# Patient Record
Sex: FEMALE | Race: WHITE | Employment: OTHER | URBAN - METROPOLITAN AREA
[De-identification: names, ages, dates, MRNs, and addresses within clinical notes are randomized per-mention and may not be internally consistent; named-entity substitution may affect disease eponyms.]

---

## 2017-07-18 RX ORDER — ATORVASTATIN CALCIUM 40 MG/1
TABLET, FILM COATED ORAL
Qty: 90 TABLET | Refills: 2 | Status: SHIPPED | OUTPATIENT
Start: 2017-07-18 | End: 2017-09-15 | Stop reason: SDUPTHER

## 2017-09-15 ENCOUNTER — OFFICE VISIT (OUTPATIENT)
Dept: FAMILY MEDICINE CLINIC | Age: 72
End: 2017-09-15

## 2017-09-15 VITALS
BODY MASS INDEX: 22.45 KG/M2 | WEIGHT: 122 LBS | SYSTOLIC BLOOD PRESSURE: 128 MMHG | HEART RATE: 72 BPM | TEMPERATURE: 97.7 F | RESPIRATION RATE: 16 BRPM | DIASTOLIC BLOOD PRESSURE: 64 MMHG | HEIGHT: 62 IN

## 2017-09-15 DIAGNOSIS — E03.9 HYPOTHYROIDISM, UNSPECIFIED TYPE: ICD-10-CM

## 2017-09-15 DIAGNOSIS — I10 ESSENTIAL HYPERTENSION: Primary | ICD-10-CM

## 2017-09-15 DIAGNOSIS — E78.2 MIXED HYPERLIPIDEMIA: ICD-10-CM

## 2017-09-15 DIAGNOSIS — Z87.891 HISTORY OF TOBACCO ABUSE: ICD-10-CM

## 2017-09-15 DIAGNOSIS — I65.23 BILATERAL CAROTID ARTERY STENOSIS: ICD-10-CM

## 2017-09-15 LAB
T4 FREE: 1.69 NG/DL (ref 0.93–1.7)
TSH SERPL DL<=0.05 MIU/L-ACNC: 0.19 UIU/ML (ref 0.27–4.2)

## 2017-09-15 PROCEDURE — 99203 OFFICE O/P NEW LOW 30 MIN: CPT | Performed by: FAMILY MEDICINE

## 2017-09-15 RX ORDER — GABAPENTIN 800 MG/1
800 TABLET ORAL DAILY
COMMUNITY
End: 2017-09-15 | Stop reason: SDUPTHER

## 2017-09-15 RX ORDER — FOSINOPRIL SODIUM 10 MG/1
10 TABLET ORAL DAILY
Qty: 30 TABLET | Refills: 5 | Status: ON HOLD | OUTPATIENT
Start: 2017-09-15 | End: 2022-07-11 | Stop reason: HOSPADM

## 2017-09-15 RX ORDER — LEVOTHYROXINE SODIUM 88 UG/1
TABLET ORAL
Qty: 30 TABLET | Refills: 5 | Status: SHIPPED | OUTPATIENT
Start: 2017-09-15 | End: 2018-02-14 | Stop reason: SDUPTHER

## 2017-09-15 RX ORDER — GABAPENTIN 800 MG/1
800 TABLET ORAL DAILY
Qty: 30 TABLET | Refills: 5 | Status: SHIPPED | OUTPATIENT
Start: 2017-09-15 | End: 2018-06-20 | Stop reason: SDUPTHER

## 2017-09-15 RX ORDER — ATORVASTATIN CALCIUM 40 MG/1
TABLET, FILM COATED ORAL
Qty: 30 TABLET | Refills: 5 | Status: SHIPPED | OUTPATIENT
Start: 2017-09-15 | End: 2018-12-30 | Stop reason: SDUPTHER

## 2017-09-15 RX ORDER — AMLODIPINE BESYLATE 5 MG/1
5 TABLET ORAL DAILY
Qty: 30 TABLET | Refills: 5 | Status: SHIPPED | OUTPATIENT
Start: 2017-09-15 | End: 2018-03-16 | Stop reason: SDUPTHER

## 2017-09-15 RX ORDER — ISOSORBIDE MONONITRATE 30 MG/1
30 TABLET, EXTENDED RELEASE ORAL DAILY
Qty: 30 TABLET | Refills: 5 | Status: SHIPPED | OUTPATIENT
Start: 2017-09-15 | End: 2018-03-07 | Stop reason: SDUPTHER

## 2017-09-15 RX ORDER — LEVOTHYROXINE SODIUM 88 UG/1
TABLET ORAL
Refills: 3 | COMMUNITY
Start: 2017-06-15 | End: 2017-09-15 | Stop reason: SDUPTHER

## 2018-02-05 DIAGNOSIS — I10 ESSENTIAL HYPERTENSION: ICD-10-CM

## 2018-02-14 DIAGNOSIS — E03.9 HYPOTHYROIDISM, UNSPECIFIED TYPE: ICD-10-CM

## 2018-02-14 RX ORDER — LEVOTHYROXINE SODIUM 88 UG/1
TABLET ORAL
Qty: 30 TABLET | Refills: 5 | Status: SHIPPED | OUTPATIENT
Start: 2018-02-14 | End: 2018-08-31 | Stop reason: SDUPTHER

## 2018-03-07 RX ORDER — ISOSORBIDE MONONITRATE 30 MG/1
30 TABLET, EXTENDED RELEASE ORAL DAILY
Qty: 30 TABLET | Refills: 5 | Status: SHIPPED | OUTPATIENT
Start: 2018-03-07 | End: 2018-03-16 | Stop reason: SDUPTHER

## 2018-03-15 RX ORDER — PREDNISONE 20 MG/1
TABLET ORAL
Refills: 0 | Status: ON HOLD | COMMUNITY
Start: 2018-01-11 | End: 2019-05-27

## 2018-03-15 RX ORDER — FLUCONAZOLE 150 MG/1
TABLET ORAL
Refills: 0 | Status: ON HOLD | COMMUNITY
Start: 2018-01-11 | End: 2019-05-28 | Stop reason: HOSPADM

## 2018-03-15 RX ORDER — BENZONATATE 100 MG/1
CAPSULE ORAL
Refills: 0 | Status: ON HOLD | COMMUNITY
Start: 2018-01-11 | End: 2021-09-10

## 2018-03-15 RX ORDER — DOXYCYCLINE HYCLATE 100 MG
TABLET ORAL
Refills: 0 | Status: ON HOLD | COMMUNITY
Start: 2018-01-11 | End: 2019-05-27

## 2018-03-16 ENCOUNTER — OFFICE VISIT (OUTPATIENT)
Dept: FAMILY MEDICINE CLINIC | Age: 73
End: 2018-03-16
Payer: MEDICARE

## 2018-03-16 VITALS
TEMPERATURE: 98.4 F | RESPIRATION RATE: 14 BRPM | HEIGHT: 62 IN | WEIGHT: 118 LBS | BODY MASS INDEX: 21.71 KG/M2 | DIASTOLIC BLOOD PRESSURE: 90 MMHG | HEART RATE: 70 BPM | SYSTOLIC BLOOD PRESSURE: 180 MMHG | OXYGEN SATURATION: 96 %

## 2018-03-16 DIAGNOSIS — E78.2 MIXED HYPERLIPIDEMIA: ICD-10-CM

## 2018-03-16 DIAGNOSIS — I10 ESSENTIAL HYPERTENSION: ICD-10-CM

## 2018-03-16 DIAGNOSIS — E03.9 HYPOTHYROIDISM, UNSPECIFIED TYPE: ICD-10-CM

## 2018-03-16 DIAGNOSIS — R73.9 HYPERGLYCEMIA: ICD-10-CM

## 2018-03-16 DIAGNOSIS — E03.9 HYPOTHYROIDISM, UNSPECIFIED TYPE: Primary | ICD-10-CM

## 2018-03-16 DIAGNOSIS — Z12.11 COLON CANCER SCREENING: ICD-10-CM

## 2018-03-16 LAB
ALBUMIN SERPL-MCNC: 4.4 G/DL (ref 3.9–4.9)
ALP BLD-CCNC: 91 U/L (ref 40–130)
ALT SERPL-CCNC: 17 U/L (ref 0–33)
ANION GAP SERPL CALCULATED.3IONS-SCNC: 13 MEQ/L (ref 7–13)
AST SERPL-CCNC: 30 U/L (ref 0–35)
BILIRUB SERPL-MCNC: 0.5 MG/DL (ref 0–1.2)
BUN BLDV-MCNC: 25 MG/DL (ref 8–23)
CALCIUM SERPL-MCNC: 9.1 MG/DL (ref 8.6–10.2)
CHLORIDE BLD-SCNC: 99 MEQ/L (ref 98–107)
CHOLESTEROL, TOTAL: 140 MG/DL (ref 0–199)
CO2: 26 MEQ/L (ref 22–29)
CREAT SERPL-MCNC: 1.55 MG/DL (ref 0.5–0.9)
GFR AFRICAN AMERICAN: 39.9
GFR NON-AFRICAN AMERICAN: 33
GLOBULIN: 3.2 G/DL (ref 2.3–3.5)
GLUCOSE BLD-MCNC: 88 MG/DL (ref 74–109)
HBA1C MFR BLD: 5.2 % (ref 4.8–5.9)
HCT VFR BLD CALC: 34.5 % (ref 37–47)
HDLC SERPL-MCNC: 52 MG/DL (ref 40–59)
HEMOGLOBIN: 11.6 G/DL (ref 12–16)
LDL CHOLESTEROL CALCULATED: 57 MG/DL (ref 0–129)
MCH RBC QN AUTO: 34.6 PG (ref 27–31.3)
MCHC RBC AUTO-ENTMCNC: 33.8 % (ref 33–37)
MCV RBC AUTO: 102.3 FL (ref 82–100)
PDW BLD-RTO: 17.1 % (ref 11.5–14.5)
PLATELET # BLD: 177 K/UL (ref 130–400)
POTASSIUM SERPL-SCNC: 5.5 MEQ/L (ref 3.5–5.1)
RBC # BLD: 3.37 M/UL (ref 4.2–5.4)
SODIUM BLD-SCNC: 138 MEQ/L (ref 132–144)
T4 FREE: 1.81 NG/DL (ref 0.93–1.7)
TOTAL PROTEIN: 7.6 G/DL (ref 6.4–8.1)
TRIGL SERPL-MCNC: 153 MG/DL (ref 0–200)
TSH SERPL DL<=0.05 MIU/L-ACNC: 0.18 UIU/ML (ref 0.27–4.2)
WBC # BLD: 6.7 K/UL (ref 4.8–10.8)

## 2018-03-16 PROCEDURE — 99214 OFFICE O/P EST MOD 30 MIN: CPT | Performed by: FAMILY MEDICINE

## 2018-03-16 RX ORDER — ISOSORBIDE MONONITRATE 30 MG/1
30 TABLET, EXTENDED RELEASE ORAL DAILY
Qty: 90 TABLET | Refills: 3 | Status: SHIPPED | OUTPATIENT
Start: 2018-03-16

## 2018-03-16 RX ORDER — AMLODIPINE BESYLATE 10 MG/1
10 TABLET ORAL DAILY
Qty: 90 TABLET | Refills: 3 | Status: ON HOLD | OUTPATIENT
Start: 2018-03-16 | End: 2019-05-27

## 2018-03-16 ASSESSMENT — PATIENT HEALTH QUESTIONNAIRE - PHQ9
1. LITTLE INTEREST OR PLEASURE IN DOING THINGS: 0
SUM OF ALL RESPONSES TO PHQ9 QUESTIONS 1 & 2: 0
2. FEELING DOWN, DEPRESSED OR HOPELESS: 0
SUM OF ALL RESPONSES TO PHQ QUESTIONS 1-9: 0

## 2018-03-16 NOTE — PROGRESS NOTES
Subjective  Adra Erick, 72 y.o. female presents today with:  Chief Complaint   Patient presents with    Hyperthyroidism    Hypertension    Hyperlipidemia    Other     pt agreed to fit test pt states she would like flonase again           Past Medical History:   Diagnosis Date    Bilateral carotid artery disease ([address]     CAD (coronary artery disease)     Hyperlipidemia     Hypertension     Hypothyroidism     Osteoarthritis      Past Surgical History:   Procedure Laterality Date    CARDIAC CATHETERIZATION  1990s    SPINE SURGERY  8/11/2014    LUMBAR    THYROIDECTOMY, PARTIAL  1962     Social History     Social History    Marital status:      Spouse name: N/A    Number of children: N/A    Years of education: N/A     Occupational History    Not on file. Social History Main Topics    Smoking status: Former Smoker     Types: Cigarettes     Quit date: 11/24/1996    Smokeless tobacco: Never Used    Alcohol use Not on file    Drug use: Unknown    Sexual activity: Not on file     Other Topics Concern    Not on file     Social History Narrative    No narrative on file     No family history on file.   Allergies   Allergen Reactions    Pcn [Penicillins] Hives and Itching     Current Outpatient Prescriptions   Medication Sig Dispense Refill    benzonatate (TESSALON) 100 MG capsule TAKE 1-2 CAPSULES BY KATHLEEN 3 TIMES A DAY AS NEEDED FOR COUGH  0    doxycycline hyclate (VIBRA-TABS) 100 MG tablet TAKE 1 TABLET BY MOUTH 2 TIMES A DAY  0    fluconazole (DIFLUCAN) 150 MG tablet TAKE 1 TABLET BY MOUTH ONCE FOR ONE DOSE-TAKE ON DAY AFTER FINISHING ANTIBIOTICS  0    predniSONE (DELTASONE) 20 MG tablet TAKE 2 TABLETS BY MOUTH ONCE DAILY FOR 5 DAYS  0    isosorbide mononitrate (IMDUR) 30 MG extended release tablet TAKE 1 TABLET BY MOUTH DAILY 30 tablet 5    levothyroxine (SYNTHROID) 88 MCG tablet TAKE 1 TABLET BY MOUTH EVERY DAY 30 tablet 5    metoprolol tartrate (LOPRESSOR) 25 MG tablet Take 1 tablet by mouth 2 times daily 60 tablet 5    PROAIR  (90 Base) MCG/ACT inhaler INHALE 2 PUFFS 4 TIMES DAILY AS NEEDED 1 Inhaler 3    gabapentin (NEURONTIN) 800 MG tablet Take 1 tablet by mouth daily 30 tablet 5    atorvastatin (LIPITOR) 40 MG tablet TAKE 1 TABLET DAILY AT BEDTIME. 30 tablet 5    amLODIPine (NORVASC) 5 MG tablet Take 1 tablet by mouth daily 30 tablet 5    fosinopril (MONOPRIL) 10 MG tablet Take 1 tablet by mouth daily 30 tablet 5    aspirin 81 MG tablet Take 81 mg by mouth daily      Famotidine (PEPCID PO) Take by mouth as needed      Acetaminophen (TYLENOL ARTHRITIS PAIN PO) Take by mouth as needed       No current facility-administered medications for this visit. The patient denies any history of      seizures,             heart attack + KNOWN CAD        NO stroke. No chest pain, shortness of breath, paroxysmal nocturnal dyspnea. No nausea, vomiting, diarrhea, hematochezia or melena. No paresthesias or headaches. No dysuria, frequency or hematuria. Last labs  No visits with results within 3 Month(s) from this visit. Latest known visit with results is:   Orders Only on 10/18/2017   Component Date Value Ref Range Status    Cholesterol, Total 10/18/2017 149  0 - 199 mg/dL Final    Triglycerides 10/18/2017 141  0 - 200 mg/dL Final    HDL 10/18/2017 58  40 - 59 mg/dL Final    Comment: ATP III HDL Cholesterol Classification is Desirable. Expected Values:    Males:    >55 = No Risk            35-55 = Moderate Risk            <35 = High Risk    Females:  >65 = No Risk            45-65 = Moderate Risk            <45 = High Risk    NCEP Guidelines:   Third Report May 2001  >59 = negative risk factor for CHD  <40 = major risk factor for CHD      LDL Calculated 10/18/2017 63  0 - 129 mg/dL Final     Health Maintenance   Topic Date Due    Hepatitis C screen  1945    Colon cancer screen colonoscopy  02/22/1997    DEXA (modify frequency per [facility]

## 2018-06-20 RX ORDER — GABAPENTIN 800 MG/1
800 TABLET ORAL DAILY
Qty: 90 TABLET | Refills: 1 | Status: ON HOLD | OUTPATIENT
Start: 2018-06-20 | End: 2022-07-02

## 2018-07-30 DIAGNOSIS — I10 ESSENTIAL HYPERTENSION: ICD-10-CM

## 2018-08-31 DIAGNOSIS — E03.9 HYPOTHYROIDISM, UNSPECIFIED TYPE: ICD-10-CM

## 2018-09-04 RX ORDER — LEVOTHYROXINE SODIUM 88 UG/1
TABLET ORAL
Qty: 30 TABLET | Refills: 5 | Status: SHIPPED | OUTPATIENT
Start: 2018-09-04 | End: 2018-09-18 | Stop reason: DRUGHIGH

## 2018-09-05 RX ORDER — ISOSORBIDE MONONITRATE 30 MG/1
30 TABLET, EXTENDED RELEASE ORAL DAILY
Qty: 30 TABLET | Refills: 5 | Status: ON HOLD | OUTPATIENT
Start: 2018-09-05 | End: 2019-05-28 | Stop reason: HOSPADM

## 2018-09-14 DIAGNOSIS — E78.2 MIXED HYPERLIPIDEMIA: ICD-10-CM

## 2018-09-14 DIAGNOSIS — E03.9 HYPOTHYROIDISM, UNSPECIFIED TYPE: ICD-10-CM

## 2018-09-14 DIAGNOSIS — I10 ESSENTIAL HYPERTENSION: Primary | ICD-10-CM

## 2018-09-17 DIAGNOSIS — I10 ESSENTIAL HYPERTENSION: ICD-10-CM

## 2018-09-17 DIAGNOSIS — E03.9 HYPOTHYROIDISM, UNSPECIFIED TYPE: ICD-10-CM

## 2018-09-17 DIAGNOSIS — E78.2 MIXED HYPERLIPIDEMIA: ICD-10-CM

## 2018-09-17 LAB
ALBUMIN SERPL-MCNC: 4.2 G/DL (ref 3.9–4.9)
ALP BLD-CCNC: 84 U/L (ref 40–130)
ALT SERPL-CCNC: 14 U/L (ref 0–33)
ANION GAP SERPL CALCULATED.3IONS-SCNC: 11 MEQ/L (ref 7–13)
AST SERPL-CCNC: 34 U/L (ref 0–35)
BILIRUB SERPL-MCNC: 0.5 MG/DL (ref 0–1.2)
BUN BLDV-MCNC: 21 MG/DL (ref 8–23)
CALCIUM SERPL-MCNC: 8.8 MG/DL (ref 8.6–10.2)
CHLORIDE BLD-SCNC: 104 MEQ/L (ref 98–107)
CHOLESTEROL, TOTAL: 130 MG/DL (ref 0–199)
CO2: 26 MEQ/L (ref 22–29)
CREAT SERPL-MCNC: 1.8 MG/DL (ref 0.5–0.9)
GFR AFRICAN AMERICAN: 33.5
GFR NON-AFRICAN AMERICAN: 27.7
GLOBULIN: 3.2 G/DL (ref 2.3–3.5)
GLUCOSE BLD-MCNC: 65 MG/DL (ref 74–109)
HDLC SERPL-MCNC: 47 MG/DL (ref 40–59)
LDL CHOLESTEROL CALCULATED: 60 MG/DL (ref 0–129)
POTASSIUM SERPL-SCNC: 4.9 MEQ/L (ref 3.5–5.1)
SODIUM BLD-SCNC: 141 MEQ/L (ref 132–144)
T4 FREE: 1.19 NG/DL (ref 0.93–1.7)
TOTAL PROTEIN: 7.4 G/DL (ref 6.4–8.1)
TRIGL SERPL-MCNC: 117 MG/DL (ref 0–200)
TSH SERPL DL<=0.05 MIU/L-ACNC: 4.77 UIU/ML (ref 0.27–4.2)

## 2018-09-18 ENCOUNTER — PATIENT MESSAGE (OUTPATIENT)
Dept: FAMILY MEDICINE CLINIC | Age: 73
End: 2018-09-18

## 2018-09-18 DIAGNOSIS — R79.89 ELEVATED SERUM CREATININE: Primary | ICD-10-CM

## 2018-09-18 RX ORDER — LEVOTHYROXINE SODIUM 0.1 MG/1
100 TABLET ORAL DAILY
Qty: 30 TABLET | Refills: 5 | Status: SHIPPED | OUTPATIENT
Start: 2018-09-18 | End: 2019-04-04 | Stop reason: SDUPTHER

## 2018-09-24 ENCOUNTER — OFFICE VISIT (OUTPATIENT)
Dept: FAMILY MEDICINE CLINIC | Age: 73
End: 2018-09-24
Payer: MEDICARE

## 2018-09-24 VITALS
BODY MASS INDEX: 23 KG/M2 | OXYGEN SATURATION: 98 % | HEIGHT: 62 IN | DIASTOLIC BLOOD PRESSURE: 72 MMHG | RESPIRATION RATE: 14 BRPM | HEART RATE: 77 BPM | SYSTOLIC BLOOD PRESSURE: 140 MMHG | WEIGHT: 125 LBS | TEMPERATURE: 96.5 F

## 2018-09-24 DIAGNOSIS — Z12.11 COLON CANCER SCREENING: ICD-10-CM

## 2018-09-24 DIAGNOSIS — I10 ESSENTIAL HYPERTENSION: ICD-10-CM

## 2018-09-24 DIAGNOSIS — Z78.0 POST-MENOPAUSAL: ICD-10-CM

## 2018-09-24 DIAGNOSIS — N28.9 RENAL INSUFFICIENCY: ICD-10-CM

## 2018-09-24 DIAGNOSIS — E03.9 HYPOTHYROIDISM, UNSPECIFIED TYPE: Primary | ICD-10-CM

## 2018-09-24 DIAGNOSIS — E78.2 MIXED HYPERLIPIDEMIA: ICD-10-CM

## 2018-09-24 LAB
BILIRUBIN, POC: ABNORMAL
BLOOD URINE, POC: ABNORMAL
CLARITY, POC: ABNORMAL
COLOR, POC: ABNORMAL
GLUCOSE URINE, POC: ABNORMAL
KETONES, POC: ABNORMAL
LEUKOCYTE EST, POC: ABNORMAL
NITRITE, POC: ABNORMAL
PH, POC: 5.5
PROTEIN, POC: ABNORMAL
SPECIFIC GRAVITY, POC: 1.02
UROBILINOGEN, POC: ABNORMAL

## 2018-09-24 PROCEDURE — 81003 URINALYSIS AUTO W/O SCOPE: CPT | Performed by: FAMILY MEDICINE

## 2018-09-24 PROCEDURE — 99214 OFFICE O/P EST MOD 30 MIN: CPT | Performed by: FAMILY MEDICINE

## 2018-09-24 NOTE — PROGRESS NOTES
09/24/18. Objective      Wt Readings from Last 3 Encounters:   09/24/18 125 lb (56.7 kg)   03/16/18 118 lb (53.5 kg)   09/15/17 122 lb (55.3 kg)     Temp Readings from Last 3 Encounters:   09/24/18 96.5 °F (35.8 °C) (Tympanic)   03/16/18 98.4 °F (36.9 °C)   09/15/17 97.7 °F (36.5 °C) (Tympanic)     BP Readings from Last 3 Encounters:   09/24/18 (!) 140/72   03/16/18 (!) 180/90   09/15/17 128/64     Pulse Readings from Last 3 Encounters:   09/24/18 77   03/16/18 70   09/15/17 72       PHYSICAL EXAMINATION:        GENERAL:    The patient appears well nourished and well-developed,     Normal affect. Not appearing significantly anxious or depressed. No acute respiratory distress. Alert and oriented times 3. Skin:     No skin rashes. No concerning moles observed. Gait:    Normal gait. No ataxia. HEENT:  Normocephalic, atraumatic. Throat:  Pharynx is clear, no erythema/ edema or exudates   Ears:    TMs normal bilaterally. Canals and ears normal   Eyes:  Extraocular eye motions intact and pain free. Pupils reactive/equal    Sclerae and conjunctivae clear    NECK: No masses or adenopathy palpable. No carotid bruits heard. No asymmetry visible. No thyromegaly. RESPIRATORY:   Clear/ Equal breath sounds /No acute respiratory distress. No wheezes,rales, or rhonchi. No percussive abnormalities    HEART: Regular rhythm without murmur, rub or gallop. ABDOMEN:  Soft, non tender. No masses, guarding or rebound. Normo active bowel sounds. EXTREMITIES:  No edema in any extremity. No cyanosis or clubbing. 2+ dorsalis pedis pulses bilaterally          Assessment & Plan    Diagnosis Orders   1. Hypothyroidism, unspecified type     2. Essential hypertension     3. Mixed hyperlipidemia     4. Renal insufficiency  US RETROPERITONEAL LIMITED    POCT Urinalysis No Micro (Auto)    Urine Culture   5.  Post-menopausal  DEXA Bone Density Axial Skeleton     No orders of the defined types were placed in this encounter. No orders of the defined types were placed in this encounter. Medications Discontinued During This Encounter   Medication Reason    Acetaminophen (TYLENOL ARTHRITIS PAIN PO) Therapy completed     No Follow-up on file.   Offered nephro and renal u/s  Drink more water  Takes synthroid NOW qd  Was missing wkds  2-3mo cmp tsh ft4 cbc  Also declines any gi workup at this time    Saba Odom MD

## 2018-09-26 LAB — URINE CULTURE, ROUTINE: NORMAL

## 2018-10-29 RX ORDER — ALBUTEROL SULFATE 90 UG/1
AEROSOL, METERED RESPIRATORY (INHALATION)
Qty: 8.5 INHALER | Refills: 3 | Status: SHIPPED | OUTPATIENT
Start: 2018-10-29 | End: 2019-03-08 | Stop reason: SDUPTHER

## 2018-11-07 ENCOUNTER — TELEPHONE (OUTPATIENT)
Dept: OTHER | Facility: CLINIC | Age: 73
End: 2018-11-07

## 2018-11-09 ENCOUNTER — HOSPITAL ENCOUNTER (OUTPATIENT)
Dept: WOMENS IMAGING | Age: 73
Discharge: HOME | End: 2018-11-11
Payer: MEDICARE

## 2018-11-09 ENCOUNTER — HOSPITAL ENCOUNTER (OUTPATIENT)
Dept: ULTRASOUND IMAGING | Age: 73
Discharge: HOME | End: 2018-11-11
Payer: MEDICARE

## 2018-11-09 DIAGNOSIS — Z78.0 POST-MENOPAUSAL: ICD-10-CM

## 2018-11-09 DIAGNOSIS — N28.9 RENAL INSUFFICIENCY: ICD-10-CM

## 2018-11-09 PROCEDURE — 77080 DXA BONE DENSITY AXIAL: CPT

## 2018-11-09 PROCEDURE — 76775 US EXAM ABDO BACK WALL LIM: CPT

## 2018-11-12 DIAGNOSIS — R93.429 ABNORMAL RENAL ULTRASOUND: Primary | ICD-10-CM

## 2018-12-17 ENCOUNTER — TELEPHONE (OUTPATIENT)
Dept: OTHER | Facility: CLINIC | Age: 73
End: 2018-12-17

## 2018-12-30 DIAGNOSIS — E78.2 MIXED HYPERLIPIDEMIA: ICD-10-CM

## 2018-12-31 RX ORDER — ATORVASTATIN CALCIUM 40 MG/1
TABLET, FILM COATED ORAL
Qty: 90 TABLET | Refills: 1 | Status: ON HOLD | OUTPATIENT
Start: 2018-12-31 | End: 2021-09-10

## 2019-01-28 DIAGNOSIS — I10 ESSENTIAL HYPERTENSION: ICD-10-CM

## 2019-03-08 RX ORDER — ALBUTEROL SULFATE 90 UG/1
AEROSOL, METERED RESPIRATORY (INHALATION)
Qty: 8.5 INHALER | Refills: 3 | Status: SHIPPED | OUTPATIENT
Start: 2019-03-08

## 2019-03-28 ENCOUNTER — TELEPHONE (OUTPATIENT)
Dept: FAMILY MEDICINE CLINIC | Age: 74
End: 2019-03-28

## 2019-04-04 ENCOUNTER — HOSPITAL ENCOUNTER (OUTPATIENT)
Dept: ULTRASOUND IMAGING | Age: 74
Discharge: HOME | End: 2019-04-06
Payer: MEDICARE

## 2019-04-04 DIAGNOSIS — N26.1 ACQUIRED ATROPHY OF KIDNEY: ICD-10-CM

## 2019-04-04 DIAGNOSIS — N18.30 CHRONIC KIDNEY DISEASE, STAGE III (MODERATE) (HCC): ICD-10-CM

## 2019-04-04 PROCEDURE — 93975 VASCULAR STUDY: CPT

## 2019-04-04 PROCEDURE — 76775 US EXAM ABDO BACK WALL LIM: CPT

## 2019-04-04 RX ORDER — LEVOTHYROXINE SODIUM 0.1 MG/1
TABLET ORAL
Qty: 30 TABLET | Refills: 0 | OUTPATIENT
Start: 2019-04-04

## 2019-04-04 RX ORDER — LEVOTHYROXINE SODIUM 0.1 MG/1
100 TABLET ORAL DAILY
Qty: 30 TABLET | Refills: 0 | Status: SHIPPED | OUTPATIENT
Start: 2019-04-04 | End: 2019-04-29 | Stop reason: SDUPTHER

## 2019-04-29 RX ORDER — LEVOTHYROXINE SODIUM 0.1 MG/1
100 TABLET ORAL DAILY
Qty: 30 TABLET | Refills: 0 | Status: ON HOLD | OUTPATIENT
Start: 2019-04-29 | End: 2019-05-28 | Stop reason: SDUPTHER

## 2019-05-26 ENCOUNTER — HOSPITAL ENCOUNTER (INPATIENT)
Age: 74
LOS: 2 days | Discharge: HOME | DRG: 074 | End: 2019-05-28
Attending: EMERGENCY MEDICINE | Admitting: INTERNAL MEDICINE
Payer: MEDICARE

## 2019-05-26 ENCOUNTER — APPOINTMENT (OUTPATIENT)
Dept: CT IMAGING | Age: 74
DRG: 074 | End: 2019-05-26
Payer: MEDICARE

## 2019-05-26 ENCOUNTER — APPOINTMENT (OUTPATIENT)
Dept: GENERAL RADIOLOGY | Age: 74
DRG: 074 | End: 2019-05-26
Payer: MEDICARE

## 2019-05-26 DIAGNOSIS — G45.9 TRANSIENT CEREBRAL ISCHEMIA, UNSPECIFIED TYPE: ICD-10-CM

## 2019-05-26 DIAGNOSIS — D61.818 PANCYTOPENIA (HCC): Primary | ICD-10-CM

## 2019-05-26 DIAGNOSIS — G62.9 PERIPHERAL POLYNEUROPATHY: ICD-10-CM

## 2019-05-26 LAB
ALBUMIN SERPL-MCNC: 3.8 G/DL (ref 3.5–4.6)
ALP BLD-CCNC: 68 U/L (ref 40–130)
ALT SERPL-CCNC: 15 U/L (ref 0–33)
ANION GAP SERPL CALCULATED.3IONS-SCNC: 15 MEQ/L (ref 9–15)
AST SERPL-CCNC: 30 U/L (ref 0–35)
BACTERIA: NEGATIVE /HPF
BILIRUB SERPL-MCNC: 1.1 MG/DL (ref 0.2–0.7)
BILIRUBIN URINE: NEGATIVE
BLOOD, URINE: ABNORMAL
BUN BLDV-MCNC: 22 MG/DL (ref 8–23)
CALCIUM SERPL-MCNC: 8.8 MG/DL (ref 8.5–9.9)
CHLORIDE BLD-SCNC: 109 MEQ/L (ref 95–107)
CLARITY: ABNORMAL
CO2: 22 MEQ/L (ref 20–31)
COLOR: ABNORMAL
CREAT SERPL-MCNC: 1.55 MG/DL (ref 0.5–0.9)
EKG ATRIAL RATE: 87 BPM
EKG P AXIS: 78 DEGREES
EKG P-R INTERVAL: 144 MS
EKG Q-T INTERVAL: 398 MS
EKG QRS DURATION: 68 MS
EKG QTC CALCULATION (BAZETT): 478 MS
EKG R AXIS: 31 DEGREES
EKG T AXIS: -8 DEGREES
EKG VENTRICULAR RATE: 87 BPM
EPITHELIAL CELLS, UA: ABNORMAL /HPF (ref 0–5)
GFR AFRICAN AMERICAN: 39.7
GFR NON-AFRICAN AMERICAN: 32.8
GLOBULIN: 2.9 G/DL (ref 2.3–3.5)
GLUCOSE BLD-MCNC: 110 MG/DL (ref 70–99)
GLUCOSE URINE: NEGATIVE MG/DL
HCT VFR BLD CALC: 19.8 % (ref 37–47)
HEMOGLOBIN: 7.1 G/DL (ref 12–16)
HYALINE CASTS: ABNORMAL /HPF (ref 0–5)
KETONES, URINE: ABNORMAL MG/DL
LEUKOCYTE ESTERASE, URINE: ABNORMAL
MAGNESIUM: 2.1 MG/DL (ref 1.7–2.4)
MCH RBC QN AUTO: 42.8 PG (ref 27–31.3)
MCHC RBC AUTO-ENTMCNC: 35.9 % (ref 33–37)
MCV RBC AUTO: 119.2 FL (ref 82–100)
NITRITE, URINE: NEGATIVE
PDW BLD-RTO: 17.2 % (ref 11.5–14.5)
PH UA: 5 (ref 5–9)
PLATELET # BLD: 79 K/UL (ref 130–400)
PLATELET SLIDE REVIEW: ABNORMAL
POTASSIUM SERPL-SCNC: 4.4 MEQ/L (ref 3.4–4.9)
PROTEIN UA: ABNORMAL MG/DL
RBC # BLD: 1.66 M/UL (ref 4.2–5.4)
RBC UA: ABNORMAL /HPF (ref 0–5)
RENAL EPITHELIAL, UA: ABNORMAL /HPF
SLIDE REVIEW: ABNORMAL
SODIUM BLD-SCNC: 146 MEQ/L (ref 135–144)
SPECIFIC GRAVITY UA: 1.02 (ref 1–1.03)
TOTAL PROTEIN: 6.7 G/DL (ref 6.3–8)
TROPONIN: 0.02 NG/ML (ref 0–0.01)
URINE REFLEX TO CULTURE: YES
UROBILINOGEN, URINE: 1 E.U./DL
WBC # BLD: 3.7 K/UL (ref 4.8–10.8)
WBC UA: >100 /HPF (ref 0–5)
YEAST: PRESENT

## 2019-05-26 PROCEDURE — 36415 COLL VENOUS BLD VENIPUNCTURE: CPT

## 2019-05-26 PROCEDURE — 2580000003 HC RX 258: Performed by: PHYSICIAN ASSISTANT

## 2019-05-26 PROCEDURE — 93005 ELECTROCARDIOGRAM TRACING: CPT | Performed by: EMERGENCY MEDICINE

## 2019-05-26 PROCEDURE — 70450 CT HEAD/BRAIN W/O DYE: CPT

## 2019-05-26 PROCEDURE — 6370000000 HC RX 637 (ALT 250 FOR IP): Performed by: EMERGENCY MEDICINE

## 2019-05-26 PROCEDURE — 81001 URINALYSIS AUTO W/SCOPE: CPT

## 2019-05-26 PROCEDURE — 83735 ASSAY OF MAGNESIUM: CPT

## 2019-05-26 PROCEDURE — 87077 CULTURE AEROBIC IDENTIFY: CPT

## 2019-05-26 PROCEDURE — 87086 URINE CULTURE/COLONY COUNT: CPT

## 2019-05-26 PROCEDURE — 84484 ASSAY OF TROPONIN QUANT: CPT

## 2019-05-26 PROCEDURE — 99285 EMERGENCY DEPT VISIT HI MDM: CPT

## 2019-05-26 PROCEDURE — 87186 SC STD MICRODIL/AGAR DIL: CPT

## 2019-05-26 PROCEDURE — 1210000000 HC MED SURG R&B

## 2019-05-26 PROCEDURE — 85027 COMPLETE CBC AUTOMATED: CPT

## 2019-05-26 PROCEDURE — 2580000003 HC RX 258: Performed by: EMERGENCY MEDICINE

## 2019-05-26 PROCEDURE — 71045 X-RAY EXAM CHEST 1 VIEW: CPT

## 2019-05-26 PROCEDURE — 74176 CT ABD & PELVIS W/O CONTRAST: CPT

## 2019-05-26 PROCEDURE — 80053 COMPREHEN METABOLIC PANEL: CPT

## 2019-05-26 RX ORDER — GABAPENTIN 400 MG/1
800 CAPSULE ORAL DAILY
Status: ON HOLD | COMMUNITY
End: 2019-05-28 | Stop reason: HOSPADM

## 2019-05-26 RX ORDER — ASPIRIN 325 MG
325 TABLET ORAL ONCE
Status: COMPLETED | OUTPATIENT
Start: 2019-05-26 | End: 2019-05-26

## 2019-05-26 RX ORDER — ONDANSETRON 2 MG/ML
4 INJECTION INTRAMUSCULAR; INTRAVENOUS EVERY 6 HOURS PRN
Status: DISCONTINUED | OUTPATIENT
Start: 2019-05-26 | End: 2019-05-28 | Stop reason: HOSPADM

## 2019-05-26 RX ORDER — SODIUM CHLORIDE 0.9 % (FLUSH) 0.9 %
10 SYRINGE (ML) INJECTION PRN
Status: DISCONTINUED | OUTPATIENT
Start: 2019-05-26 | End: 2019-05-28 | Stop reason: HOSPADM

## 2019-05-26 RX ORDER — SODIUM CHLORIDE 9 MG/ML
INJECTION, SOLUTION INTRAVENOUS CONTINUOUS
Status: DISCONTINUED | OUTPATIENT
Start: 2019-05-26 | End: 2019-05-27

## 2019-05-26 RX ORDER — SODIUM CHLORIDE 0.9 % (FLUSH) 0.9 %
10 SYRINGE (ML) INJECTION EVERY 12 HOURS SCHEDULED
Status: DISCONTINUED | OUTPATIENT
Start: 2019-05-26 | End: 2019-05-28 | Stop reason: HOSPADM

## 2019-05-26 RX ORDER — 0.9 % SODIUM CHLORIDE 0.9 %
500 INTRAVENOUS SOLUTION INTRAVENOUS ONCE
Status: COMPLETED | OUTPATIENT
Start: 2019-05-26 | End: 2019-05-26

## 2019-05-26 RX ADMIN — Medication 10 ML: at 22:10

## 2019-05-26 RX ADMIN — SODIUM CHLORIDE: 9 INJECTION, SOLUTION INTRAVENOUS at 22:09

## 2019-05-26 RX ADMIN — ASPIRIN 325 MG: 325 TABLET, COATED ORAL at 17:19

## 2019-05-26 RX ADMIN — SODIUM CHLORIDE 500 ML: 9 INJECTION, SOLUTION INTRAVENOUS at 14:32

## 2019-05-26 ASSESSMENT — ENCOUNTER SYMPTOMS
ABDOMINAL PAIN: 0
SHORTNESS OF BREATH: 0
COLOR CHANGE: 0
WHEEZING: 0
VOMITING: 0
PHOTOPHOBIA: 0
RHINORRHEA: 0
EYE DISCHARGE: 0
ABDOMINAL DISTENTION: 0
FACIAL SWELLING: 0

## 2019-05-26 NOTE — ED NOTES
Hand off report received from [facility], [facility].       Delta Romero RN  05/26/19 1453

## 2019-05-26 NOTE — ED TRIAGE NOTES
Pt arrived via ems from home with c/o chronic bilat feet numbs since back surgery [id number]. Pt also has bilat hand numbness x 1 month. Pt denies any pain. pt a/o x 3 skin pink w/d rep non labored. Pt in nad.

## 2019-05-26 NOTE — ED PROVIDER NOTES
[facility] ED  eMERGENCY dEPARTMENT eNCOUnter      Pt Name: Raisa Ge  MRN: [id number]  [place] 1945  Date of evaluation: 5/26/2019  Provider: Chichi Escalante [address] [place] [address]       Chief Complaint   Patient presents with    Other     chronic numbess in feet and hands         HISTORY OF PRESENT ILLNESS   (Location/Symptom, Timing/Onset,Context/Setting, Quality, Duration, Modifying Factors, Severity)  Note limiting factors. Raisa Ge is a 69 y.o. female who presents to the emergency department with a chief complaint of not feeling well. Patient has had neuropathy in her feet off-and-on since her back surgery and may be 2015. Recently she has had this neuropathy back and feels as though it is been in her hands as well. She states that her feet are doing what she asked him to do and she fell yesterday. She doesn't trust her hand to hold a cup and has to guide it with both hands. She had an episode today witnessed by the family that lasted about 5 minutes where she had slurred speech and was \"limp like a dish rag\". She did not lose consciousness during this period. She denies any headache or trauma. She denies any chest pain or shortness of breath. HPI    NursingNotes were reviewed. REVIEW OF SYSTEMS    (2-9 systems for level 4, 10 or more for level 5)     Review of Systems   Constitutional: Negative for activity change and appetite change. HENT: Negative for congestion, facial swelling and rhinorrhea. Eyes: Negative for photophobia and discharge. Respiratory: Negative for shortness of breath and wheezing. Cardiovascular: Negative for chest pain. Gastrointestinal: Negative for abdominal distention, abdominal pain and vomiting. Endocrine: Negative for polydipsia and polyphagia. Genitourinary: Negative for difficulty urinating, frequency, vaginal bleeding and vaginal discharge. Musculoskeletal: Negative for gait problem.    Skin: Negative for color change. Allergic/Immunologic: Negative for immunocompromised state. Neurological: Positive for numbness. Negative for dizziness, weakness and light-headedness. Hematological: Negative for adenopathy. Psychiatric/Behavioral: Negative for behavioral problems. Except as noted above the remainder of the review of systems was reviewed and negative. PAST MEDICAL HISTORY     Past Medical History:   Diagnosis Date    Bilateral carotid artery disease ([address]     CAD (coronary artery disease)     Hyperlipidemia     Hypertension     Hypothyroidism     Osteoarthritis          SURGICALHISTORY       Past Surgical History:   Procedure Laterality Date    CARDIAC CATHETERIZATION  1990s    SPINE SURGERY  8/11/2014    LUMBAR    THYROIDECTOMY, PARTIAL  1962         CURRENT MEDICATIONS       Previous Medications    ALBUTEROL SULFATE  (90 BASE) MCG/ACT INHALER    INHALE 2 PUFFS 4 TIMES DAILY AS NEEDED    AMLODIPINE (NORVASC) 10 MG TABLET    Take 1 tablet by mouth daily    ASPIRIN 81 MG TABLET    Take 81 mg by mouth daily    ATORVASTATIN (LIPITOR) 40 MG TABLET    TAKE 1 TABLET DAILY AT BEDTIME. BENZONATATE (TESSALON) 100 MG CAPSULE    TAKE 1-2 CAPSULES BY KATHLEEN 3 TIMES A DAY AS NEEDED FOR COUGH    DOXYCYCLINE HYCLATE (VIBRA-TABS) 100 MG TABLET    TAKE 1 TABLET BY MOUTH 2 TIMES A DAY    FAMOTIDINE (PEPCID PO)    Take by mouth as needed    FLUCONAZOLE (DIFLUCAN) 150 MG TABLET    TAKE 1 TABLET BY MOUTH ONCE FOR ONE DOSE-TAKE ON DAY AFTER FINISHING ANTIBIOTICS    FOSINOPRIL (MONOPRIL) 10 MG TABLET    Take 1 tablet by mouth daily    GABAPENTIN (NEURONTIN) 800 MG TABLET    Take 1 tablet by mouth daily for 90 days. .    ISOSORBIDE MONONITRATE (IMDUR) 30 MG EXTENDED RELEASE TABLET    Take 1 tablet by mouth daily    ISOSORBIDE MONONITRATE (IMDUR) 30 MG EXTENDED RELEASE TABLET    TAKE 1 TABLET BY MOUTH DAILY    LEVOTHYROXINE (SYNTHROID) 100 MCG TABLET    Take 1 tablet by mouth Daily    METOPROLOL TARTRATE (LOPRESSOR) 25 MG TABLET    TAKE 1 TABLET BY MOUTH 2 TIMES DAILY    PREDNISONE (DELTASONE) 20 MG TABLET    TAKE 2 TABLETS BY MOUTH ONCE DAILY FOR 5 DAYS       ALLERGIES     Bee venom and Pcn [penicillins]    FAMILY HISTORY     History reviewed. No pertinent family history. SOCIAL HISTORY       Social History     Socioeconomic History    Marital status:      Spouse name: None    Number of children: None    Years of education: None    Highest education level: None   Occupational History    None   Social Needs    Financial resource strain: None    Food insecurity:     Worry: None     Inability: None    Transportation needs:     Medical: None     Non-medical: None   Tobacco Use    Smoking status: Former Smoker     Packs/day: 1.00     Years: 35.00     Pack years: 35.00     Types: Cigarettes     Start date:      Last attempt to quit: 1996     Years since quittin.5    Smokeless tobacco: Never Used   Substance and Sexual Activity    Alcohol use: None    Drug use: None    Sexual activity: None   Lifestyle    Physical activity:     Days per week: None     Minutes per session: None    Stress: None   Relationships    Social connections:     Talks on phone: None     Gets together: None     Attends Druze service: None     Active member of club or organization: None     Attends meetings of clubs or organizations: None     Relationship status: None    Intimate partner violence:     Fear of current or ex partner: None     Emotionally abused: None     Physically abused: None     Forced sexual activity: None   Other Topics Concern    None   Social History Narrative    None       SCREENINGS   NIH Stroke Scale  NIH Stroke Scale Assessed: Yes  Interval: Baseline  Level of Consciousness (1a. ): Alert  LOC Questions (1b. ):  Answers both correctly  LOC Commands (1c. ): Obeys both correctly  Best Gaze (2. ): Normal  Visual (3. ): No visual loss  Facial Palsy (4. ): Normal  Motor Arm, Left Additional Contrast? None   Final Result   ANEMIA. QUESTIONABLE GALLBLADDER SLUDGE OR FINE GRAVEL--GALLBLADDER ULTRASOUND MAY BE OBTAINED FOR FURTHER EVALUATION. ATROPHIC RIGHT KIDNEY WITH WITH NONOBSTRUCTING NEPHROLITHIASIS. ADDITIONAL FINDINGS AS ABOVE. CT Head WO Contrast   Final Result   NO ACUTE INTRACRANIAL PATHOLOGY.       XR CHEST PORTABLE    (Results Pending)         ED BEDSIDE ULTRASOUND:   Performed by ED Physician - none    LABS:  Labs Reviewed   CBC - Abnormal; Notable for the following components:       Result Value    WBC 3.7 (*)     RBC 1.66 (*)     Hemoglobin 7.1 (*)     Hematocrit 19.8 (*)     .2 (*)     MCH 42.8 (*)     RDW 17.2 (*)     Platelets 79 (*)     All other components within normal limits    Narrative:     CALL  [facility] tel. [phone],  hct  results called to and read back by  Shaggy Saleem rn, 05/26/2019 14:12, by  Last Cooper   COMPREHENSIVE METABOLIC PANEL - Abnormal; Notable for the following components:    Sodium 146 (*)     Chloride 109 (*)     Glucose 110 (*)     CREATININE 1.55 (*)     GFR Non- 32.8 (*)     GFR  39.7 (*)     Total Bilirubin 1.1 (*)     All other components within normal limits   URINE RT REFLEX TO CULTURE - Abnormal; Notable for the following components:    Color, UA DARK YELLOW (*)     Clarity, UA CLOUDY (*)     Ketones, Urine TRACE (*)     Blood, Urine TRACE (*)     Protein, UA TRACE (*)     Leukocyte Esterase, Urine LARGE (*)     All other components within normal limits   TROPONIN - Abnormal; Notable for the following components:    Troponin 0.019 (*)     All other components within normal limits    Narrative:     CALL  [facility] tel. [phone],  Troponin called to Maxwell ford E.D., 05/26/2019 14:39, by Amol 9 - Abnormal; Notable for the following components:    Renal Epithelial, Urine 3-5 (*)     Yeast, UA Present (*)     WBC, UA >100 (*)     RBC, UA 3-5 (*)     All other components

## 2019-05-26 NOTE — H&P
Hospital Medicine History & Physical      PCP: Avani Castrejon MD    Date of Admission: 5/26/2019    Date of Service: 5/26/19      Chief Complaint:  Numbness in feet and hands      History Of Present Illness:  69 y.o. female who presented to [facility] ED for complaints of not feeling well. She admits to intermittent neuropathy to her feet and hands since 2015. She recently had developed numbness to her back and has been feeling it in her hands. She states that she has been having difficult gait and stumbling a lot lately. She admits to falling yesterday. She had an episode today which was witnessed by family, where she had slurred speech and went limp, \"like a rag doll\". She did not loose consciousness. She denies cp sob ha rash anx n v d f    Past Medical History:          Diagnosis Date    Bilateral carotid artery disease ([address]     CAD (coronary artery disease)     Hyperlipidemia     Hypertension     Hypothyroidism     Osteoarthritis        Past Surgical History:          Procedure Laterality Date    CARDIAC CATHETERIZATION  1990s    SPINE SURGERY  8/11/2014    LUMBAR    THYROIDECTOMY, PARTIAL  1962       Medications Prior to Admission:      Prior to Admission medications    Medication Sig Start Date End Date Taking? Authorizing Provider   levothyroxine (SYNTHROID) 100 MCG tablet Take 1 tablet by mouth Daily 4/29/19   Orville Mata MD   albuterol sulfate  (90 Base) MCG/ACT inhaler INHALE 2 PUFFS 4 TIMES DAILY AS NEEDED 3/8/19   Jose Maria Bardales MD   metoprolol tartrate (LOPRESSOR) 25 MG tablet TAKE 1 TABLET BY MOUTH 2 TIMES DAILY 1/28/19   Jose Maria Bardales MD   atorvastatin (LIPITOR) 40 MG tablet TAKE 1 TABLET DAILY AT BEDTIME. 12/31/18   Jose Maria Bardales MD   isosorbide mononitrate (IMDUR) 30 MG extended release tablet TAKE 1 TABLET BY MOUTH DAILY 9/5/18   Jose Maria Bardales MD   gabapentin (NEURONTIN) 800 MG tablet Take 1 tablet by mouth daily for 90 days. . 6/20/18 9/24/18 muscles intact. Conjunctivae/corneas clear. Neck: Supple, with full range of motion. No jugular venous distention. Trachea midline. Respiratory:  Normal respiratory effort. Clear to auscultation, bilaterally without Rales/Wheezes/Rhonchi. Cardiovascular:  Regular rate and rhythm with normal S1/S2 without murmurs, rubs or gallops. Abdomen: Soft, non-tender, non-distended with normal bowel sounds. Musculoskeletal:  No clubbing, cyanosis or edema bilaterally. Full range of motion without deformity. Skin: Skin color, texture, turgor normal.  No rashes or lesions. Neurologic:  Neurovascularly intact without any focal sensory/motor deficits. Cranial nerves: II-XII intact, grossly non-focal.  Psychiatric:  Alert and oriented, thought content appropriate, normal insight  Capillary Refill: Brisk,< 3 seconds   Peripheral Pulses: +2 palpable, equal bilaterally       Labs:     Recent Labs     05/26/19  1345   WBC 3.7*   HGB 7.1*   HCT 19.8*   PLT 79*     Recent Labs     05/26/19  1345   *   K 4.4   *   CO2 22   BUN 22   CREATININE 1.55*   CALCIUM 8.8     Recent Labs     05/26/19  1345   AST 30   ALT 15   BILITOT 1.1*   ALKPHOS 68     No results for input(s): INR in the last 72 hours. Recent Labs     05/26/19  1345   TROPONINI 0.019*       Urinalysis:      Lab Results   Component Value Date    NITRU Negative 05/26/2019    WBCUA >100 05/26/2019    BACTERIA Negative 05/26/2019    RBCUA 3-5 05/26/2019    BLOODU TRACE 05/26/2019    SPECGRAV 1.023 05/26/2019    GLUCOSEU Negative 05/26/2019       Radiology:     CXR: I have reviewed the CXR with the following interpretation: pending  EKG:  I have reviewed the EKG with the following interpretation: pending    CT ABDOMEN PELVIS WO CONTRAST Additional Contrast? None   Final Result   ANEMIA. QUESTIONABLE GALLBLADDER SLUDGE OR FINE GRAVEL--GALLBLADDER ULTRASOUND MAY BE OBTAINED FOR FURTHER EVALUATION. ATROPHIC RIGHT KIDNEY WITH WITH NONOBSTRUCTING NEPHROLITHIASIS.

## 2019-05-26 NOTE — ED NOTES
Urine sample collected and sent to lab via tube system at this time.       [facility]  60/89/08 3345

## 2019-05-26 NOTE — ED PROVIDER NOTES
(5a. ): No drift  Motor Arm, Right (5b. ): No drift  Motor Leg, Left (6a. ): No drift  Motor Leg, Right (6b. ): No drift  Limb Ataxia (7. ): (!) Present in two limbs  Sensory (8. ): (!) Partial loss  Best Language (9. ): No aphasia  Dysarthria (10. ): Normal  Extinction and Inattention (11): No neglect  Total: 3  @FLOW([id number])@      PHYSICAL EXAM    (up to 7 for level 4, 8 or more for level 5)     ED Triage Vitals [05/26/19 1307]   BP Temp Temp Source Pulse Resp SpO2 Height Weight   (!) 142/59 98.2 °F (36.8 °C) Oral 95 18 100 % 5' 2\" (1.575 m) 107 lb (48.5 kg)       Physical Exam   Constitutional: She is oriented to person, place, and time. She appears well-developed and well-nourished. No distress. HENT:   Head: Normocephalic and atraumatic. Eyes: Pupils are equal, round, and reactive to light. Conjunctivae and EOM are normal.   Neck: Normal range of motion. Neck supple. Cardiovascular: Normal rate. Pulmonary/Chest: Effort normal.   Abdominal: Soft. Bowel sounds are normal.   Musculoskeletal: Normal range of motion. Neurological: She is alert and oriented to person, place, and time. She has normal reflexes. Coordination abnormal.   See stroke scale   Skin: Skin is warm and dry. Psychiatric: She has a normal mood and affect.        DIAGNOSTIC RESULTS     EKG: All EKG's are interpreted by the Emergency Department Physician who either signs or Co-signsthis chart in the absence of a cardiologist.    Normal sinus rhythm at 87 bpm with no acute ST segment elevation or T-wave inversion  She has what appears to be dig effect on her EKG    RADIOLOGY:   Non-plain filmimages such as CT, Ultrasound and MRI are read by the radiologist. Plain radiographic images are visualized and preliminarily interpreted by the emergency physician with the below findings:    No acute findings on cxr per my read    Interpretation per the Radiologist below, if available at the time ofthis note:    [facility] within normal limits   URINE CULTURE   MAGNESIUM       All other labs were within normal range or not returned as of this dictation. EMERGENCY DEPARTMENT COURSE and DIFFERENTIAL DIAGNOSIS/MDM:   Vitals:    Vitals:    05/26/19 1307 05/26/19 1518 05/26/19 1641   BP: (!) 142/59 (!) 171/57 (!) 147/60   Pulse: 95 94 87   Resp: 18 18 18   Temp: 98.2 °F (36.8 °C)     TempSrc: Oral     SpO2: 100% 100% 100%   Weight: 107 lb (48.5 kg)     Height: 5' 2\" (1.575 m)         Patient appears energetic and pleasant here. Her family states that while here she seems better than she has in days. She has new pancytopenia and it is unclear what occurred exactly during her episode earlier that perhaps could have been a TIA. She is still driving and very independent. Yesterday a similar incident caused her to fall. For her own safety and because of the incidents becoming more frequent, I feel she should be admitted to the hospital.  I discuss this with the hospitalist and patient will be admitted    BRIAN Simon 124 time was 0 minutes, excluding separately reportableprocedures. There was a high probability of clinicallysignificant/life threatening deterioration in the patient's condition which required my urgent intervention. CONSULTS:  IP CONSULT TO HOSPITALIST  IP CONSULT TO NEUROLOGY  IP CONSULT TO HEM/ONC    PROCEDURES:  Unless otherwise noted below, none     Procedures    FINAL IMPRESSION      1. Pancytopenia ([address]    2. Transient cerebral ischemia, unspecified type    3.  Peripheral polyneuropathy          DISPOSITION/PLAN   DISPOSITION Admitted 05/26/2019 04:51:44 PM      PATIENT REFERRED TO:  Og Tomlin MD  [address] [address]  [phone]            DISCHARGE MEDICATIONS:  New Prescriptions    No medications on file          (Please note that portions of this note were completed with a voice recognition program.  Efforts were made to edit the dictations but occasionally words are mis-transcribed.)    Gabby Haynes DO (electronically signed)  Attending Emergency Physician          Gabby Haynes DO  05/26/19 4102

## 2019-05-26 NOTE — ED NOTES
Patient to the restroom via wheelchair at this time, patient aware urine sample is needed.       Jewel Sizer  72/18/10 1543

## 2019-05-27 ENCOUNTER — APPOINTMENT (OUTPATIENT)
Dept: MRI IMAGING | Age: 74
DRG: 074 | End: 2019-05-27
Payer: MEDICARE

## 2019-05-27 LAB
ABO/RH: NORMAL
ANION GAP SERPL CALCULATED.3IONS-SCNC: 13 MEQ/L (ref 9–15)
ANTIBODY SCREEN: NORMAL
BASOPHILS ABSOLUTE: 0 K/UL (ref 0–0.2)
BASOPHILS RELATIVE PERCENT: 0.5 %
BUN BLDV-MCNC: 21 MG/DL (ref 8–23)
CALCIUM SERPL-MCNC: 8 MG/DL (ref 8.5–9.9)
CHLORIDE BLD-SCNC: 111 MEQ/L (ref 95–107)
CO2: 22 MEQ/L (ref 20–31)
CREAT SERPL-MCNC: 1.47 MG/DL (ref 0.5–0.9)
EOSINOPHILS ABSOLUTE: 0.1 K/UL (ref 0–0.7)
EOSINOPHILS RELATIVE PERCENT: 3.6 %
FERRITIN: 678 NG/ML (ref 13–150)
FOLATE: 19.6 NG/ML (ref 7.3–26.1)
GFR AFRICAN AMERICAN: 42.2
GFR NON-AFRICAN AMERICAN: 34.9
GLUCOSE BLD-MCNC: 96 MG/DL (ref 70–99)
HCT VFR BLD CALC: 17.5 % (ref 37–47)
HCT VFR BLD CALC: 19.5 % (ref 37–47)
HEMOGLOBIN: 6.2 G/DL (ref 12–16)
IRON SATURATION: 76 % (ref 11–46)
IRON: 133 UG/DL (ref 37–145)
LACTIC ACID: 1.2 MMOL/L (ref 0.5–2.2)
LYMPHOCYTES ABSOLUTE: 0.9 K/UL (ref 1–4.8)
LYMPHOCYTES RELATIVE PERCENT: 34.6 %
MCH RBC QN AUTO: 42.6 PG (ref 27–31.3)
MCHC RBC AUTO-ENTMCNC: 35.5 % (ref 33–37)
MCV RBC AUTO: 119.9 FL (ref 82–100)
MONOCYTES ABSOLUTE: 0.1 K/UL (ref 0.2–0.8)
MONOCYTES RELATIVE PERCENT: 2.9 %
NEUTROPHILS ABSOLUTE: 1.5 K/UL (ref 1.4–6.5)
NEUTROPHILS RELATIVE PERCENT: 58.4 %
PDW BLD-RTO: 16.1 % (ref 11.5–14.5)
PLATELET # BLD: 49 K/UL (ref 130–400)
POTASSIUM REFLEX MAGNESIUM: 4.2 MEQ/L (ref 3.4–4.9)
RBC # BLD: 1.46 M/UL (ref 4.2–5.4)
RETICULOCYTE ABSOLUTE COUNT: 0.02 M/CUMM (ref 0.02–0.11)
RETICULOCYTE COUNT PCT: 1.4 % (ref 0.6–2.2)
SODIUM BLD-SCNC: 146 MEQ/L (ref 135–144)
TOTAL IRON BINDING CAPACITY: 176 UG/DL (ref 178–450)
VITAMIN B-12: <150 PG/ML (ref 232–1245)
WBC # BLD: 2.6 K/UL (ref 4.8–10.8)

## 2019-05-27 PROCEDURE — 84425 ASSAY OF VITAMIN B-1: CPT

## 2019-05-27 PROCEDURE — 1210000000 HC MED SURG R&B

## 2019-05-27 PROCEDURE — 84160 ASSAY OF PROTEIN ANY SOURCE: CPT

## 2019-05-27 PROCEDURE — 70553 MRI BRAIN STEM W/O & W/DYE: CPT

## 2019-05-27 PROCEDURE — 84165 PROTEIN E-PHORESIS SERUM: CPT

## 2019-05-27 PROCEDURE — 6360000004 HC RX CONTRAST MEDICATION: Performed by: PHYSICIAN ASSISTANT

## 2019-05-27 PROCEDURE — 86901 BLOOD TYPING SEROLOGIC RH(D): CPT

## 2019-05-27 PROCEDURE — 2580000003 HC RX 258: Performed by: PHYSICIAN ASSISTANT

## 2019-05-27 PROCEDURE — 85046 RETICYTE/HGB CONCENTRATE: CPT

## 2019-05-27 PROCEDURE — 36430 TRANSFUSION BLD/BLD COMPNT: CPT

## 2019-05-27 PROCEDURE — 82746 ASSAY OF FOLIC ACID SERUM: CPT

## 2019-05-27 PROCEDURE — 83605 ASSAY OF LACTIC ACID: CPT

## 2019-05-27 PROCEDURE — P9016 RBC LEUKOCYTES REDUCED: HCPCS

## 2019-05-27 PROCEDURE — 36415 COLL VENOUS BLD VENIPUNCTURE: CPT

## 2019-05-27 PROCEDURE — 86850 RBC ANTIBODY SCREEN: CPT

## 2019-05-27 PROCEDURE — 82607 VITAMIN B-12: CPT

## 2019-05-27 PROCEDURE — 83550 IRON BINDING TEST: CPT

## 2019-05-27 PROCEDURE — 99222 1ST HOSP IP/OBS MODERATE 55: CPT | Performed by: INTERNAL MEDICINE

## 2019-05-27 PROCEDURE — 86923 COMPATIBILITY TEST ELECTRIC: CPT

## 2019-05-27 PROCEDURE — 82728 ASSAY OF FERRITIN: CPT

## 2019-05-27 PROCEDURE — 86900 BLOOD TYPING SEROLOGIC ABO: CPT

## 2019-05-27 PROCEDURE — A9579 GAD-BASE MR CONTRAST NOS,1ML: HCPCS | Performed by: PHYSICIAN ASSISTANT

## 2019-05-27 PROCEDURE — 86340 INTRINSIC FACTOR ANTIBODY: CPT

## 2019-05-27 PROCEDURE — 6370000000 HC RX 637 (ALT 250 FOR IP): Performed by: INTERNAL MEDICINE

## 2019-05-27 PROCEDURE — 6360000002 HC RX W HCPCS: Performed by: INTERNAL MEDICINE

## 2019-05-27 PROCEDURE — 83540 ASSAY OF IRON: CPT

## 2019-05-27 PROCEDURE — 70544 MR ANGIOGRAPHY HEAD W/O DYE: CPT

## 2019-05-27 PROCEDURE — 85025 COMPLETE CBC W/AUTO DIFF WBC: CPT

## 2019-05-27 PROCEDURE — 80048 BASIC METABOLIC PNL TOTAL CA: CPT

## 2019-05-27 PROCEDURE — 2580000003 HC RX 258: Performed by: INTERNAL MEDICINE

## 2019-05-27 RX ORDER — LORAZEPAM 1 MG/1
1 TABLET ORAL ONCE
Status: COMPLETED | OUTPATIENT
Start: 2019-05-27 | End: 2019-05-27

## 2019-05-27 RX ORDER — 0.9 % SODIUM CHLORIDE 0.9 %
250 INTRAVENOUS SOLUTION INTRAVENOUS ONCE
Status: COMPLETED | OUTPATIENT
Start: 2019-05-27 | End: 2019-05-28

## 2019-05-27 RX ORDER — LORAZEPAM 2 MG/ML
1 INJECTION INTRAMUSCULAR
Status: ACTIVE | OUTPATIENT
Start: 2019-05-27 | End: 2019-05-27

## 2019-05-27 RX ORDER — ATORVASTATIN CALCIUM 40 MG/1
40 TABLET, FILM COATED ORAL NIGHTLY
Status: DISCONTINUED | OUTPATIENT
Start: 2019-05-27 | End: 2019-05-28 | Stop reason: HOSPADM

## 2019-05-27 RX ORDER — AMLODIPINE BESYLATE 5 MG/1
5 TABLET ORAL DAILY
Status: ON HOLD | COMMUNITY
End: 2022-07-08 | Stop reason: HOSPADM

## 2019-05-27 RX ORDER — CYANOCOBALAMIN 1000 UG/ML
1000 INJECTION INTRAMUSCULAR; SUBCUTANEOUS DAILY
Status: DISCONTINUED | OUTPATIENT
Start: 2019-05-27 | End: 2019-05-28 | Stop reason: HOSPADM

## 2019-05-27 RX ORDER — SODIUM CHLORIDE 0.9 % (FLUSH) 0.9 %
10 SYRINGE (ML) INJECTION 2 TIMES DAILY
Status: DISCONTINUED | OUTPATIENT
Start: 2019-05-27 | End: 2019-05-28 | Stop reason: HOSPADM

## 2019-05-27 RX ORDER — LEVOTHYROXINE SODIUM 0.1 MG/1
100 TABLET ORAL DAILY
Status: DISCONTINUED | OUTPATIENT
Start: 2019-05-27 | End: 2019-05-28 | Stop reason: HOSPADM

## 2019-05-27 RX ORDER — GABAPENTIN 400 MG/1
800 CAPSULE ORAL DAILY
Status: DISCONTINUED | OUTPATIENT
Start: 2019-05-27 | End: 2019-05-28 | Stop reason: HOSPADM

## 2019-05-27 RX ADMIN — CYANOCOBALAMIN 1000 MCG: 1000 INJECTION, SOLUTION INTRAMUSCULAR; SUBCUTANEOUS at 15:23

## 2019-05-27 RX ADMIN — GABAPENTIN 800 MG: 400 CAPSULE ORAL at 10:50

## 2019-05-27 RX ADMIN — LORAZEPAM 1 MG: 1 TABLET ORAL at 10:50

## 2019-05-27 RX ADMIN — Medication 10 ML: at 15:23

## 2019-05-27 RX ADMIN — ATORVASTATIN CALCIUM 40 MG: 40 TABLET, FILM COATED ORAL at 20:28

## 2019-05-27 RX ADMIN — SODIUM CHLORIDE 250 ML: 9 INJECTION, SOLUTION INTRAVENOUS at 15:23

## 2019-05-27 RX ADMIN — GADOTERIDOL 9 ML: 279.3 INJECTION, SOLUTION INTRAVENOUS at 13:02

## 2019-05-27 RX ADMIN — METOPROLOL TARTRATE 25 MG: 25 TABLET ORAL at 20:28

## 2019-05-27 RX ADMIN — METOPROLOL TARTRATE 25 MG: 25 TABLET ORAL at 10:50

## 2019-05-27 RX ADMIN — Medication 10 ML: at 20:30

## 2019-05-27 NOTE — CARE COORDINATION
MET WITH PT THIS AM LIVES WITH DTR. PT IS INDEPENDENT AND DRIVES. DC PLAN IS HOME PT DENIES NEEDS.  C3 TO FOLLOW

## 2019-05-27 NOTE — PROGRESS NOTES
0507 up to bathroom gait steady, monitor room called to say HR up to 140, previously called when patient up to report HR in 130s, patient quickly returns to 80 after lying back down.  Electronically signed by Wendy Teague RN on 5/27/2019 at 5:11 AM

## 2019-05-27 NOTE — PROGRESS NOTES
[facility] Occupational Therapy      Date: 2019  Patient Name: Peyton Perez        MRN: [id number]  Account: [de-identified]   : 1945  (69 y.o.)  Room: [place] [address]    Chart reviewed, attempted OT at  [id number] for eval. Patient not seen 2° to:    [x] Hold per nsg request, 6.2 Hgb, to receive blood    [] Pt declined     [] Pt. off floor for test/procedure. Spoke to [employer], RN aware. Will attempt again when able.     Electronically signed by DULCE Chin on 2019 at 2:42 PM

## 2019-05-27 NOTE — PROGRESS NOTES
Patient refusing to have US carotid completed based on previous direction from her physician Dr Chio Vera. Patient states she was informed that bilateral carotids are occluded and she has collateral vasculature in posterior neck that has developed to permit bloodflow to the brain. She was told be physician to only allow CT diagnostics for this reason. Hospitalist notified. Patient hesitant to have MRI but discussed with son Tami Morel and daughter Enrique Thornton and then agreed. Medicated for anxiety and currently in MRI. Dr Kylie Roth on unit and will return to assess patient post MRI. Vitamin B12 resulted low and he will treat accordingly. One unit of PRBC to be infused post MRI. Patient to have labs redrawn tomorrow and Litam to follow up tomorrow.

## 2019-05-27 NOTE — PROGRESS NOTES
Hospitalist Progress Note      PCP: Sean Peralta MD    Date of Admission: 5/26/2019    Chief Complaint:    Chief Complaint   Patient presents with    Other     chronic numbess in feet and hands     Subjective:  Patient denies fevers, chills, sweats, CP, SOB. 12 point ROS negative other than mentioned above     Medications:  Reviewed    Infusion Medications    sodium chloride 75 mL/hr at 05/26/19 2209     Scheduled Medications    sodium chloride  250 mL Intravenous Once    sodium chloride flush  10 mL Intravenous 2 times per day     PRN Meds: sodium chloride flush, magnesium hydroxide, ondansetron      Intake/Output Summary (Last 24 hours) at 5/27/2019 1008  Last data filed at 5/26/2019 1636  Gross per 24 hour   Intake 500 ml   Output --   Net 500 ml       Exam:    BP (!) 153/67   Pulse 122   Temp 97.9 °F (36.6 °C) (Oral)   Resp 16   Ht 5' 2\" (1.575 m)   Wt 105 lb 9.6 oz (47.9 kg)   LMP  (LMP Unknown)   SpO2 100%   BMI 19.31 kg/m²     General appearance: No apparent distress, appears stated age and cooperative. HEENT:  Conjunctivae/corneas clear. Neck: Trachea midline. Respiratory:  Normal respiratory effort. Clear to auscultation. Cardiovascular: Tachycardic  Abdomen: Soft, non-tender, non-distended with normal bowel sounds. Musculoskeletal: No clubbing, cyanosis or edema bilaterally. Neuro: Non Focal.   Capillary Refill: Brisk,< 3 seconds   Peripheral Pulses: +2 palpable, equal bilaterally     Labs:   Recent Labs     05/26/19  1345 05/27/19  0545 05/27/19  0743   WBC 3.7* 2.6*  --    HGB 7.1* 6.2*  --    HCT 19.8* 17.5* 19.5*   PLT 79* 49*  --      Recent Labs     05/26/19  1345 05/27/19  0545   * 146*   K 4.4 4.2   * 111*   CO2 22 22   BUN 22 21   CREATININE 1.55* 1.47*   CALCIUM 8.8 8.0*     Recent Labs     05/26/19  1345   AST 30   ALT 15   BILITOT 1.1*   ALKPHOS 68     No results for input(s): INR in the last 72 hours.   Recent Labs     05/26/19  1345   TROPONINI 0.019* Ceci       Electronically signed by Kimberley Espinoza MD on 5/27/2019 at 10:08 AM

## 2019-05-27 NOTE — PROGRESS NOTES
Nutrition Assessment    Type and Reason for Visit: Positive Nutrition Screen    Nutrition Recommendations: Continue current diet, Start ONS(High calorie supplement tid)    Nutrition Assessment: Pt nutritionally compromised on admission as evidenced by c/o decreased intake with intermittent stomach pain and nausea x 2-3 months pta, with noted weight loss. Pt remains at nutritional risk due to decreased intake. To provide high calorie supplement tid. Malnutrition Assessment:  · Malnutrition Status: Meets the criteria for moderate malnutrition  · Context: Acute illness or injury  · Findings of the 6 clinical characteristics of malnutrition (Minimum of 2 out of 6 clinical characteristics is required to make the diagnosis of moderate or severe Protein Calorie Malnutrition based on AND/ASPEN Guidelines):  1. Energy Intake-Less than or equal to 50% of estimated energy requirement, Greater than or equal to 1 month    2. Weight Loss-10% loss or greater, (in 7 months)  3. Fat Loss-Moderate subcutaneous fat loss, Orbital  4. Muscle Loss-Moderate muscle mass loss, Temples (temporalis muscle), Clavicles (pectoralis and deltoids)  5. Fluid Accumulation-No significant fluid accumulation,    6.  Strength-Normal    Nutrition Risk Level: High    Nutrient Needs:  · Estimated Daily Total Kcal: 7528-4081 (kg x 30-32)  · Estimated Daily Protein (g): 58-67 (kg x1.2-1.4)  · Estimated Daily Total Fluid (ml/day): ~1500 (30ml/kg)    Nutrition Diagnosis:   · Problem: Inadequate oral intake  · Etiology: related to Alteration in GI function     Signs and symptoms:  as evidenced by Weight loss, BMI, Moderate muscle loss, Intake 25-50%, Patient report of(intermittent stomach pain, nausea x ~2-3 months)    Objective Information:  · Nutrition-Focused Physical Findings: No edema noted. Pt c/o stomach ache and not feeling well after eating 'for ~2-3 months', with decreased intake and weight loss.    · Wound Type: None  · Current Nutrition Therapies:  · Oral Diet Orders: General   · Oral Diet intake: 26-50%  · Oral Nutrition Supplement (ONS) Orders: None  · Anthropometric Measures:  · Ht: 5' 2\" (157.5 cm)   · Admission Body Wt: 105 lb 9.6 oz (47.9 kg)  · Usual Body Wt: 125 lb (56.7 kg)(9-24-18)  · % Weight Change:  ,  16% in ~7 months  · Ideal Body Wt: 110 lb (49.9 kg), % Ideal Body 95%  · BMI Classification: BMI 18.5 - 24.9 Normal Weight    Nutrition Interventions:   Continue current diet, Start ONS(High calorie supplement tid)  Continued Inpatient Monitoring    Nutrition Evaluation:   · Evaluation: Goals set   · Goals: Intake >75% of meals/supplement, free of GI complaints. Weight gain.     · Monitoring: Meal Intake, Supplement Intake, Weight      Electronically signed by Azam Juarez RD, LD on 5/27/19 at 3:02 PM

## 2019-05-27 NOTE — CONSULTS
[address]                      [facility], [address] [facility]                                  CONSULTATION    PATIENT NAME: Shirley Mir                :        1945  MED REC NO:   [id number]                            ROOM:       S409  ACCOUNT NO:   [de-identified]                           ADMIT DATE: 2019  PROVIDER:     Te Salvador MD    CONSULT DATE:  2019    ATTENDING:  _____. HISTORY OF PRESENT ILLNESS:  This is a 72-year-old right-handed female  who was admitted with a history of general weakness decline and gait  ataxia with some numbness and tingling of her extremities. She has neck  discomfort. She has a spell with neck discomfort and then she becomes  somewhat ataxic. She lies down when this occurs. She had a episode  witnessed by the family lasted for 5 minutes where she slurred speech  and went limp like dishrag. She does not trust her own hands to hold a  cup and has to guide it both ways. The patient has numbness in her legs as well. She denies any recent  falls or injuries. On close questioning, her daughter does report that  she has some minor memory issues as well. She denies any recent falls,  injuries, or trauma. No bleeding, bruising, choking, drooling. PAST MEDICAL HISTORY:  Remarkable for bilateral coronary artery disease,  bilateral carotid artery disease, hyperlipidemia, hypertension,  hypothyroidism, osteoarthritis. She is followed by [facility]. PAST SURGICAL HISTORY:  Surgical history of cardia catheterization,  spine surgery of the lumbar spine, thyroidectomy partial.    MEDICATIONS:  Atorvastatin, she just started on B12 injection, she is on  gabapentin 800 mg daily, she is on levothyroxine, lorazepam, metoprolol,  ondansetron. PHYSICAL EXAMINATION:  GENERAL:  She is in no acute distress. CARDIOVASCULAR SYSTEM:  S1 and S2 are normal.  No murmurs appreciated. No carotid bruits.   RESPIRATORY SYSTEM: Clear to auscultation. CNS EXAMINATION:  She is awake, alert, and oriented to self, place,  month only. Pupils are equal and reactive. Eye movements are  conjugate. There is no facial asymmetry. Speech is normal.  Tongue is  midline. Palate moves symmetrically. HEENT:  She is edentulous. EXTREMITIES:  Examination of extremities reveals no pronator drift. Fine finger movements are symmetrical.  Strength is 4+/5. Reflexes are  2+ with hyperreflexia in the upper extremity compared to the lower  extremity, and she is areflexic at the ankles and knee reflex. There  are no sensory levels noted. We did not attempt to walk her. LAB EXAMINATION:  _____ pending. She had a white count of 2.6,  hemoglobin 6.2, hematocrit 17.5, and platelets of 90,507. Her  comprehensive metabolic profile:  Sodium 146, potassium 4.4, creatinine  1.5, BUN of 22. IMPRESSION:  Vitamin B12 myelopathy with numbness in her hands with  peripheral neuropathy with gait ataxia. The other etiology could be  cervical spine stenosis. The patient has systemic disease which may  relate to bone marrow suppression. An underlying malignancy must be  ruled out. The patient does have cognitive decline which could be explained with  the B12 deficiency or truly she is developing a dementia. Her MRA shows a possibility of basilar tip aneurysm, which is unlikely. She will require follow with a CT angiogram both of her neck and head. She may have carotid disease that is known from Cardiology. Depending on the results of the same, we will further advise. Thank you Dr. Mitzy Roth for asking us to assist in the care of this  patient.       Dorita Patton MD    D: 05/27/2019 15:18:57       T: 05/27/2019 19:27:18     [email or web address]  Job#: [id number]     Doc#: [id number]    CC:

## 2019-05-27 NOTE — CONSULTS
Consults   Hematology/Oncology Consult  Encounter Date: 2019 2:36 PM    Ms. Meng Dahl is a 69 y.o. female  : 1945  MRN: [id number]  [facility]  Requesting Provider: Dr. Kelly Leaver    Reason for request: pancytopenia      CONSULTANT: Nyla Andre MD    HPI: The pt has chronic mild numbness in fingers and toes since 2016. The paresthesias worsened over the past 3 months. She has occ abd pain ; also has early satiety. She had involuntary weight loss of 20 lbs over 6 months. She had worsening fatigue; also had dizziness but no headache or LOC. No gross bleeding. The pt went to the ER where she was noted to be pancytopenic for which she was hospitalized. Order has been written for RBC transfusion to be given today. B12 level today was very low (<150 pg /ml)    Patient Active Problem List   Diagnosis    Degenerative spondylolisthesis    Lumbar stenosis with neurogenic claudication    Hypothyroidism    Hypertension    Hyperlipidemia    Bilateral carotid artery stenosis    TIA (transient ischemic attack)     Past Medical History:   Diagnosis Date    Bilateral carotid artery disease ([address]     CAD (coronary artery disease)     Hyperlipidemia     Hypertension     Hypothyroidism     Osteoarthritis      Past Surgical History:   Procedure Laterality Date    CARDIAC CATHETERIZATION      SPINE SURGERY  2014    LUMBAR    THYROIDECTOMY, PARTIAL       History reviewed. No pertinent family history. Social History     Socioeconomic History    Marital status:       Spouse name: Not on file    Number of children: Not on file    Years of education: Not on file    Highest education level: Not on file   Occupational History    Not on file   Social Needs    Financial resource strain: Not on file    Food insecurity:     Worry: Not on file     Inability: Not on file    Transportation needs:     Medical: Not on file     Non-medical: Not on file   Tobacco Use    Smoking status: Former Smoker     Packs/day: 1.00     Years: 35.00     Pack years: 35.00     Types: Cigarettes     Start date:      Last attempt to quit: 1996     Years since quittin.5    Smokeless tobacco: Never Used   Substance and Sexual Activity    Alcohol use: Not on file    Drug use: Not on file    Sexual activity: Not on file   Lifestyle    Physical activity:     Days per week: Not on file     Minutes per session: Not on file    Stress: Not on file   Relationships    Social connections:     Talks on phone: Not on file     Gets together: Not on file     Attends [employer] service: Not on file     Active member of club or organization: Not on file     Attends meetings of clubs or organizations: Not on file     Relationship status: Not on file    Intimate partner violence:     Fear of current or ex partner: Not on file     Emotionally abused: Not on file     Physically abused: Not on file     Forced sexual activity: Not on file   Other Topics Concern    Not on file   Social History Narrative    Not on file          Current Facility-Administered Medications   Medication Dose Route Frequency Provider Last Rate Last Dose    0.9 % sodium chloride bolus  250 mL Intravenous Once Trinh Echols MD        atorvastatin (LIPITOR) tablet 40 mg  40 mg Oral Nightly Valentina Hall MD        gabapentin (NEURONTIN) capsule 800 mg  800 mg Oral Daily Valentina Hall MD   800 mg at 19 1050    levothyroxine (SYNTHROID) tablet 100 mcg  100 mcg Oral Daily Valentina Hall MD        metoprolol tartrate (LOPRESSOR) tablet 25 mg  25 mg Oral BID Valentina Hall MD   25 mg at 19 1050    sodium chloride flush 0.9 % injection 10 mL  10 mL Intravenous BID Stephanie Tovarma        cyanocobalamin injection 1,000 mcg  1,000 mcg Subcutaneous Daily Rendell Halsted, MD        sodium chloride flush 0.9 % injection 10 mL  10 mL Intravenous 2 times per day MARY Padron   10 mL at bilateral supraclavicular fossae, axillary chains, or inguinal regions. LUNGS: Good inspiratory effort, no accessory muscle use, clear bilaterally, no focal wheeze, rales or rhonchi. CARDIAC: Regular rate and rhythm, normal HS  ABDOMINAL: Normal bowel sounds present, soft, non-tender, no mass or organomegaly. MUSKL:no tenderness over spine or ribs  EXTREMITIES:no pedal edema or calf tenderness  NEUROLOGIC: Gait not tested .  No facial asymmetry; Moving all extremities well; + numbness over bilateral fingers and toes  PSYCH: cooperative; pt has appropriate affect and behavior    LAB RESULTS:  Recent Results (from the past 24 hour(s))   Urine Culture    Collection Time: 05/26/19  4:00 PM   Result Value Ref Range    Urine Culture, Routine      Organism Escherichia coli (A)     Urine Culture, Routine 50,000 CFU/ml  Sensitivity to follow      Microscopic Urinalysis    Collection Time: 05/26/19  4:00 PM   Result Value Ref Range    Renal Epithelial, Urine 3-5 (A) /HPF    Bacteria, UA Negative /HPF    Yeast, UA Present (A)     Hyaline Casts, UA 1-3 0 - 5 /HPF    WBC, UA >100 (H) 0 - 5 /HPF    RBC, UA 3-5 (A) 0 - 5 /HPF    Epi Cells 0-2 0 - 5 /HPF   CBC auto differential    Collection Time: 05/27/19  5:45 AM   Result Value Ref Range    WBC 2.6 (L) 4.8 - 10.8 K/uL    RBC 1.46 (L) 4.20 - 5.40 M/uL    Hemoglobin 6.2 (LL) 12.0 - 16.0 g/dL    Hematocrit 17.5 (LL) 37.0 - 47.0 %    .9 (H) 82.0 - 100.0 fL    MCH 42.6 (H) 27.0 - 31.3 pg    MCHC 35.5 33.0 - 37.0 %    RDW 16.1 (H) 11.5 - 14.5 %    Platelets 49 (L) 460 - 400 K/uL    Neutrophils % 58.4 %    Lymphocytes % 34.6 %    Monocytes % 2.9 %    Eosinophils % 3.6 %    Basophils % 0.5 %    Neutrophils # 1.5 1.4 - 6.5 K/uL    Lymphocytes # 0.9 (L) 1.0 - 4.8 K/uL    Monocytes # 0.1 (L) 0.2 - 0.8 K/uL    Eosinophils # 0.1 0.0 - 0.7 K/uL    Basophils # 0.0 0.0 - 0.2 K/uL   Basic Metabolic Panel w/ Reflex to MG    Collection Time: 05/27/19  5:45 AM   Result Value Ref Range Sodium 146 (H) 135 - 144 mEq/L    Potassium reflex Magnesium 4.2 3.4 - 4.9 mEq/L    Chloride 111 (H) 95 - 107 mEq/L    CO2 22 20 - 31 mEq/L    Anion Gap 13 9 - 15 mEq/L    Glucose 96 70 - 99 mg/dL    BUN 21 8 - 23 mg/dL    CREATININE 1.47 (H) 0.50 - 0.90 mg/dL    GFR Non-African American 34.9 (L) >60    GFR  42.2 (L) >60    Calcium 8.0 (L) 8.5 - 9.9 mg/dL   Lactic acid, plasma    Collection Time: 05/27/19  5:45 AM   Result Value Ref Range    Lactic Acid 1.2 0.5 - 2.2 mmol/L   Vitamin B12 & Folate    Collection Time: 05/27/19  5:45 AM   Result Value Ref Range    Vitamin B-12 <150 (L) 232 - 1245 pg/mL    Folate 19.6 7.3 - 26.1 ng/mL   TYPE AND SCREEN    Collection Time: 05/27/19  7:43 AM   Result Value Ref Range    ABO/Rh A POS     Antibody Screen NEG    PREPARE RBC (CROSSMATCH), 1 Units    Collection Time: 05/27/19  7:43 AM   Result Value Ref Range    Product Code Blood Bank [id number]     Description Blood Bank Red Blood Cells, Apheresis, Leuko-reduced     Unit Number [id number]     Dispense Status Blood Bank selected     Product Code Blood Bank [id number]     Description Blood Bank Red Blood Cells, Leuko-reduced     Unit Number [id number]     Dispense Status Blood Bank selected    Iron and TIBC    Collection Time: 05/27/19  7:43 AM   Result Value Ref Range    Iron 133 37 - 145 ug/dL    TIBC 176 (L) 178 - 450 ug/dL    Iron Saturation 76 (H) 11 - 46 %   Ferritin    Collection Time: 05/27/19  7:43 AM   Result Value Ref Range    Ferritin 678.0 (H) 13.0 - 150.0 ng/mL   Reticulocytes    Collection Time: 05/27/19  7:43 AM   Result Value Ref Range    Retic Ct Pct 1.4 0.6 - 2.2 %    Retic Ct Abs 0.024 0.022 - 0.111 m/cumm    Hematocrit 19.5 (LL) 37.0 - 47.0 %     Recent Labs     05/26/19  1345 05/26/19  1600 05/27/19  0545   COLORU DARK YELLOW*  --   --    PHUR 5.0  --   --    WBCUA  --  >100*  --    RBCUA  --  3-5*  --    YEAST  --  Present*  --    BACTERIA  --  Negative  --    CLARITYU CLOUDY*  --   -- SPECGRAV 1.023  --   --    LEUKOCYTESUR LARGE*  --   --    UROBILINOGEN 1.0  --   --    BILIRUBINUR Negative  --   --    BLOODU TRACE*  --   --    GLUCOSE 110*  --  96            RADIOLOGY RESULTS:  Ct Abdomen Pelvis Wo Contrast Additional Contrast? None    Result Date: 5/26/2019  EXAMINATION:  CT ABDOMEN PELVIS WO CONTRAST CLINICAL HISTORY:  wt loss/abd pain/pancytopenia COMPARISON: NONE AVAILABLE TECHNIQUE: Spiral scans without contrast. Multi-planar 2-D reconstructions. All CT scans at this facility use dose modulation, iterative reconstruction, and/or weight based dosing when appropriate to reduce radiation dose to as low as reasonably achievable. FINDINGS: There is blood pool low attenuation with visibility of the intraventricular septum, consistent with anemia. There are coronary artery calcifications. There are mild emphysematous changes in the lungs. There is slight heterogeneity of attenuation within the gallbladder questionable for sludge or fine gravel. Gallbladder ultrasound may be obtained for further evaluation. There is right renal atrophy. The length of the right kidney is 5 cm. The length of the left kidney is 9 cm. There are at least 3 right renal calculi, measuring 2 mm, 4 mm and 4 mm. There is no hydroureteronephrosis. There is a 1 cm right renal cyst. The  urinary bladder is unremarkable. The uterus and adnexa are unremarkable. The bowel is nonspecific. There is mild diverticulosis. There is some hyperdense ingested material within formed stool in the colon, predominantly in the sigmoid. The appendix is normal. There is a small hiatal hernia. There is L4-5 lumbar decompression and fusion with intervertebral material and pedicle screws/rods.  Note: This interpretation includes assessment of the liver, spleen, gallbladder, bile ducts, pancreas, adrenal glands, kidneys, urogenital structures, abdominal vasculature, retroperitoneum, gastrointestinal tract, mesentery, lymph nodes, inguinal regions, osseous structures, abdominopelvic walls, and visualized portions of the lower thorax. Some structures may be congenitally or surgically absent/altered. Unless otherwise indicated in this report, these organs and structures demonstrate no significant pathology or demonstrate findings which do not require additional follow-up/evaluation. ANEMIA. QUESTIONABLE GALLBLADDER SLUDGE OR FINE GRAVEL--GALLBLADDER ULTRASOUND MAY BE OBTAINED FOR FURTHER EVALUATION. ATROPHIC RIGHT KIDNEY WITH WITH NONOBSTRUCTING NEPHROLITHIASIS. ADDITIONAL FINDINGS AS ABOVE. Ct Head Wo Contrast    Result Date: 5/26/2019  EXAMINATION: CT BRAIN WITHOUT CONTRAST CLINICAL HISTORY:  ataxia   numbness COMPARISONS: NONE AVAILABLE TECHNIQUE: Spiral scans without contrast. Multiplanar 2-D reconstructions. All CT scans at this facility use dose modulation, iterative reconstruction, and/or weight based dosing when appropriate to reduce radiation dose to as low as reasonably achievable. FINDINGS: There is age-consistent enlargement of the ventricles, cisterns, and sulci. There are nonspecific white matter hypodensities, likely representing chronic microvascular ischemic changes. There are basal ganglia calcifications. There are no CT findings of acute intracranial pathology. There are no skull lesions. There is hyperostosis frontalis interna. The mastoids and middle ears are clear. The orbits and visualized portions of the paranasal sinuses show no significant pathology. There is nasal septal deviation to the left. NO ACUTE INTRACRANIAL PATHOLOGY. ASSESSMENT AND PLAN  1. Pt has pancytopenia with macrocytosis consistent with severe B12 deficiency r/o pernicious anemia; r/o concurrent Fe deficiency or monoclonal gammopathy as additional contributory factors to her anemia. Pt will be given parenteral B12 tx . Parietal cell Ab and Intrinsic factor blocking Ab will be checked.  Pt will also be given RBC transfusion because of symptomatic anemia. Pt will be considered for possible bone marrow aspiration and Bx in the future to r/o possible Myelodysplastic syndrome if she has persistent cytopenias after the B12 deficiency is corrected; BM asp & Bx will also be considered if she is found to have monoclonal gammopathy on SPEP. 2. Abd pain with early satiety and involuntary weight loss r/o atrophic gastritis r/o gastric malignancy. I recommend GI consult for EGD and possible colonoscopy. CT chest without contrast will also be done to check for occult malignancy ( pt is a former smoker). 3. Paresthesias are likely related to her B12 deficiency r/o other causes of peripheral neuropathy. Results of Neurology evaluation will be awaited. Thank you, Dr. Nini Sahni, for this consultation.       Electronically signed by Joann Dejesus MD on 5/27/2019 at 2:36 PM

## 2019-05-27 NOTE — CONSULTS
Consult to Neurology  Consult performed by: Shane Medina MD  Consult ordered by: MARY Angeles      Gait ataxia  b12 myelopathy  R/o cervical myelopathy  Question basilar tip aneurysm, doubt  F/u CTA for carotids as well  Has Mild cognitive impaimenet, dementia or b12 def      Grahamsoha Correa MD, Martina Musa, American Board of Psychiatry & Neurology  Board Certified in Vascular Neurology  Board Certified in Neuromuscular Medicine  Certified in . [address]

## 2019-05-28 VITALS
TEMPERATURE: 98.1 F | SYSTOLIC BLOOD PRESSURE: 183 MMHG | DIASTOLIC BLOOD PRESSURE: 58 MMHG | HEIGHT: 62 IN | HEART RATE: 99 BPM | RESPIRATION RATE: 16 BRPM | WEIGHT: 105.6 LBS | OXYGEN SATURATION: 100 % | BODY MASS INDEX: 19.43 KG/M2

## 2019-05-28 LAB
ALBUMIN SERPL-MCNC: 3.4 G/DL (ref 3.5–4.6)
ALP BLD-CCNC: 61 U/L (ref 40–130)
ALT SERPL-CCNC: 13 U/L (ref 0–33)
ANION GAP SERPL CALCULATED.3IONS-SCNC: 7 MEQ/L (ref 9–15)
ANISOCYTOSIS: ABNORMAL
AST SERPL-CCNC: 27 U/L (ref 0–35)
BASOPHILS ABSOLUTE: 0 K/UL (ref 0–0.2)
BASOPHILS RELATIVE PERCENT: 0.6 %
BILIRUB SERPL-MCNC: 1.4 MG/DL (ref 0.2–0.7)
BILIRUBIN DIRECT: 0.3 MG/DL (ref 0–0.4)
BILIRUBIN, INDIRECT: 1.1 MG/DL (ref 0–0.6)
BUN BLDV-MCNC: 19 MG/DL (ref 8–23)
CALCIUM SERPL-MCNC: 8.4 MG/DL (ref 8.5–9.9)
CHLORIDE BLD-SCNC: 111 MEQ/L (ref 95–107)
CO2: 25 MEQ/L (ref 20–31)
CREAT SERPL-MCNC: 1.31 MG/DL (ref 0.5–0.9)
EOSINOPHILS ABSOLUTE: 0.2 K/UL (ref 0–0.7)
EOSINOPHILS RELATIVE PERCENT: 8.8 %
GFR AFRICAN AMERICAN: 48.3
GFR NON-AFRICAN AMERICAN: 39.9
GLUCOSE BLD-MCNC: 83 MG/DL (ref 70–99)
HCT VFR BLD CALC: 22.4 % (ref 37–47)
HEMOGLOBIN: 7.9 G/DL (ref 12–16)
LACTIC ACID: 0.8 MMOL/L (ref 0.5–2.2)
LV EF: 60 %
LVEF MODALITY: NORMAL
LYMPHOCYTES ABSOLUTE: 1 K/UL (ref 1–4.8)
LYMPHOCYTES RELATIVE PERCENT: 37.3 %
MACROCYTES: ABNORMAL
MCH RBC QN AUTO: 38.3 PG (ref 27–31.3)
MCHC RBC AUTO-ENTMCNC: 35.1 % (ref 33–37)
MCV RBC AUTO: 109.2 FL (ref 82–100)
MONOCYTES ABSOLUTE: 0.1 K/UL (ref 0.2–0.8)
MONOCYTES RELATIVE PERCENT: 3.2 %
NEUTROPHILS ABSOLUTE: 1.4 K/UL (ref 1.4–6.5)
NEUTROPHILS RELATIVE PERCENT: 50.1 %
ORGANISM: ABNORMAL
OVALOCYTES: ABNORMAL
PDW BLD-RTO: 26.6 % (ref 11.5–14.5)
PLATELET # BLD: 41 K/UL (ref 130–400)
PLATELET SLIDE REVIEW: ABNORMAL
POIKILOCYTES: ABNORMAL
POTASSIUM REFLEX MAGNESIUM: 4.3 MEQ/L (ref 3.4–4.9)
RBC # BLD: 2.05 M/UL (ref 4.2–5.4)
SODIUM BLD-SCNC: 143 MEQ/L (ref 135–144)
T4 FREE: 1.69 NG/DL (ref 0.84–1.68)
TEAR DROP CELLS: ABNORMAL
TOTAL PROTEIN: 6.2 G/DL (ref 6.3–8)
TSH SERPL DL<=0.05 MIU/L-ACNC: 0.01 UIU/ML (ref 0.44–3.86)
URINE CULTURE, ROUTINE: ABNORMAL
URINE CULTURE, ROUTINE: ABNORMAL
WBC # BLD: 2.8 K/UL (ref 4.8–10.8)

## 2019-05-28 PROCEDURE — 6370000000 HC RX 637 (ALT 250 FOR IP): Performed by: INTERNAL MEDICINE

## 2019-05-28 PROCEDURE — 84443 ASSAY THYROID STIM HORMONE: CPT

## 2019-05-28 PROCEDURE — 97165 OT EVAL LOW COMPLEX 30 MIN: CPT

## 2019-05-28 PROCEDURE — 93306 TTE W/DOPPLER COMPLETE: CPT

## 2019-05-28 PROCEDURE — 93010 ELECTROCARDIOGRAM REPORT: CPT | Performed by: INTERNAL MEDICINE

## 2019-05-28 PROCEDURE — 85025 COMPLETE CBC W/AUTO DIFF WBC: CPT

## 2019-05-28 PROCEDURE — 99222 1ST HOSP IP/OBS MODERATE 55: CPT | Performed by: INTERNAL MEDICINE

## 2019-05-28 PROCEDURE — 84439 ASSAY OF FREE THYROXINE: CPT

## 2019-05-28 PROCEDURE — 36415 COLL VENOUS BLD VENIPUNCTURE: CPT

## 2019-05-28 PROCEDURE — 83516 IMMUNOASSAY NONANTIBODY: CPT

## 2019-05-28 PROCEDURE — 80048 BASIC METABOLIC PNL TOTAL CA: CPT

## 2019-05-28 PROCEDURE — 97161 PT EVAL LOW COMPLEX 20 MIN: CPT

## 2019-05-28 PROCEDURE — 80076 HEPATIC FUNCTION PANEL: CPT

## 2019-05-28 PROCEDURE — 86340 INTRINSIC FACTOR ANTIBODY: CPT

## 2019-05-28 PROCEDURE — 83605 ASSAY OF LACTIC ACID: CPT

## 2019-05-28 RX ORDER — AMLODIPINE BESYLATE 5 MG/1
5 TABLET ORAL DAILY
Status: DISCONTINUED | OUTPATIENT
Start: 2019-05-28 | End: 2019-05-28 | Stop reason: HOSPADM

## 2019-05-28 RX ORDER — SODIUM CHLORIDE, SODIUM LACTATE, POTASSIUM CHLORIDE, CALCIUM CHLORIDE 600; 310; 30; 20 MG/100ML; MG/100ML; MG/100ML; MG/100ML
INJECTION, SOLUTION INTRAVENOUS CONTINUOUS
Status: DISCONTINUED | OUTPATIENT
Start: 2019-05-28 | End: 2019-05-28

## 2019-05-28 RX ORDER — ISOSORBIDE MONONITRATE 30 MG/1
30 TABLET, EXTENDED RELEASE ORAL DAILY
Status: DISCONTINUED | OUTPATIENT
Start: 2019-05-28 | End: 2019-05-28 | Stop reason: HOSPADM

## 2019-05-28 RX ORDER — LISINOPRIL 10 MG/1
10 TABLET ORAL DAILY
Status: DISCONTINUED | OUTPATIENT
Start: 2019-05-28 | End: 2019-05-28 | Stop reason: HOSPADM

## 2019-05-28 RX ORDER — LEVOTHYROXINE SODIUM 112 UG/1
100 TABLET ORAL DAILY
Qty: 30 TABLET | Refills: 1 | Status: SHIPPED | OUTPATIENT
Start: 2019-05-28

## 2019-05-28 RX ORDER — LORAZEPAM 1 MG/1
1 TABLET ORAL ONCE
Status: DISCONTINUED | OUTPATIENT
Start: 2019-05-28 | End: 2019-05-28 | Stop reason: HOSPADM

## 2019-05-28 RX ORDER — ASPIRIN 81 MG/1
81 TABLET ORAL DAILY
Status: DISCONTINUED | OUTPATIENT
Start: 2019-05-28 | End: 2019-05-28 | Stop reason: HOSPADM

## 2019-05-28 RX ORDER — SODIUM CHLORIDE, SODIUM LACTATE, POTASSIUM CHLORIDE, CALCIUM CHLORIDE 600; 310; 30; 20 MG/100ML; MG/100ML; MG/100ML; MG/100ML
INJECTION, SOLUTION INTRAVENOUS CONTINUOUS
Status: DISCONTINUED | OUTPATIENT
Start: 2019-05-28 | End: 2019-05-28 | Stop reason: HOSPADM

## 2019-05-28 RX ADMIN — CYANOCOBALAMIN 1000 MCG: 1000 INJECTION, SOLUTION INTRAMUSCULAR; SUBCUTANEOUS at 11:12

## 2019-05-28 RX ADMIN — GABAPENTIN 800 MG: 400 CAPSULE ORAL at 11:11

## 2019-05-28 RX ADMIN — AMLODIPINE BESYLATE 5 MG: 5 TABLET ORAL at 11:11

## 2019-05-28 RX ADMIN — METOPROLOL TARTRATE 25 MG: 25 TABLET ORAL at 11:11

## 2019-05-28 RX ADMIN — ASPIRIN 81 MG: 81 TABLET, COATED ORAL at 11:11

## 2019-05-28 RX ADMIN — ISOSORBIDE MONONITRATE 30 MG: 30 TABLET, EXTENDED RELEASE ORAL at 11:11

## 2019-05-28 RX ADMIN — LISINOPRIL 10 MG: 10 TABLET ORAL at 11:11

## 2019-05-28 ASSESSMENT — ENCOUNTER SYMPTOMS
VOMITING: 0
CHEST TIGHTNESS: 0
COUGH: 0
WHEEZING: 0
TROUBLE SWALLOWING: 0
SHORTNESS OF BREATH: 0
NAUSEA: 0
COLOR CHANGE: 0

## 2019-05-28 ASSESSMENT — PAIN SCALES - GENERAL: PAINLEVEL_OUTOF10: 0

## 2019-05-28 NOTE — PROGRESS NOTES
Neurology Daily Progress Note  Name: Maude Maher  Age: 69 y.o. Gender: female  CodeStatus: Full Code  Allergies: Bee Venom  Pcn [Penicillins]    Chief Complaint:Other (chronic numbess in feet and hands)    Primary Care Provider: Iam Medina MD  InpatientTreatment Team: Treatment Team: Attending Provider: Ever Rose MD; Consulting Physician: Jo Malhotra MD; Consulting Physician: Tiffani Cody MD; Consulting Physician: Johnny Costa MD; Consulting Physician: Edson Santos MD; Patient Care Tech: Maye Johns; Registered Nurse: Keisha Bishop RN; : Valentino Saucer, PRISCILLA; Registered Nurse: Zuly Bah RN  Admission Date: 5/26/2019      HPI   Pt seen and examined for neuro follow up for peripheral neuropathy and gait ataxia with B12 deficiency. A&O x3, NAD, cooperative. P reports her bilat hand neuropathy is somewhat improved today. No new focal neuro deficits. Denies Headache, double vision, blurry vision, difficulty with speech, difficulty with swallowing, pain, nausea, vomiting, choking, neck pain, dizziness. No acute complaints or concerns. Pt declining all testing today. Vitals:    05/27/19 2345   BP:    Pulse: 99   Resp:    Temp:    SpO2:         Physical Exam   Constitutional: She is oriented to person, place, and time. No distress. Thin, frail     HENT:   Head: Normocephalic and atraumatic. Eyes: Pupils are equal, round, and reactive to light. EOM are normal.   Neck: Neck supple. No JVD present. Cardiovascular: Normal rate and regular rhythm. Pulmonary/Chest: Effort normal and breath sounds normal. No respiratory distress. Abdominal: Soft. Bowel sounds are normal. She exhibits no distension. There is no tenderness. Musculoskeletal: She exhibits no edema. Neurological: She is alert and oriented to person, place, and time. She displays abnormal reflex. No cranial nerve deficit. Skin: Skin is warm and dry. She is not diaphoretic. Psychiatric: She has a normal mood and affect. Review of Systems   Constitutional: Negative for chills, fatigue and fever. HENT: Negative for hearing loss and trouble swallowing. Eyes: Negative for visual disturbance. Respiratory: Negative for cough, chest tightness, shortness of breath and wheezing. Cardiovascular: Negative for chest pain, palpitations and leg swelling. Gastrointestinal: Negative for nausea and vomiting. Genitourinary: Negative for difficulty urinating, dysuria and frequency. Musculoskeletal: Positive for gait problem. Skin: Negative for color change and rash. Neurological: Positive for weakness (extremities). Negative for dizziness, tremors, seizures, syncope, facial asymmetry, speech difficulty, light-headedness, numbness and headaches. Psychiatric/Behavioral: Negative for agitation, confusion and hallucinations. The patient is not nervous/anxious.         Medications:  Reviewed    Infusion Medications:    lactated ringers       Scheduled Medications:    LORazepam  1 mg Oral Once    amLODIPine  5 mg Oral Daily    aspirin  81 mg Oral Daily    lisinopril  10 mg Oral Daily    isosorbide mononitrate  30 mg Oral Daily    atorvastatin  40 mg Oral Nightly    gabapentin  800 mg Oral Daily    levothyroxine  100 mcg Oral Daily    metoprolol tartrate  25 mg Oral BID    sodium chloride flush  10 mL Intravenous BID    cyanocobalamin  1,000 mcg Subcutaneous Daily    sodium chloride flush  10 mL Intravenous 2 times per day     PRN Meds: sodium chloride flush, magnesium hydroxide, ondansetron    Labs:   Recent Labs     05/26/19  1345 05/27/19  0545 05/27/19  0743 05/28/19  0521   WBC 3.7* 2.6*  --  2.8*   HGB 7.1* 6.2*  --  7.9*   HCT 19.8* 17.5* 19.5* 22.4*   PLT 79* 49*  --  41*     Recent Labs     05/26/19  1345 05/27/19  0545 05/28/19  0521   * 146* 143   K 4.4 4.2 4.3   * 111* 111*   CO2 22 22 25   BUN 22 21 19   CREATININE 1.55* 1.47* 1.31*   CALCIUM 8.8 8.0* 8.4*     Recent Labs     05/26/19  1345 05/28/19  0521   AST 30 27   ALT 15 13   BILIDIR  --  0.3   BILITOT 1.1* 1.4*   ALKPHOS 68 61     No results for input(s): INR in the last 72 hours. Recent Labs     05/26/19  1345   TROPONINI 0.019*       Urinalysis:   Lab Results   Component Value Date    NITRU Negative 05/26/2019    WBCUA >100 05/26/2019    BACTERIA Negative 05/26/2019    RBCUA 3-5 05/26/2019    BLOODU TRACE 05/26/2019    SPECGRAV 1.023 05/26/2019    GLUCOSEU Negative 05/26/2019       Radiology:   Most recent    Chest CT      WITH CONTRAST:No results found for this or any previous visit. WITHOUT CONTRAST: No results found for this or any previous visit. CXR      2-view: No results found for this or any previous visit. Portable:   Results for orders placed during the hospital encounter of 05/26/19   XR CHEST PORTABLE    Narrative XR CHEST PORTABLE: 5/26/2019    CLINICAL HISTORY:  ataxia . COMPARISON: None available. A portable upright AP radiograph of the chest was obtained. FINDINGS:    Hyperinflation and coarsening of the bronchovascular structures is suggestive of COPD. There are no focal pulmonary infiltrates, pleural effusion, cardiomegaly, vascular congestion, pneumothorax, or displaced fractures identified. Impression NO EVIDENCE OF ACTIVE CARDIOPULMONARY DISEASE. COPD, WHICH IS BEST EVALUATED CLINICALLY. Echo No results found for this or any previous visit. Assessment/Plan:    Gait ataxia  B12 myelopathy  R/o cervical myelopathy  Question basilar tip aneurysm, doubt  F/u CTA for carotids as well  Has Mild cognitive impairment, dementia or B12 def  Vit B12 1000 mcg SQ daily  Pancytopenia, oncology consulted and work up in progress. Also with recent weight loss and early satiety. GI consulted  Pt refusing to stay to have work up done.  Spoke with daughter and pt and advised of indications for neuro testing to r/o cord compression and vascular/carotid occlusion and risk of not getting tests now. They voiced understanding and wish to have testing done as outpatient. Patient can DC home from neurology point of view and follow up 4 weeks      [facility] WARREN Murray MD, Rissa Vazquez, American Board of Psychiatry & Neurology  Board Certified in Vascular Neurology  Board Certified in Neuromuscular Medicine  Certified in . [place] physicians: Dr Kelsey Murray, Dr JAE Christianson, Dr Missy Archer    Electronically signed by VALARIE Santana CNP on 5/28/2019 at 10:46 AM

## 2019-05-28 NOTE — PROGRESS NOTES
[facility] OCCUPATIONAL THERAPY EVALUATION - ACUTE     Date: 2019  Patient Name: Felipe Coburn        MRN: [id number]  Account: [de-identified]   : 1945  (69 y.o.)  [facility]    Chart Review:  Diagnosis:  The primary encounter diagnosis [facility] Diagnoses of Transient cerebral ischemia, unspecified type and Peripheral polyneuropathy were also pertinent to this visit.   Past Medical History:   Diagnosis Date    Bilateral carotid artery disease ([facility]     CAD (coronary artery disease)     Hyperlipidemia     Hypertension     Hypothyroidism     Osteoarthritis      Past Surgical History:   Procedure Laterality Date    CARDIAC CATHETERIZATION      SPINE SURGERY  2014    LUMBAR    THYROIDECTOMY, PARTIAL         Restrictions  Restrictions/Precautions: Up as Tolerated, Fall Risk     Safety Devices: Safety Devices  Safety Devices in place: Not Applicable    Subjective       Pain Reassessment:   Pain Assessment  Patient Currently in Pain: No  Pain Assessment: 0-10  Pain Level: 0       Orientation  Orientation  Overall Orientation Status: Within Normal Limits    Prior Level of Function:  Social/Functional History  Lives With: Daughter  Type of Home: House  Home Layout: One level  Home Access: Stairs to enter with rails  Entrance Stairs - Number of Steps: 3  Bathroom Shower/Tub: Tub/Shower unit  Bathroom Equipment: Grab bars in shower, Shower chair(has in storage)  Home Equipment: (NA)  ADL Assistance: Independent  Homemaking Assistance: Independent  Homemaking Responsibilities: Yes(shared with daughter)  Ambulation Assistance: Independent  Transfer Assistance: Independent  Active : Yes    OBJECTIVE:     Orientation Status:  Orientation  Overall Orientation Status: Within Normal Limits    Observation:  Observation/Palpation  Observation: Pt pleasant and participated well    Cognition Status:  Cognition  Overall Cognitive Status: [facility]    Perception Status:  Perception  Overall Perceptual Status: WFL    Sensation Status:  Sensation  Overall Sensation Status: Impaired(Pt feel possible neuropathy in feet, they feel funny)    Vision and Hearing Status:  Vision  Vision: Impaired  Vision Exceptions: Wears glasses at all times  Hearing  Hearing: Within functional limits     ROM:   LUE AROM (degrees)  LUE AROM : WFL  Left Hand AROM (degrees)  Left Hand AROM: WFL  RUE AROM (degrees)  RUE AROM : WFL  Right Hand AROM (degrees)  Right Hand AROM: WFL    Strength:  LUE Strength  Gross LUE Strength: WFL  RUE Strength  Gross RUE Strength: WFL    Coordination, Tone, Quality of Movement: Tone RUE  RUE Tone: Normotonic  Tone LUE  LUE Tone: Normotonic  Coordination  Movements Are Fluid And Coordinated: Yes    Hand Dominance:  Hand Dominance  Hand Dominance: Right    ADL Status:  ADL  Feeding: Independent  Grooming: Independent  UE Bathing: Independent  LE Bathing: Independent  UE Dressing: Independent  LE Dressing: Independent  Toileting: Independent  Additional Comments: bqathing simulated dressing with robe, sliiper socks and pajama pants  Toilet Transfers  Toilet - Technique: Ambulating  Equipment Used: Standard toilet  Toilet Transfer: Independent       Functional Mobility:     Transfers  Sit to stand: Independent  Stand to sit:  Independent    Bed Mobility  Bed mobility  Rolling to Left: Independent    Seated and Standing Balance:  Balance  Sitting Balance: Independent  Standing Balance: Independent    Functional Endurance:  Activity Tolerance  Activity Tolerance: Patient Tolerated treatment well    D/C Recommendations:  Zarina benefit from OP    Equipment Recommendations: bathroom grab bars      OT Follow Up:  OT D/C RECOMMENDATIONS  REQUIRES OT FOLLOW UP: Yes       Assessment/Discharge Disposition:  Assessment: Pt 75y/o f admitted with gen weakness gait ataxia, and numbness, Pt doing welll no OT  Prognosis: Good  No Skilled OT: Independent with functional mobility,

## 2019-05-28 NOTE — FLOWSHEET NOTE
Pt discharged home w daughter. Discharge inst explained to pt and daughter, both verbalized understanding. IV/tele monitor removed. Pt belongings packed. Transport to wheel pt down to main lobby.

## 2019-05-28 NOTE — DISCHARGE SUMMARY
Hospital Medicine Discharge Summary    Yaneli Smith  :  1945  MRN:  [id number]    Admit date:  2019  Discharge date:  2019    Admitting Physician:  Alvaro Phillips MD  Primary Care Physician:  Melonie Justin MD      Discharge Diagnoses: Active Problems:    TIA (transient ischemic attack)    Pancytopenia (HCC)    Peripheral polyneuropathy    Weight loss  Resolved Problems:    * No resolved hospital problems. *    Chief Complaint   Patient presents with    Other     chronic numbess in feet and hands     Hospital Course: Yaneli Smith is a 69 y.o. female that was admitted and treated at [facility] for the following medical issues: Active Problems:    TIA (transient ischemic attack)    Pancytopenia (HCC)    Peripheral polyneuropathy    Weight loss  Resolved Problems:    * No resolved hospital problems. *    Patient presented with peripheral neuropathy, a fall and found to be pancytopenic with peripheral neuropathy secondary to vitamin B12 deficiency. She was evaluated by neurology who recommended further imaging in regards to other possible etiologies but patient refused them and wanted to follow up as an outpatient. She will be discharged with hematology final recommendations in regards to her pancytopenia. Also increased her synthroid from 100->112 as her TSH was elevated with a slightly low T4; this will need to be followed up by her PCP  Pt was discharge in a stable condition. Exam on discharge:   BP (!) 183/58   Pulse 99   Temp 98.1 °F (36.7 °C) (Oral)   Resp 16   Ht 5' 2\" (1.575 m)   Wt 105 lb 9.6 oz (47.9 kg)   LMP  (LMP Unknown)   SpO2 100%   BMI 19.31 kg/m²   General appearance: No apparent distress, appears stated age and cooperative. HEENT:  Conjunctivae/corneas clear. Neck: Trachea midline. Respiratory:  Normal respiratory effort. Clear to auscultation.   Cardiovascular: Tachycardic  Abdomen: Soft, non-tender, non-distended with normal bowel sounds. Musculoskeletal: No clubbing, cyanosis or edema bilaterally. Neuro: Non Focal.   Capillary Refill: Brisk,< 3 seconds   Peripheral Pulses: +2 palpable, equal bilaterally         Patient was seen by the following consultants while admitted to [facility]:   Consults:  IP CONSULT TO HOSPITALIST  IP CONSULT TO NEUROLOGY  IP CONSULT TO HEM/ONC  IP CONSULT TO GI    Significant Diagnostic Studies:    Ct Abdomen Pelvis Wo Contrast Additional Contrast? None    Result Date: 5/26/2019  EXAMINATION:  CT ABDOMEN PELVIS WO CONTRAST CLINICAL HISTORY:  wt loss/abd pain/pancytopenia COMPARISON: NONE AVAILABLE TECHNIQUE: Spiral scans without contrast. Multi-planar 2-D reconstructions. All CT scans at this facility use dose modulation, iterative reconstruction, and/or weight based dosing when appropriate to reduce radiation dose to as low as reasonably achievable. FINDINGS: There is blood pool low attenuation with visibility of the intraventricular septum, consistent with anemia. There are coronary artery calcifications. There are mild emphysematous changes in the lungs. There is slight heterogeneity of attenuation within the gallbladder questionable for sludge or fine gravel. Gallbladder ultrasound may be obtained for further evaluation. There is right renal atrophy. The length of the right kidney is 5 cm. The length of the left kidney is 9 cm. There are at least 3 right renal calculi, measuring 2 mm, 4 mm and 4 mm. There is no hydroureteronephrosis. There is a 1 cm right renal cyst. The  urinary bladder is unremarkable. The uterus and adnexa are unremarkable. The bowel is nonspecific. There is mild diverticulosis. There is some hyperdense ingested material within formed stool in the colon, predominantly in the sigmoid. The appendix is normal. There is a small hiatal hernia. There is L4-5 lumbar decompression and fusion with intervertebral material and pedicle screws/rods.  Note: This interpretation includes assessment of the liver, spleen, gallbladder, bile ducts, pancreas, adrenal glands, kidneys, urogenital structures, abdominal vasculature, retroperitoneum, gastrointestinal tract, mesentery, lymph nodes, inguinal regions, osseous structures, abdominopelvic walls, and visualized portions of the lower thorax. Some structures may be congenitally or surgically absent/altered. Unless otherwise indicated in this report, these organs and structures demonstrate no significant pathology or demonstrate findings which do not require additional follow-up/evaluation. ANEMIA. QUESTIONABLE GALLBLADDER SLUDGE OR FINE GRAVEL--GALLBLADDER ULTRASOUND MAY BE OBTAINED FOR FURTHER EVALUATION. ATROPHIC RIGHT KIDNEY WITH WITH NONOBSTRUCTING NEPHROLITHIASIS. ADDITIONAL FINDINGS AS ABOVE. Ct Head Wo Contrast    Result Date: 5/26/2019  EXAMINATION: CT BRAIN WITHOUT CONTRAST CLINICAL HISTORY:  ataxia   numbness COMPARISONS: NONE AVAILABLE TECHNIQUE: Spiral scans without contrast. Multiplanar 2-D reconstructions. All CT scans at this facility use dose modulation, iterative reconstruction, and/or weight based dosing when appropriate to reduce radiation dose to as low as reasonably achievable. FINDINGS: There is age-consistent enlargement of the ventricles, cisterns, and sulci. There are nonspecific white matter hypodensities, likely representing chronic microvascular ischemic changes. There are basal ganglia calcifications. There are no CT findings of acute intracranial pathology. There are no skull lesions. There is hyperostosis frontalis interna. The mastoids and middle ears are clear. The orbits and visualized portions of the paranasal sinuses show no significant pathology. There is nasal septal deviation to the left. NO ACUTE INTRACRANIAL PATHOLOGY.     Mra Head Wo Contrast    Result Date: 5/28/2019  EXAMINATION: MRI BRAIN W WO CONTRAST DATE AND TIME:5/26/2019 6:30 PM CLINICAL HISTORY: Headache   STROKE noted in the middle cerebral artery branches bilaterally. Posterior cervical arteries: The bilateral posterior cerebral arteries are patent. Basilar arteries: Flow is visualized in the basilar artery. Questionable basilar tip aneurysm versus motion artifact. IMPRESSION:  Nondiagnostic MRI of the brain motion artifact. Consider repeat to exam or CTA     Xr Chest Portable    Result Date: 5/28/2019  XR CHEST PORTABLE: 5/26/2019 CLINICAL HISTORY:  ataxia . COMPARISON: None available. A portable upright AP radiograph of the chest was obtained. FINDINGS: Hyperinflation and coarsening of the bronchovascular structures is suggestive of COPD. There are no focal pulmonary infiltrates, pleural effusion, cardiomegaly, vascular congestion, pneumothorax, or displaced fractures identified. NO EVIDENCE OF ACTIVE CARDIOPULMONARY DISEASE. COPD, WHICH IS BEST EVALUATED CLINICALLY. Mri Brain With And Without Contrast    Result Date: 5/28/2019  EXAMINATION: MRI BRAIN W WO CONTRAST DATE AND TIME:5/26/2019 6:30 PM CLINICAL HISTORY: Headache   STROKE  COMPARISON: None TECHNIQUE:  Multi-sequence multiplanar imaging of the brain was performed. Sequences included T1-weighted, T2-weighted, FLAIR, diffusion-weighted, ADC maps, and  susceptibility weighted imaging. VOLUME: 09 mL   MR CONTRAST: ProHance FINDINGS Acute change: No restricted diffusion to suggest an acute infarct. No magnetic susceptibility artifact on gradient echo pulse sequence to suggest the presence of blood products. Ventricles: Ventricles and sulci are age-appropriate. Brain parenchyma There are multiple bilateral white matter hyperintensities that may be related to  arteriosclerosis, however there can be significant overlap between normal age-related changes and microvascular disease. No mass or mass effect.  No extra-axial fluid mouth daily             atorvastatin (LIPITOR) 40 MG tablet  TAKE 1 TABLET DAILY AT BEDTIME.             benzonatate (TESSALON) 100 MG capsule  TAKE 1-2 CAPSULES BY KATHLEEN 3 TIMES A DAY AS NEEDED FOR COUGH             cyanocobalamin ([employer] VITAMIN B12) 1000 MCG tablet  Take 1 tablet by mouth daily             Famotidine (PEPCID PO)  Take by mouth as needed             fosinopril (MONOPRIL) 10 MG tablet  Take 1 tablet by mouth daily             gabapentin (NEURONTIN) 800 MG tablet  Take 1 tablet by mouth daily for 90 days. .             isosorbide mononitrate (IMDUR) 30 MG extended release tablet  Take 1 tablet by mouth daily             levothyroxine (SYNTHROID) 112 MCG tablet  Take 1 tablet by mouth Daily             metoprolol tartrate (LOPRESSOR) 25 MG tablet  TAKE 1 TABLET BY MOUTH 2 TIMES DAILY                 Disposition:   If discharged to Home, Any [place] [address] needs that were indicated and/or required as been addressed and set up by Social Work. Condition at discharge: Pt was medically stable at the time of discharge. Activity: activity as tolerated    Total time taken for discharging this patient: 40 minutes. Greater than 70% of time was spent focused exclusively on this patient. Time was taken to review chart, discuss plans with consultants, reconciling medications, discussing plan answering questions with patient.      Nilesh Jenkins  5/28/2019, 12:29 PM  ----------------------------------------------------------------------------------------------------------------------    Kathy Mcclendon

## 2019-05-29 LAB
ALBUMIN SERPL-MCNC: 3.79 G/DL (ref 3.75–5.01)
ALPHA-1-GLOBULIN: 0.37 G/DL (ref 0.19–0.46)
ALPHA-2-GLOBULIN: 0.46 G/DL (ref 0.48–1.05)
BETA GLOBULIN: 0.76 G/DL (ref 0.48–1.1)
GAMMA GLOBULIN: 1.32 G/DL (ref 0.62–1.51)
GASTRIC PARIETAL CELL ANTIBODY: 58 UNITS (ref 0–24.9)
INTRINSIC FACTOR BLOCKING AB: NEGATIVE
INTRINSIC FACTOR BLOCKING AB: NEGATIVE
PROTEIN ELECTROPHORESIS, SERUM: ABNORMAL
SPE/IFE INTERPRETATION: ABNORMAL
TOTAL PROTEIN: 6.7 G/DL (ref 6–8.3)

## 2019-05-30 LAB — VITAMIN B1, PLASMA: 7 NMOL/L (ref 4–15)

## 2019-05-31 LAB
BLOOD BANK DISPENSE STATUS: NORMAL
BLOOD BANK DISPENSE STATUS: NORMAL
BLOOD BANK PRODUCT CODE: NORMAL
BLOOD BANK PRODUCT CODE: NORMAL
BPU ID: NORMAL
BPU ID: NORMAL
DESCRIPTION BLOOD BANK: NORMAL
DESCRIPTION BLOOD BANK: NORMAL

## 2020-01-04 ENCOUNTER — HOSPITAL ENCOUNTER (EMERGENCY)
Age: 75
Discharge: HOME | End: 2020-01-04
Payer: MEDICARE

## 2020-01-04 VITALS
DIASTOLIC BLOOD PRESSURE: 81 MMHG | HEART RATE: 71 BPM | WEIGHT: 115 LBS | TEMPERATURE: 98 F | HEIGHT: 62 IN | OXYGEN SATURATION: 98 % | SYSTOLIC BLOOD PRESSURE: 167 MMHG | RESPIRATION RATE: 16 BRPM | BODY MASS INDEX: 21.16 KG/M2

## 2020-01-04 PROBLEM — J20.9 ACUTE BRONCHITIS: Status: ACTIVE | Noted: 2020-01-04

## 2020-01-04 PROCEDURE — 99283 EMERGENCY DEPT VISIT LOW MDM: CPT

## 2020-01-04 PROCEDURE — 94761 N-INVAS EAR/PLS OXIMETRY MLT: CPT

## 2020-01-04 PROCEDURE — 6360000002 HC RX W HCPCS: Performed by: NURSE PRACTITIONER

## 2020-01-04 PROCEDURE — 94640 AIRWAY INHALATION TREATMENT: CPT

## 2020-01-04 RX ORDER — AZITHROMYCIN 250 MG/1
TABLET, FILM COATED ORAL
Qty: 1 PACKET | Refills: 0 | Status: SHIPPED | OUTPATIENT
Start: 2020-01-04 | End: 2020-01-08

## 2020-01-04 RX ORDER — BENZONATATE 100 MG/1
100 CAPSULE ORAL 2 TIMES DAILY PRN
Qty: 30 CAPSULE | Refills: 0 | Status: SHIPPED | OUTPATIENT
Start: 2020-01-04 | End: 2020-01-19

## 2020-01-04 RX ORDER — ALBUTEROL SULFATE 90 UG/1
2 AEROSOL, METERED RESPIRATORY (INHALATION) EVERY 4 HOURS PRN
Qty: 1 INHALER | Refills: 0 | Status: ON HOLD | OUTPATIENT
Start: 2020-01-04 | End: 2022-08-01 | Stop reason: HOSPADM

## 2020-01-04 RX ORDER — PREDNISONE 20 MG/1
20 TABLET ORAL 2 TIMES DAILY
Qty: 10 TABLET | Refills: 0 | Status: SHIPPED | OUTPATIENT
Start: 2020-01-04 | End: 2020-01-09

## 2020-01-04 RX ORDER — ALBUTEROL SULFATE 2.5 MG/3ML
2.5 SOLUTION RESPIRATORY (INHALATION) ONCE
Status: COMPLETED | OUTPATIENT
Start: 2020-01-04 | End: 2020-01-04

## 2020-01-04 RX ADMIN — ALBUTEROL SULFATE 2.5 MG: 2.5 SOLUTION RESPIRATORY (INHALATION) at 12:57

## 2020-01-04 SDOH — HEALTH STABILITY: MENTAL HEALTH: HOW OFTEN DO YOU HAVE A DRINK CONTAINING ALCOHOL?: NEVER

## 2020-01-04 ASSESSMENT — ENCOUNTER SYMPTOMS
WHEEZING: 0
COUGH: 1
SINUS PRESSURE: 0
COLOR CHANGE: 0
STRIDOR: 0
SORE THROAT: 0
TROUBLE SWALLOWING: 0
VOMITING: 0
NAUSEA: 0
SHORTNESS OF BREATH: 0

## 2020-01-04 NOTE — ED PROVIDER NOTES
All EKG's are interpreted by the Emergency Department Physician who either signs or Co-signs this chart in the absence of a cardiologist.        RADIOLOGY:   Non-plain film images such as CT, Ultrasound and MRI are read by the radiologist. Plain radiographic images are visualized and preliminarily interpreted by the emergency physician with the below findings:        Interpretation per the Radiologist below, if available at the time of this note:    No orders to display         ED BEDSIDE ULTRASOUND:   Performed by ED Physician - none    LABS:  Labs Reviewed - No data to display    All other labs were within normal range or not returned as of this dictation. EMERGENCY DEPARTMENT COURSE and DIFFERENTIAL DIAGNOSIS/MDM:   Vitals:    Vitals:    01/04/20 1151   BP: (!) 167/81   Pulse: 71   Resp: 16   Temp: 98 °F (36.7 °C)   TempSrc: Oral   SpO2: 97%   Weight: 115 lb (52.2 kg)   Height: 5' 2\" (1.575 m)           MDM      REASSESSMENT            PROCEDURES:  Unless otherwise noted below, none     Procedures        FINAL IMPRESSION      1. Acute bronchitis, unspecified organism          DISPOSITION/PLAN   DISPOSITION Decision To Discharge 01/04/2020 12:39:45 PM      PATIENT REFERRED TO:  Alexis Gonzalez MD  [id number] [place] [address]  [phone]    In 3 days        DISCHARGE MEDICATIONS:  New Prescriptions    ALBUTEROL SULFATE  (90 BASE) MCG/ACT INHALER    Inhale 2 puffs into the lungs every 4 hours as needed for Wheezing or Shortness of Breath (Space out to every 6 hours as symptoms improve)    AZITHROMYCIN (ZITHROMAX Z-COSTA) 250 MG TABLET    Take 2 tablets (500 mg) on Day 1, and then take 1 tablet (250 mg) on days 2 through 5. BENZONATATE (TESSALON) 100 MG CAPSULE    Take 1 capsule by mouth 2 times daily as needed for Cough    PREDNISONE (DELTASONE) 20 MG TABLET    Take 1 tablet by mouth 2 times daily for 5 days     Controlled Substances Monitoring:     No flowsheet data found.     (Please note

## 2020-01-04 NOTE — ED TRIAGE NOTES
Pt arrived to ER with c/o cough x couple days. Pt denies any other symptoms. [facility]. Pt A&OX4, skin p/w/d. resp even and unlabored.

## 2021-09-10 ENCOUNTER — HOSPITAL ENCOUNTER (INPATIENT)
Age: 76
LOS: 6 days | Discharge: HOME | DRG: 871 | End: 2021-09-16
Attending: EMERGENCY MEDICINE
Payer: MEDICARE

## 2021-09-10 ENCOUNTER — APPOINTMENT (OUTPATIENT)
Dept: ULTRASOUND IMAGING | Age: 76
DRG: 871 | End: 2021-09-10
Payer: MEDICARE

## 2021-09-10 ENCOUNTER — APPOINTMENT (OUTPATIENT)
Dept: CT IMAGING | Age: 76
DRG: 871 | End: 2021-09-10
Payer: MEDICARE

## 2021-09-10 ENCOUNTER — APPOINTMENT (OUTPATIENT)
Dept: GENERAL RADIOLOGY | Age: 76
DRG: 871 | End: 2021-09-10
Payer: MEDICARE

## 2021-09-10 DIAGNOSIS — N17.9 ACUTE KIDNEY INJURY (HCC): Primary | ICD-10-CM

## 2021-09-10 DIAGNOSIS — I10 ESSENTIAL HYPERTENSION: ICD-10-CM

## 2021-09-10 DIAGNOSIS — J18.9 COMMUNITY ACQUIRED BILATERAL LOWER LOBE PNEUMONIA: ICD-10-CM

## 2021-09-10 DIAGNOSIS — U07.1 COVID-19: ICD-10-CM

## 2021-09-10 DIAGNOSIS — E86.0 DEHYDRATION: ICD-10-CM

## 2021-09-10 DIAGNOSIS — R77.8 ELEVATED TROPONIN: ICD-10-CM

## 2021-09-10 PROBLEM — J12.82 PNEUMONIA DUE TO COVID-19 VIRUS: Status: ACTIVE | Noted: 2021-09-10

## 2021-09-10 LAB
ALBUMIN SERPL-MCNC: 4 G/DL (ref 3.5–4.6)
ALP BLD-CCNC: 78 U/L (ref 40–130)
ALT SERPL-CCNC: 8 U/L (ref 0–33)
ANION GAP SERPL CALCULATED.3IONS-SCNC: 20 MEQ/L (ref 9–15)
AST SERPL-CCNC: 38 U/L (ref 0–35)
BACTERIA: NEGATIVE /HPF
BETA-HYDROXYBUTYRATE: 27.5 MG/DL (ref 0.2–2.8)
BILIRUB SERPL-MCNC: 0.3 MG/DL (ref 0.2–0.7)
BILIRUBIN URINE: NEGATIVE
BLOOD, URINE: ABNORMAL
BUN BLDV-MCNC: 45 MG/DL (ref 8–23)
CALCIUM SERPL-MCNC: 9 MG/DL (ref 8.5–9.9)
CHLORIDE BLD-SCNC: 106 MEQ/L (ref 95–107)
CLARITY: CLEAR
CO2: 17 MEQ/L (ref 20–31)
COLOR: YELLOW
CREAT SERPL-MCNC: 1.97 MG/DL (ref 0.5–0.9)
EKG ATRIAL RATE: 112 BPM
EKG P AXIS: 78 DEGREES
EKG P-R INTERVAL: 128 MS
EKG Q-T INTERVAL: 338 MS
EKG QRS DURATION: 66 MS
EKG QTC CALCULATION (BAZETT): 461 MS
EKG R AXIS: 19 DEGREES
EKG T AXIS: 60 DEGREES
EKG VENTRICULAR RATE: 112 BPM
EPITHELIAL CELLS, UA: NORMAL /HPF (ref 0–5)
GFR AFRICAN AMERICAN: 26
GFR AFRICAN AMERICAN: 29.9
GFR NON-AFRICAN AMERICAN: 22
GFR NON-AFRICAN AMERICAN: 24.7
GLOBULIN: 3.9 G/DL (ref 2.3–3.5)
GLUCOSE BLD-MCNC: 105 MG/DL (ref 70–99)
GLUCOSE URINE: NEGATIVE MG/DL
HCT VFR BLD CALC: 38 % (ref 37–47)
HEMOGLOBIN: 12.5 G/DL (ref 12–16)
HYALINE CASTS: NORMAL /HPF (ref 0–5)
KETONES, URINE: 15 MG/DL
LACTIC ACID: 1.7 MMOL/L (ref 0.5–2.2)
LEUKOCYTE ESTERASE, URINE: NEGATIVE
LIPASE: 49 U/L (ref 12–95)
MCH RBC QN AUTO: 29.6 PG (ref 27–31.3)
MCHC RBC AUTO-ENTMCNC: 33 % (ref 33–37)
MCV RBC AUTO: 89.6 FL (ref 82–100)
NITRITE, URINE: NEGATIVE
PDW BLD-RTO: 15.3 % (ref 11.5–14.5)
PERFORMED ON: ABNORMAL
PH UA: 5.5 (ref 5–9)
PHOSPHORUS: 3.6 MG/DL (ref 2.3–4.8)
PLATELET # BLD: 236 K/UL (ref 130–400)
PLATELET SLIDE REVIEW: ADEQUATE
POC CREATININE WHOLE BLOOD: 2.2
POC CREATININE: 2.2 MG/DL (ref 0.6–1.2)
POC SAMPLE TYPE: ABNORMAL
POTASSIUM SERPL-SCNC: 4.8 MEQ/L (ref 3.4–4.9)
PROCALCITONIN: 0.51 NG/ML (ref 0–0.15)
PROTEIN UA: >=300 MG/DL
RBC # BLD: 4.24 M/UL (ref 4.2–5.4)
RBC UA: NORMAL /HPF (ref 0–5)
SARS-COV-2, NAAT: DETECTED
SODIUM BLD-SCNC: 143 MEQ/L (ref 135–144)
SPECIFIC GRAVITY UA: 1.02 (ref 1–1.03)
TOTAL PROTEIN: 7.9 G/DL (ref 6.3–8)
TROPONIN: 0.07 NG/ML (ref 0–0.01)
URINE REFLEX TO CULTURE: ABNORMAL
UROBILINOGEN, URINE: 0.2 E.U./DL
WBC # BLD: 3.7 K/UL (ref 4.8–10.8)
WBC UA: NORMAL /HPF (ref 0–5)

## 2021-09-10 PROCEDURE — 80053 COMPREHEN METABOLIC PANEL: CPT

## 2021-09-10 PROCEDURE — XW033E5 INTRODUCTION OF REMDESIVIR ANTI-INFECTIVE INTO PERIPHERAL VEIN, PERCUTANEOUS APPROACH, NEW TECHNOLOGY GROUP 5: ICD-10-PCS | Performed by: INTERNAL MEDICINE

## 2021-09-10 PROCEDURE — 6370000000 HC RX 637 (ALT 250 FOR IP): Performed by: EMERGENCY MEDICINE

## 2021-09-10 PROCEDURE — 2580000003 HC RX 258: Performed by: INTERNAL MEDICINE

## 2021-09-10 PROCEDURE — 93970 EXTREMITY STUDY: CPT

## 2021-09-10 PROCEDURE — 74176 CT ABD & PELVIS W/O CONTRAST: CPT

## 2021-09-10 PROCEDURE — 87635 SARS-COV-2 COVID-19 AMP PRB: CPT

## 2021-09-10 PROCEDURE — 6360000002 HC RX W HCPCS: Performed by: INTERNAL MEDICINE

## 2021-09-10 PROCEDURE — 99284 EMERGENCY DEPT VISIT MOD MDM: CPT

## 2021-09-10 PROCEDURE — 71045 X-RAY EXAM CHEST 1 VIEW: CPT

## 2021-09-10 PROCEDURE — 6360000002 HC RX W HCPCS: Performed by: EMERGENCY MEDICINE

## 2021-09-10 PROCEDURE — 84100 ASSAY OF PHOSPHORUS: CPT

## 2021-09-10 PROCEDURE — 81001 URINALYSIS AUTO W/SCOPE: CPT

## 2021-09-10 PROCEDURE — 2580000003 HC RX 258: Performed by: EMERGENCY MEDICINE

## 2021-09-10 PROCEDURE — 96365 THER/PROPH/DIAG IV INF INIT: CPT

## 2021-09-10 PROCEDURE — 84484 ASSAY OF TROPONIN QUANT: CPT

## 2021-09-10 PROCEDURE — 85027 COMPLETE CBC AUTOMATED: CPT

## 2021-09-10 PROCEDURE — 1210000000 HC MED SURG R&B

## 2021-09-10 PROCEDURE — 94664 DEMO&/EVAL PT USE INHALER: CPT

## 2021-09-10 PROCEDURE — 87040 BLOOD CULTURE FOR BACTERIA: CPT

## 2021-09-10 PROCEDURE — 84145 PROCALCITONIN (PCT): CPT

## 2021-09-10 PROCEDURE — 36415 COLL VENOUS BLD VENIPUNCTURE: CPT

## 2021-09-10 PROCEDURE — 83690 ASSAY OF LIPASE: CPT

## 2021-09-10 PROCEDURE — 83605 ASSAY OF LACTIC ACID: CPT

## 2021-09-10 PROCEDURE — 93010 ELECTROCARDIOGRAM REPORT: CPT | Performed by: INTERNAL MEDICINE

## 2021-09-10 PROCEDURE — 2500000003 HC RX 250 WO HCPCS: Performed by: INTERNAL MEDICINE

## 2021-09-10 PROCEDURE — 6370000000 HC RX 637 (ALT 250 FOR IP): Performed by: INTERNAL MEDICINE

## 2021-09-10 PROCEDURE — 93005 ELECTROCARDIOGRAM TRACING: CPT | Performed by: EMERGENCY MEDICINE

## 2021-09-10 PROCEDURE — 82010 KETONE BODYS QUAN: CPT

## 2021-09-10 PROCEDURE — 96361 HYDRATE IV INFUSION ADD-ON: CPT

## 2021-09-10 RX ORDER — SODIUM CHLORIDE 0.9 % (FLUSH) 0.9 %
5-40 SYRINGE (ML) INJECTION EVERY 12 HOURS SCHEDULED
Status: DISCONTINUED | OUTPATIENT
Start: 2021-09-10 | End: 2021-09-16 | Stop reason: HOSPADM

## 2021-09-10 RX ORDER — LEVOTHYROXINE SODIUM 112 UG/1
112 TABLET ORAL DAILY
Status: DISCONTINUED | OUTPATIENT
Start: 2021-09-11 | End: 2021-09-16 | Stop reason: HOSPADM

## 2021-09-10 RX ORDER — VITAMIN B COMPLEX
1000 TABLET ORAL DAILY
Status: DISCONTINUED | OUTPATIENT
Start: 2021-09-10 | End: 2021-09-16 | Stop reason: HOSPADM

## 2021-09-10 RX ORDER — GUAIFENESIN/DEXTROMETHORPHAN 100-10MG/5
5 SYRUP ORAL EVERY 4 HOURS PRN
Status: DISCONTINUED | OUTPATIENT
Start: 2021-09-10 | End: 2021-09-16 | Stop reason: HOSPADM

## 2021-09-10 RX ORDER — ISOSORBIDE MONONITRATE 30 MG/1
30 TABLET, EXTENDED RELEASE ORAL DAILY
Status: DISCONTINUED | OUTPATIENT
Start: 2021-09-11 | End: 2021-09-16 | Stop reason: HOSPADM

## 2021-09-10 RX ORDER — SODIUM CHLORIDE 0.9 % (FLUSH) 0.9 %
5-40 SYRINGE (ML) INJECTION PRN
Status: DISCONTINUED | OUTPATIENT
Start: 2021-09-10 | End: 2021-09-16 | Stop reason: HOSPADM

## 2021-09-10 RX ORDER — DEXAMETHASONE SODIUM PHOSPHATE 4 MG/ML
6 INJECTION, SOLUTION INTRA-ARTICULAR; INTRALESIONAL; INTRAMUSCULAR; INTRAVENOUS; SOFT TISSUE DAILY
Status: DISCONTINUED | OUTPATIENT
Start: 2021-09-10 | End: 2021-09-16 | Stop reason: HOSPADM

## 2021-09-10 RX ORDER — ACETAMINOPHEN 325 MG/1
650 TABLET ORAL EVERY 6 HOURS PRN
Status: DISCONTINUED | OUTPATIENT
Start: 2021-09-10 | End: 2021-09-16 | Stop reason: HOSPADM

## 2021-09-10 RX ORDER — ACETAMINOPHEN 650 MG/1
650 SUPPOSITORY RECTAL EVERY 6 HOURS PRN
Status: DISCONTINUED | OUTPATIENT
Start: 2021-09-10 | End: 2021-09-16 | Stop reason: HOSPADM

## 2021-09-10 RX ORDER — DEXTROSE, SODIUM CHLORIDE, SODIUM LACTATE, POTASSIUM CHLORIDE, AND CALCIUM CHLORIDE 5; .6; .31; .03; .02 G/100ML; G/100ML; G/100ML; G/100ML; G/100ML
INJECTION, SOLUTION INTRAVENOUS CONTINUOUS
Status: DISPENSED | OUTPATIENT
Start: 2021-09-10 | End: 2021-09-11

## 2021-09-10 RX ORDER — SODIUM CHLORIDE 9 MG/ML
INJECTION, SOLUTION INTRAVENOUS CONTINUOUS
Status: DISCONTINUED | OUTPATIENT
Start: 2021-09-10 | End: 2021-09-10

## 2021-09-10 RX ORDER — 0.9 % SODIUM CHLORIDE 0.9 %
30 INTRAVENOUS SOLUTION INTRAVENOUS PRN
Status: DISCONTINUED | OUTPATIENT
Start: 2021-09-10 | End: 2021-09-16 | Stop reason: HOSPADM

## 2021-09-10 RX ORDER — ALBUTEROL SULFATE 90 UG/1
1 AEROSOL, METERED RESPIRATORY (INHALATION) EVERY 4 HOURS PRN
Status: DISCONTINUED | OUTPATIENT
Start: 2021-09-10 | End: 2021-09-16 | Stop reason: HOSPADM

## 2021-09-10 RX ORDER — BENZONATATE 100 MG/1
100 CAPSULE ORAL 3 TIMES DAILY PRN
Status: DISCONTINUED | OUTPATIENT
Start: 2021-09-10 | End: 2021-09-16 | Stop reason: HOSPADM

## 2021-09-10 RX ORDER — ONDANSETRON 2 MG/ML
4 INJECTION INTRAMUSCULAR; INTRAVENOUS EVERY 6 HOURS PRN
Status: DISCONTINUED | OUTPATIENT
Start: 2021-09-10 | End: 2021-09-16 | Stop reason: HOSPADM

## 2021-09-10 RX ORDER — CHOLECALCIFEROL (VITAMIN D3) 125 MCG
1000 CAPSULE ORAL DAILY
Status: DISCONTINUED | OUTPATIENT
Start: 2021-09-11 | End: 2021-09-16 | Stop reason: HOSPADM

## 2021-09-10 RX ORDER — ASCORBIC ACID 500 MG
500 TABLET ORAL DAILY
Status: DISCONTINUED | OUTPATIENT
Start: 2021-09-10 | End: 2021-09-16 | Stop reason: HOSPADM

## 2021-09-10 RX ORDER — 0.9 % SODIUM CHLORIDE 0.9 %
500 INTRAVENOUS SOLUTION INTRAVENOUS ONCE
Status: COMPLETED | OUTPATIENT
Start: 2021-09-10 | End: 2021-09-10

## 2021-09-10 RX ORDER — AMLODIPINE BESYLATE 5 MG/1
5 TABLET ORAL DAILY
Status: DISCONTINUED | OUTPATIENT
Start: 2021-09-11 | End: 2021-09-16 | Stop reason: HOSPADM

## 2021-09-10 RX ORDER — ATORVASTATIN CALCIUM 40 MG/1
40 TABLET, FILM COATED ORAL NIGHTLY
Status: DISCONTINUED | OUTPATIENT
Start: 2021-09-10 | End: 2021-09-13

## 2021-09-10 RX ORDER — SODIUM CHLORIDE 9 MG/ML
25 INJECTION, SOLUTION INTRAVENOUS PRN
Status: DISCONTINUED | OUTPATIENT
Start: 2021-09-10 | End: 2021-09-16 | Stop reason: HOSPADM

## 2021-09-10 RX ORDER — ONDANSETRON 4 MG/1
4 TABLET, ORALLY DISINTEGRATING ORAL EVERY 8 HOURS PRN
Status: DISCONTINUED | OUTPATIENT
Start: 2021-09-10 | End: 2021-09-16 | Stop reason: HOSPADM

## 2021-09-10 RX ORDER — POLYETHYLENE GLYCOL 3350 17 G/17G
17 POWDER, FOR SOLUTION ORAL DAILY PRN
Status: DISCONTINUED | OUTPATIENT
Start: 2021-09-10 | End: 2021-09-16 | Stop reason: HOSPADM

## 2021-09-10 RX ORDER — ASPIRIN 81 MG/1
81 TABLET, CHEWABLE ORAL DAILY
Status: DISCONTINUED | OUTPATIENT
Start: 2021-09-11 | End: 2021-09-16 | Stop reason: HOSPADM

## 2021-09-10 RX ORDER — ASPIRIN 81 MG/1
81 TABLET, CHEWABLE ORAL ONCE
Status: COMPLETED | OUTPATIENT
Start: 2021-09-10 | End: 2021-09-10

## 2021-09-10 RX ORDER — ZINC SULFATE 50(220)MG
50 CAPSULE ORAL DAILY
Status: DISCONTINUED | OUTPATIENT
Start: 2021-09-10 | End: 2021-09-16 | Stop reason: HOSPADM

## 2021-09-10 RX ADMIN — OXYCODONE HYDROCHLORIDE AND ACETAMINOPHEN 500 MG: 500 TABLET ORAL at 18:18

## 2021-09-10 RX ADMIN — ASPIRIN 81 MG: 81 TABLET, CHEWABLE ORAL at 13:34

## 2021-09-10 RX ADMIN — ATORVASTATIN CALCIUM 40 MG: 40 TABLET, FILM COATED ORAL at 23:27

## 2021-09-10 RX ADMIN — METOPROLOL TARTRATE 25 MG: 25 TABLET, FILM COATED ORAL at 23:27

## 2021-09-10 RX ADMIN — SODIUM CHLORIDE, SODIUM LACTATE, POTASSIUM CHLORIDE, CALCIUM CHLORIDE AND DEXTROSE MONOHYDRATE: 5; 600; 310; 30; 20 INJECTION, SOLUTION INTRAVENOUS at 18:16

## 2021-09-10 RX ADMIN — ENOXAPARIN SODIUM 30 MG: 30 INJECTION SUBCUTANEOUS at 23:29

## 2021-09-10 RX ADMIN — Medication 1000 UNITS: at 18:17

## 2021-09-10 RX ADMIN — SODIUM CHLORIDE 500 ML: 9 INJECTION, SOLUTION INTRAVENOUS at 11:44

## 2021-09-10 RX ADMIN — REMDESIVIR 200 MG: 5 INJECTION INTRAVENOUS at 18:17

## 2021-09-10 RX ADMIN — DEXAMETHASONE SODIUM PHOSPHATE 6 MG: 4 INJECTION, SOLUTION INTRAMUSCULAR; INTRAVENOUS at 18:17

## 2021-09-10 RX ADMIN — CEFTRIAXONE SODIUM 1000 MG: 1 INJECTION, POWDER, FOR SOLUTION INTRAMUSCULAR; INTRAVENOUS at 13:34

## 2021-09-10 RX ADMIN — ZINC SULFATE 220 MG (50 MG) CAPSULE 50 MG: CAPSULE at 18:17

## 2021-09-10 ASSESSMENT — ENCOUNTER SYMPTOMS
SHORTNESS OF BREATH: 0
EYES NEGATIVE: 1
WHEEZING: 0
TROUBLE SWALLOWING: 0
RHINORRHEA: 0
COUGH: 1
VOMITING: 0
NAUSEA: 1
ALLERGIC/IMMUNOLOGIC NEGATIVE: 1
ABDOMINAL PAIN: 1

## 2021-09-10 NOTE — ED NOTES
Lab called for 2nd set of blood cultures  Family bedside     Alvarado Montemayor, PRISCILLA  09/10/21 9090

## 2021-09-10 NOTE — H&P
Hospital Medicine  History and Physical    Patient:  Ragini Lubin  MRN: [id number]    CHIEF COMPLAINT:    Chief Complaint   Patient presents with    Fatigue       History Obtained From:  Patient, EMR  Primary Care Physician: Parminder Mehta MD    HISTORY OF PRESENT ILLNESS:   65-year-old female with hypertension, former tobacco use, suspected COPD presents from home with generalized weakness, dyspnea, and decreased p.o. intake over the past 4 to 5 days. Daughter brought her in because of increasing weakness to the point where she was unable to ambulate. In the emergency department, she was found to be positive for COVID-19. She was noted to be 90% on room air. On my interview, the patient appears to be a poor historian. She denies any complaints. When asked how long she has felt ill, she tells me for the past year. She denies any recent dyspnea, weakness, or cough. She otherwise denies fevers, nausea, chest pain, abdominal pain, change in bowels or urination. She is a former smoker and quit in the 90s. She does not use any oxygen at home. She does use inhalers. She denies alcohol or illicit drug use. Past Medical History:      Diagnosis Date    Bilateral carotid artery disease ([address]     CAD (coronary artery disease)     Hyperlipidemia     Hypertension     Hypothyroidism     Osteoarthritis        Past Surgical History:      Procedure Laterality Date    CARDIAC CATHETERIZATION  1990s    SPINE SURGERY  8/11/2014    LUMBAR    THYROIDECTOMY, PARTIAL  1962       Medications Prior to Admission:    Prior to Admission medications    Medication Sig Start Date End Date Taking?  Authorizing Provider   albuterol sulfate  (90 Base) MCG/ACT inhaler Inhale 2 puffs into the lungs every 4 hours as needed for Wheezing or Shortness of Breath (Space out to every 6 hours as symptoms improve) 1/4/20 2/3/20  VALARIE Ferris - CNP   cyanocobalamin ([facility] VITAMIN B12) 1000 MCG tablet Take 1 tablet by mouth daily 5/28/19   Sacha Hess MD   levothyroxine (SYNTHROID) 112 MCG tablet Take 1 tablet by mouth Daily 5/28/19   Sacha Hess MD   amLODIPine (NORVASC) 5 MG tablet Take 5 mg by mouth daily    Historical Provider, MD   albuterol sulfate  (90 Base) MCG/ACT inhaler INHALE 2 PUFFS 4 TIMES DAILY AS NEEDED 3/8/19   Carline Pate MD   metoprolol tartrate (LOPRESSOR) 25 MG tablet TAKE 1 TABLET BY MOUTH 2 TIMES DAILY 1/28/19   Carline Pate MD   atorvastatin (LIPITOR) 40 MG tablet TAKE 1 TABLET DAILY AT BEDTIME. 12/31/18   Carline Pate MD   gabapentin (NEURONTIN) 800 MG tablet Take 1 tablet by mouth daily for 90 days. . 6/20/18 9/24/18  Carline Pate MD   isosorbide mononitrate (IMDUR) 30 MG extended release tablet Take 1 tablet by mouth daily 3/16/18   Carline Pate MD   benzonatate (TESSALON) 100 MG capsule TAKE 1-2 CAPSULES BY [facility] 3 TIMES A DAY AS NEEDED FOR COUGH 1/11/18   Historical Provider, MD   fosinopril (MONOPRIL) 10 MG tablet Take 1 tablet by mouth daily 9/15/17   Carline Pate MD   aspirin 81 MG tablet Take 81 mg by mouth daily    Historical Provider, MD   Famotidine (PEPCID PO) Take by mouth as needed    Historical Provider, MD       Allergies:  Bee venom and Pcn [penicillins]    Social History:   TOBACCO:   reports that she quit smoking about 24 years ago. Her smoking use included cigarettes. She started smoking about 60 years ago. She has a 35.00 pack-year smoking history. She has never used smokeless tobacco.  ETOH:   reports no history of alcohol use. Family History:   History reviewed. No pertinent family history.     REVIEW OF SYSTEMS:  Ten systems reviewed and negative except for stated in HPI    Physical Exam:    Vitals: BP (!) 159/73   Pulse 104   Temp 98.3 °F (36.8 °C) (Oral)   Resp 15   Ht 5' 2\" (1.575 m)   Wt 125 lb (56.7 kg)   LMP  (LMP Unknown)   SpO2 97%   BMI 22.86 kg/m²   General appearance: Elderly, alert, appears stated age and cooperative. NAD. Capable of simple discussion but not comparable of complex conversation  Skin: Skin color, texture, turgor normal. No rashes or lesions  HEENT: eomi, perrla. MMM  Neck: No JVD or lymphadenopathy  Lungs: CTA bilaterally. No wheeze   Heart: RRR, no murmur or gallp  Abdomen: soft, nontender. Bsx4. No masses or organomegaly  Extremities: no edema, redness, or tenderness in calves. Cap refill <2s  Neurologic: No focal deficits     Recent Labs     09/10/21  1100   WBC 3.7*   HGB 12.5        Recent Labs     09/10/21  1100 09/10/21  1135     --    K 4.8  --      --    CO2 17*  --    BUN 45*  --    CREATININE 1.97* 2.2*   GLUCOSE 105*  --    AST 38*  --    ALT 8  --    BILITOT 0.3  --    ALKPHOS 78  --      Troponin T:   Recent Labs     09/10/21  1100   TROPONINI 0.065*       ABGs: No results found for: PHART, PO2ART, RPY6AYN  INR: No results for input(s): INR in the last 72 hours. URINALYSIS:  Recent Labs     09/10/21  1130   COLORU Yellow   PHUR 5.5   WBCUA 0-2   RBCUA 0-2   BACTERIA Negative   CLARITYU Clear   SPECGRAV 1.020   LEUKOCYTESUR Negative   UROBILINOGEN 0.2   BILIRUBINUR Negative   BLOODU MODERATE*   GLUCOSEU Negative     -----------------------------------------------------------------   CT ABDOMEN PELVIS WO CONTRAST Additional Contrast? None    Result Date: 9/10/2021  CT ABDOMEN PELVIS WO CONTRAST: 9/10/2021 CLINICAL HISTORY:  abdominal pain, epigastric, nausea, inability to tolerate po . COMPARISON: 5/26/2019. TECHNIQUE: Spiral images were obtained of the abdomen and pelvis without contrast. All CT scans at this facility use dose modulation, iterative reconstruction, and/or weight based dosing when appropriate to reduce radiation dose to as low as reasonably achievable. FINDINGS: There is no abnormal bowel or biliary dilatation, ascites, inflammatory changes, significant lymphadenopathy, hernias, or other findings of concern identified.  Marked atrophy of the right kidney with small nonobstructing lower pole renal calculi appear unchanged. Moderate atherosclerotic plaquing of a normal caliber abdominal aorta and branch vessels is again noted. The liver, gallbladder, spleen, pancreas, adrenal glands, left kidney, great vessels, unopacified bowel loops, uterus, adnexa, urinary bladder, and additional images of the pelvis are unremarkable. Degenerative and postoperative changes of the lumbar spine are again noted. Mild to moderate atelectasis, infiltrate, and/or fibrotic changes are present of the visualized lung bases have mildly progressed from the prior study. MILDLY PROGRESSIVE BIBASILAR PULMONARY INFILTRATES, SCARRING, AND/OR ATELECTASIS. NO ACUTE INTRA-ABDOMINAL PROCESS OR OTHER SIGNIFICANT CHANGE FROM 5/26/2019 IDENTIFIED. XR CHEST PORTABLE    Result Date: 9/10/2021  XR CHEST PORTABLE : 9/10/2021 CLINICAL HISTORY:  abnormal EKG. COMPARISON: 5/26/2019. TECHNIQUE: A portable upright AP radiograph of the chest was obtained. FINDINGS: A poor inspiratory volume is present with mild patchy infiltrate/atelectasis of the mid to lower lung torres. Bronchopneumonia should be excluded clinically. There is no cardiomegaly, significant pleural effusion, vascular congestion, pneumothorax, or displaced fractures identified. POOR INSPIRATION WITH MILD PATCHY INFILTRATE/ATELECTASIS OF THE MID TO LOWER LUNG TORRES. BRONCHOPNEUMONIA SHOULD BE EXCLUDED CLINICALLY. Assessment and Plan     24-year-old female with hypertension, former tobacco use, suspected COPD presents from home with generalized weakness, dyspnea, and decreased p.o. intake over the past 4 to 5 days. 1.  COVID-19 pneumonia: 90% on room air  -Remdesivir, Decadron, and supportive respiratory care  -With RICARDO will hold off on CTA chest and just Doppler lower extremities  -High-dose DVT prophylaxis    2. RICARDO on CKD 3 due to dehydration and starvation ketosis due to above  -D5 LR    3.   Weakness due to above: PT/OT    Hypothyroidism  Peripheral neuropathy  B12 deficiency  Hypothyroidism  History of TIA  Former tobacco use    45 minutes in total care time.      Giovanni Richardson DO  Admitting Hospitalist

## 2021-09-10 NOTE — PROGRESS NOTES
[facility] Respiratory Therapy Evaluation   Current Order:  ALBUTEROL MDI Q4 PRN   Home Regimen: PRN      Ordering Physician: TENISHA  Re-evaluation Date:  EVAL DONE     Diagnosis: COVID-19      Patient Status: Stable / Unstable + Physician notified    The following MDI Criteria must be met in order to convert aerosol to MDI with spacer. If unable to meet, MDI will be converted to aerosol:  []  Patient able to demonstrate the ability to use MDI effectively  []  Patient alert and cooperative  []  Patient able to take deep breath with 5-10 second hold  []  Medication(s) available in this delivery method   []  Peak flow greater than or equal to 200 ml/min            Current Order Substituted To  (same drug, same frequency)   Aerosol to MDI [] Albuterol Sulfate 0.083% unit dose by aerosol Albuterol Sulfate MDI 2 puffs by inhalation with spacer    [] Levalbuterol 1.25 mg unit dose by aerosol Levalbuterol MDI 2 puffs by inhalation with spacer    [] Levalbuterol 0.63 mg unit dose by aerosol Levalbuterol MDI 2 puffs by inhalation with spacer    [] Ipratropium Bromide 0.02% unit dose by aerosol Ipratropium Bromide MDI 2 puffs by inhalation with spacer    [] Duoneb (Ipratropium + Albuterol) unit dose by aerosol Ipratropium MDI + Albuterol MDI 2 puffs by inhalation w/spacer   MDI to Aerosol [] Albuterol Sulfate MDI Albuterol Sulfate 0.083% unit dose by aerosol    [] Levalbuterol MDI 2 puffs by inhalation Levalbuterol 1.25 mg unit dose by aerosol    [] Ipratropium Bromide MDI by inhalation Ipratropium Bromide 0.02% unit dose by aerosol    [] Combivent (Ipratropium + Albuterol) MDI by inhalation Duoneb (Ipratropium + Albuterol) unit dose by aerosol       Treatment Assessment [Frequency/Schedule]:  Change frequency to: __NO CHANGE________________________________________________per Protocol, P&T, MEC      Points 0 1 2 3 4   Pulmonary Status  Non-Smoker  []   Smoking history   < 20 pack years  []   Smoking history  ?  20 pack years  [x]   Pulmonary Disorder  (acute or chronic)  []   Severe or Chronic w/ Exacerbation  []     Surgical Status No []   Surgeries     General []   Surgery Lower []   Abdominal Thoracic or []   Upper Abdominal Thoracic with  PulmonaryDisorder  []     Chest X-ray Clear/Not  Ordered     []  Chronic Changes  Results Pending  []  Infiltrates, atelectasis, pleural effusion, or edema  [x]  Infiltrates in more than one lobe []  Infiltrate + Atelectasis, &/or pleural effusion  []    Respiratory Pattern Regular,  RR = 12-20 []  Increased,  RR = 21-25 []  KINNEY, irregular,  or RR = 26-30 []  Decreased FEV1  or RR = 31-35 []  Severe SOB, use  of accessory muscles, or RR ? 35  []    Mental Status Alert, oriented,  Cooperative [x]  Confused but Follows commands []  Lethargic or unable to follow commands []  Obtunded  []  Comatose  []    Breath Sounds Clear to  auscultation  [x]  Decreased unilaterally or  in bases only []  Decreased  bilaterally  []  Crackles or intermittent wheezes []  Wheezes []    Cough Strong, Spontan., & nonproductive [x]  Strong,  spontaneous, &  productive []  Weak,  Nonproductive []  Weak, productive or  with wheezes []  No spontaneous  cough or may require suctioning []    Level of Activity Ambulatory []  Ambulatory w/ Assist  [x]  Non-ambulatory []  Paraplegic []  Quadriplegic []    Total    Score:___5____     Triage Score:______5__      Tri       Triage:     1. (>20) Freq: Q3    2. (16-20) Freq: Q4   3. (11-15) Freq: QID & Albuterol Q2 PRN    4. (6-10) Freq: TID & Albuterol Q2 PRN    5. (0-5) Freq Q4prn    ]

## 2021-09-10 NOTE — ED PROVIDER NOTES
Positive for abdominal pain and nausea. Negative for vomiting. Endocrine: Negative. Genitourinary: Negative for dysuria, frequency and hematuria. Musculoskeletal: Negative for gait problem and neck pain. Skin: Negative. Allergic/Immunologic: Negative. Neurological: Positive for weakness and numbness. Negative for seizures, syncope, speech difficulty and light-headedness. Hematological: Negative. Psychiatric/Behavioral: Negative for hallucinations, self-injury and suicidal ideas. Except as noted above the remainder of the review of systems was reviewed and negative. PAST MEDICAL HISTORY     Past Medical History:   Diagnosis Date    Bilateral carotid artery disease ([facility] [address]     CAD (coronary artery disease)     Hyperlipidemia     Hypertension     Hypothyroidism     Osteoarthritis          SURGICALHISTORY       Past Surgical History:   Procedure Laterality Date    CARDIAC CATHETERIZATION  1990s    SPINE SURGERY  8/11/2014    LUMBAR    THYROIDECTOMY, PARTIAL  1962         CURRENT MEDICATIONS       Previous Medications    ALBUTEROL SULFATE  (90 BASE) MCG/ACT INHALER    INHALE 2 PUFFS 4 TIMES DAILY AS NEEDED    ALBUTEROL SULFATE  (90 BASE) MCG/ACT INHALER    Inhale 2 puffs into the lungs every 4 hours as needed for Wheezing or Shortness of Breath (Space out to every 6 hours as symptoms improve)    AMLODIPINE (NORVASC) 5 MG TABLET    Take 5 mg by mouth daily    ASPIRIN 81 MG TABLET    Take 81 mg by mouth daily    ATORVASTATIN (LIPITOR) 40 MG TABLET    TAKE 1 TABLET DAILY AT BEDTIME. BENZONATATE (TESSALON) 100 MG CAPSULE    TAKE 1-2 CAPSULES BY [facility] 3 TIMES A DAY AS NEEDED FOR COUGH    CYANOCOBALAMIN ([facility] VITAMIN B12) 1000 MCG TABLET    Take 1 tablet by mouth daily    FAMOTIDINE (PEPCID PO)    Take by mouth as needed    FOSINOPRIL (MONOPRIL) 10 MG TABLET    Take 1 tablet by mouth daily    GABAPENTIN (NEURONTIN) 800 MG TABLET    Take 1 tablet by mouth daily for 90 days. [place] ISOSORBIDE MONONITRATE (IMDUR) 30 MG EXTENDED RELEASE TABLET    Take 1 tablet by mouth daily    LEVOTHYROXINE (SYNTHROID) 112 MCG TABLET    Take 1 tablet by mouth Daily    METOPROLOL TARTRATE (LOPRESSOR) 25 MG TABLET    TAKE 1 TABLET BY MOUTH 2 TIMES DAILY       ALLERGIES     Bee venom and Pcn [penicillins]    FAMILY HISTORY     No family history on file. SOCIAL HISTORY       Social History     Socioeconomic History    Marital status:      Spouse name: Not on file    Number of children: Not on file    Years of education: Not on file    Highest education level: Not on file   Occupational History    Not on file   Tobacco Use    Smoking status: Former Smoker     Packs/day: 1.00     Years: 35.00     Pack years: 35.00     Types: Cigarettes     Start date: 67     Quit date: 1996     Years since quittin.8    Smokeless tobacco: Never Used   Substance and Sexual Activity    Alcohol use: Never     Alcohol/week: 0.0 standard drinks    Drug use: Never    Sexual activity: Not on file   Other Topics Concern    Not on file   Social History Narrative    Not on file     Social Determinants of Health     Financial Resource Strain:     Difficulty of Paying Living Expenses:    Food Insecurity:     Worried About Running Out of Food in the Last Year:     [address] in the Last Year:    Transportation Needs:     Lack of Transportation (Medical):      Lack of Transportation (Non-Medical):    Physical Activity:     Days of Exercise per Week:     Minutes of Exercise per Session:    Stress:     Feeling of Stress :    Social Connections:     Frequency of Communication with Friends and Family:     Frequency of Social Gatherings with Friends and Family:     Attends Mandaeism Services:     Active Member of Clubs or Organizations:     Attends Club or Organization Meetings:     Marital Status:    Intimate Partner Violence:     Fear of Current or Ex-Partner:     Emotionally Abused:     back: Full passive range of motion without pain, normal range of motion and neck supple. No rigidity or tenderness. Skin:     General: Skin is warm and dry. Capillary Refill: Capillary refill takes less than 2 seconds. Coloration: Skin is not jaundiced or pale. Findings: No bruising or erythema. Neurological:      Mental Status: She is alert and oriented to person, place, and time. Cranial Nerves: No cranial nerve deficit. Motor: Weakness present. Comments: Denies weakness appreciated. Psychiatric:         Speech: Speech normal.         Behavior: Behavior normal.         Thought Content: Thought content normal.         Judgment: Judgment normal.         DIAGNOSTIC RESULTS     EKG: All EKG's are interpreted by the Emergency Department Physician who either signs or Co-signsthis chart in the absence of a cardiologist.    EKG demonstrates sinus tachycardia. Rate is 112. There is PACs. There is nonspecific ST segments. This is abnormal overall EKG. RADIOLOGY:   Non-plain filmimages such as CT, Ultrasound and MRI are read by the radiologist. Plain radiographic images are visualized and preliminarily interpreted by the emergency physician with the below findings:    Single view chest x-ray demonstrates what appears to be nonspecific lower bilateral infiltrative process. CT of the abdomen and pelvis catching lower pulmonary fields demonstrates evidence of pneumonic process. To the abdomen and pelvis is read by radiologist demonstrates bilateral lower lobar infiltrative process and/or scarring and or atelectasis. Abdominal findings are otherwise unremarkable. Please see CT report. Interpretation per the Radiologist below, if available at the time ofthis note:    CT ABDOMEN PELVIS WO CONTRAST Additional Contrast? None   Final Result      MILDLY PROGRESSIVE BIBASILAR PULMONARY INFILTRATES, SCARRING, AND/OR ATELECTASIS.       NO ACUTE INTRA-ABDOMINAL PROCESS OR OTHER SIGNIFICANT CHANGE FROM 5/26/2019 IDENTIFIED. XR CHEST PORTABLE    (Results Pending)         ED BEDSIDE ULTRASOUND:   Performed by ED Physician - none    LABS:  Labs Reviewed   TROPONIN - Abnormal; Notable for the following components:       Result Value    Troponin 0.065 (*)     All other components within normal limits    Narrative:     CALL  [facility] tel. [phone],  Troponin results called to and read back by Dr Renato Huitron, 09/10/2021 11:58, by  Miah Muir   CBC - Abnormal; Notable for the following components:    WBC 3.7 (*)     RDW 15.3 (*)     All other components within normal limits   COMPREHENSIVE METABOLIC PANEL - Abnormal; Notable for the following components:    CO2 17 (*)     Anion Gap 20 (*)     Glucose 105 (*)     BUN 45 (*)     CREATININE 1.97 (*)     GFR Non- 24.7 (*)     GFR  29.9 (*)     AST 38 (*)     Globulin 3.9 (*)     All other components within normal limits   URINE RT REFLEX TO CULTURE - Abnormal; Notable for the following components:    Ketones, Urine 15 (*)     Blood, Urine MODERATE (*)     Protein, UA >=300 (*)     All other components within normal limits   POCT CREATININE - URINE - Normal   CULTURE, BLOOD 1   CULTURE, BLOOD 2   COVID-19, RAPID   LACTIC ACID, PLASMA   LIPASE   MICROSCOPIC URINALYSIS       All other labs were within normal range or not returned as of this dictation. EMERGENCY DEPARTMENT COURSE and DIFFERENTIAL DIAGNOSIS/MDM:   Vitals:    Vitals:    09/10/21 1130 09/10/21 1200 09/10/21 1230 09/10/21 1300   BP: (!) 108/56 130/69 (!) 148/64 (!) 136/58   Pulse:  105 102 103   Resp:  14 15 13   Temp:       TempSrc:       SpO2: 97% 98% 97% 97%   Weight:       Height:           No significant alteration laboratory analysis consistent with n.p.o. status, elevated BUN, worsening kidney function, and evidence of pulmonary fissure process. Medication fluids as noted.   Will admit for further evaluation management as appropriate. MDM    CRITICAL CARE TIME   Total Critical Care time was - minutes, excluding separately reportableprocedures. There was a high probability of clinicallysignificant/life threatening deterioration in the patient's condition which required my urgent intervention.  -    CONSULTS:  None    PROCEDURES:  Unless otherwise noted below, none     Procedures    FINAL IMPRESSION      1. Acute kidney injury ([facility] [address]    2. Dehydration    3. Community acquired bilateral lower lobe pneumonia    4. Elevated troponin    5. COVID-19        DISPOSITION/PLAN   DISPOSITION Decision To Admit 09/10/2021 01:07:06 PM      PATIENT REFERRED TO:  No follow-up provider specified.     DISCHARGE MEDICATIONS:  New Prescriptions    No medications on file          (Please note that portions of this note were completed with a voice recognitionprogram.  Efforts were made to edit the dictations but occasionally words are mis-transcribed.)    Lilliam Cowan MD (electronically signed)  Attending Emergency Physician          Lilliam Cowan MD  09/10/21 1913 Rico Amador MD  09/10/21 2365

## 2021-09-10 NOTE — ED TRIAGE NOTES
Pt arrived via EMS from home with c/o generalized weakness/illness  Pt states her daughter had covid about a month ago and she had all of the same symptoms but was never tested. She states she has not felt the same since. Pt states she is SOB.    Pt is 89% on RA, placed on 2L NC and is now 95%  Dyspnea noted on exertion and rest  Pt reports no cough  RR are even  Lung sounds are diminished  No edema noted  Pt is alert and oriented  Skin p/w/d  No distress noted on arrival

## 2021-09-10 NOTE — PROGRESS NOTES
Pt up with cane and walker at home, per daughter Andres Swann has not been able to move for the last few weeks.

## 2021-09-11 ENCOUNTER — APPOINTMENT (OUTPATIENT)
Dept: CT IMAGING | Age: 76
DRG: 871 | End: 2021-09-11
Payer: MEDICARE

## 2021-09-11 LAB
ALBUMIN SERPL-MCNC: 3.5 G/DL (ref 3.5–4.6)
ALP BLD-CCNC: 75 U/L (ref 40–130)
ALT SERPL-CCNC: 12 U/L (ref 0–33)
ANION GAP SERPL CALCULATED.3IONS-SCNC: 18 MEQ/L (ref 9–15)
AST SERPL-CCNC: 45 U/L (ref 0–35)
BASOPHILS ABSOLUTE: 0 K/UL (ref 0–0.2)
BASOPHILS RELATIVE PERCENT: 0.1 %
BILIRUB SERPL-MCNC: 0.3 MG/DL (ref 0.2–0.7)
BUN BLDV-MCNC: 38 MG/DL (ref 8–23)
C-REACTIVE PROTEIN: 69.4 MG/L (ref 0–5)
CALCIUM SERPL-MCNC: 8.7 MG/DL (ref 8.5–9.9)
CHLORIDE BLD-SCNC: 110 MEQ/L (ref 95–107)
CO2: 16 MEQ/L (ref 20–31)
CREAT SERPL-MCNC: 1.21 MG/DL (ref 0.5–0.9)
D DIMER: 1.31 MG/L FEU (ref 0–0.5)
EOSINOPHILS ABSOLUTE: 0 K/UL (ref 0–0.7)
EOSINOPHILS RELATIVE PERCENT: 0 %
GFR AFRICAN AMERICAN: 52.6
GFR NON-AFRICAN AMERICAN: 43.4
GLOBULIN: 3.8 G/DL (ref 2.3–3.5)
GLUCOSE BLD-MCNC: 127 MG/DL (ref 70–99)
HCT VFR BLD CALC: 38 % (ref 37–47)
HEMOGLOBIN: 12.7 G/DL (ref 12–16)
LYMPHOCYTES ABSOLUTE: 0.2 K/UL (ref 1–4.8)
LYMPHOCYTES RELATIVE PERCENT: 15.9 %
MCH RBC QN AUTO: 29.9 PG (ref 27–31.3)
MCHC RBC AUTO-ENTMCNC: 33.4 % (ref 33–37)
MCV RBC AUTO: 89.5 FL (ref 82–100)
MONOCYTES ABSOLUTE: 0.1 K/UL (ref 0.2–0.8)
MONOCYTES RELATIVE PERCENT: 8.1 %
NEUTROPHILS ABSOLUTE: 1.1 K/UL (ref 1.4–6.5)
NEUTROPHILS RELATIVE PERCENT: 75.9 %
PDW BLD-RTO: 15.3 % (ref 11.5–14.5)
PHOSPHORUS: 3.1 MG/DL (ref 2.3–4.8)
PLATELET # BLD: 162 K/UL (ref 130–400)
POTASSIUM REFLEX MAGNESIUM: 4.8 MEQ/L (ref 3.4–4.9)
RBC # BLD: 4.25 M/UL (ref 4.2–5.4)
SODIUM BLD-SCNC: 144 MEQ/L (ref 135–144)
TOTAL PROTEIN: 7.3 G/DL (ref 6.3–8)
TROPONIN: 0.05 NG/ML (ref 0–0.01)
WBC # BLD: 1.5 K/UL (ref 4.8–10.8)

## 2021-09-11 PROCEDURE — 1210000000 HC MED SURG R&B

## 2021-09-11 PROCEDURE — 6360000004 HC RX CONTRAST MEDICATION: Performed by: INTERNAL MEDICINE

## 2021-09-11 PROCEDURE — 84100 ASSAY OF PHOSPHORUS: CPT

## 2021-09-11 PROCEDURE — 71275 CT ANGIOGRAPHY CHEST: CPT

## 2021-09-11 PROCEDURE — 97162 PT EVAL MOD COMPLEX 30 MIN: CPT

## 2021-09-11 PROCEDURE — 85379 FIBRIN DEGRADATION QUANT: CPT

## 2021-09-11 PROCEDURE — 2500000003 HC RX 250 WO HCPCS: Performed by: INTERNAL MEDICINE

## 2021-09-11 PROCEDURE — 84484 ASSAY OF TROPONIN QUANT: CPT

## 2021-09-11 PROCEDURE — 36415 COLL VENOUS BLD VENIPUNCTURE: CPT

## 2021-09-11 PROCEDURE — 97166 OT EVAL MOD COMPLEX 45 MIN: CPT

## 2021-09-11 PROCEDURE — 86140 C-REACTIVE PROTEIN: CPT

## 2021-09-11 PROCEDURE — 80053 COMPREHEN METABOLIC PANEL: CPT

## 2021-09-11 PROCEDURE — 2580000003 HC RX 258: Performed by: INTERNAL MEDICINE

## 2021-09-11 PROCEDURE — 87040 BLOOD CULTURE FOR BACTERIA: CPT

## 2021-09-11 PROCEDURE — 6360000002 HC RX W HCPCS: Performed by: INTERNAL MEDICINE

## 2021-09-11 PROCEDURE — 6370000000 HC RX 637 (ALT 250 FOR IP): Performed by: INTERNAL MEDICINE

## 2021-09-11 PROCEDURE — 85025 COMPLETE CBC W/AUTO DIFF WBC: CPT

## 2021-09-11 RX ORDER — SODIUM CHLORIDE 0.9 % (FLUSH) 0.9 %
10 SYRINGE (ML) INJECTION 2 TIMES DAILY
Status: DISCONTINUED | OUTPATIENT
Start: 2021-09-11 | End: 2021-09-16 | Stop reason: HOSPADM

## 2021-09-11 RX ADMIN — SODIUM CHLORIDE, SODIUM LACTATE, POTASSIUM CHLORIDE, CALCIUM CHLORIDE AND DEXTROSE MONOHYDRATE: 5; 600; 310; 30; 20 INJECTION, SOLUTION INTRAVENOUS at 11:13

## 2021-09-11 RX ADMIN — ACETAMINOPHEN 650 MG: 325 TABLET ORAL at 21:43

## 2021-09-11 RX ADMIN — METOPROLOL TARTRATE 25 MG: 25 TABLET, FILM COATED ORAL at 11:15

## 2021-09-11 RX ADMIN — ZINC SULFATE 220 MG (50 MG) CAPSULE 50 MG: CAPSULE at 11:14

## 2021-09-11 RX ADMIN — LEVOTHYROXINE SODIUM 112 MCG: 112 TABLET ORAL at 05:42

## 2021-09-11 RX ADMIN — METOPROLOL TARTRATE 25 MG: 25 TABLET, FILM COATED ORAL at 21:34

## 2021-09-11 RX ADMIN — ASPIRIN 81 MG: 81 TABLET, CHEWABLE ORAL at 11:14

## 2021-09-11 RX ADMIN — ENOXAPARIN SODIUM 30 MG: 30 INJECTION SUBCUTANEOUS at 18:23

## 2021-09-11 RX ADMIN — ATORVASTATIN CALCIUM 40 MG: 40 TABLET, FILM COATED ORAL at 21:34

## 2021-09-11 RX ADMIN — ENOXAPARIN SODIUM 30 MG: 30 INJECTION SUBCUTANEOUS at 11:14

## 2021-09-11 RX ADMIN — DEXAMETHASONE SODIUM PHOSPHATE 6 MG: 4 INJECTION, SOLUTION INTRAMUSCULAR; INTRAVENOUS at 18:23

## 2021-09-11 RX ADMIN — REMDESIVIR 100 MG: 100 INJECTION, POWDER, LYOPHILIZED, FOR SOLUTION INTRAVENOUS at 21:34

## 2021-09-11 RX ADMIN — AMLODIPINE BESYLATE 5 MG: 5 TABLET ORAL at 11:14

## 2021-09-11 RX ADMIN — Medication 10 ML: at 21:35

## 2021-09-11 RX ADMIN — Medication 1000 UNITS: at 11:14

## 2021-09-11 RX ADMIN — OXYCODONE HYDROCHLORIDE AND ACETAMINOPHEN 500 MG: 500 TABLET ORAL at 11:15

## 2021-09-11 RX ADMIN — CYANOCOBALAMIN TAB 500 MCG 1000 MCG: 500 TAB at 11:15

## 2021-09-11 RX ADMIN — ISOSORBIDE MONONITRATE 30 MG: 30 TABLET, EXTENDED RELEASE ORAL at 11:15

## 2021-09-11 RX ADMIN — IOPAMIDOL 100 ML: 755 INJECTION, SOLUTION INTRAVENOUS at 16:51

## 2021-09-11 RX ADMIN — ENOXAPARIN SODIUM 30 MG: 30 INJECTION SUBCUTANEOUS at 21:34

## 2021-09-11 ASSESSMENT — PAIN SCALES - GENERAL
PAINLEVEL_OUTOF10: 0
PAINLEVEL_OUTOF10: 3
PAINLEVEL_OUTOF10: 3

## 2021-09-11 NOTE — PROGRESS NOTES
Physical Therapy Med Surg Initial Assessment  Facility/Department: [facility]  Room: [place] [address]       NAME: Elliott Hendrickson  : 1945 (78 y.o.)  MRN: [id number]  CODE STATUS: Full Code    Date of Service: 2021    Patient Diagnosis(es): Dehydration [E86.0]  Elevated troponin [R77.8]  Acute kidney injury ([facility] [address] [N17.9]  Community acquired bilateral lower lobe pneumonia [J18.9]  Pneumonia due to COVID-19 virus [U07.1, J12.82]  COVID-19 [U07.1]   Chief Complaint   Patient presents with    Fatigue     Patient Active Problem List    Diagnosis Date Noted    Pneumonia due to COVID-19 virus 09/10/2021    Acute bronchitis 2020    Pancytopenia ([facility] [address]     Peripheral polyneuropathy     Weight loss     TIA (transient ischemic attack) 2019    Bilateral carotid artery stenosis 09/15/2017    Hypothyroidism     Hypertension     Hyperlipidemia     Degenerative spondylolisthesis 2015    Lumbar stenosis with neurogenic claudication 2015        Past Medical History:   Diagnosis Date    Bilateral carotid artery disease ([facility] [address]     CAD (coronary artery disease)     Hyperlipidemia     Hypertension     Hypothyroidism     Osteoarthritis      Past Surgical History:   Procedure Laterality Date    CARDIAC CATHETERIZATION      SPINE SURGERY  2014    LUMBAR    THYROIDECTOMY, PARTIAL         Chart Reviewed: Yes  Patient assessed for rehabilitation services?: Yes  Family / Caregiver Present: No    Restrictions:  Restrictions/Precautions: Fall Risk, Isolation (covid-19 +; droplet +)     SUBJECTIVE: Subjective: Pt denies pain, reports SOB.  Pt reports that she has not been out of bed since admission    Pain  Pre Treatment Pain Screening  Pain at present: 0    Post Treatment Pain Screening:   Pain Screening  Patient Currently in Pain: No  Pain Assessment  Pain Assessment: 0-10  Pain Level: 0    Prior Level of Function:  Social/Functional History  Lives With: Daughter  Type of Home: House  Home Layout: Two level, Able to Live on Main level with bedroom/bathroom, Bed/Bath upstairs (shower main floor with toilet)  Home Access: Stairs to enter with rails  Entrance Stairs - Number of Steps: 4  Entrance Stairs - Rails: Both  Bathroom Shower/Tub: Walk-in shower  Bathroom Equipment: Grab bars in shower  Home Equipment: Cane, [employer]  ADL Assistance: [address]: Needs assistance (dtr cooks and cleans)  Ambulation Assistance: Independent (cane)  Transfer Assistance: Independent  Active : Yes    OBJECTIVE:   Vision: Impaired  Vision Exceptions: Wears glasses for reading  Hearing: [facility] to [facility]  Hearing Exceptions: Hard of hearing/hearing concerns    Cognition:  Overall Orientation Status: Within Functional Limits  Follows Commands: Within Functional Limits    Observation/Palpation  Observation: pleasant, cooperative, no acute distress at rest, SOB upon exertion, SPO2 96% upon arrival, desats 90% with mobility and recovers ~2 min to 100% on RA    ROM:  RLE AROM: WFL  LLE AROM : WFL    Strength:  Strength RLE  Comment: functionally >/=3+/5  Strength LLE  Comment: functionally >/=3+/5    Neuro:  Balance  Posture: Fair (forward head, rounded shoulders, increased T kyphosis, PPT)  Sitting - Static: Fair;+  Sitting - Dynamic: Fair;+  Standing - Static: Fair (B UE support, Min A)  Standing - Dynamic: Fair;-     Tone RLE  RLE Tone: Normotonic  Tone LLE  LLE Tone: Normotonic  Motor Control  Gross Motor?: Exceptions  Comments: generalized weakness  Sensation  Overall Sensation Status: Impaired (R foot numbness and tingling \"on and off, if on legs too long\")    Bed mobility  Supine to Sit: Minimal assistance  Sit to Supine: Stand by assistance  Scootin Person assistance;Dependent/Total (attempted however poor sequencing and SOB)  Comment: increased time and effort    Transfers  Sit to Stand: Minimal Assistance  Stand to sit: Minimal

## 2021-09-11 NOTE — FLOWSHEET NOTE
Assessment completed. Breath sounds diminished; SPO2 96% on 2L. Resting in room. Denies pain or needs at the moment. Standard safety precaution.  Will continue to monitor

## 2021-09-11 NOTE — PROGRESS NOTES
[facility] OCCUPATIONAL THERAPY EVALUATION - ACUTE     NAME: Vicenta Mancini  : 1945 (78 y.o.)  MRN: [id number]  CODE STATUS: Full Code  Room: X702/J876-69    Date of Service: 2021    Patient Diagnosis(es): Dehydration [E86.0]  Elevated troponin [R77.8]  Acute kidney injury ([facility] [address] [N17.9]  Community acquired bilateral lower lobe pneumonia [J18.9]  Pneumonia due to COVID-19 virus [U07.1, J12.82]  COVID-19 [U07.1]   Chief Complaint   Patient presents with    Fatigue     Patient Active Problem List    Diagnosis Date Noted    Pneumonia due to COVID-19 virus 09/10/2021    Acute bronchitis 2020    Pancytopenia ([facility] [address]     Peripheral polyneuropathy     Weight loss     TIA (transient ischemic attack) 2019    Bilateral carotid artery stenosis 09/15/2017    Hypothyroidism     Hypertension     Hyperlipidemia     Degenerative spondylolisthesis 2015    Lumbar stenosis with neurogenic claudication 2015        Past Medical History:   Diagnosis Date    Bilateral carotid artery disease ([facility] [address]     CAD (coronary artery disease)     Hyperlipidemia     Hypertension     Hypothyroidism     Osteoarthritis      Past Surgical History:   Procedure Laterality Date    CARDIAC CATHETERIZATION      SPINE SURGERY  2014    LUMBAR    THYROIDECTOMY, PARTIAL          Restrictions  Restrictions/Precautions: Fall Risk, Isolation (covid-19)     Safety Devices: Safety Devices  Safety Devices in place: Yes  Type of devices:  All fall risk precautions in place        Subjective       Pain Reassessment:   Pain Assessment  Patient Currently in Pain: Denies       Prior Level of Function:  Social/Functional History  Lives With: Daughter  Type of Home: House  Home Layout: Two level, Able to Live on Main level with bedroom/bathroom, Bed/Bath upstairs (shower main floor with toilet)  Home Access: Stairs to enter with rails  Entrance Stairs - Number of Steps: 4  Entrance Stairs - Rails: Both  Bathroom Shower/Tub: Walk-in shower  Bathroom Equipment: Grab bars in shower  Home Equipment: Cane, [employer]  ADL Assistance: Independent  Homemaking Assistance: Needs assistance (dtr cooks and cleans)  Ambulation Assistance: Independent (cane)  Transfer Assistance: Independent  Active : Yes    OBJECTIVE:     Orientation Status:  Orientation  Overall Orientation Status: Within Functional Limits    Observation:  Observation/Palpation  Observation: pleasant, cooperative, no acute distress at rest, SOB upon exertion, SPO2 96% upon arrival, desats 90% with mobility and recovers ~2 min to 100% on RA    Cognition Status:  Cognition  Overall Cognitive Status: WFL    Perception Status:  Perception  Overall Perceptual Status: WFL    Sensation Status:  Sensation  Overall Sensation Status: Impaired (R foot numbness and tingling \"on and off, if on legs too long\")    Vision and Hearing Status:  Vision  Vision: Impaired  Vision Exceptions: Wears glasses for reading  Hearing  Hearing: Exceptions to [facility]  Hearing Exceptions: Hard of hearing/hearing concerns     ROM:   LUE AROM (degrees)  LUE AROM : WFL  Left Hand AROM (degrees)  Left Hand AROM: WFL  Left Hand General AROM: arthritic changes noted  RUE AROM (degrees)  RUE AROM : WFL  Right Hand AROM (degrees)  Right Hand AROM: WFL  Right Hand General AROM: arthritic changes noted    Strength:  LUE Strength  L Hand General: 3+/5  LUE Strength Comment: 3+/5  RUE Strength  R Hand General: 3+/5  RUE Strength Comment: 3+/5    Coordination, Tone, Quality of Movement:    Tone RUE  RUE Tone: Normotonic  Tone LUE  LUE Tone: Normotonic  Coordination  Movements Are Fluid And Coordinated: Yes    Hand Dominance:  Hand Dominance  Hand Dominance: Right    ADL Status:  ADL  Feeding: Setup  Grooming: Setup  UE Bathing: Setup, Stand by assistance  LE Bathing: Minimal assistance  UE Dressing: Stand by assistance  LE Dressing: Minimal assistance (pt able to don/doff socks seated EOB)  Toileting: Contact guard assistance (pt on purwick cath)  Additional Comments: ADL' simulated unless otherwise stated above  Toilet Transfers  Toilet Transfer: Unable to assess  Toilet Transfers Comments: anticipated Min A       Therapy key for assistance levels -   Independent = Pt. is able to perform task with no assistance but may require a device   Stand by assistance = Pt. does not perform task at an independent level but does not need physical assistance, requires verbal cues  Minimal, Moderate, Maximal Assistance = Pt. requires physical assistance (25%, 50%, 75% assist from helper) for task but is able to actively participate in task   Dependent = Pt. requires total assistance with task and is not able to actively participate with task completion     Functional Mobility:  Functional Mobility  Functional - Mobility Device: Other (hand held)  Activity: Other (side step HOB)  Assist Level: Minimal assistance (+2)  Transfers  Sit to stand: Minimal assistance, 2 Person assistance  Stand to sit: Minimal assistance    Bed Mobility  Bed mobility  Supine to Sit: Minimal assistance  Sit to Supine: Stand by assistance  Scootin Person assistance, Dependent/Total (attempted however poor sequencing and SOB)    Seated and Standing Balance:  Balance  Sitting Balance: Supervision  Standing Balance: Contact guard assistance    Functional Endurance:  Activity Tolerance  Activity Tolerance: Patient Tolerated treatment well  Activity Tolerance: SPO2 on RA 90% +, in bed >92% on RA at rest    D/C Recommendations:  OT D/C RECOMMENDATIONS  REQUIRES OT FOLLOW UP: Yes    Equipment Recommendations:  OT Equipment Recommendations  Other: continue to assess    OT Education:   OT Education  OT Education: OT Role, Plan of Care  Barriers to Learning: none    OT Follow Up:  OT D/C RECOMMENDATIONS  REQUIRES OT FOLLOW UP: Yes       Assessment/Discharge Disposition:  Assessment: Pt is a 78 y.o. female with above mentioned deficits impairing ability to complete ADL's at reported baseline. Pt may benefit from OT services to address deficits and maximize safety and function during ADL's. Performance deficits / Impairments: Decreased functional mobility , Decreased ADL status, Decreased strength, Decreased endurance, Decreased balance, Decreased high-level IADLs  Prognosis: Good  Discharge Recommendations: Continue to assess pending progress  Decision Making: Medium Complexity  History: Mutli comorb  Exam: 6 perf imp  Assistance / Modification: Mod A    Six Click Score   How much help for putting on and taking off regular lower body clothing?: A Lot  How much help for Bathing?: A Little  How much help for Toileting?: A Little  How much help for putting on and taking off regular upper body clothing?: A Little  How much help for taking care of personal grooming?: A Little  How much help for eating meals?: A Little  AM-[facility] Inpatient Daily Activity Raw Score: 17  AM-[facility] Inpatient ADL T-Scale Score : 37.26  ADL Inpatient CMS 0-100% Score: 50.11    Plan:  Plan  Times per week: 1-4  Plan weeks: LOS  Current Treatment Recommendations: Strengthening, Balance Training, Functional Mobility Training, Endurance Training, Patient/Caregiver Education & Training, Equipment Evaluation, Education, & procurement, Self-Care / ADL, Home Management Training    Goals:   Patient will:    - Improve functional endurance to tolerate/complete 20-30 mins of ADL's  - Be SBA in UB ADLs   - Be SBA in LB ADLs  - Be SBA in ADL transfers without LOB  - Improve B UE strength and endurance to increase by 1/2 MMT grade in order to participate in self-care activities as projected. Patient Goal:    home   Comments: pt may benefit from therapy prior to home to increased safety     Therapy Time:   OT Individual Minutes  Time In: 1510  Time Out: 1536  Minutes: 26    Eval: 26 minutes     Electronically signed by:     DEANDRA Tuttle/L, OTRENU/L  9/11/2021, 4:33 PM

## 2021-09-11 NOTE — PROGRESS NOTES
Hospitalist Progress Note      Date of Admission: 9/10/2021  Chief Complaint:    Chief Complaint   Patient presents with    Fatigue     Subjective:  No new complaints.   No nausea, vomiting, chest pain, or headache      Medications:    Infusion Medications    sodium chloride       Scheduled Medications    amLODIPine  5 mg Oral Daily    aspirin  81 mg Oral Daily    atorvastatin  40 mg Oral Nightly    cyanocobalamin  1,000 mcg Oral Daily    isosorbide mononitrate  30 mg Oral Daily    levothyroxine  112 mcg Oral Daily    metoprolol tartrate  25 mg Oral BID    sodium chloride flush  5-40 mL IntraVENous 2 times per day    enoxaparin  30 mg SubCUTAneous BID    dexamethasone  6 mg IntraVENous Daily    Vitamin D  1,000 Units Oral Daily    ascorbic acid  500 mg Oral Daily    zinc sulfate  50 mg Oral Daily    remdesivir IVPB  100 mg IntraVENous Q24H     PRN Meds: albuterol sulfate HFA, benzonatate, sodium chloride flush, sodium chloride, ondansetron **OR** ondansetron, polyethylene glycol, acetaminophen **OR** acetaminophen, guaiFENesin-dextromethorphan, sodium chloride    Intake/Output Summary (Last 24 hours) at 9/11/2021 1504  Last data filed at 9/11/2021 0549  Gross per 24 hour   Intake 936 ml   Output 200 ml   Net 736 ml     Exam:  BP (!) 150/68   Pulse 91   Temp 97.3 °F (36.3 °C)   Resp 18   Ht 5' 2\" (1.575 m)   Wt 125 lb (56.7 kg)   LMP  (LMP Unknown)   SpO2 99%   BMI 22.86 kg/m²   Head: Normocephalic, atraumatic  Sclera clear  Neck JVD flat  Lungs: normal effort of breathing    Labs:   Recent Labs     09/10/21  1100 09/11/21  0743   WBC 3.7* 1.5*   HGB 12.5 12.7   HCT 38.0 38.0    162     Recent Labs     09/10/21  1100 09/10/21  1135 09/11/21  0743     --  144   K 4.8  --  4.8     --  110*   CO2 17*  --  16*   BUN 45*  --  38*   CREATININE 1.97* 2.2* 1.21*   CALCIUM 9.0  --  8.7   PHOS 3.6  --  3.1   AST 38*  --  45*   ALT 8  --  12   BILITOT 0.3  --  0.3   ALKPHOS 78  --  75 No results for input(s): INR in the last 72 hours. Recent Labs     09/10/21  1100 09/11/21  0743   TROPONINI 0.065* 0.052*     Radiology:  US DUP LOWER EXTREMITIES BILATERAL VENOUS   Final Result   NEGATIVE STUDY FOR ACUTE PROXIMAL DVT IN THE RIGHT AND LEFT LOWER EXTREMITIES. NEGATIVE STUDY FOR ACUTE CALF DVT IN THE RIGHT AND LEFT LOWER EXTREMITIES. NEGATIVE STUDY FOR SUPERFICIAL THROMBOPHLEBITIS IN THE RIGHT AND LEFT LOWER EXTREMITIES. XR CHEST PORTABLE   Final Result      POOR INSPIRATION WITH MILD PATCHY INFILTRATE/ATELECTASIS OF THE MID TO LOWER LUNG TORRES. BRONCHOPNEUMONIA SHOULD BE EXCLUDED CLINICALLY. CT ABDOMEN PELVIS WO CONTRAST Additional Contrast? None   Final Result      MILDLY PROGRESSIVE BIBASILAR PULMONARY INFILTRATES, SCARRING, AND/OR ATELECTASIS. NO ACUTE INTRA-ABDOMINAL PROCESS OR OTHER SIGNIFICANT CHANGE FROM 5/26/2019 IDENTIFIED. Assessment/Plan:    Acute hypoxic resp failure sec to covid pna. On remdesivir, dexamethasone  Unsure of actual oxygen needs, discussed with bedside RN, currently working on weaning off oxygen to better understand FiO2 needs. Continue to monitor respiratory status while being off oxygen. Normal respiratory rate, no distress. Comfortably resting. Hypertension: Mild. Not optimal.  Continue to monitor. андрей resolving. Elevated D-dimer: CT angio ruled out PE. Ultrasound legs negative for DVT.     35 minutes total care time, >1/2 in unit/floor time and care coordination       Alanna Howe MD ,MD

## 2021-09-12 LAB
ALBUMIN SERPL-MCNC: 3.2 G/DL (ref 3.5–4.6)
ALP BLD-CCNC: 62 U/L (ref 40–130)
ALT SERPL-CCNC: 12 U/L (ref 0–33)
ANION GAP SERPL CALCULATED.3IONS-SCNC: 15 MEQ/L (ref 9–15)
ANISOCYTOSIS: ABNORMAL
AST SERPL-CCNC: 35 U/L (ref 0–35)
BANDED NEUTROPHILS RELATIVE PERCENT: 2 % (ref 5–11)
BASOPHILS ABSOLUTE: 0 K/UL (ref 0–0.2)
BASOPHILS RELATIVE PERCENT: 0.1 %
BILIRUB SERPL-MCNC: 0.3 MG/DL (ref 0.2–0.7)
BUN BLDV-MCNC: 37 MG/DL (ref 8–23)
C-REACTIVE PROTEIN: 35.5 MG/L (ref 0–5)
CALCIUM SERPL-MCNC: 8.5 MG/DL (ref 8.5–9.9)
CHLORIDE BLD-SCNC: 107 MEQ/L (ref 95–107)
CO2: 18 MEQ/L (ref 20–31)
CREAT SERPL-MCNC: 1.18 MG/DL (ref 0.5–0.9)
D DIMER: 0.88 MG/L FEU (ref 0–0.5)
EOSINOPHILS ABSOLUTE: 0 K/UL (ref 0–0.7)
EOSINOPHILS RELATIVE PERCENT: 0 %
GFR AFRICAN AMERICAN: 54.1
GFR NON-AFRICAN AMERICAN: 44.7
GLOBULIN: 3.4 G/DL (ref 2.3–3.5)
GLUCOSE BLD-MCNC: 107 MG/DL (ref 70–99)
HCT VFR BLD CALC: 35.7 % (ref 37–47)
HEMOGLOBIN: 11.9 G/DL (ref 12–16)
LYMPHOCYTES ABSOLUTE: 0.2 K/UL (ref 1–4.8)
LYMPHOCYTES RELATIVE PERCENT: 5 %
MCH RBC QN AUTO: 29.9 PG (ref 27–31.3)
MCHC RBC AUTO-ENTMCNC: 33.4 % (ref 33–37)
MCV RBC AUTO: 89.4 FL (ref 82–100)
MICROCYTES: ABNORMAL
MONOCYTES ABSOLUTE: 0.5 K/UL (ref 0.2–0.8)
MONOCYTES RELATIVE PERCENT: 16 %
NEUTROPHILS ABSOLUTE: 2.5 K/UL (ref 1.4–6.5)
NEUTROPHILS RELATIVE PERCENT: 77 %
OVALOCYTES: ABNORMAL
PDW BLD-RTO: 15.1 % (ref 11.5–14.5)
PHOSPHORUS: 2.8 MG/DL (ref 2.3–4.8)
PLATELET # BLD: 245 K/UL (ref 130–400)
PLATELET SLIDE REVIEW: ADEQUATE
POIKILOCYTES: ABNORMAL
POTASSIUM REFLEX MAGNESIUM: 4.3 MEQ/L (ref 3.4–4.9)
RBC # BLD: 4 M/UL (ref 4.2–5.4)
SODIUM BLD-SCNC: 140 MEQ/L (ref 135–144)
TOTAL PROTEIN: 6.6 G/DL (ref 6.3–8)
WBC # BLD: 3.2 K/UL (ref 4.8–10.8)

## 2021-09-12 PROCEDURE — 6360000002 HC RX W HCPCS: Performed by: INTERNAL MEDICINE

## 2021-09-12 PROCEDURE — 2500000003 HC RX 250 WO HCPCS: Performed by: INTERNAL MEDICINE

## 2021-09-12 PROCEDURE — 85025 COMPLETE CBC W/AUTO DIFF WBC: CPT

## 2021-09-12 PROCEDURE — 2580000003 HC RX 258: Performed by: INTERNAL MEDICINE

## 2021-09-12 PROCEDURE — 6370000000 HC RX 637 (ALT 250 FOR IP): Performed by: INTERNAL MEDICINE

## 2021-09-12 PROCEDURE — 36415 COLL VENOUS BLD VENIPUNCTURE: CPT

## 2021-09-12 PROCEDURE — 86140 C-REACTIVE PROTEIN: CPT

## 2021-09-12 PROCEDURE — 1210000000 HC MED SURG R&B

## 2021-09-12 PROCEDURE — 85379 FIBRIN DEGRADATION QUANT: CPT

## 2021-09-12 PROCEDURE — 84100 ASSAY OF PHOSPHORUS: CPT

## 2021-09-12 PROCEDURE — 80053 COMPREHEN METABOLIC PANEL: CPT

## 2021-09-12 RX ORDER — METOPROLOL TARTRATE 5 MG/5ML
5 INJECTION INTRAVENOUS EVERY 4 HOURS PRN
Status: DISCONTINUED | OUTPATIENT
Start: 2021-09-12 | End: 2021-09-16 | Stop reason: HOSPADM

## 2021-09-12 RX ORDER — METOPROLOL TARTRATE 5 MG/5ML
5 INJECTION INTRAVENOUS ONCE
Status: COMPLETED | OUTPATIENT
Start: 2021-09-12 | End: 2021-09-12

## 2021-09-12 RX ORDER — METOPROLOL TARTRATE 5 MG/5ML
5 INJECTION INTRAVENOUS EVERY 6 HOURS
Status: DISCONTINUED | OUTPATIENT
Start: 2021-09-12 | End: 2021-09-12

## 2021-09-12 RX ORDER — MAGNESIUM SULFATE IN WATER 40 MG/ML
2000 INJECTION, SOLUTION INTRAVENOUS ONCE
Status: COMPLETED | OUTPATIENT
Start: 2021-09-12 | End: 2021-09-12

## 2021-09-12 RX ORDER — METOPROLOL TARTRATE 5 MG/5ML
5 INJECTION INTRAVENOUS EVERY 6 HOURS PRN
Status: DISCONTINUED | OUTPATIENT
Start: 2021-09-12 | End: 2021-09-12

## 2021-09-12 RX ADMIN — Medication 1000 UNITS: at 07:51

## 2021-09-12 RX ADMIN — METOPROLOL TARTRATE 5 MG: 5 INJECTION INTRAVENOUS at 12:46

## 2021-09-12 RX ADMIN — ISOSORBIDE MONONITRATE 30 MG: 30 TABLET, EXTENDED RELEASE ORAL at 07:52

## 2021-09-12 RX ADMIN — AMLODIPINE BESYLATE 5 MG: 5 TABLET ORAL at 07:51

## 2021-09-12 RX ADMIN — ENOXAPARIN SODIUM 30 MG: 30 INJECTION SUBCUTANEOUS at 21:24

## 2021-09-12 RX ADMIN — ACETAMINOPHEN 650 MG: 325 TABLET ORAL at 22:10

## 2021-09-12 RX ADMIN — ZINC SULFATE 220 MG (50 MG) CAPSULE 50 MG: CAPSULE at 07:52

## 2021-09-12 RX ADMIN — LEVOTHYROXINE SODIUM 112 MCG: 112 TABLET ORAL at 05:03

## 2021-09-12 RX ADMIN — REMDESIVIR 100 MG: 100 INJECTION, POWDER, LYOPHILIZED, FOR SOLUTION INTRAVENOUS at 21:20

## 2021-09-12 RX ADMIN — Medication 10 ML: at 08:03

## 2021-09-12 RX ADMIN — ASPIRIN 81 MG: 81 TABLET, CHEWABLE ORAL at 07:51

## 2021-09-12 RX ADMIN — Medication 10 ML: at 22:10

## 2021-09-12 RX ADMIN — Medication 10 ML: at 21:21

## 2021-09-12 RX ADMIN — METOPROLOL TARTRATE 25 MG: 25 TABLET, FILM COATED ORAL at 07:51

## 2021-09-12 RX ADMIN — CYANOCOBALAMIN TAB 500 MCG 1000 MCG: 500 TAB at 07:51

## 2021-09-12 RX ADMIN — ENOXAPARIN SODIUM 30 MG: 30 INJECTION SUBCUTANEOUS at 07:52

## 2021-09-12 RX ADMIN — MAGNESIUM SULFATE HEPTAHYDRATE 2000 MG: 40 INJECTION, SOLUTION INTRAVENOUS at 12:46

## 2021-09-12 RX ADMIN — DEXAMETHASONE SODIUM PHOSPHATE 6 MG: 4 INJECTION, SOLUTION INTRAMUSCULAR; INTRAVENOUS at 18:56

## 2021-09-12 RX ADMIN — METOPROLOL TARTRATE 25 MG: 25 TABLET, FILM COATED ORAL at 21:20

## 2021-09-12 RX ADMIN — ATORVASTATIN CALCIUM 40 MG: 40 TABLET, FILM COATED ORAL at 21:20

## 2021-09-12 RX ADMIN — OXYCODONE HYDROCHLORIDE AND ACETAMINOPHEN 500 MG: 500 TABLET ORAL at 07:51

## 2021-09-12 ASSESSMENT — PAIN SCALES - GENERAL: PAINLEVEL_OUTOF10: 2

## 2021-09-12 NOTE — PROGRESS NOTES
Hospitalist Progress Note      Date of Admission: 9/10/2021  Chief Complaint:    Chief Complaint   Patient presents with    Fatigue     Subjective:  No new complaints.   No nausea, vomiting, chest pain, or headache      Medications:    Infusion Medications    sodium chloride       Scheduled Medications    sodium chloride flush  10 mL IntraVENous BID    amLODIPine  5 mg Oral Daily    aspirin  81 mg Oral Daily    atorvastatin  40 mg Oral Nightly    cyanocobalamin  1,000 mcg Oral Daily    isosorbide mononitrate  30 mg Oral Daily    levothyroxine  112 mcg Oral Daily    metoprolol tartrate  25 mg Oral BID    sodium chloride flush  5-40 mL IntraVENous 2 times per day    enoxaparin  30 mg SubCUTAneous BID    dexamethasone  6 mg IntraVENous Daily    Vitamin D  1,000 Units Oral Daily    ascorbic acid  500 mg Oral Daily    zinc sulfate  50 mg Oral Daily    remdesivir IVPB  100 mg IntraVENous Q24H     PRN Meds: metoprolol, albuterol sulfate HFA, benzonatate, sodium chloride flush, sodium chloride, ondansetron **OR** ondansetron, polyethylene glycol, acetaminophen **OR** acetaminophen, guaiFENesin-dextromethorphan, sodium chloride    Intake/Output Summary (Last 24 hours) at 9/12/2021 1514  Last data filed at 9/12/2021 0533  Gross per 24 hour   Intake 420 ml   Output 750 ml   Net -330 ml     Exam:  /76   Pulse 102   Temp 97.5 °F (36.4 °C) (Oral)   Resp 20   Ht 5' 2\" (1.575 m)   Wt 125 lb (56.7 kg)   LMP  (LMP Unknown)   SpO2 95%   BMI 22.86 kg/m²   Head: Normocephalic, atraumatic  Sclera clear  Neck JVD flat  Lungs: normal effort of breathing    Labs:   Recent Labs     09/10/21  1100 09/11/21  0743 09/12/21  1026   WBC 3.7* 1.5* 3.2*   HGB 12.5 12.7 11.9*   HCT 38.0 38.0 35.7*    162 245     Recent Labs     09/10/21  1100 09/10/21  1135 09/11/21  0743 09/12/21  1026     --  144 140   K 4.8  --  4.8 4.3     --  110* 107   CO2 17*  --  16* 18*   BUN 45*  --  38* 37*   CREATININE 1.97* 2.2* 1.21* 1.18*   CALCIUM 9.0  --  8.7 8.5   PHOS 3.6  --  3.1 2.8   AST 38*  --  45* 35   ALT 8  --  12 12   BILITOT 0.3  --  0.3 0.3   ALKPHOS 78  --  75 62     No results for input(s): INR in the last 72 hours. Recent Labs     09/10/21  1100 09/11/21  0743   TROPONINI 0.065* 0.052*     Radiology:  CTA CHEST W WO CONTRAST   Final Result   NO EVIDENCE OF CENTRAL OR SEGMENTAL PULMONARY EMBOLISM. MILD TO MODERATE EMPHYSEMATOUS DISEASE. PATCHY MULTIFOCAL AREAS OF GROUNDGLASS AIRSPACE DISEASE THROUGHOUT THE LUNG PARENCHYMA. IMAGING FEATURES CAN BE SEEN WITH (COVID-19) PNEUMONIA, THOUGH ARE NONSPECIFIC AND CAN OCCUR WITH A VARIETY OF INFECTIOUS AND NONINFECTIOUS PROCESSES. 4. PLEURAL-BASED SOFT TISSUE MASS BASE LATERAL ASPECT RIGHT LOWER LOBE. WILL NEED TO BE FOLLOW-UP AND FURTHER EVALUATED. 5. THERE IS SIGNIFICANT STENOSIS AT THE ORIGIN OF THE LEFT CAROTID ARTERY. . THERE IS ALSO SIGNIFICANT ATHEROSCLEROTIC DISEASE SEEN AT THE ORIGIN OF LEFT SUBCLAVIAN ARTERY. FURTHER EVALUATION IS WARRANTED. 6. THE RIGHT KIDNEY IS PARTIALLY VISUALIZED AND IS ATROPHIC. CORRELATE WITH UNDERLYING MEDICAL RENAL FUNCTION. PARTIALLY VISUALIZED RIGHT RENAL CALCULI. All CT scans at this facility use dose modulation, iterative reconstruction, and/or weight based dosing when appropriate to reduce radiation dose to as low as reasonably achievable. US DUP LOWER EXTREMITIES BILATERAL VENOUS   Final Result   NEGATIVE STUDY FOR ACUTE PROXIMAL DVT IN THE RIGHT AND LEFT LOWER EXTREMITIES. NEGATIVE STUDY FOR ACUTE CALF DVT IN THE RIGHT AND LEFT LOWER EXTREMITIES. NEGATIVE STUDY FOR SUPERFICIAL THROMBOPHLEBITIS IN THE RIGHT AND LEFT LOWER EXTREMITIES. XR CHEST PORTABLE   Final Result      POOR INSPIRATION WITH MILD PATCHY INFILTRATE/ATELECTASIS OF THE MID TO LOWER LUNG TORRES. BRONCHOPNEUMONIA SHOULD BE EXCLUDED CLINICALLY.                         CT ABDOMEN PELVIS WO CONTRAST Additional Contrast? None   Final Result      MILDLY PROGRESSIVE BIBASILAR PULMONARY INFILTRATES, SCARRING, AND/OR ATELECTASIS. NO ACUTE INTRA-ABDOMINAL PROCESS OR OTHER SIGNIFICANT CHANGE FROM 5/26/2019 IDENTIFIED. Assessment/Plan:    Acute hypoxic resp failure sec to covid pna. On remdesivir, dexamethasone  Unsure of actual oxygen needs, discussed with bedside RN, currently working on weaning off oxygen to better understand FiO2 needs. Continue to monitor respiratory status while being off oxygen. Normal respiratory rate, no distress. Comfortably resting. Hypertension: Mild. Not optimal.  Continue to monitor. андрей resolving. Atrial fibrillation with rapid ventricular rate. IV Lopressor given, IV magnesium given. Heart rate improved from 1 60-90. Cardiology consult requested for atrial fibrillation and elevated troponin. Elevated D-dimer: CT angio ruled out PE. Ultrasound legs negative for DVT.     35 minutes total care time, >1/2 in unit/floor time and care coordination       Tessy Griffin MD ,MD

## 2021-09-12 NOTE — PROGRESS NOTES
Monitor room called and patient kicked into afib after a walk to the bathroom. She is sustaining heart rates above 160 beats per minute. Perfectserved Dr. Darnell Napier and awaiting response from him. Will continue to monitor.

## 2021-09-12 NOTE — CARE COORDINATION
PATIENT WILL NEED A COVID SNF AT DISCHARGE. LSW/CM TO FOLLOW UP Monday TO DISCUSS SNF OPTIONS. PATIENT WILL NEED A PRECERT. PATIENT IS OFF THE UNIT AT CT SCAN AND I WAS UNABLE TO CALL PATIENT IN THE ROOM TO DISCUSS HER CHOICES.  PER PT/OT PATIENT WILL NEED A SNF AT D/C SHE IS ON 2 L O2--NO HOME O2. PER NURSING PATIENT WENT TO AFIB W/ RVR EARLIER TODAY AND NOW HAS A NEW CARIOLOGY CONSULT. LSW/CM TO FOLLOW

## 2021-09-12 NOTE — PROGRESS NOTES
Shift assessment completed, patient denies shortness of breath. Lung sounds diminished. Call light within reach, will continue to monitor.

## 2021-09-12 NOTE — PROGRESS NOTES
Patient has been saturating above 92% on room air all day and PT got her up and as she was moving the lowest she dropped to was 90% and came right back up. Will continue to monitor.

## 2021-09-13 LAB
ALBUMIN SERPL-MCNC: 3.2 G/DL (ref 3.5–4.6)
ALP BLD-CCNC: 61 U/L (ref 40–130)
ALT SERPL-CCNC: 13 U/L (ref 0–33)
ANION GAP SERPL CALCULATED.3IONS-SCNC: 16 MEQ/L (ref 9–15)
AST SERPL-CCNC: 31 U/L (ref 0–35)
BASOPHILS ABSOLUTE: 0 K/UL (ref 0–0.2)
BASOPHILS RELATIVE PERCENT: 0.1 %
BILIRUB SERPL-MCNC: 0.4 MG/DL (ref 0.2–0.7)
BUN BLDV-MCNC: 43 MG/DL (ref 8–23)
C-REACTIVE PROTEIN: 21.6 MG/L (ref 0–5)
CALCIUM SERPL-MCNC: 8.4 MG/DL (ref 8.5–9.9)
CHLORIDE BLD-SCNC: 108 MEQ/L (ref 95–107)
CO2: 16 MEQ/L (ref 20–31)
CREAT SERPL-MCNC: 1.27 MG/DL (ref 0.5–0.9)
D DIMER: 0.76 MG/L FEU (ref 0–0.5)
EOSINOPHILS ABSOLUTE: 0 K/UL (ref 0–0.7)
EOSINOPHILS RELATIVE PERCENT: 0.1 %
GFR AFRICAN AMERICAN: 49.7
GFR NON-AFRICAN AMERICAN: 41.1
GLOBULIN: 3.2 G/DL (ref 2.3–3.5)
GLUCOSE BLD-MCNC: 126 MG/DL (ref 70–99)
HCT VFR BLD CALC: 36.1 % (ref 37–47)
HEMOGLOBIN: 12.1 G/DL (ref 12–16)
LYMPHOCYTES ABSOLUTE: 0.3 K/UL (ref 1–4.8)
LYMPHOCYTES RELATIVE PERCENT: 6 %
MCH RBC QN AUTO: 30.6 PG (ref 27–31.3)
MCHC RBC AUTO-ENTMCNC: 33.6 % (ref 33–37)
MCV RBC AUTO: 91.2 FL (ref 82–100)
MONOCYTES ABSOLUTE: 0.4 K/UL (ref 0.2–0.8)
MONOCYTES RELATIVE PERCENT: 9.1 %
NEUTROPHILS ABSOLUTE: 3.7 K/UL (ref 1.4–6.5)
NEUTROPHILS RELATIVE PERCENT: 84.7 %
PDW BLD-RTO: 14.7 % (ref 11.5–14.5)
PHOSPHORUS: 3.2 MG/DL (ref 2.3–4.8)
PLATELET # BLD: 303 K/UL (ref 130–400)
POTASSIUM REFLEX MAGNESIUM: 4.4 MEQ/L (ref 3.4–4.9)
RBC # BLD: 3.96 M/UL (ref 4.2–5.4)
SODIUM BLD-SCNC: 140 MEQ/L (ref 135–144)
TOTAL PROTEIN: 6.4 G/DL (ref 6.3–8)
WBC # BLD: 4.4 K/UL (ref 4.8–10.8)

## 2021-09-13 PROCEDURE — 36415 COLL VENOUS BLD VENIPUNCTURE: CPT

## 2021-09-13 PROCEDURE — 85379 FIBRIN DEGRADATION QUANT: CPT

## 2021-09-13 PROCEDURE — 80053 COMPREHEN METABOLIC PANEL: CPT

## 2021-09-13 PROCEDURE — 2580000003 HC RX 258: Performed by: INTERNAL MEDICINE

## 2021-09-13 PROCEDURE — 6370000000 HC RX 637 (ALT 250 FOR IP): Performed by: INTERNAL MEDICINE

## 2021-09-13 PROCEDURE — 85025 COMPLETE CBC W/AUTO DIFF WBC: CPT

## 2021-09-13 PROCEDURE — 84100 ASSAY OF PHOSPHORUS: CPT

## 2021-09-13 PROCEDURE — 1210000000 HC MED SURG R&B

## 2021-09-13 PROCEDURE — 93005 ELECTROCARDIOGRAM TRACING: CPT

## 2021-09-13 PROCEDURE — 99222 1ST HOSP IP/OBS MODERATE 55: CPT | Performed by: INTERNAL MEDICINE

## 2021-09-13 PROCEDURE — 2500000003 HC RX 250 WO HCPCS: Performed by: INTERNAL MEDICINE

## 2021-09-13 PROCEDURE — 6360000002 HC RX W HCPCS: Performed by: INTERNAL MEDICINE

## 2021-09-13 PROCEDURE — 2700000000 HC OXYGEN THERAPY PER DAY

## 2021-09-13 PROCEDURE — 86140 C-REACTIVE PROTEIN: CPT

## 2021-09-13 RX ORDER — ATORVASTATIN CALCIUM 20 MG/1
20 TABLET, FILM COATED ORAL NIGHTLY
Status: DISCONTINUED | OUTPATIENT
Start: 2021-09-13 | End: 2021-09-16 | Stop reason: HOSPADM

## 2021-09-13 RX ADMIN — ZINC SULFATE 220 MG (50 MG) CAPSULE 50 MG: CAPSULE at 08:08

## 2021-09-13 RX ADMIN — METOPROLOL TARTRATE 25 MG: 25 TABLET, FILM COATED ORAL at 08:08

## 2021-09-13 RX ADMIN — METOPROLOL TARTRATE 5 MG: 5 INJECTION INTRAVENOUS at 10:31

## 2021-09-13 RX ADMIN — METOPROLOL TARTRATE 5 MG: 5 INJECTION INTRAVENOUS at 14:53

## 2021-09-13 RX ADMIN — ISOSORBIDE MONONITRATE 30 MG: 30 TABLET, EXTENDED RELEASE ORAL at 08:08

## 2021-09-13 RX ADMIN — Medication 10 ML: at 22:48

## 2021-09-13 RX ADMIN — METOPROLOL TARTRATE 25 MG: 25 TABLET, FILM COATED ORAL at 21:16

## 2021-09-13 RX ADMIN — Medication 10 ML: at 23:31

## 2021-09-13 RX ADMIN — REMDESIVIR 100 MG: 100 INJECTION, POWDER, LYOPHILIZED, FOR SOLUTION INTRAVENOUS at 22:48

## 2021-09-13 RX ADMIN — CYANOCOBALAMIN TAB 500 MCG 1000 MCG: 500 TAB at 08:07

## 2021-09-13 RX ADMIN — AMLODIPINE BESYLATE 5 MG: 5 TABLET ORAL at 08:13

## 2021-09-13 RX ADMIN — LEVOTHYROXINE SODIUM 112 MCG: 112 TABLET ORAL at 08:08

## 2021-09-13 RX ADMIN — ATORVASTATIN CALCIUM 20 MG: 20 TABLET, FILM COATED ORAL at 21:17

## 2021-09-13 RX ADMIN — Medication 10 ML: at 08:07

## 2021-09-13 RX ADMIN — DEXAMETHASONE SODIUM PHOSPHATE 6 MG: 4 INJECTION, SOLUTION INTRAMUSCULAR; INTRAVENOUS at 21:16

## 2021-09-13 RX ADMIN — ASPIRIN 81 MG: 81 TABLET, CHEWABLE ORAL at 08:08

## 2021-09-13 RX ADMIN — ENOXAPARIN SODIUM 30 MG: 30 INJECTION SUBCUTANEOUS at 21:17

## 2021-09-13 RX ADMIN — OXYCODONE HYDROCHLORIDE AND ACETAMINOPHEN 500 MG: 500 TABLET ORAL at 08:08

## 2021-09-13 RX ADMIN — Medication 1000 UNITS: at 08:08

## 2021-09-13 RX ADMIN — ENOXAPARIN SODIUM 30 MG: 30 INJECTION SUBCUTANEOUS at 08:07

## 2021-09-13 NOTE — CONSULTS
Consult Note  Patient: Edson Baca  Unit/Bed: K346/G302-74  YOB: 1945  MRN: [id number]  Acct: [de-identified]   Admitting Diagnosis: Dehydration [E86.0]  Elevated troponin [R77.8]  Acute kidney injury ([address] [N17.9]  Community acquired bilateral lower lobe pneumonia [J18.9]  Pneumonia due to COVID-19 virus [U07.1, J12.82]  COVID-19 [U07.1]  Date:  9/10/2021  Hospital Day: 3      Chief Complaint:  Elevated troponins    History of Present Illness:  82-year-old female with hypertension former tobacco abuse suspected COPD presented with generalized weakness was Covid positive. In the ER was noted to have 90% on room air oxygenation. Former smoker quit almost 20 years ago. Not on any oxygen at home. Also noted to have episodes of tachycardia. She is a poor historian. History was obtained from medical records and EMR.   Elevated troponins    Allergies   Allergen Reactions    Bee Venom     Pcn [Penicillins] Hives and Itching       Current Facility-Administered Medications   Medication Dose Route Frequency Provider Last Rate Last Admin    metoprolol (LOPRESSOR) injection 5 mg  5 mg IntraVENous Q4H PRN Luke Herrera MD   5 mg at 09/13/21 1453    sodium chloride flush 0.9 % injection 10 mL  10 mL IntraVENous BID Luke Herrera MD   10 mL at 09/12/21 2121    albuterol sulfate  (90 Base) MCG/ACT inhaler 1 puff  1 puff Inhalation Q4H PRN Alisa Double, DO        amLODIPine (NORVASC) tablet 5 mg  5 mg Oral Daily Alisa Double, DO   5 mg at 09/13/21 0813    aspirin chewable tablet 81 mg  81 mg Oral Daily Alisa Double, DO   81 mg at 09/13/21 1639    atorvastatin (LIPITOR) tablet 40 mg  40 mg Oral Nightly Alisa Double, DO   40 mg at 09/12/21 2120    benzonatate (TESSALON) capsule 100 mg  100 mg Oral TID PRN Alisa Double, DO        vitamin B-12 (CYANOCOBALAMIN) tablet 1,000 mcg  1,000 mcg Oral Daily Alisa Double, DO   1,000 mcg at 09/13/21 0807    isosorbide mononitrate (IMDUR) extended release tablet 30 mg  30 mg Oral Daily Laura Hem, DO   30 mg at 09/13/21 4404    levothyroxine (SYNTHROID) tablet 112 mcg  112 mcg Oral Daily Laura Hem, DO   112 mcg at 09/13/21 3819    metoprolol tartrate (LOPRESSOR) tablet 25 mg  25 mg Oral BID Laura Hem, DO   25 mg at 09/13/21 9968    sodium chloride flush 0.9 % injection 5-40 mL  5-40 mL IntraVENous 2 times per day Laura Hem, DO   10 mL at 09/13/21 9552    sodium chloride flush 0.9 % injection 5-40 mL  5-40 mL IntraVENous PRN Laura Hem, DO        0.9 % sodium chloride infusion  25 mL IntraVENous PRN Laura Hem, DO        enoxaparin (LOVENOX) injection 30 mg  30 mg SubCUTAneous BID Laura Hem, DO   30 mg at 09/13/21 1820    ondansetron (ZOFRAN-ODT) disintegrating tablet 4 mg  4 mg Oral Q8H PRN Laura Hem, DO        Or    ondansetron [facility]) injection 4 mg  4 mg IntraVENous Q6H PRN Laura Hem, DO        polyethylene glycol (GLYCOLAX) packet 17 g  17 g Oral Daily PRN Laura Hem, DO        acetaminophen (TYLENOL) tablet 650 mg  650 mg Oral Q6H PRN Laura Hem, DO   650 mg at 09/12/21 2210    Or    acetaminophen (TYLENOL) suppository 650 mg  650 mg Rectal Q6H PRN Laura Hem, DO        guaiFENesin-dextromethorphan (ROBITUSSIN DM) 100-10 MG/5ML syrup 5 mL  5 mL Oral Q4H PRN Laura Hem, DO        dexamethasone (DECADRON) injection 6 mg  6 mg IntraVENous Daily Laura Hem, DO   6 mg at 09/12/21 1856    Vitamin D (CHOLECALCIFEROL) tablet 1,000 Units  1,000 Units Oral Daily Laura Hem, DO   1,000 Units at 09/13/21 9216    ascorbic acid (VITAMIN C) tablet 500 mg  500 mg Oral Daily Laura Hem, DO   500 mg at 09/13/21 9214    zinc sulfate (ZINCATE) capsule 50 mg  50 mg Oral Daily Laura Hem, DO   50 mg at 09/13/21 0311    remdesivir 100 mg in sodium chloride 0.9 % 250 mL IVPB  100 mg IntraVENous Q24H Laura Acosta DO   Stopped at 09/12/21 9111    0.9 % sodium chloride bolus  30 mL IntraVENous PRN Colon Eileen, DO           PMHx:  Past Medical History:   Diagnosis Date    Bilateral carotid artery disease ([address]     CAD (coronary artery disease)     Hyperlipidemia     Hypertension     Hypothyroidism     Osteoarthritis        PSHx:  Past Surgical History:   Procedure Laterality Date    CARDIAC CATHETERIZATION      SPINE SURGERY  2014    LUMBAR    THYROIDECTOMY, PARTIAL         Social Hx:  Social History     Socioeconomic History    Marital status:      Spouse name: None    Number of children: None    Years of education: None    Highest education level: None   Occupational History    None   Tobacco Use    Smoking status: Former Smoker     Packs/day: 1.00     Years: 35.00     Pack years: 35.00     Types: Cigarettes     Start date:      Quit date: 1996     Years since quittin.8    Smokeless tobacco: Never Used   Substance and Sexual Activity    Alcohol use: Never     Alcohol/week: 0.0 standard drinks    Drug use: Never    Sexual activity: None   Other Topics Concern    None   Social History Narrative    None     Social Determinants of Health     Financial Resource Strain:     Difficulty of Paying Living Expenses:    Food Insecurity:     Worried About Running Out of Food in the Last Year:     Ran Out of Food in the Last Year:    Transportation Needs:     Lack of Transportation (Medical):      Lack of Transportation (Non-Medical):    Physical Activity:     Days of Exercise per Week:     Minutes of Exercise per Session:    Stress:     Feeling of Stress :    Social Connections:     Frequency of Communication with Friends and Family:     Frequency of Social Gatherings with Friends and Family:     Attends Synagogue Services:     Active Member of Clubs or Organizations:     Attends Club or Organization Meetings:     Marital Status:    Intimate Partner Violence:     Fear of Current or Ex-Partner:     Emotionally Abused:     Physically Abused:     Sexually Abused:        Family Hx:  History reviewed. No pertinent family history.     Review of Systems:   Review of Systems   Unable to perform ROS: Acuity of condition         Physical Examination:    /72   Pulse 98   Temp 97.2 °F (36.2 °C)   Resp 16   Ht 5' 2\" (1.575 m)   Wt 125 lb (56.7 kg)   LMP  (LMP Unknown)   SpO2 94%   BMI 22.86 kg/m²    Physical Exam  Deferred due to Covid status and prevent further spread     LABS:  CBC:  Lab Results   Component Value Date    WBC 4.4 09/13/2021    RBC 3.96 09/13/2021    HGB 12.1 09/13/2021    HCT 36.1 09/13/2021    MCV 91.2 09/13/2021    MCH 30.6 09/13/2021    MCHC 33.6 09/13/2021    RDW 14.7 09/13/2021     09/13/2021    MPV 8.4 08/04/2014     CBC with Differential:   Lab Results   Component Value Date    WBC 4.4 09/13/2021    RBC 3.96 09/13/2021    HGB 12.1 09/13/2021    HCT 36.1 09/13/2021     09/13/2021    MCV 91.2 09/13/2021    MCH 30.6 09/13/2021    MCHC 33.6 09/13/2021    RDW 14.7 09/13/2021    BANDSPCT 2 09/12/2021    LYMPHOPCT 6.0 09/13/2021    MONOPCT 9.1 09/13/2021    BASOPCT 0.1 09/13/2021    MONOSABS 0.4 09/13/2021    LYMPHSABS 0.3 09/13/2021    EOSABS 0.0 09/13/2021    BASOSABS 0.0 09/13/2021     CMP:    Lab Results   Component Value Date     09/13/2021    K 4.4 09/13/2021     09/13/2021    CO2 16 09/13/2021    BUN 43 09/13/2021    CREATININE 1.27 09/13/2021    GFRAA 49.7 09/13/2021    LABGLOM 41.1 09/13/2021    GLUCOSE 126 09/13/2021    PROT 6.4 09/13/2021    LABALBU 3.2 09/13/2021    CALCIUM 8.4 09/13/2021    BILITOT 0.4 09/13/2021    ALKPHOS 61 09/13/2021    AST 31 09/13/2021    ALT 13 09/13/2021     BMP:    Lab Results   Component Value Date     09/13/2021    K 4.4 09/13/2021     09/13/2021    CO2 16 09/13/2021    BUN 43 09/13/2021    LABALBU 3.2 09/13/2021    CREATININE 1.27 09/13/2021    CALCIUM 8.4 09/13/2021    GFRAA 49.7 09/13/2021    LABGLOM 41.1 09/13/2021    GLUCOSE 126 09/13/2021     Magnesium:    Lab Results   Component Value Date    MG 2.1 05/26/2019     Troponin:    Lab Results   Component Value Date    TROPONINI 0.052 09/11/2021       Radiology:  No results found. EKG:     Assessment:    Active Hospital Problems    Diagnosis Date Noted    Pneumonia due to COVID-19 virus [U07.1, J12.82] 09/10/2021         Acute renal insufficiency        Elevated troponin        AF? Normal ejection fraction few years ago        Abnormal EKG       Plan:  1. Increase the dose of beta-blocker  2. We will get another EKG. 3. Consider anticoagulation if A. fib documented  4.  Echo when feasible            Electronically signed by Rick Russell MD on 9/13/2021 at 6:14 PM

## 2021-09-13 NOTE — CARE COORDINATION
[facility] [place] Case Management Initial Discharge Assessment    Met with Family to discuss discharge plan. PCP: Piyush Joseph MD                                Date of Last Visit: 2020    If no PCP, list provided? N/A    Discharge Planning    Living Arrangements: independently at home    Who do you live with? [facility] Staff    Who helps you with your care:  self    If lives at home:     Do you have any barriers navigating in your home? no    Patient can perform ADL? Yes    Current Services (outpatient and in home) :  None    Dialysis: No    Is transportation available to get to your appointments? Yes    DME Equipment:  no    Respiratory equipment: None    Respiratory provider:  no     Pharmacy:  yes - [facility] and [address] [place] [address] with Medication Assistance Program?  No      Patient agreeable to [facility]? No    Patient agreeable to SNF/Rehab? No    Other discharge needs identified? N/A    Does Patient Have a High-Risk for Readmission Diagnosis (CHF, PN, MI, COPD)? No         Initial Discharge Plan? (Note: please see concurrent daily documentation for any updates after initial note). Patient will return to daughter's Starlin Sandifer) home upon discharge.     Readmission Risk              Risk of Unplanned Readmission:  [address]  Electronically signed by MAYTE Cannon, ZULEIKAW on 9/13/2021 at 2:54 PM

## 2021-09-13 NOTE — PROGRESS NOTES
Hospitalist Progress Note      Date of Admission: 9/10/2021  Chief Complaint:    Chief Complaint   Patient presents with    Fatigue     Subjective:  No new complaints.   No nausea, vomiting, chest pain, or headache      Medications:    Infusion Medications    sodium chloride       Scheduled Medications    atorvastatin  20 mg Oral Nightly    metoprolol tartrate  25 mg Oral TID    sodium chloride flush  10 mL IntraVENous BID    amLODIPine  5 mg Oral Daily    aspirin  81 mg Oral Daily    cyanocobalamin  1,000 mcg Oral Daily    isosorbide mononitrate  30 mg Oral Daily    levothyroxine  112 mcg Oral Daily    sodium chloride flush  5-40 mL IntraVENous 2 times per day    enoxaparin  30 mg SubCUTAneous BID    dexamethasone  6 mg IntraVENous Daily    Vitamin D  1,000 Units Oral Daily    ascorbic acid  500 mg Oral Daily    zinc sulfate  50 mg Oral Daily    remdesivir IVPB  100 mg IntraVENous Q24H     PRN Meds: metoprolol, albuterol sulfate HFA, benzonatate, sodium chloride flush, sodium chloride, ondansetron **OR** ondansetron, polyethylene glycol, acetaminophen **OR** acetaminophen, guaiFENesin-dextromethorphan, sodium chloride    Intake/Output Summary (Last 24 hours) at 9/13/2021 1822  Last data filed at 9/13/2021 0543  Gross per 24 hour   Intake 770 ml   Output --   Net 770 ml     Exam:  /72   Pulse 98   Temp 97.2 °F (36.2 °C)   Resp 16   Ht 5' 2\" (1.575 m)   Wt 125 lb (56.7 kg)   LMP  (LMP Unknown)   SpO2 94%   BMI 22.86 kg/m²   Head: Normocephalic, atraumatic  Sclera clear  Neck JVD flat  Lungs: normal effort of breathing    Labs:   Recent Labs     09/11/21  0743 09/12/21  1026 09/13/21  0911   WBC 1.5* 3.2* 4.4*   HGB 12.7 11.9* 12.1   HCT 38.0 35.7* 36.1*    245 303     Recent Labs     09/11/21  0743 09/12/21  1026 09/13/21  0911    140 140   K 4.8 4.3 4.4   * 107 108*   CO2 16* 18* 16*   BUN 38* 37* 43*   CREATININE 1.21* 1.18* 1.27*   CALCIUM 8.7 8.5 8.4*   PHOS 3.1 2.8 3.2   AST 45* 35 31   ALT 12 12 13   BILITOT 0.3 0.3 0.4   ALKPHOS 75 62 61     No results for input(s): INR in the last 72 hours. Recent Labs     09/11/21  0743   TROPONINI 0.052*     Radiology:  CTA CHEST W WO CONTRAST   Final Result   NO EVIDENCE OF CENTRAL OR SEGMENTAL PULMONARY EMBOLISM. MILD TO MODERATE EMPHYSEMATOUS DISEASE. PATCHY MULTIFOCAL AREAS OF GROUNDGLASS AIRSPACE DISEASE THROUGHOUT THE LUNG PARENCHYMA. IMAGING FEATURES CAN BE SEEN WITH (COVID-19) PNEUMONIA, THOUGH ARE NONSPECIFIC AND CAN OCCUR WITH A VARIETY OF INFECTIOUS AND NONINFECTIOUS PROCESSES. 4. PLEURAL-BASED SOFT TISSUE MASS BASE LATERAL ASPECT RIGHT LOWER LOBE. WILL NEED TO BE FOLLOW-UP AND FURTHER EVALUATED. 5. THERE IS SIGNIFICANT STENOSIS AT THE ORIGIN OF THE LEFT CAROTID ARTERY. . THERE IS ALSO SIGNIFICANT ATHEROSCLEROTIC DISEASE SEEN AT THE ORIGIN OF LEFT SUBCLAVIAN ARTERY. FURTHER EVALUATION IS WARRANTED. 6. THE RIGHT KIDNEY IS PARTIALLY VISUALIZED AND IS ATROPHIC. CORRELATE WITH UNDERLYING MEDICAL RENAL FUNCTION. PARTIALLY VISUALIZED RIGHT RENAL CALCULI. All CT scans at this facility use dose modulation, iterative reconstruction, and/or weight based dosing when appropriate to reduce radiation dose to as low as reasonably achievable. US DUP LOWER EXTREMITIES BILATERAL VENOUS   Final Result   NEGATIVE STUDY FOR ACUTE PROXIMAL DVT IN THE RIGHT AND LEFT LOWER EXTREMITIES. NEGATIVE STUDY FOR ACUTE CALF DVT IN THE RIGHT AND LEFT LOWER EXTREMITIES. NEGATIVE STUDY FOR SUPERFICIAL THROMBOPHLEBITIS IN THE RIGHT AND LEFT LOWER EXTREMITIES. XR CHEST PORTABLE   Final Result      POOR INSPIRATION WITH MILD PATCHY INFILTRATE/ATELECTASIS OF THE MID TO LOWER LUNG TORRES. BRONCHOPNEUMONIA SHOULD BE EXCLUDED CLINICALLY.                         CT ABDOMEN PELVIS WO CONTRAST Additional Contrast? None   Final Result      MILDLY PROGRESSIVE BIBASILAR PULMONARY INFILTRATES, SCARRING, AND/OR ATELECTASIS. NO ACUTE INTRA-ABDOMINAL PROCESS OR OTHER SIGNIFICANT CHANGE FROM 5/26/2019 IDENTIFIED. Assessment/Plan:    Acute hypoxic resp failure sec to covid pna. On remdesivir, dexamethasone  Unsure of actual oxygen needs, discussed with bedside RN, currently working on weaning off oxygen to better understand FiO2 needs. Continue to monitor respiratory status while being off oxygen. Normal respiratory rate, no distress. Comfortably resting. Hypertension: Mild. Not optimal.  Continue to monitor. андрей resolving. Atrial fibrillation with rapid ventricular rate. Cardiology on consult, will defer management and anticoagulation plan to cardiology. Elevated D-dimer: CT angio ruled out PE. Ultrasound legs negative for DVT.     35 minutes total care time, >1/2 in unit/floor time and care coordination       Rhona Newsome MD

## 2021-09-14 LAB
ACANTHOCYTES: ABNORMAL
ALBUMIN SERPL-MCNC: 3.5 G/DL (ref 3.5–4.6)
ALP BLD-CCNC: 68 U/L (ref 40–130)
ALT SERPL-CCNC: 17 U/L (ref 0–33)
ANION GAP SERPL CALCULATED.3IONS-SCNC: 17 MEQ/L (ref 9–15)
ANISOCYTOSIS: ABNORMAL
AST SERPL-CCNC: 36 U/L (ref 0–35)
BASOPHILS ABSOLUTE: 0 K/UL (ref 0–0.2)
BASOPHILS RELATIVE PERCENT: 0.1 %
BILIRUB SERPL-MCNC: 0.5 MG/DL (ref 0.2–0.7)
BUN BLDV-MCNC: 44 MG/DL (ref 8–23)
CALCIUM SERPL-MCNC: 8.5 MG/DL (ref 8.5–9.9)
CHLORIDE BLD-SCNC: 110 MEQ/L (ref 95–107)
CO2: 17 MEQ/L (ref 20–31)
CREAT SERPL-MCNC: 1.29 MG/DL (ref 0.5–0.9)
EKG ATRIAL RATE: 101 BPM
EKG ATRIAL RATE: 163 BPM
EKG P AXIS: 85 DEGREES
EKG P-R INTERVAL: 136 MS
EKG Q-T INTERVAL: 334 MS
EKG Q-T INTERVAL: 382 MS
EKG QRS DURATION: 68 MS
EKG QRS DURATION: 72 MS
EKG QTC CALCULATION (BAZETT): 487 MS
EKG QTC CALCULATION (BAZETT): 495 MS
EKG R AXIS: 15 DEGREES
EKG R AXIS: 33 DEGREES
EKG T AXIS: -44 DEGREES
EKG T AXIS: 237 DEGREES
EKG VENTRICULAR RATE: 101 BPM
EKG VENTRICULAR RATE: 128 BPM
EOSINOPHILS ABSOLUTE: 0 K/UL (ref 0–0.7)
EOSINOPHILS RELATIVE PERCENT: 0 %
GFR AFRICAN AMERICAN: 48.8
GFR NON-AFRICAN AMERICAN: 40.3
GLOBULIN: 3.7 G/DL (ref 2.3–3.5)
GLUCOSE BLD-MCNC: 132 MG/DL (ref 70–99)
HCT VFR BLD CALC: 38.2 % (ref 37–47)
HEMOGLOBIN: 12.8 G/DL (ref 12–16)
LYMPHOCYTES ABSOLUTE: 0.1 K/UL (ref 1–4.8)
LYMPHOCYTES RELATIVE PERCENT: 2 %
MCH RBC QN AUTO: 30.1 PG (ref 27–31.3)
MCHC RBC AUTO-ENTMCNC: 33.6 % (ref 33–37)
MCV RBC AUTO: 89.7 FL (ref 82–100)
MICROCYTES: ABNORMAL
MONOCYTES ABSOLUTE: 0.3 K/UL (ref 0.2–0.8)
MONOCYTES RELATIVE PERCENT: 6.5 %
NEUTROPHILS ABSOLUTE: 3.6 K/UL (ref 1.4–6.5)
NEUTROPHILS RELATIVE PERCENT: 92 %
OVALOCYTES: ABNORMAL
PDW BLD-RTO: 15 % (ref 11.5–14.5)
PHOSPHORUS: 3.1 MG/DL (ref 2.3–4.8)
PLATELET # BLD: 289 K/UL (ref 130–400)
PLATELET SLIDE REVIEW: ADEQUATE
POIKILOCYTES: ABNORMAL
POTASSIUM REFLEX MAGNESIUM: 4.5 MEQ/L (ref 3.4–4.9)
RBC # BLD: 4.26 M/UL (ref 4.2–5.4)
SODIUM BLD-SCNC: 144 MEQ/L (ref 135–144)
TEAR DROP CELLS: ABNORMAL
TOTAL PROTEIN: 7.2 G/DL (ref 6.3–8)
WBC # BLD: 3.9 K/UL (ref 4.8–10.8)

## 2021-09-14 PROCEDURE — 93005 ELECTROCARDIOGRAM TRACING: CPT | Performed by: INTERNAL MEDICINE

## 2021-09-14 PROCEDURE — APPSS30 APP SPLIT SHARED TIME 16-30 MINUTES: Performed by: PHYSICIAN ASSISTANT

## 2021-09-14 PROCEDURE — 2580000003 HC RX 258: Performed by: INTERNAL MEDICINE

## 2021-09-14 PROCEDURE — 85025 COMPLETE CBC W/AUTO DIFF WBC: CPT

## 2021-09-14 PROCEDURE — 6360000002 HC RX W HCPCS: Performed by: INTERNAL MEDICINE

## 2021-09-14 PROCEDURE — 6370000000 HC RX 637 (ALT 250 FOR IP): Performed by: INTERNAL MEDICINE

## 2021-09-14 PROCEDURE — 84100 ASSAY OF PHOSPHORUS: CPT

## 2021-09-14 PROCEDURE — 1210000000 HC MED SURG R&B

## 2021-09-14 PROCEDURE — 93010 ELECTROCARDIOGRAM REPORT: CPT | Performed by: INTERNAL MEDICINE

## 2021-09-14 PROCEDURE — 36415 COLL VENOUS BLD VENIPUNCTURE: CPT

## 2021-09-14 PROCEDURE — 99232 SBSQ HOSP IP/OBS MODERATE 35: CPT | Performed by: INTERNAL MEDICINE

## 2021-09-14 PROCEDURE — 2500000003 HC RX 250 WO HCPCS: Performed by: INTERNAL MEDICINE

## 2021-09-14 PROCEDURE — 80053 COMPREHEN METABOLIC PANEL: CPT

## 2021-09-14 RX ADMIN — METOPROLOL TARTRATE 25 MG: 25 TABLET, FILM COATED ORAL at 15:36

## 2021-09-14 RX ADMIN — Medication 1000 UNITS: at 08:47

## 2021-09-14 RX ADMIN — OXYCODONE HYDROCHLORIDE AND ACETAMINOPHEN 500 MG: 500 TABLET ORAL at 08:47

## 2021-09-14 RX ADMIN — ZINC SULFATE 220 MG (50 MG) CAPSULE 50 MG: CAPSULE at 08:47

## 2021-09-14 RX ADMIN — Medication 10 ML: at 20:26

## 2021-09-14 RX ADMIN — ISOSORBIDE MONONITRATE 30 MG: 30 TABLET, EXTENDED RELEASE ORAL at 08:47

## 2021-09-14 RX ADMIN — METOPROLOL TARTRATE 25 MG: 25 TABLET, FILM COATED ORAL at 20:25

## 2021-09-14 RX ADMIN — DEXAMETHASONE SODIUM PHOSPHATE 6 MG: 4 INJECTION, SOLUTION INTRAMUSCULAR; INTRAVENOUS at 20:25

## 2021-09-14 RX ADMIN — ENOXAPARIN SODIUM 30 MG: 30 INJECTION SUBCUTANEOUS at 20:25

## 2021-09-14 RX ADMIN — REMDESIVIR 100 MG: 100 INJECTION, POWDER, LYOPHILIZED, FOR SOLUTION INTRAVENOUS at 20:26

## 2021-09-14 RX ADMIN — ENOXAPARIN SODIUM 30 MG: 30 INJECTION SUBCUTANEOUS at 08:46

## 2021-09-14 RX ADMIN — CYANOCOBALAMIN TAB 500 MCG 1000 MCG: 500 TAB at 08:47

## 2021-09-14 RX ADMIN — Medication 10 ML: at 08:48

## 2021-09-14 RX ADMIN — AMLODIPINE BESYLATE 5 MG: 5 TABLET ORAL at 08:47

## 2021-09-14 RX ADMIN — METOPROLOL TARTRATE 25 MG: 25 TABLET, FILM COATED ORAL at 08:47

## 2021-09-14 RX ADMIN — ATORVASTATIN CALCIUM 20 MG: 20 TABLET, FILM COATED ORAL at 20:25

## 2021-09-14 RX ADMIN — Medication 10 ML: at 08:46

## 2021-09-14 RX ADMIN — ASPIRIN 81 MG: 81 TABLET, CHEWABLE ORAL at 08:46

## 2021-09-14 ASSESSMENT — ENCOUNTER SYMPTOMS
COLOR CHANGE: 0
VOMITING: 0
NAUSEA: 0
ABDOMINAL DISTENTION: 0
SHORTNESS OF BREATH: 1
CHEST TIGHTNESS: 0

## 2021-09-14 ASSESSMENT — PAIN - FUNCTIONAL ASSESSMENT: PAIN_FUNCTIONAL_ASSESSMENT: 0-10

## 2021-09-14 ASSESSMENT — PAIN SCALES - GENERAL
PAINLEVEL_OUTOF10: 0
PAINLEVEL_OUTOF10: 0

## 2021-09-14 NOTE — PROGRESS NOTES
Progress Note  Patient: Jeff Redman  Unit/Bed: D440/A460-79  YOB: 1945  MRN: [id number]  Acct: [de-identified]   Admitting Diagnosis: Dehydration [E86.0]  Elevated troponin [R77.8]  Acute kidney injury ([facility] [N17.9]  Community acquired bilateral lower lobe pneumonia [J18.9]  Pneumonia due to COVID-19 virus [U07.1, J12.82]  COVID-19 [U07.1]  Date:  9/10/2021  Hospital Day: 4    Chief Complaint:  Fatigue/weakness    Subjective      9/14/21: Remains on Covid precautions. Resting comfortably in bed in no acute distress. Complains of mild shortness of breath with exertion but states this is improved since admission. Maintaining adequate SPO2 on room air. Patient noted to be A. fib RVR yesterday briefly and later converted back to sinus rhythm. Currently on telemetry, she is sinus rhythm with heart rates 90s to 100s with occasional PVCs noted. Telemetry alarms reviewed showed 6 beat episode of wide QRS tachycardia around 3:56 AM this morning. Telemetry alarms last showed A. fib RVR around 1441 yesterday. She remains on Decadron 6 mg IV daily and remdesivir 100 mg IV every 24 hours x4 doses. Patient denies having had any prior of atrial fibrillation. She states she has followed with [facility] cardiology, Dr. Herminio Alonso in the past and wishes to continue to follow with him on discharge. Will likely need event monitor in future as outpatient to reevaluate for recurrent episodes of A. Fib.        513/21: 46-year-old female with hypertension former tobacco abuse suspected COPD presented with generalized weakness was Covid positive. In the ER was noted to have 90% on room air oxygenation. Former smoker quit almost 20 years ago. Not on any oxygen at home. Also noted to have episodes of tachycardia. She is a poor historian. History was obtained from medical records and EMR. Elevated troponins    Review of Systems:   Review of Systems   Constitutional: Negative for activity change and fever.    HENT: Negative for congestion. Respiratory: Positive for shortness of breath (improving). Negative for chest tightness. Cardiovascular: Negative for chest pain, palpitations and leg swelling. Gastrointestinal: Negative for abdominal distention, nausea and vomiting. Genitourinary: Negative for difficulty urinating. Musculoskeletal: Negative for arthralgias. Skin: Negative for color change. Neurological: Negative for dizziness and syncope. Psychiatric/Behavioral: Negative for agitation and behavioral problems. Physical Examination:    BP (!) 143/76   Pulse 109   Temp 97.9 °F (36.6 °C) (Oral)   Resp 20   Ht 5' 2\" (1.575 m)   Wt 125 lb (56.7 kg)   LMP  (LMP Unknown)   SpO2 95%   BMI 22.86 kg/m²    Physical Exam  Constitutional:       Appearance: Normal appearance. HENT:      Head: Normocephalic and atraumatic. Cardiovascular:      Rate and Rhythm: Normal rate and regular rhythm. Pulmonary:      Effort: Pulmonary effort is normal. No respiratory distress. Breath sounds: No wheezing, rhonchi or rales. Abdominal:      Palpations: Abdomen is soft. Tenderness: There is no abdominal tenderness. Musculoskeletal:         General: Normal range of motion. Cervical back: Normal range of motion and neck supple. Right lower leg: No edema. Left lower leg: No edema. Skin:     General: Skin is warm and dry. Neurological:      General: No focal deficit present. Mental Status: She is alert and oriented to person, place, and time. Cranial Nerves: No cranial nerve deficit.    Psychiatric:         Mood and Affect: Mood normal.         Behavior: Behavior normal.         LABS:  CBC:   Lab Results   Component Value Date    WBC 3.9 09/14/2021    RBC 4.26 09/14/2021    HGB 12.8 09/14/2021    HCT 38.2 09/14/2021    MCV 89.7 09/14/2021    MCH 30.1 09/14/2021    MCHC 33.6 09/14/2021    RDW 15.0 09/14/2021     09/14/2021    MPV 8.4 08/04/2014     CBC with Differential:   Lab Results   Component Value Date    WBC 3.9 09/14/2021    RBC 4.26 09/14/2021    HGB 12.8 09/14/2021    HCT 38.2 09/14/2021     09/14/2021    MCV 89.7 09/14/2021    MCH 30.1 09/14/2021    MCHC 33.6 09/14/2021    RDW 15.0 09/14/2021    BANDSPCT 2 09/12/2021    LYMPHOPCT 2.0 09/14/2021    MONOPCT 6.5 09/14/2021    BASOPCT 0.1 09/14/2021    MONOSABS 0.3 09/14/2021    LYMPHSABS 0.1 09/14/2021    EOSABS 0.0 09/14/2021    BASOSABS 0.0 09/14/2021     CMP:    Lab Results   Component Value Date     09/14/2021    K 4.5 09/14/2021     09/14/2021    CO2 17 09/14/2021    BUN 44 09/14/2021    CREATININE 1.29 09/14/2021    GFRAA 48.8 09/14/2021    LABGLOM 40.3 09/14/2021    GLUCOSE 132 09/14/2021    PROT 7.2 09/14/2021    LABALBU 3.5 09/14/2021    CALCIUM 8.5 09/14/2021    BILITOT 0.5 09/14/2021    ALKPHOS 68 09/14/2021    AST 36 09/14/2021    ALT 17 09/14/2021     BMP:    Lab Results   Component Value Date     09/14/2021    K 4.5 09/14/2021     09/14/2021    CO2 17 09/14/2021    BUN 44 09/14/2021    LABALBU 3.5 09/14/2021    CREATININE 1.29 09/14/2021    CALCIUM 8.5 09/14/2021    GFRAA 48.8 09/14/2021    LABGLOM 40.3 09/14/2021    GLUCOSE 132 09/14/2021     Magnesium:    Lab Results   Component Value Date    MG 2.1 05/26/2019     Troponin:    Lab Results   Component Value Date    TROPONINI 0.052 09/11/2021       Radiology:  No results found. EKG 9/14/21: , nonspecific ST/T wave changes, QTc 495ms    Telemetry 9/14/21: SR 90s-100, occasional PVC, 6 beat WQRS tach at 3:56am; A-fib RVR yesterday around 2:41pm on tele alarms      Assessment:    Active Hospital Problems    Diagnosis Date Noted    Acute kidney injury ([facility] [N17.9]     Pneumonia due to COVID-19 virus [U07.1, J12.82] 09/10/2021     1. PA-fib RVR--currently SR  2. COVID 19 pneumonia  3. CKD  4. Remote tobacco abuse  5. HTN  6. Hx carotid stenosis--known occluded bilateral ICA and left common carotid  7.  Hx mild to mod MR per echo 5/2019  8. Hypothyroidism  9. WQRS tach        Plan:  1. Maximize medical therapy-aspirin 81 mg p.o. daily, Lopressor 25 mg p.o. 3 times daily, Imdur 30 mg p.o. daily, amlodipine 5 mg p.o. daily, Lipitor 20 mg p.o. daily, DVT prophylaxis with Lovenox 30 mg subcu twice daily, Decadron 6 mg IV daily, remdesivir 100 mg IV every 24 hours x4 doses, levothyroxine 112 mcg p.o. daily  2. No full dose anticoagulation for now as A. fib RVR episode was brief lasting less than 24 hours and in the setting of COVID-19 pneumonia. If recurrent episodes of A. fib in future, will reevaluate need for full dose oral anticoagulation  3. Cardiac diet recommended  4. Monitor on telemetry for any tachycardia or bradycardia arrhythmias  5. Maintain potassium greater than 4, magnesium greater than 2  6. GI/DVT prophylaxis  7. Hospitalist recommendations  8. Recommend outpatient event monitor in future for further evaluation of paroxysmal atrial fibrillation and episode of wide QRS tachycardia  9. Will need outpatient follow-up with cardiology upon discharge. Patient has seen [facility] cardiology, Dr. Charline Pierce in the past and wishes to continue to follow with him upon discharge.   10. Further recommendations to follow        Electronically signed by MARY Boyer on 9/14/2021 at 12:23 PM

## 2021-09-14 NOTE — CARE COORDINATION
Pneumonia booklet and zones sheet, [employer] \"COVID-19 Discharge Instructions\" and \"Recovery After COVID-19\" left outside room as patient is in isolation. Requested that nurse deliver to patient.

## 2021-09-14 NOTE — PROGRESS NOTES
Physician Progress Note      Rosina Connolly  [facility] #:                  [id number]  :                       1945  ADMIT DATE:       9/10/2021 10:47 AM  DISCH DATE:  RESPONDING  PROVIDER #:        Rena Bhatt MD          QUERY TEXT:    Pt admitted with COVID-19 and noted to have low WBC, elevated procalcitonin &   CRP with tachycardia and low MAPS. If possible, please document in progress   notes and discharge summary if you are evaluating and/or treating: The medical record reflects the following:  Risk Factors: COVID pneumonia, CKD  Clinical Indicators: procal .51, WBC 1.5-3.7, HR , CRP 69.4, covid   positive 9/10,  BP 92/41-97/45 with MAPS 49-59; CT chest MILDLY   PROGRESSIVE BIBASILAR PULMONARY INFILTRATES, SCARRING, AND/OR ATELECTASIS  Treatment: NS bolus 500ml, decoadron, rocpehin, remdesivir  Options provided:  -- Sepsis present on admission due to COVID-19 pneumonia  -- Covid-19 pneumonia without sepsis  -- Other - I will add my own diagnosis  -- Disagree - Not applicable / Not valid  -- Disagree - Clinically unable to determine / Unknown  -- Refer to Clinical Documentation Reviewer    PROVIDER RESPONSE TEXT:    This patient has sepsis which was present on admission due to COVID-19   pneumonia.     Query created by: James Barriga on 2021 9:20 AM      Electronically signed by:  Rena Bhatt MD 2021 11:22 AM

## 2021-09-14 NOTE — PROGRESS NOTES
Physical Therapy Missed Treatment   Facility/Department: [facility] MED SURG G396/U451-15    NAME: Aurelia Villa    : 1945 (78 y.o.)  MRN: [id number]    Account: [de-identified]  Gender: female    Chart reviewed, attempted PT at 09:25. Patient unavailable 2° to:    [] Hold per nsg request    [x] Pt declined stating \"I was up all night doing tests for my heart and I am exhausted. I need to rest.\" Encouragement provided to pt to participate in PT. Offered pt supine there ex in bed. Pt continues to decline despite this PTA's efforts. [x] Nsg notified   [] Other notified    [] Pt. . off floor for test/procedure. [] Pt. Unavailable       Will attempt PT treatment again at earliest convenience.       Electronically signed by Snow Paul PTA on 21 at 9:30 AM EDT

## 2021-09-14 NOTE — FLOWSHEET NOTE
Pt up to BR for void and BM and return to bed. Standby assist. Accepted AM meds without problem. States she only wants yogurt for breakfast. Pulse ox 95% on room air. Respirations even and nonlabored, dyspnea on exertion. Pt states abdominal discomfort and shortness of breath are improving. Facial pain, acute

## 2021-09-15 LAB
ALBUMIN SERPL-MCNC: 3.3 G/DL (ref 3.5–4.6)
ALP BLD-CCNC: 59 U/L (ref 40–130)
ALT SERPL-CCNC: 15 U/L (ref 0–33)
ANION GAP SERPL CALCULATED.3IONS-SCNC: 16 MEQ/L (ref 9–15)
AST SERPL-CCNC: 27 U/L (ref 0–35)
BASOPHILS ABSOLUTE: 0 K/UL (ref 0–0.2)
BASOPHILS RELATIVE PERCENT: 0.4 %
BILIRUB SERPL-MCNC: 0.5 MG/DL (ref 0.2–0.7)
BLOOD CULTURE, ROUTINE: NORMAL
BUN BLDV-MCNC: 41 MG/DL (ref 8–23)
CALCIUM SERPL-MCNC: 8.2 MG/DL (ref 8.5–9.9)
CHLORIDE BLD-SCNC: 106 MEQ/L (ref 95–107)
CO2: 18 MEQ/L (ref 20–31)
CREAT SERPL-MCNC: 1.14 MG/DL (ref 0.5–0.9)
EOSINOPHILS ABSOLUTE: 0 K/UL (ref 0–0.7)
EOSINOPHILS RELATIVE PERCENT: 0 %
GFR AFRICAN AMERICAN: 56.3
GFR NON-AFRICAN AMERICAN: 46.5
GLOBULIN: 2.9 G/DL (ref 2.3–3.5)
GLUCOSE BLD-MCNC: 122 MG/DL (ref 70–99)
HCT VFR BLD CALC: 34 % (ref 37–47)
HEMOGLOBIN: 11.8 G/DL (ref 12–16)
LYMPHOCYTES ABSOLUTE: 0.2 K/UL (ref 1–4.8)
LYMPHOCYTES RELATIVE PERCENT: 5.1 %
MCH RBC QN AUTO: 30.6 PG (ref 27–31.3)
MCHC RBC AUTO-ENTMCNC: 34.7 % (ref 33–37)
MCV RBC AUTO: 88.2 FL (ref 82–100)
MONOCYTES ABSOLUTE: 0.2 K/UL (ref 0.2–0.8)
MONOCYTES RELATIVE PERCENT: 5.3 %
NEUTROPHILS ABSOLUTE: 3.2 K/UL (ref 1.4–6.5)
NEUTROPHILS RELATIVE PERCENT: 89.2 %
PDW BLD-RTO: 14.8 % (ref 11.5–14.5)
PHOSPHORUS: 3.1 MG/DL (ref 2.3–4.8)
PLATELET # BLD: 223 K/UL (ref 130–400)
POTASSIUM REFLEX MAGNESIUM: 4.5 MEQ/L (ref 3.4–4.9)
RBC # BLD: 3.85 M/UL (ref 4.2–5.4)
SODIUM BLD-SCNC: 140 MEQ/L (ref 135–144)
TOTAL PROTEIN: 6.2 G/DL (ref 6.3–8)
WBC # BLD: 3.6 K/UL (ref 4.8–10.8)

## 2021-09-15 PROCEDURE — 84100 ASSAY OF PHOSPHORUS: CPT

## 2021-09-15 PROCEDURE — 94640 AIRWAY INHALATION TREATMENT: CPT

## 2021-09-15 PROCEDURE — 6370000000 HC RX 637 (ALT 250 FOR IP): Performed by: INTERNAL MEDICINE

## 2021-09-15 PROCEDURE — 2580000003 HC RX 258: Performed by: INTERNAL MEDICINE

## 2021-09-15 PROCEDURE — 94761 N-INVAS EAR/PLS OXIMETRY MLT: CPT

## 2021-09-15 PROCEDURE — 85025 COMPLETE CBC W/AUTO DIFF WBC: CPT

## 2021-09-15 PROCEDURE — 1210000000 HC MED SURG R&B

## 2021-09-15 PROCEDURE — 80053 COMPREHEN METABOLIC PANEL: CPT

## 2021-09-15 PROCEDURE — 6360000002 HC RX W HCPCS: Performed by: INTERNAL MEDICINE

## 2021-09-15 PROCEDURE — 36415 COLL VENOUS BLD VENIPUNCTURE: CPT

## 2021-09-15 PROCEDURE — 97116 GAIT TRAINING THERAPY: CPT

## 2021-09-15 RX ADMIN — ACETAMINOPHEN 650 MG: 325 TABLET ORAL at 09:19

## 2021-09-15 RX ADMIN — ENOXAPARIN SODIUM 30 MG: 30 INJECTION SUBCUTANEOUS at 20:37

## 2021-09-15 RX ADMIN — ALBUTEROL SULFATE 1 PUFF: 90 AEROSOL, METERED RESPIRATORY (INHALATION) at 16:58

## 2021-09-15 RX ADMIN — Medication 10 ML: at 09:22

## 2021-09-15 RX ADMIN — Medication 1000 UNITS: at 09:19

## 2021-09-15 RX ADMIN — LEVOTHYROXINE SODIUM 112 MCG: 112 TABLET ORAL at 05:06

## 2021-09-15 RX ADMIN — Medication 10 ML: at 20:40

## 2021-09-15 RX ADMIN — METOPROLOL TARTRATE 25 MG: 25 TABLET, FILM COATED ORAL at 13:12

## 2021-09-15 RX ADMIN — DEXAMETHASONE SODIUM PHOSPHATE 6 MG: 4 INJECTION, SOLUTION INTRAMUSCULAR; INTRAVENOUS at 20:40

## 2021-09-15 RX ADMIN — METOPROLOL TARTRATE 25 MG: 25 TABLET, FILM COATED ORAL at 09:19

## 2021-09-15 RX ADMIN — METOPROLOL TARTRATE 25 MG: 25 TABLET, FILM COATED ORAL at 20:36

## 2021-09-15 RX ADMIN — ATORVASTATIN CALCIUM 20 MG: 20 TABLET, FILM COATED ORAL at 20:36

## 2021-09-15 RX ADMIN — CYANOCOBALAMIN TAB 500 MCG 1000 MCG: 500 TAB at 09:19

## 2021-09-15 RX ADMIN — ISOSORBIDE MONONITRATE 30 MG: 30 TABLET, EXTENDED RELEASE ORAL at 09:19

## 2021-09-15 RX ADMIN — ASPIRIN 81 MG: 81 TABLET, CHEWABLE ORAL at 09:19

## 2021-09-15 RX ADMIN — ENOXAPARIN SODIUM 30 MG: 30 INJECTION SUBCUTANEOUS at 09:18

## 2021-09-15 RX ADMIN — AMLODIPINE BESYLATE 5 MG: 5 TABLET ORAL at 09:19

## 2021-09-15 RX ADMIN — OXYCODONE HYDROCHLORIDE AND ACETAMINOPHEN 500 MG: 500 TABLET ORAL at 09:19

## 2021-09-15 RX ADMIN — ZINC SULFATE 220 MG (50 MG) CAPSULE 50 MG: CAPSULE at 09:18

## 2021-09-15 ASSESSMENT — PAIN SCALES - GENERAL
PAINLEVEL_OUTOF10: 0
PAINLEVEL_OUTOF10: 5
PAINLEVEL_OUTOF10: 0
PAINLEVEL_OUTOF10: 0

## 2021-09-15 NOTE — PROGRESS NOTES
Hospitalist Progress Note      Date of Admission: 9/10/2021  Chief Complaint:    Chief Complaint   Patient presents with    Fatigue     Subjective:  No new complaints.   No nausea, vomiting, chest pain, or headache      Medications:    Infusion Medications    sodium chloride       Scheduled Medications    atorvastatin  20 mg Oral Nightly    metoprolol tartrate  25 mg Oral TID    sodium chloride flush  10 mL IntraVENous BID    amLODIPine  5 mg Oral Daily    aspirin  81 mg Oral Daily    cyanocobalamin  1,000 mcg Oral Daily    isosorbide mononitrate  30 mg Oral Daily    levothyroxine  112 mcg Oral Daily    sodium chloride flush  5-40 mL IntraVENous 2 times per day    enoxaparin  30 mg SubCUTAneous BID    dexamethasone  6 mg IntraVENous Daily    Vitamin D  1,000 Units Oral Daily    ascorbic acid  500 mg Oral Daily    zinc sulfate  50 mg Oral Daily    remdesivir IVPB  100 mg IntraVENous Q24H     PRN Meds: metoprolol, albuterol sulfate HFA, benzonatate, sodium chloride flush, sodium chloride, ondansetron **OR** ondansetron, polyethylene glycol, acetaminophen **OR** acetaminophen, guaiFENesin-dextromethorphan, sodium chloride    Intake/Output Summary (Last 24 hours) at 9/14/2021 2004  Last data filed at 9/14/2021 0859  Gross per 24 hour   Intake 120 ml   Output --   Net 120 ml     Exam:  BP (!) 157/82   Pulse 96   Temp 98.1 °F (36.7 °C) (Oral)   Resp 18   Ht 5' 2\" (1.575 m)   Wt 125 lb (56.7 kg)   LMP  (LMP Unknown)   SpO2 94%   BMI 22.86 kg/m²   Head: Normocephalic, atraumatic  Sclera clear  Neck JVD flat  Lungs: normal effort of breathing    Labs:   Recent Labs     09/12/21  1026 09/13/21  0911 09/14/21  0913   WBC 3.2* 4.4* 3.9*   HGB 11.9* 12.1 12.8   HCT 35.7* 36.1* 38.2    303 289     Recent Labs     09/12/21  1026 09/13/21  0911 09/14/21  0913    140 144   K 4.3 4.4 4.5    108* 110*   CO2 18* 16* 17*   BUN 37* 43* 44*   CREATININE 1.18* 1.27* 1.29*   CALCIUM 8.5 8.4* 8.5 PHOS 2.8 3.2 3.1   AST 35 31 36*   ALT 12 13 17   BILITOT 0.3 0.4 0.5   ALKPHOS 62 61 68     No results for input(s): INR in the last 72 hours. No results for input(s): Nicole Pace in the last 72 hours. Radiology:  CTA CHEST W WO CONTRAST   Final Result   NO EVIDENCE OF CENTRAL OR SEGMENTAL PULMONARY EMBOLISM. MILD TO MODERATE EMPHYSEMATOUS DISEASE. PATCHY MULTIFOCAL AREAS OF GROUNDGLASS AIRSPACE DISEASE THROUGHOUT THE LUNG PARENCHYMA. IMAGING FEATURES CAN BE SEEN WITH (COVID-19) PNEUMONIA, THOUGH ARE NONSPECIFIC AND CAN OCCUR WITH A VARIETY OF INFECTIOUS AND NONINFECTIOUS PROCESSES. 4. PLEURAL-BASED SOFT TISSUE MASS BASE LATERAL ASPECT RIGHT LOWER LOBE. WILL NEED TO BE FOLLOW-UP AND FURTHER EVALUATED. 5. THERE IS SIGNIFICANT STENOSIS AT THE ORIGIN OF THE LEFT CAROTID ARTERY. . THERE IS ALSO SIGNIFICANT ATHEROSCLEROTIC DISEASE SEEN AT THE ORIGIN OF LEFT SUBCLAVIAN ARTERY. FURTHER EVALUATION IS WARRANTED. 6. THE RIGHT KIDNEY IS PARTIALLY VISUALIZED AND IS ATROPHIC. CORRELATE WITH UNDERLYING MEDICAL RENAL FUNCTION. PARTIALLY VISUALIZED RIGHT RENAL CALCULI. All CT scans at this facility use dose modulation, iterative reconstruction, and/or weight based dosing when appropriate to reduce radiation dose to as low as reasonably achievable. US DUP LOWER EXTREMITIES BILATERAL VENOUS   Final Result   NEGATIVE STUDY FOR ACUTE PROXIMAL DVT IN THE RIGHT AND LEFT LOWER EXTREMITIES. NEGATIVE STUDY FOR ACUTE CALF DVT IN THE RIGHT AND LEFT LOWER EXTREMITIES. NEGATIVE STUDY FOR SUPERFICIAL THROMBOPHLEBITIS IN THE RIGHT AND LEFT LOWER EXTREMITIES. XR CHEST PORTABLE   Final Result      POOR INSPIRATION WITH MILD PATCHY INFILTRATE/ATELECTASIS OF THE MID TO LOWER LUNG TORRES. BRONCHOPNEUMONIA SHOULD BE EXCLUDED CLINICALLY.                         CT ABDOMEN PELVIS WO CONTRAST Additional Contrast? None   Final Result      MILDLY PROGRESSIVE BIBASILAR PULMONARY INFILTRATES, SCARRING, AND/OR ATELECTASIS. NO ACUTE INTRA-ABDOMINAL PROCESS OR OTHER SIGNIFICANT CHANGE FROM 5/26/2019 IDENTIFIED. Assessment/Plan:    Acute hypoxic resp failure sec to covid pna. On remdesivir, dexamethasone  Unsure of actual oxygen needs, discussed with bedside RN, currently working on weaning off oxygen to better understand FiO2 needs. Continue to monitor respiratory status while being off oxygen. Normal respiratory rate, no distress. Comfortably resting. Hypertension: Mild. Not optimal.  Continue to monitor. андрей resolving. Atrial fibrillation with rapid ventricular rate. Cardiology on consult, will defer management and anticoagulation plan to cardiology. Elevated D-dimer: CT angio ruled out PE. Ultrasound legs negative for DVT.     Dc plan : placement   35 minutes total care time, >1/2 in unit/floor time and care coordination       Allie Dominguez MD

## 2021-09-15 NOTE — PROGRESS NOTES
Physical Therapy Med Surg Daily Treatment Note  Facility/Department: [facility] MED SURG UNIT  [facility]       NAME: Ingrid Gil  : 1945 (78 y.o.)  MRN: [id number]  CODE STATUS: Full Code    Date of Service: 9/15/2021    Patient Diagnosis(es): Dehydration [E86.0]  Elevated troponin [R77.8]  Acute kidney injury ([address] [N17.9]  Community acquired bilateral lower lobe pneumonia [J18.9]  Pneumonia due to COVID-19 virus [U07.1, J12.82]  COVID-19 [U07.1]   Chief Complaint   Patient presents with    Fatigue     Patient Active Problem List    Diagnosis Date Noted    Acute kidney injury ([address]     Pneumonia due to COVID-19 virus 09/10/2021    Acute bronchitis 2020    Pancytopenia ([address]     Peripheral polyneuropathy     Weight loss     TIA (transient ischemic attack) 2019    Bilateral carotid artery stenosis 09/15/2017    Hypothyroidism     Hypertension     Hyperlipidemia     Degenerative spondylolisthesis 2015    Lumbar stenosis with neurogenic claudication 2015        Past Medical History:   Diagnosis Date    Bilateral carotid artery disease ([address]     CAD (coronary artery disease)     Hyperlipidemia     Hypertension     Hypothyroidism     Osteoarthritis      Past Surgical History:   Procedure Laterality Date    CARDIAC CATHETERIZATION      SPINE SURGERY  2014    LUMBAR    THYROIDECTOMY, PARTIAL         Restrictions  Restrictions/Precautions: Fall Risk;Isolation (covid-19 +; droplet +)    SUBJECTIVE   General  Chart Reviewed: Yes  Family / Caregiver Present: No  Subjective  Subjective: i dont know how much i can do i am tired. the doctors say i am doing well and i hope i can go soon.  braxton been walking to the bathroom with nsg    Pre-Session Pain Report     Pain Screening  Patient Currently in Pain: No  Pre Treatment Pain Screening  Pain at present: 0  Scale Used: Numeric Score  Intervention List: Patient able to continue with treatment    Post-Session Pain Report  Pain Assessment  Pain Assessment: 0-10  Pain Level: 0         OBJECTIVE        Bed mobility  Rolling to Left: Stand by assistance  Rolling to Right: Stand by assistance  Supine to Sit: Stand by assistance  Sit to Supine: Stand by assistance  Scooting: Stand by assistance    Transfers  Sit to Stand: Minimal Assistance  Stand to sit: Minimal Assistance  Comment: VC for hand placement, HHA to stand, no LOB, no dizziness reported    Ambulation  Ambulation?: Yes  More Ambulation?: No  Ambulation 1  Surface: level tile  Device: Hand-Held Assist  Assistance: Minimal assistance  Quality of Gait: slow silvia,increased lateral sway, NBOS, no LOB , fatigued post  Distance: 15'                                         Activity Tolerance  Activity Tolerance: Patient limited by fatigue;Patient limited by endurance; Patient Tolerated treatment well          ASSESSMENT      Pt demonstrated ability to tolerate increase in ambulation distance without any episodes of LOB and minimal fatigue post. Pt was not willing to sit in chair post and requested to return to bed. Discharge Recommendations:  Continue to assess pending progress    Goals  Long term goals  Long term goal 1: Bed mobility with indep  Long term goal 2: Functional transfers with indep  Long term goal 3: Amb 50ft with 2ww and indep  Long term goal 4: indep with HEP to improve LE strength, activity tolerance, and balance  Patient Goals   Patient goals : \"get better\"    PLAN    Times per week: 3-6  Safety Devices  Type of devices:  All fall risk precautions in place, Bed alarm in place, Call light within reach, Left in bed     AMPAC (6 CLICK) BASIC MOBILITY  AM-PAC Inpatient Mobility Raw Score : 17     Therapy Time   Individual   Time In 1507   Time Out 1521   Minutes 14      gait:10  Bm/transfer:4       Marya Opitz, PTA, 09/15/21 at 3:37 PM         Definitions for assistance levels  Independent = pt does not require any physical

## 2021-09-15 NOTE — PLAN OF CARE
Problem: Airway Clearance - Ineffective  Goal: Achieve or maintain patent airway  Outcome: Ongoing     Problem: Gas Exchange - Impaired  Goal: Absence of hypoxia  Outcome: Ongoing  Goal: Promote optimal lung function  Outcome: Ongoing     Problem: Breathing Pattern - Ineffective  Goal: Ability to achieve and maintain a regular respiratory rate  Outcome: Ongoing     Problem:  Body Temperature -  Risk of, Imbalanced  Goal: Ability to maintain a body temperature within defined limits  Outcome: Ongoing  Goal: Will regain or maintain usual level of consciousness  Outcome: Ongoing  Goal: Complications related to the disease process, condition or treatment will be avoided or minimized  Outcome: Ongoing     Problem: Isolation Precautions - Risk of Spread of Infection  Goal: Prevent transmission of infection  Outcome: Ongoing     Problem: Nutrition Deficits  Goal: Optimize nutritional status  Outcome: Ongoing     Problem: Risk for Fluid Volume Deficit  Goal: Maintain normal heart rhythm  Outcome: Ongoing  Goal: Maintain absence of muscle cramping  Outcome: Ongoing  Goal: Maintain normal serum potassium, sodium, calcium, phosphorus, and pH  Outcome: Ongoing     Problem: Loneliness or Risk for Loneliness  Goal: Demonstrate positive use of time alone when socialization is not possible  Outcome: Ongoing     Problem: Fatigue  Goal: Verbalize increase energy and improved vitality  Outcome: Ongoing     Problem: Patient Education: Go to Patient Education Activity  Goal: Patient/Family Education  Outcome: Ongoing     Problem: Falls - Risk of:  Goal: Will remain free from falls  Description: Will remain free from falls  Outcome: Ongoing  Goal: Absence of physical injury  Description: Absence of physical injury  Outcome: Ongoing     Problem: Skin Integrity:  Goal: Will show no infection signs and symptoms  Description: Will show no infection signs and symptoms  Outcome: Ongoing  Goal: Absence of new skin breakdown  Description: Absence of new skin breakdown  Outcome: Ongoing     Problem: IP MOBILITY  Goal: LTG - patient will demonstrate safe mobility requirements  Outcome: Ongoing     Problem: IP DRESSINGS LOWER EXTREMITIES  Goal: LTG - patient will dress lower body with or without assistive device  Outcome: Ongoing     Problem: Pain:  Goal: Pain level will decrease  Description: Pain level will decrease  Outcome: Ongoing  Goal: Control of acute pain  Description: Control of acute pain  Outcome: Ongoing  Goal: Control of chronic pain  Description: Control of chronic pain  Outcome: Ongoing

## 2021-09-15 NOTE — PROGRESS NOTES
[facility] Occupational Therapy      Date: 9/15/2021  Patient Name: Vicenta Mancini        MRN: [id number]  Account: [de-identified]   : 1945  (78 y.o.)  Room: J64/Z748-74    Chart reviewed, attempted OT at 1113. Patient not seen 2° to:    Pt declined therapy stating \"I just need to rest\". Pt reports being up all night with \"people coming in and out of my room\". Encouraged pt to participate in exercises at bed level, however, she continued to decline. Will attempt again when able.     Electronically signed by LORIE Villeda on 9/15/2021 at 11:26 AM

## 2021-09-15 NOTE — PROGRESS NOTES
Hospitalist Progress Note      Date of Admission: 9/10/2021  Chief Complaint:    Chief Complaint   Patient presents with    Fatigue     Subjective:  No new complaints.   No nausea, vomiting, chest pain, or headache      Medications:    Infusion Medications    sodium chloride       Scheduled Medications    atorvastatin  20 mg Oral Nightly    metoprolol tartrate  25 mg Oral TID    sodium chloride flush  10 mL IntraVENous BID    amLODIPine  5 mg Oral Daily    aspirin  81 mg Oral Daily    cyanocobalamin  1,000 mcg Oral Daily    isosorbide mononitrate  30 mg Oral Daily    levothyroxine  112 mcg Oral Daily    sodium chloride flush  5-40 mL IntraVENous 2 times per day    enoxaparin  30 mg SubCUTAneous BID    dexamethasone  6 mg IntraVENous Daily    Vitamin D  1,000 Units Oral Daily    ascorbic acid  500 mg Oral Daily    zinc sulfate  50 mg Oral Daily     PRN Meds: metoprolol, albuterol sulfate HFA, benzonatate, sodium chloride flush, sodium chloride, ondansetron **OR** ondansetron, polyethylene glycol, acetaminophen **OR** acetaminophen, guaiFENesin-dextromethorphan, sodium chloride    Intake/Output Summary (Last 24 hours) at 9/15/2021 1830  Last data filed at 9/15/2021 0501  Gross per 24 hour   Intake 210 ml   Output --   Net 210 ml     Exam:  BP (!) 141/72   Pulse 98   Temp 98.4 °F (36.9 °C) (Oral)   Resp 16   Ht 5' 2\" (1.575 m)   Wt 125 lb (56.7 kg)   LMP  (LMP Unknown)   SpO2 95%   BMI 22.86 kg/m²   Head: Normocephalic, atraumatic  Sclera clear  Neck JVD flat  Lungs: normal effort of breathing    Labs:   Recent Labs     09/13/21  0911 09/14/21  0913 09/15/21  0633   WBC 4.4* 3.9* 3.6*   HGB 12.1 12.8 11.8*   HCT 36.1* 38.2 34.0*    289 223     Recent Labs     09/13/21  0911 09/14/21  0913 09/15/21  0633    144 140   K 4.4 4.5 4.5   * 110* 106   CO2 16* 17* 18*   BUN 43* 44* 41*   CREATININE 1.27* 1.29* 1.14*   CALCIUM 8.4* 8.5 8.2*   PHOS 3.2 3.1 3.1   AST 31 36* 27   ALT 13 17 15   BILITOT 0.4 0.5 0.5   ALKPHOS 61 68 59     No results for input(s): INR in the last 72 hours. No results for input(s): [place] Pace in the last 72 hours. Radiology:  CTA CHEST W WO CONTRAST   Final Result   NO EVIDENCE OF CENTRAL OR SEGMENTAL PULMONARY EMBOLISM. MILD TO MODERATE EMPHYSEMATOUS DISEASE. PATCHY MULTIFOCAL AREAS OF GROUNDGLASS AIRSPACE DISEASE THROUGHOUT THE LUNG PARENCHYMA. IMAGING FEATURES CAN BE SEEN WITH (COVID-19) PNEUMONIA, THOUGH ARE NONSPECIFIC AND CAN OCCUR WITH A VARIETY OF INFECTIOUS AND NONINFECTIOUS PROCESSES. 4. PLEURAL-BASED SOFT TISSUE MASS BASE LATERAL ASPECT RIGHT LOWER LOBE. WILL NEED TO BE FOLLOW-UP AND FURTHER EVALUATED. 5. THERE IS SIGNIFICANT STENOSIS AT THE ORIGIN OF THE LEFT CAROTID ARTERY. . THERE IS ALSO SIGNIFICANT ATHEROSCLEROTIC DISEASE SEEN AT THE ORIGIN OF LEFT SUBCLAVIAN ARTERY. FURTHER EVALUATION IS WARRANTED. 6. THE RIGHT KIDNEY IS PARTIALLY VISUALIZED AND IS ATROPHIC. CORRELATE WITH UNDERLYING MEDICAL RENAL FUNCTION. PARTIALLY VISUALIZED RIGHT RENAL CALCULI. All CT scans at this facility use dose modulation, iterative reconstruction, and/or weight based dosing when appropriate to reduce radiation dose to as low as reasonably achievable. US DUP LOWER EXTREMITIES BILATERAL VENOUS   Final Result   NEGATIVE STUDY FOR ACUTE PROXIMAL DVT IN THE RIGHT AND LEFT LOWER EXTREMITIES. NEGATIVE STUDY FOR ACUTE CALF DVT IN THE RIGHT AND LEFT LOWER EXTREMITIES. NEGATIVE STUDY FOR SUPERFICIAL THROMBOPHLEBITIS IN THE RIGHT AND LEFT LOWER EXTREMITIES. XR CHEST PORTABLE   Final Result      POOR INSPIRATION WITH MILD PATCHY INFILTRATE/ATELECTASIS OF THE MID TO LOWER LUNG TORRES. BRONCHOPNEUMONIA SHOULD BE EXCLUDED CLINICALLY. CT ABDOMEN PELVIS WO CONTRAST Additional Contrast? None   Final Result      MILDLY PROGRESSIVE BIBASILAR PULMONARY INFILTRATES, SCARRING, AND/OR ATELECTASIS.       NO ACUTE

## 2021-09-15 NOTE — PROGRESS NOTES
Pt shift assessment completed. Pt denies pain. 94% on RA. Vitals stable. Walked to bathroom with a stand-by assist with no issue. Pt has no other requests at this time. Will continue to monitor.      Electronically signed by Francesco Kolb RN on 9/14/2021 at 11:57 PM

## 2021-09-16 VITALS
RESPIRATION RATE: 18 BRPM | WEIGHT: 125 LBS | HEIGHT: 62 IN | SYSTOLIC BLOOD PRESSURE: 158 MMHG | BODY MASS INDEX: 23 KG/M2 | TEMPERATURE: 97.5 F | HEART RATE: 93 BPM | OXYGEN SATURATION: 94 % | DIASTOLIC BLOOD PRESSURE: 79 MMHG

## 2021-09-16 LAB
ALBUMIN SERPL-MCNC: 3.2 G/DL (ref 3.5–4.6)
ALP BLD-CCNC: 58 U/L (ref 40–130)
ALT SERPL-CCNC: 14 U/L (ref 0–33)
ANION GAP SERPL CALCULATED.3IONS-SCNC: 13 MEQ/L (ref 9–15)
AST SERPL-CCNC: 24 U/L (ref 0–35)
BASOPHILS ABSOLUTE: 0 K/UL (ref 0–0.2)
BASOPHILS RELATIVE PERCENT: 0.5 %
BILIRUB SERPL-MCNC: 0.4 MG/DL (ref 0.2–0.7)
BUN BLDV-MCNC: 37 MG/DL (ref 8–23)
CALCIUM SERPL-MCNC: 8.1 MG/DL (ref 8.5–9.9)
CHLORIDE BLD-SCNC: 108 MEQ/L (ref 95–107)
CO2: 19 MEQ/L (ref 20–31)
CREAT SERPL-MCNC: 1.12 MG/DL (ref 0.5–0.9)
CULTURE, BLOOD 2: NORMAL
EOSINOPHILS ABSOLUTE: 0 K/UL (ref 0–0.7)
EOSINOPHILS RELATIVE PERCENT: 0 %
GFR AFRICAN AMERICAN: 57.5
GFR NON-AFRICAN AMERICAN: 47.5
GLOBULIN: 3 G/DL (ref 2.3–3.5)
GLUCOSE BLD-MCNC: 131 MG/DL (ref 70–99)
HCT VFR BLD CALC: 34.2 % (ref 37–47)
HEMOGLOBIN: 11.8 G/DL (ref 12–16)
LYMPHOCYTES ABSOLUTE: 0.2 K/UL (ref 1–4.8)
LYMPHOCYTES RELATIVE PERCENT: 6.1 %
MCH RBC QN AUTO: 30.4 PG (ref 27–31.3)
MCHC RBC AUTO-ENTMCNC: 34.5 % (ref 33–37)
MCV RBC AUTO: 88.1 FL (ref 82–100)
MONOCYTES ABSOLUTE: 0.2 K/UL (ref 0.2–0.8)
MONOCYTES RELATIVE PERCENT: 4.3 %
NEUTROPHILS ABSOLUTE: 3.6 K/UL (ref 1.4–6.5)
NEUTROPHILS RELATIVE PERCENT: 89.1 %
PDW BLD-RTO: 14.9 % (ref 11.5–14.5)
PHOSPHORUS: 3.3 MG/DL (ref 2.3–4.8)
PLATELET # BLD: 227 K/UL (ref 130–400)
POTASSIUM REFLEX MAGNESIUM: 4.6 MEQ/L (ref 3.4–4.9)
RBC # BLD: 3.89 M/UL (ref 4.2–5.4)
SODIUM BLD-SCNC: 140 MEQ/L (ref 135–144)
TOTAL PROTEIN: 6.2 G/DL (ref 6.3–8)
WBC # BLD: 4.1 K/UL (ref 4.8–10.8)

## 2021-09-16 PROCEDURE — 80053 COMPREHEN METABOLIC PANEL: CPT

## 2021-09-16 PROCEDURE — 2580000003 HC RX 258: Performed by: INTERNAL MEDICINE

## 2021-09-16 PROCEDURE — 99232 SBSQ HOSP IP/OBS MODERATE 35: CPT | Performed by: INTERNAL MEDICINE

## 2021-09-16 PROCEDURE — 6370000000 HC RX 637 (ALT 250 FOR IP): Performed by: INTERNAL MEDICINE

## 2021-09-16 PROCEDURE — 84100 ASSAY OF PHOSPHORUS: CPT

## 2021-09-16 PROCEDURE — 85025 COMPLETE CBC W/AUTO DIFF WBC: CPT

## 2021-09-16 PROCEDURE — 36415 COLL VENOUS BLD VENIPUNCTURE: CPT

## 2021-09-16 PROCEDURE — 6360000002 HC RX W HCPCS: Performed by: INTERNAL MEDICINE

## 2021-09-16 RX ORDER — BENZONATATE 100 MG/1
100 CAPSULE ORAL 3 TIMES DAILY PRN
Qty: 21 CAPSULE | Refills: 0 | Status: SHIPPED | OUTPATIENT
Start: 2021-09-16 | End: 2021-09-23

## 2021-09-16 RX ADMIN — LEVOTHYROXINE SODIUM 112 MCG: 112 TABLET ORAL at 05:36

## 2021-09-16 RX ADMIN — ISOSORBIDE MONONITRATE 30 MG: 30 TABLET, EXTENDED RELEASE ORAL at 10:44

## 2021-09-16 RX ADMIN — OXYCODONE HYDROCHLORIDE AND ACETAMINOPHEN 500 MG: 500 TABLET ORAL at 10:44

## 2021-09-16 RX ADMIN — AMLODIPINE BESYLATE 5 MG: 5 TABLET ORAL at 10:45

## 2021-09-16 RX ADMIN — Medication 1000 UNITS: at 10:46

## 2021-09-16 RX ADMIN — ASPIRIN 81 MG: 81 TABLET, CHEWABLE ORAL at 10:44

## 2021-09-16 RX ADMIN — Medication 10 ML: at 10:46

## 2021-09-16 RX ADMIN — METOPROLOL TARTRATE 25 MG: 25 TABLET, FILM COATED ORAL at 10:45

## 2021-09-16 RX ADMIN — CYANOCOBALAMIN TAB 500 MCG 1000 MCG: 500 TAB at 10:44

## 2021-09-16 RX ADMIN — ZINC SULFATE 220 MG (50 MG) CAPSULE 50 MG: CAPSULE at 10:44

## 2021-09-16 RX ADMIN — ENOXAPARIN SODIUM 30 MG: 30 INJECTION SUBCUTANEOUS at 10:44

## 2021-09-16 RX ADMIN — Medication 10 ML: at 10:45

## 2021-09-16 ASSESSMENT — PAIN SCALES - GENERAL: PAINLEVEL_OUTOF10: 0

## 2021-09-16 NOTE — PROGRESS NOTES
Progress Note  Patient: Jeff Oregon  Unit/Bed: F347/I319-76  YOB: 1945  MRN: [id number]  Acct: [de-identified]   Admitting Diagnosis: Dehydration [E86.0]  Elevated troponin [R77.8]  Acute kidney injury ([address] [N17.9]  Community acquired bilateral lower lobe pneumonia [J18.9]  Pneumonia due to COVID-19 virus [U07.1, J12.82]  COVID-19 [U07.1]  Date:  9/10/2021  Hospital Day: 6    Chief Complaint:  Covid positive    Subjective  Patient was admitted with Covid positive pneumonia. We were consulted because of short episode of atrial fibrillation. It was self-limiting. She has been followed by Dr. Herminio Alonso. Because atrial fib was brief anticoagulation was not started.   Decision will be deferred to Dr. Haritha Colunga of Systems:   Review of Systems  NA    Physical Examination:    BP (!) 158/79   Pulse 93   Temp 97.5 °F (36.4 °C) (Oral)   Resp 18   Ht 5' 2\" (1.575 m)   Wt 125 lb (56.7 kg)   LMP  (LMP Unknown)   SpO2 94%   BMI 22.86 kg/m²    Physical Exam  Deferred  LABS:  CBC:   Lab Results   Component Value Date    WBC 4.1 09/16/2021    RBC 3.89 09/16/2021    HGB 11.8 09/16/2021    HCT 34.2 09/16/2021    MCV 88.1 09/16/2021    MCH 30.4 09/16/2021    MCHC 34.5 09/16/2021    RDW 14.9 09/16/2021     09/16/2021    MPV 8.4 08/04/2014     CBC with Differential:   Lab Results   Component Value Date    WBC 4.1 09/16/2021    RBC 3.89 09/16/2021    HGB 11.8 09/16/2021    HCT 34.2 09/16/2021     09/16/2021    MCV 88.1 09/16/2021    MCH 30.4 09/16/2021    MCHC 34.5 09/16/2021    RDW 14.9 09/16/2021    BANDSPCT 2 09/12/2021    LYMPHOPCT 6.1 09/16/2021    MONOPCT 4.3 09/16/2021    BASOPCT 0.5 09/16/2021    MONOSABS 0.2 09/16/2021    LYMPHSABS 0.2 09/16/2021    EOSABS 0.0 09/16/2021    BASOSABS 0.0 09/16/2021     CMP:    Lab Results   Component Value Date     09/16/2021    K 4.6 09/16/2021     09/16/2021    CO2 19 09/16/2021    BUN 37 09/16/2021    CREATININE 1.12 09/16/2021    GFRAA 57.5 09/16/2021    LABGLOM 47.5 09/16/2021    GLUCOSE 131 09/16/2021    PROT 6.2 09/16/2021    LABALBU 3.2 09/16/2021    CALCIUM 8.1 09/16/2021    BILITOT 0.4 09/16/2021    ALKPHOS 58 09/16/2021    AST 24 09/16/2021    ALT 14 09/16/2021     BMP:    Lab Results   Component Value Date     09/16/2021    K 4.6 09/16/2021     09/16/2021    CO2 19 09/16/2021    BUN 37 09/16/2021    LABALBU 3.2 09/16/2021    CREATININE 1.12 09/16/2021    CALCIUM 8.1 09/16/2021    GFRAA 57.5 09/16/2021    LABGLOM 47.5 09/16/2021    GLUCOSE 131 09/16/2021     Magnesium:    Lab Results   Component Value Date    MG 2.1 05/26/2019     Troponin:    Lab Results   Component Value Date    TROPONINI 0.052 09/11/2021       Radiology:  No results found. EKG:  Assessment:    Active Hospital Problems    Diagnosis Date Noted    Acute kidney injury [address] [address] [address] [N17.9]     Pneumonia due to COVID-19 virus [U07.1, J12.82] 09/10/2021        Lateral carotid occlusion       AF. Brief episode     Plan:  1. Agree with plans for discharge  2. To follow with Dr. Marleny Greer at [facility]  3. It was a self-limiting atrial fibrillation, no anticoagulation recommended at this time.   Electronically signed by Johanny Hernandez MD on 9/16/2021 at 3:42 PM

## 2021-09-16 NOTE — PROGRESS NOTES
Pt shift assessment completed. Pt is alert & oriented. Vitals stable. Room air. Keeping up with a standby to the restroom. She did not eat any of her dinner. No other requests at this time. Will continue to monitor.      Electronically signed by Clara Salguero RN on 9/16/2021 at 12:00 AM

## 2021-09-16 NOTE — DISCHARGE INSTR - COC
Assessment:  Last Vital Signs: BP (!) 156/73   Pulse 99   Temp 97.2 °F (36.2 °C) (Oral)   Resp 18   Ht 5' 2\" (1.575 m)   Wt 125 lb (56.7 kg)   LMP  (LMP Unknown)   SpO2 93%   BMI 22.86 kg/m²     Last documented pain score (0-10 scale): Pain Level: 0  Last Weight:   Wt Readings from Last 1 Encounters:   09/10/21 125 lb (56.7 kg)     Mental Status:  oriented and alert    IV Access:  - None    Nursing Mobility/ADLs:  Walking   Assisted  Transfer  Independent  Bathing  Independent  Dressing  Assisted  Toileting  Independent  Feeding  [address]  Independent  Med Delivery   whole    Wound Care Documentation and Therapy:        Elimination:  Continence:   · Bowel: Yes  · Bladder: Yes  Urinary Catheter: None   Colostomy/Ileostomy/Ileal Conduit: No       Date of Last BM: ***    Intake/Output Summary (Last 24 hours) at 9/16/2021 1121  Last data filed at 9/16/2021 0538  Gross per 24 hour   Intake 120 ml   Output --   Net 120 ml     I/O last 3 completed shifts: In: 120 [P.O.:100; I.V.:20]  Out: -     Safety Concerns:     History of falls    Impairments/Disabilities:      None    Nutrition Therapy:  Current Nutrition Therapy:   - Oral Diet:  General    Routes of Feeding: Oral  Liquids: Thin Liquids  Daily Fluid Restriction: no  Last Modified Barium Swallow with Video (Video Swallowing Test): not done    Treatments at the Time of Hospital Discharge:   Respiratory Treatments:   Oxygen Therapy:  is not on home oxygen therapy.   Ventilator:    - No ventilator support    Rehab Therapies: Physical Therapy and Occupational Therapy  Weight Bearing Status/Restrictions: No weight bearing restirctions  Other Medical Equipment (for information only, NOT a DME order):  ***  Other Treatments: ***    Patient's personal belongings (please select all that are sent with patient):  Dentures {DESC; UPPER/LOWER/BOTH:[id number]}    RN SIGNATURE:  Electronically signed by Chavo Hudson RN on 9/16/21 at 5:04 PM EDT    CASE MANAGEMENT/SOCIAL WORK SECTION    Inpatient Status Date: ***    Readmission Risk Assessment Score:  Readmission Risk              Risk of Unplanned Readmission:  22           Discharging to Facility/ Agency   · Name: [facility]  · Address:  · Phone: [phone]  · Fax:    Dialysis Facility (if applicable)   · Name:  · Address:  · Dialysis Schedule:  · Phone:  · Fax:    / signature: Electronically signed by MAYTE Escamilla, ZULEIKAW on 9/16/21 at 3:39 PM EDT      PHYSICIAN SECTION    Prognosis: Good    Condition at Discharge: Stable    Rehab Potential (if transferring to Rehab): Good    Physician Certification: I certify the above information and transfer of Kusum Tam  is necessary for the continuing treatment of the diagnosis listed and that she requires [place] for less 30 days.      Update Admission H&P: No change in H&P    PHYSICIAN SIGNATURE:  Electronically signed by Levi Haas MD on 9/16/21 at 11:21 AM EDT

## 2021-09-16 NOTE — CARE COORDINATION
ZULEIKAW spoke with son Alexi salgado this afternoon. He is aware that patient is requesting  to return home. Alexi salgado is calling [facility] to pay for patient to be transported home via [facility]. [facility] to follow pt.   Electronically signed by MAYTE Ervin, IVORY on 9/16/21 at 3:38 PM EDT

## 2021-09-16 NOTE — PROGRESS NOTES
Physical Therapy Missed Treatment   Facility/Department: [facility] MED SURG [id number]    NAME: Aurelia Villa    : 1945 (78 y.o.)  MRN: [id number]    Account: [de-identified]  Gender: female    Chart reviewed, attempted PT at 1110. Patient unavailable 2° to:    [] Hold per nsg request    [x] Pt declined pt stating \"I feel fine, I just don't care to get up right now, I do fine. \" encouragement and education provided, pt still unwilling. [] Pt. . off floor for test/procedure. [] Pt. Unavailable      Will attempt PT treatment again at earliest convenience.       Electronically signed by Jocy Saleh PTA on 21 at 11:14 AM EDT

## 2021-09-16 NOTE — PROGRESS NOTES
Shift assessment complete. VSS. 93% RA. Pt is AxOx4. Lungs clear. abd soft. meds given per mar. However, pt states she can only take 1-2 pills every 2-3 hrs. I educated her on the importance of taking all of her medication this AM, but she refused. I will keep going in her room and encouraging her to take her pills. Call light within reach. Will continue to monitor.      Electronically signed by Himanshu Acosta RN on 9/16/2021 at 11:10 AM

## 2021-09-17 ENCOUNTER — CARE COORDINATION (OUTPATIENT)
Dept: CASE MANAGEMENT | Age: 76
End: 2021-09-17

## 2021-09-17 NOTE — CARE COORDINATION
Spoke with daughter, Mila Mcdowell (on HIPAA) who reports the pt is currently sleeping at the moment. States she overall is very weak and tired but cough has improved. States she appears to get winded/SOB on exertion but recovers well. Reports her appetite is good and had a bowl of oatmeal last night and this morning, some yogurt and crackers. Denies any worsening or new symptoms to report        Patient contacted regarding COVID-19 risk, exposure, diagnosis, pulse oximeter ordered at discharge and monoclonal antibody infusion follow up. Discussed COVID-19 related testing which was available at this time. Test results were positive. Patient informed of results, if available? Yes. Care Transition Nurse contacted the family by telephone to perform post discharge assessment. Call within 2 business days of discharge: Yes. Verified name and  with family as identifiers. Provided introduction to self, and explanation of the CTN/ACM role, and reason for call due to risk factors for infection and/or exposure to COVID-19. Symptoms reviewed with family who verbalized the following symptoms: fatigue, pain or aching joints, cough, shortness of breath, no new symptoms and no worsening symptoms. Due to no new or worsening symptoms encounter was not routed to provider for escalation. Discussed follow-up appointments. If no appointment was previously scheduled, appointment scheduling offered: Yes. [facility] follow up appointment(s): No future appointments. [facility] follow up appointment(s): Dr. Pineda Delong to call, declines assistance    Non-face-to-face services provided:  Obtained and reviewed discharge summary and/or continuity of care documents     Advance Care Planning:   Does patient have an Advance Directive:  not on file; education provided. Educated patient about risk for severe COVID-19 due to risk factors according to CDC guidelines.  CTN reviewed discharge instructions, medical action plan and red flag symptoms with the patient who verbalized understanding. Discussed COVID vaccination status: Yes. Education provided on COVID-19 vaccination as appropriate. Discussed exposure protocols and quarantine with CDC Guidelines. Family was given an opportunity to verbalize any questions and concerns and agrees to contact CTN or health care provider for questions related to their healthcare. Reviewed and educated family on any new and changed medications related to discharge diagnosis     Was patient discharged with a pulse oximeter? No Discussed and confirmed pulse oximeter discharge instructions and when to notify provider or seek emergency care. CTN provided contact information. Plan for follow-up call in 5-7 days based on severity of symptoms and risk factors.

## 2021-09-17 NOTE — DISCHARGE SUMMARY
Hospital Medicine Discharge Summary    Latasha Ortega  :  1945  MRN:  [id number]    Admit date:  9/10/2021  Discharge date:  2021    Admitting Physician:  No admitting provider for patient encounter. Primary Care Physician:  Mar Dodge MD    Latasha Ortega is a 78 y.o. female that was admitted and treated at [facility] for the following medical issues: Active Problems:    Pneumonia due to COVID-19 virus    Acute kidney injury ([facility] [address]  Resolved Problems:    * No resolved hospital problems. *      Discharge Diagnoses: Active Problems:    Pneumonia due to COVID-19 virus    Acute kidney injury ([facility] [address]  Resolved Problems:    * No resolved hospital problems. *    Chief Complaint   Patient presents with   [facility] Course: Latasha Ortega is a 78 y.o. female who was admitted to the hospital with covid pna. remdesivir. Transitioned to room air, subsequently dc'd with outpt follow up. Pt was discharge in a stable condition. BP (!) 158/79   Pulse 93   Temp 97.5 °F (36.4 °C) (Oral)   Resp 18   Ht 5' 2\" (1.575 m)   Wt 125 lb (56.7 kg)   LMP  (LMP Unknown)   SpO2 94%   BMI 22.86 kg/m²     Patient was seen by the following consultants while admitted to [facility]:   Consults:  IP CONSULT TO CASE MANAGEMENT  IP CONSULT TO CARDIOLOGY  IP CONSULT TO HOME CARE NEEDS    Significant Diagnostic Studies:    Refer to chart if  CT ABDOMEN PELVIS WO CONTRAST Additional Contrast? None    Result Date: 9/10/2021  CT ABDOMEN PELVIS WO CONTRAST: 9/10/2021 CLINICAL HISTORY:  abdominal pain, epigastric, nausea, inability to tolerate po . COMPARISON: 2019. TECHNIQUE: Spiral images were obtained of the abdomen and pelvis without contrast. All CT scans at this facility use dose modulation, iterative reconstruction, and/or weight based dosing when appropriate to reduce radiation dose to as low as reasonably achievable.  FINDINGS: There is no abnormal bowel or biliary dilatation, ascites, inflammatory changes, significant lymphadenopathy, hernias, or other findings of concern identified. Marked atrophy of the right kidney with small nonobstructing lower pole renal calculi appear unchanged. Moderate atherosclerotic plaquing of a normal caliber abdominal aorta and branch vessels is again noted. The liver, gallbladder, spleen, pancreas, adrenal glands, left kidney, great vessels, unopacified bowel loops, uterus, adnexa, urinary bladder, and additional images of the pelvis are unremarkable. Degenerative and postoperative changes of the lumbar spine are again noted. Mild to moderate atelectasis, infiltrate, and/or fibrotic changes are present of the visualized lung bases have mildly progressed from the prior study. MILDLY PROGRESSIVE BIBASILAR PULMONARY INFILTRATES, SCARRING, AND/OR ATELECTASIS. NO ACUTE INTRA-ABDOMINAL PROCESS OR OTHER SIGNIFICANT CHANGE FROM 5/26/2019 IDENTIFIED. CTA CHEST W WO CONTRAST    Result Date: 9/11/2021  The EXAMINATION: CT scan of the chest with contrast (pulmonary embolism protocol) INDICATION: Chest pain and shortness of breath. COMPARISON: None TECHNIQUE: Helical CT was performed through the chest utilizing 100 cc of 370 intravenous contrast.  Images were obtained with bolus tracking in order to opacify the pulmonary arteries. Both MIP and 3D volume rendered reconstructions were performed. FINDINGS: There are no findings a central, proximal proximal-segmental pulmonary emboli. There is mild to moderate, emphysematous disease. Scattered areas of groundglass airspace disease within the lung parenchyma bilaterally. There is a pleural-based soft tissue mass at the base and lateral aspect right lower lobe series 2 image 99. It measures 1.0 x 1.6 cm. There is areas of atelectasis, scarring both bases. No pleural effusions.  No pneumothoraces Field-of-view there is significant atherosclerotic disease, narrowing of the origin of the left carotid artery no significant opacification of the visualized carotid artery. Is also significant atherosclerotic disease of the origin of the left is some. No significant periaortic, pretracheal, parahilar or subcarinal adenopathy. Field-of-view visualized abdominal contents show the visualized portion of the right kidney is atrophic and partially visualized calcification inferior pole measuring 6 mm. Mild levoscoliosis multilevel degenerative changes of the thoracic spine. NO EVIDENCE OF CENTRAL OR SEGMENTAL PULMONARY EMBOLISM. MILD TO MODERATE EMPHYSEMATOUS DISEASE. PATCHY MULTIFOCAL AREAS OF GROUNDGLASS AIRSPACE DISEASE THROUGHOUT THE LUNG PARENCHYMA. IMAGING FEATURES CAN BE SEEN WITH (COVID-19) PNEUMONIA, THOUGH ARE NONSPECIFIC AND CAN OCCUR WITH A VARIETY OF INFECTIOUS AND NONINFECTIOUS PROCESSES. 4. PLEURAL-BASED SOFT TISSUE MASS BASE LATERAL ASPECT RIGHT LOWER LOBE. WILL NEED TO BE FOLLOW-UP AND FURTHER EVALUATED. 5. THERE IS SIGNIFICANT STENOSIS AT THE ORIGIN OF THE LEFT CAROTID ARTERY. . THERE IS ALSO SIGNIFICANT ATHEROSCLEROTIC DISEASE SEEN AT THE ORIGIN OF LEFT SUBCLAVIAN ARTERY. FURTHER EVALUATION IS WARRANTED. 6. THE RIGHT KIDNEY IS PARTIALLY VISUALIZED AND IS ATROPHIC. CORRELATE WITH UNDERLYING MEDICAL RENAL FUNCTION. PARTIALLY VISUALIZED RIGHT RENAL CALCULI. All CT scans at this facility use dose modulation, iterative reconstruction, and/or weight based dosing when appropriate to reduce radiation dose to as low as reasonably achievable. XR CHEST PORTABLE    Result Date: 9/10/2021  XR CHEST PORTABLE : 9/10/2021 CLINICAL HISTORY:  abnormal EKG. COMPARISON: 5/26/2019. TECHNIQUE: A portable upright AP radiograph of the chest was obtained. FINDINGS: A poor inspiratory volume is present with mild patchy infiltrate/atelectasis of the mid to lower lung rodas. Bronchopneumonia should be excluded clinically.  There is no cardiomegaly, significant pleural effusion, vascular congestion, Normal response to augmentation Femoral vein:      Compression: Normal      Doppler: Normal, spontaneous flow                      Normal response to augmentation  Popliteal vein:      Compression: Normal      Doppler: Normal, spontaneous flow                      Normal response to augmentation CALF DEEP VEINS Peroneal veins: Normal compression Posterior tibial veins: Normal compression Gastrocnemius and Soleal veins: Not imaged SUPERFICIAL VEINS Great saphenous: Patent at insertion into common femoral vein; not otherwise assessed. Small Saphenous: Patent and compressible in the proximal calf, not otherwise assessed. NEGATIVE STUDY FOR ACUTE PROXIMAL DVT IN THE RIGHT AND LEFT LOWER EXTREMITIES. NEGATIVE STUDY FOR ACUTE CALF DVT IN THE RIGHT AND LEFT LOWER EXTREMITIES. NEGATIVE STUDY FOR SUPERFICIAL THROMBOPHLEBITIS IN THE RIGHT AND LEFT LOWER EXTREMITIES. Discharge Medications:       Pending sale to [facility] Medication Instructions [id number]    Printed on:09/16/21 8949   Medication Information                      albuterol sulfate  (90 Base) MCG/ACT inhaler  INHALE 2 PUFFS 4 TIMES DAILY AS NEEDED             albuterol sulfate  (90 Base) MCG/ACT inhaler  Inhale 2 puffs into the lungs every 4 hours as needed for Wheezing or Shortness of Breath (Space out to every 6 hours as symptoms improve)             amLODIPine (NORVASC) 5 MG tablet  Take 5 mg by mouth daily             benzonatate (TESSALON) 100 MG capsule  Take 1 capsule by mouth 3 times daily as needed for Cough             cyanocobalamin ([employer] VITAMIN B12) 1000 MCG tablet  Take 1 tablet by mouth daily             fosinopril (MONOPRIL) 10 MG tablet  Take 1 tablet by mouth daily             gabapentin (NEURONTIN) 800 MG tablet  Take 1 tablet by mouth daily for 90 days. .             isosorbide mononitrate (IMDUR) 30 MG extended release tablet  Take 1 tablet by mouth daily             levothyroxine (SYNTHROID) 112 MCG tablet  Take 1 tablet by mouth Daily             metoprolol tartrate (LOPRESSOR) 25 MG tablet  Take 1 tablet by mouth 2 times daily                 Disposition:   If discharged to Home, Any [place] [address] needs that were indicated and/or required as been addressed and set up by Social Work. Condition at discharge: Pt was medically stable at the time of discharge. Activity: activity as tolerated    Total time taken for discharging this patient: 40 minutes. Greater than 70% of time was spent focused exclusively on this patient. Time was taken to review chart, discuss plans with consultants, reconciling medications, discussing plan answering questions with patient.      Signed:  Rosalina Awan MD

## 2021-09-23 ENCOUNTER — CARE COORDINATION (OUTPATIENT)
Dept: CASE MANAGEMENT | Age: 76
End: 2021-09-23

## 2021-09-27 ENCOUNTER — CARE COORDINATION (OUTPATIENT)
Dept: CASE MANAGEMENT | Age: 76
End: 2021-09-27

## 2021-09-27 NOTE — CARE COORDINATION
[facility] Transitions Follow Up Call    2021    Patient: Magalis Stover  Patient : 1945   MRN: [id number]  Reason for Admission: 9/10/2021 - 2021 CV-19 PN, RICARDO  Discharge Date: 21 RARS: Readmission Risk Score: 22       Second subsequent CV-19 outreach. Left Marce LUQUE. CTN s/o. Care Transitions Subsequent and Final Call    Subsequent and Final Calls  Care Transitions Interventions  Other Interventions: Follow Up  No future appointments.     Milagros Salcedo RN

## 2022-07-02 ENCOUNTER — HOSPITAL ENCOUNTER (INPATIENT)
Age: 77
LOS: 11 days | Discharge: NURSING, REHAB OR LONG-TERM CARE | DRG: 871 | End: 2022-07-13
Attending: STUDENT IN AN ORGANIZED HEALTH CARE EDUCATION/TRAINING PROGRAM
Payer: MEDICARE

## 2022-07-02 ENCOUNTER — APPOINTMENT (OUTPATIENT)
Dept: ULTRASOUND IMAGING | Age: 77
DRG: 871 | End: 2022-07-02
Payer: MEDICARE

## 2022-07-02 ENCOUNTER — APPOINTMENT (OUTPATIENT)
Dept: GENERAL RADIOLOGY | Age: 77
DRG: 871 | End: 2022-07-02
Payer: MEDICARE

## 2022-07-02 DIAGNOSIS — R34 ANURIA: Primary | ICD-10-CM

## 2022-07-02 DIAGNOSIS — N17.9 AKI (ACUTE KIDNEY INJURY) (HCC): ICD-10-CM

## 2022-07-02 DIAGNOSIS — E87.20 METABOLIC ACIDOSIS: ICD-10-CM

## 2022-07-02 DIAGNOSIS — D72.829 LEUKOCYTOSIS, UNSPECIFIED TYPE: ICD-10-CM

## 2022-07-02 PROBLEM — N39.0 COMPLICATED UTI (URINARY TRACT INFECTION): Status: ACTIVE | Noted: 2022-07-02

## 2022-07-02 LAB
ALBUMIN SERPL-MCNC: 2.9 G/DL (ref 3.5–4.6)
ALP BLD-CCNC: 128 U/L (ref 40–130)
ALT SERPL-CCNC: 8 U/L (ref 0–33)
ANION GAP SERPL CALCULATED.3IONS-SCNC: 22 MEQ/L (ref 9–15)
ANION GAP SERPL CALCULATED.3IONS-SCNC: 22 MEQ/L (ref 9–15)
ANION GAP SERPL CALCULATED.3IONS-SCNC: 23 MEQ/L (ref 9–15)
ANISOCYTOSIS: ABNORMAL
AST SERPL-CCNC: 16 U/L (ref 0–35)
BASE EXCESS ARTERIAL: -17 (ref -3–3)
BASOPHILS ABSOLUTE: 0 K/UL (ref 0–0.2)
BASOPHILS RELATIVE PERCENT: 0.4 %
BILIRUB SERPL-MCNC: <0.2 MG/DL (ref 0.2–0.7)
BUN BLDV-MCNC: 56 MG/DL (ref 8–23)
BUN BLDV-MCNC: 58 MG/DL (ref 8–23)
BUN BLDV-MCNC: 59 MG/DL (ref 8–23)
C-REACTIVE PROTEIN: 142 MG/L (ref 0–5)
CALCIUM IONIZED: 1.28 MMOL/L (ref 1.12–1.32)
CALCIUM SERPL-MCNC: 8.6 MG/DL (ref 8.5–9.9)
CALCIUM SERPL-MCNC: 8.9 MG/DL (ref 8.5–9.9)
CALCIUM SERPL-MCNC: 9.5 MG/DL (ref 8.5–9.9)
CHLORIDE BLD-SCNC: 106 MEQ/L (ref 95–107)
CHLORIDE BLD-SCNC: 109 MEQ/L (ref 95–107)
CHLORIDE BLD-SCNC: 110 MEQ/L (ref 95–107)
CO2: 6 MEQ/L (ref 20–31)
CO2: 6 MEQ/L (ref 20–31)
CO2: 8 MEQ/L (ref 20–31)
CREAT SERPL-MCNC: 1.54 MG/DL (ref 0.5–0.9)
CREAT SERPL-MCNC: 1.68 MG/DL (ref 0.5–0.9)
CREAT SERPL-MCNC: 1.81 MG/DL (ref 0.5–0.9)
EOSINOPHILS ABSOLUTE: 0 K/UL (ref 0–0.7)
EOSINOPHILS RELATIVE PERCENT: 0.4 %
GFR AFRICAN AMERICAN: 31
GFR AFRICAN AMERICAN: 32.9
GFR AFRICAN AMERICAN: 35.9
GFR AFRICAN AMERICAN: 39.7
GFR NON-AFRICAN AMERICAN: 26
GFR NON-AFRICAN AMERICAN: 27.2
GFR NON-AFRICAN AMERICAN: 29.7
GFR NON-AFRICAN AMERICAN: 32.8
GLOBULIN: 3.9 G/DL (ref 2.3–3.5)
GLUCOSE BLD-MCNC: 66 MG/DL (ref 70–99)
GLUCOSE BLD-MCNC: 79 MG/DL (ref 70–99)
GLUCOSE BLD-MCNC: 83 MG/DL (ref 70–99)
GLUCOSE BLD-MCNC: 89 MG/DL (ref 70–99)
HCO3 ARTERIAL: 8.5 MMOL/L (ref 21–29)
HCT VFR BLD CALC: 30.3 % (ref 37–47)
HEMOGLOBIN: 10.2 GM/DL (ref 12–16)
HEMOGLOBIN: 9.5 G/DL (ref 12–16)
LACTATE: 1.04 MMOL/L (ref 0.4–2)
LACTIC ACID: 1.5 MMOL/L (ref 0.5–2.2)
LYMPHOCYTES ABSOLUTE: 0.5 K/UL (ref 1–4.8)
LYMPHOCYTES RELATIVE PERCENT: 2 %
MAGNESIUM: 2.8 MG/DL (ref 1.7–2.4)
MCH RBC QN AUTO: 28.8 PG (ref 27–31.3)
MCHC RBC AUTO-ENTMCNC: 31.4 % (ref 33–37)
MCV RBC AUTO: 91.7 FL (ref 82–100)
MONOCYTES ABSOLUTE: 0.7 K/UL (ref 0.2–0.8)
MONOCYTES RELATIVE PERCENT: 2.8 %
NEUTROPHILS ABSOLUTE: 22.9 K/UL (ref 1.4–6.5)
NEUTROPHILS RELATIVE PERCENT: 95 %
O2 SAT, ARTERIAL: 99 % (ref 93–100)
PCO2 ARTERIAL: 15 MM HG (ref 35–45)
PDW BLD-RTO: 20.2 % (ref 11.5–14.5)
PERFORMED ON: ABNORMAL
PH ARTERIAL: 7.36 (ref 7.35–7.45)
PLATELET # BLD: 442 K/UL (ref 130–400)
PLATELET SLIDE REVIEW: ABNORMAL
PO2 ARTERIAL: 120 MM HG (ref 75–108)
POC CHLORIDE: 117 MEQ/L (ref 99–110)
POC CREATININE: 1.9 MG/DL (ref 0.6–1.2)
POC HEMATOCRIT: 30 % (ref 36–48)
POC POTASSIUM: 5.3 MEQ/L (ref 3.5–5.1)
POC SAMPLE TYPE: ABNORMAL
POC SODIUM: 137 MEQ/L (ref 136–145)
POIKILOCYTES: ABNORMAL
POTASSIUM SERPL-SCNC: 5.1 MEQ/L (ref 3.4–4.9)
POTASSIUM SERPL-SCNC: 5.1 MEQ/L (ref 3.4–4.9)
POTASSIUM SERPL-SCNC: 5.4 MEQ/L (ref 3.4–4.9)
RBC # BLD: 3.31 M/UL (ref 4.2–5.4)
SMUDGE CELLS: 1.8
SODIUM BLD-SCNC: 137 MEQ/L (ref 135–144)
SODIUM BLD-SCNC: 137 MEQ/L (ref 135–144)
SODIUM BLD-SCNC: 138 MEQ/L (ref 135–144)
TCO2 ARTERIAL: 9 MMOL/L (ref 21–32)
TOTAL CK: 105 U/L (ref 0–170)
TOTAL PROTEIN: 6.8 G/DL (ref 6.3–8)
TROPONIN: 0.01 NG/ML (ref 0–0.01)
WBC # BLD: 24.1 K/UL (ref 4.8–10.8)

## 2022-07-02 PROCEDURE — 1210000000 HC MED SURG R&B

## 2022-07-02 PROCEDURE — 82330 ASSAY OF CALCIUM: CPT

## 2022-07-02 PROCEDURE — 71045 X-RAY EXAM CHEST 1 VIEW: CPT

## 2022-07-02 PROCEDURE — 2580000003 HC RX 258: Performed by: STUDENT IN AN ORGANIZED HEALTH CARE EDUCATION/TRAINING PROGRAM

## 2022-07-02 PROCEDURE — 36600 WITHDRAWAL OF ARTERIAL BLOOD: CPT

## 2022-07-02 PROCEDURE — 94664 DEMO&/EVAL PT USE INHALER: CPT

## 2022-07-02 PROCEDURE — 84484 ASSAY OF TROPONIN QUANT: CPT

## 2022-07-02 PROCEDURE — 82803 BLOOD GASES ANY COMBINATION: CPT

## 2022-07-02 PROCEDURE — 2580000003 HC RX 258: Performed by: INTERNAL MEDICINE

## 2022-07-02 PROCEDURE — 85014 HEMATOCRIT: CPT

## 2022-07-02 PROCEDURE — 36415 COLL VENOUS BLD VENIPUNCTURE: CPT

## 2022-07-02 PROCEDURE — 83605 ASSAY OF LACTIC ACID: CPT

## 2022-07-02 PROCEDURE — 87040 BLOOD CULTURE FOR BACTERIA: CPT

## 2022-07-02 PROCEDURE — 82565 ASSAY OF CREATININE: CPT

## 2022-07-02 PROCEDURE — 80053 COMPREHEN METABOLIC PANEL: CPT

## 2022-07-02 PROCEDURE — 87086 URINE CULTURE/COLONY COUNT: CPT

## 2022-07-02 PROCEDURE — 85025 COMPLETE CBC W/AUTO DIFF WBC: CPT

## 2022-07-02 PROCEDURE — 87186 SC STD MICRODIL/AGAR DIL: CPT

## 2022-07-02 PROCEDURE — 87077 CULTURE AEROBIC IDENTIFY: CPT

## 2022-07-02 PROCEDURE — 86140 C-REACTIVE PROTEIN: CPT

## 2022-07-02 PROCEDURE — 99285 EMERGENCY DEPT VISIT HI MDM: CPT

## 2022-07-02 PROCEDURE — 96360 HYDRATION IV INFUSION INIT: CPT

## 2022-07-02 PROCEDURE — 76770 US EXAM ABDO BACK WALL COMP: CPT

## 2022-07-02 PROCEDURE — 83735 ASSAY OF MAGNESIUM: CPT

## 2022-07-02 PROCEDURE — 82435 ASSAY OF BLOOD CHLORIDE: CPT

## 2022-07-02 PROCEDURE — 84132 ASSAY OF SERUM POTASSIUM: CPT

## 2022-07-02 PROCEDURE — 6360000002 HC RX W HCPCS: Performed by: INTERNAL MEDICINE

## 2022-07-02 PROCEDURE — 84295 ASSAY OF SERUM SODIUM: CPT

## 2022-07-02 PROCEDURE — 82550 ASSAY OF CK (CPK): CPT

## 2022-07-02 RX ORDER — SODIUM CHLORIDE, SODIUM LACTATE, POTASSIUM CHLORIDE, CALCIUM CHLORIDE 600; 310; 30; 20 MG/100ML; MG/100ML; MG/100ML; MG/100ML
INJECTION, SOLUTION INTRAVENOUS CONTINUOUS
Status: DISCONTINUED | OUTPATIENT
Start: 2022-07-02 | End: 2022-07-02

## 2022-07-02 RX ORDER — SODIUM CHLORIDE 9 MG/ML
INJECTION, SOLUTION INTRAVENOUS PRN
Status: DISCONTINUED | OUTPATIENT
Start: 2022-07-02 | End: 2022-07-13 | Stop reason: HOSPADM

## 2022-07-02 RX ORDER — ACETAMINOPHEN 325 MG/1
650 TABLET ORAL EVERY 6 HOURS PRN
Status: DISCONTINUED | OUTPATIENT
Start: 2022-07-02 | End: 2022-07-13 | Stop reason: HOSPADM

## 2022-07-02 RX ORDER — ALBUTEROL SULFATE 90 UG/1
2 AEROSOL, METERED RESPIRATORY (INHALATION) EVERY 6 HOURS PRN
Status: DISCONTINUED | OUTPATIENT
Start: 2022-07-02 | End: 2022-07-13 | Stop reason: HOSPADM

## 2022-07-02 RX ORDER — ONDANSETRON 4 MG/1
4 TABLET, ORALLY DISINTEGRATING ORAL EVERY 8 HOURS PRN
Status: DISCONTINUED | OUTPATIENT
Start: 2022-07-02 | End: 2022-07-13 | Stop reason: HOSPADM

## 2022-07-02 RX ORDER — SODIUM CHLORIDE 9 MG/ML
INJECTION, SOLUTION INTRAVENOUS CONTINUOUS
Status: DISCONTINUED | OUTPATIENT
Start: 2022-07-02 | End: 2022-07-05

## 2022-07-02 RX ORDER — ONDANSETRON 2 MG/ML
4 INJECTION INTRAMUSCULAR; INTRAVENOUS EVERY 6 HOURS PRN
Status: DISCONTINUED | OUTPATIENT
Start: 2022-07-02 | End: 2022-07-13 | Stop reason: HOSPADM

## 2022-07-02 RX ORDER — ACETAMINOPHEN 650 MG/1
650 SUPPOSITORY RECTAL EVERY 6 HOURS PRN
Status: DISCONTINUED | OUTPATIENT
Start: 2022-07-02 | End: 2022-07-13 | Stop reason: HOSPADM

## 2022-07-02 RX ORDER — SODIUM CHLORIDE 0.9 % (FLUSH) 0.9 %
5-40 SYRINGE (ML) INJECTION EVERY 12 HOURS SCHEDULED
Status: DISCONTINUED | OUTPATIENT
Start: 2022-07-02 | End: 2022-07-13 | Stop reason: HOSPADM

## 2022-07-02 RX ORDER — 0.9 % SODIUM CHLORIDE 0.9 %
1000 INTRAVENOUS SOLUTION INTRAVENOUS ONCE
Status: COMPLETED | OUTPATIENT
Start: 2022-07-02 | End: 2022-07-02

## 2022-07-02 RX ORDER — LEVOTHYROXINE SODIUM 0.1 MG/1
100 TABLET ORAL DAILY
Status: DISCONTINUED | OUTPATIENT
Start: 2022-07-03 | End: 2022-07-13 | Stop reason: HOSPADM

## 2022-07-02 RX ORDER — ENOXAPARIN SODIUM 100 MG/ML
30 INJECTION SUBCUTANEOUS DAILY
Status: DISCONTINUED | OUTPATIENT
Start: 2022-07-02 | End: 2022-07-03 | Stop reason: DRUGHIGH

## 2022-07-02 RX ORDER — POLYETHYLENE GLYCOL 3350 17 G/17G
17 POWDER, FOR SOLUTION ORAL DAILY PRN
Status: DISCONTINUED | OUTPATIENT
Start: 2022-07-02 | End: 2022-07-13 | Stop reason: HOSPADM

## 2022-07-02 RX ORDER — ISOSORBIDE MONONITRATE 30 MG/1
30 TABLET, EXTENDED RELEASE ORAL DAILY
Status: DISCONTINUED | OUTPATIENT
Start: 2022-07-03 | End: 2022-07-13 | Stop reason: HOSPADM

## 2022-07-02 RX ORDER — SODIUM CHLORIDE 0.9 % (FLUSH) 0.9 %
5-40 SYRINGE (ML) INJECTION PRN
Status: DISCONTINUED | OUTPATIENT
Start: 2022-07-02 | End: 2022-07-13 | Stop reason: HOSPADM

## 2022-07-02 RX ADMIN — SODIUM CHLORIDE: 9 INJECTION, SOLUTION INTRAVENOUS at 13:34

## 2022-07-02 RX ADMIN — ENOXAPARIN SODIUM 30 MG: 100 INJECTION SUBCUTANEOUS at 13:34

## 2022-07-02 RX ADMIN — SODIUM CHLORIDE 1000 ML: 9 INJECTION, SOLUTION INTRAVENOUS at 11:42

## 2022-07-02 RX ADMIN — CEFTRIAXONE SODIUM 1000 MG: 1 INJECTION, POWDER, FOR SOLUTION INTRAMUSCULAR; INTRAVENOUS at 13:35

## 2022-07-02 ASSESSMENT — ENCOUNTER SYMPTOMS
TROUBLE SWALLOWING: 0
ABDOMINAL DISTENTION: 0
COUGH: 0
DIARRHEA: 0
CHEST TIGHTNESS: 0
SINUS PRESSURE: 0
VOMITING: 0
BLOOD IN STOOL: 0
ABDOMINAL PAIN: 0
SHORTNESS OF BREATH: 0
BACK PAIN: 0
NAUSEA: 1

## 2022-07-02 ASSESSMENT — PAIN SCALES - GENERAL
PAINLEVEL_OUTOF10: 0
PAINLEVEL_OUTOF10: 0

## 2022-07-02 ASSESSMENT — PAIN - FUNCTIONAL ASSESSMENT: PAIN_FUNCTIONAL_ASSESSMENT: NONE - DENIES PAIN

## 2022-07-02 NOTE — ED NOTES
Blood cultures and BMP drawn by phlebotomist.      Sree Stallings RN  07/02/22 3491 [phone] / [phone]

## 2022-07-02 NOTE — CARE COORDINATION
[facility] Case Management Initial Discharge Assessment    Met with Patient to discuss discharge plan. PCP: Jose L Soto MD                                Date of Last Visit: not recently    [facility] Patient: No        [facility] Notified: no    If no PCP, list provided? N/A    Discharge Planning    Living Arrangements: independently at home    Who do you live with? Daughter      Who helps you with your care:  self or family    If lives at home:     Do you have any barriers navigating in your home? no    Patient can perform ADL? Yes    Current Services (outpatient and in home) :  None    Dialysis: No    Is transportation available to get to your appointments? Yes    DME Equipment:  yes - cane, elevated toilet seat    Respiratory equipment: None    Respiratory provider:  no     [facility]    Consult with Medication Assistance Program?  No      Patient agreeable to [facility]? Declined    Patient agreeable to SNF/Rehab? Declined    Other discharge needs identified? N/A    Does Patient Have a High-Risk for Readmission Diagnosis (CHF, PN, MI, COPD)? No      Initial Discharge Plan? (Note: please see concurrent daily documentation for any updates after initial note). Pt denied any d/c needs in ER. Cm to assess for any further d/c needs and referrals.     Readmission Risk              Risk of Unplanned Readmission:  0         Electronically signed by Jake Dickens RN on 7/2/2022 at 1:58 PM

## 2022-07-02 NOTE — ED NOTES
Obt. Urine via straight cath, pt c/o tenderness to external area, pubic area noted puffy and reddened tender to touch. Janelle care provided, pt jaime. Well. Will monitor. Obt. Urine and blood to lab.      Maria Ines Hemphill RN  07/02/22 0112

## 2022-07-02 NOTE — ACP (ADVANCE CARE PLANNING)
Advance Care Planning     Advance Care Planning Activator (Inpatient)  Conversation Note      Date of ACP Conversation: 7/2/2022     Conversation Conducted with: Patient with Decision Making Capacity    ACP Activator: Serafin Chu RN        Health Care Decision Maker:     Current Designated Health Care Decision Maker:     Kishan Shape: child: [phone]  Lory Adjutant: [phone]: child  Paul Lennert: [phone]: child    Pt states that all 3 of her children would be primary as they would make the decisions together. Care Preferences    Ventilation: \"If you were in your present state of health and suddenly became very ill and were unable to breathe on your own, what would your preference be about the use of a ventilator (breathing machine) if it were available to you? \"      Would the patient desire the use of ventilator (breathing machine)?: yes    \"If your health worsens and it becomes clear that your chance of recovery is unlikely, what would your preference be about the use of a ventilator (breathing machine) if it were available to you? \"     Would the patient desire the use of ventilator (breathing machine)?: Yes      Resuscitation  \"CPR works best to restart the heart when there is a sudden event, like a heart attack, in someone who is otherwise healthy. Unfortunately, CPR does not typically restart the heart for people who have serious health conditions or who are very sick. \"    \"In the event your heart stopped as a result of an underlying serious health condition, would you want attempts to be made to restart your heart (answer \"yes\" for attempt to resuscitate) or would you prefer a natural death (answer \"no\" for do not attempt to resuscitate)? \" yes       [x] Yes   [] No   Educated Patient / Destiny Crain regarding differences between Advance Directives and portable DNR orders.     Length of ACP Conversation in minutes:  10    Conversation Outcomes:  [x] ACP discussion completed  [] Existing advance directive reviewed with patient; no changes to patient's previously recorded wishes  [] New Advance Directive completed  [] Portable Do Not Rescitate prepared for Provider review and signature  [] POLST/POST/MOLST/MOST prepared for Provider review and signature      Follow-up plan:    [] Schedule follow-up conversation to continue planning  [] Referred individual to Provider for additional questions/concerns   [] Advised patient/agent/surrogate to review completed ACP document and update if needed with changes in condition, patient preferences or care setting    [x] This note routed to one or more involved healthcare providers

## 2022-07-02 NOTE — PROGRESS NOTES
[facility] Respiratory Therapy Evaluation   Current Order:  Alb mdi q6 prn      Home Regimen: y      Ordering Physician: garo  Re-evaluation Date:  eval done     Diagnosis: uti      Patient Status: Stable / Unstable + Physician notified    The following MDI Criteria must be met in order to convert aerosol to MDI with spacer. If unable to meet, MDI will be converted to aerosol:  []  Patient able to demonstrate the ability to use MDI effectively  []  Patient alert and cooperative  []  Patient able to take deep breath with 5-10 second hold  []  Medication(s) available in this delivery method   []  Peak flow greater than or equal to 200 ml/min            Current Order Substituted To  (same drug, same frequency)   Aerosol to MDI [] Albuterol Sulfate 0.083% unit dose by aerosol Albuterol Sulfate MDI 2 puffs by inhalation with spacer    [] Levalbuterol 1.25 mg unit dose by aerosol Levalbuterol MDI 2 puffs by inhalation with spacer    [] Levalbuterol 0.63 mg unit dose by aerosol Levalbuterol MDI 2 puffs by inhalation with spacer    [] Ipratropium Bromide 0.02% unit dose by aerosol Ipratropium Bromide MDI 2 puffs by inhalation with spacer    [] Duoneb (Ipratropium + Albuterol) unit dose by aerosol Ipratropium MDI + Albuterol MDI 2 puffs by inhalation w/spacer   MDI to Aerosol [] Albuterol Sulfate MDI Albuterol Sulfate 0.083% unit dose by aerosol    [] Levalbuterol MDI 2 puffs by inhalation Levalbuterol 1.25 mg unit dose by aerosol    [] Ipratropium Bromide MDI by inhalation Ipratropium Bromide 0.02% unit dose by aerosol    [] Combivent (Ipratropium + Albuterol) MDI by inhalation Duoneb (Ipratropium + Albuterol) unit dose by aerosol       Treatment Assessment [Frequency/Schedule]:  Change frequency to: ___________no change_______________________________________per Protocol, P&T, MEC      Points 0 1 2 3 4   Pulmonary Status  Non-Smoker  []   Smoking history   < 20 pack years  [x]   Smoking history  ?  20 pack years  [] Pulmonary Disorder  (acute or chronic)  []   Severe or Chronic w/ Exacerbation  []     Surgical Status No [x]   Surgeries     General []   Surgery Lower []   Abdominal Thoracic or []   Upper Abdominal Thoracic with  PulmonaryDisorder  []     Chest X-ray Clear/Not  Ordered     [x]  Chronic Changes  Results Pending  []  Infiltrates, atelectasis, pleural effusion, or edema  []  Infiltrates in more than one lobe []  Infiltrate + Atelectasis, &/or pleural effusion  []    Respiratory Pattern Regular,  RR = 12-20 [x]  Increased,  RR = 21-25 []  KINNEY, irregular,  or RR = 26-30 []  Decreased FEV1  or RR = 31-35 []  Severe SOB, use  of accessory muscles, or RR ? 35  []    Mental Status Alert, oriented,  Cooperative [x]  Confused but Follows commands []  Lethargic or unable to follow commands []  Obtunded  []  Comatose  []    Breath Sounds Clear to  auscultation  [x]  Decreased unilaterally or  in bases only []  Decreased  bilaterally  []  Crackles or intermittent wheezes []  Wheezes []    Cough Strong, Spontan., & nonproductive [x]  Strong,  spontaneous, &  productive []  Weak,  Nonproductive []  Weak, productive or  with wheezes []  No spontaneous  cough or may require suctioning []    Level of Activity Ambulatory []  Ambulatory w/ Assist  [x]  Non-ambulatory []  Paraplegic []  Quadriplegic []    Total    Score:__2_____     Triage Score:__5______      Tri       Triage:     1. (>20) Freq: Q3    2. (16-20) Freq: Q4   3. (11-15) Freq: QID & Albuterol Q2 PRN    4. (6-10) Freq: TID & Albuterol Q2 PRN    5. (0-5) Freq Q4prn

## 2022-07-02 NOTE — ED PROVIDER NOTES
[address] [facility] ED  eMERGENCY dEPARTMENT eNCOUnter      Pt Name: Froylan Max  MRN: [id number]  [place] 1945  Date of evaluation: 7/2/2022  Provider: Jhoana Haque DO    CHIEF COMPLAINT       Chief Complaint   Patient presents with    Dysuria     BURNING WITH Lavenia Flicker. WAS TREATED W/ABX 3WEEKS AGO         HISTORY OF PRESENT ILLNESS   (Location/Symptom, Timing/Onset,Context/Setting, Quality, Duration, Modifying Factors, Severity)  Note limiting factors. Froylan Max is a 78 y.o. female who presents to the emergency department complaint of 3 weeks of burning with urination. Family states she was treated for UTI. She admits that she has now a very poor appetite and does not eat or drink very much at all. On exam the patient has extremely dry tongue and mucous membranes. Patient also has skin tenting. Patient admits that she drinks almost no water at all. Patient's family came during the patient's ED stay and states that she only drinks diet Coke. The history is provided by the patient. NursingNotes were reviewed. REVIEW OF SYSTEMS    (2-9 systems for level 4, 10 or more for level 5)     Review of Systems   Constitutional: Positive for activity change, appetite change and fatigue. Negative for chills, diaphoresis, fever and unexpected weight change. HENT: Negative for drooling, ear pain, nosebleeds, sinus pressure and trouble swallowing. Respiratory: Negative for cough, chest tightness and shortness of breath. Cardiovascular: Negative for chest pain and leg swelling. Gastrointestinal: Positive for nausea. Negative for abdominal distention, abdominal pain, blood in stool, diarrhea and vomiting. Endocrine: Negative for polydipsia and polyphagia. Genitourinary: Positive for decreased urine volume, difficulty urinating and dysuria. Negative for flank pain and frequency. Musculoskeletal: Negative for back pain and myalgias.    Skin: Negative for pallor and rash. Neurological: Negative for syncope, weakness and headaches. Hematological: Does not bruise/bleed easily. All other systems reviewed and are negative. Except as noted above the remainder of the review of systems was reviewed and negative. PAST MEDICAL HISTORY     Past Medical History:   Diagnosis Date    Bilateral carotid artery disease ([address]     CAD (coronary artery disease)     Hyperlipidemia     Hypertension     Hypothyroidism     Osteoarthritis          SURGICALHISTORY       Past Surgical History:   Procedure Laterality Date    CARDIAC CATHETERIZATION  1990s    SPINE SURGERY  8/11/2014    LUMBAR    THYROIDECTOMY, PARTIAL  1962         CURRENT MEDICATIONS       Previous Medications    ALBUTEROL SULFATE  (90 BASE) MCG/ACT INHALER    INHALE 2 PUFFS 4 TIMES DAILY AS NEEDED    ALBUTEROL SULFATE  (90 BASE) MCG/ACT INHALER    Inhale 2 puffs into the lungs every 4 hours as needed for Wheezing or Shortness of Breath (Space out to every 6 hours as symptoms improve)    AMLODIPINE (NORVASC) 5 MG TABLET    Take 5 mg by mouth daily    CYANOCOBALAMIN ([employer] VITAMIN B12) 1000 MCG TABLET    Take 1 tablet by mouth daily    FOSINOPRIL (MONOPRIL) 10 MG TABLET    Take 1 tablet by mouth daily    GABAPENTIN (NEURONTIN) 800 MG TABLET    Take 1 tablet by mouth daily for 90 days. .    ISOSORBIDE MONONITRATE (IMDUR) 30 MG EXTENDED RELEASE TABLET    Take 1 tablet by mouth daily    LEVOTHYROXINE (SYNTHROID) 112 MCG TABLET    Take 1 tablet by mouth Daily    METOPROLOL TARTRATE (LOPRESSOR) 25 MG TABLET    Take 1 tablet by mouth 2 times daily       ALLERGIES     Bee venom and Pcn [penicillins]    FAMILY HISTORY     History reviewed. No pertinent family history. SOCIAL HISTORY       Social History     Socioeconomic History    Marital status:       Spouse name: None    Number of children: None    Years of education: None    Highest education level: None   Occupational History  None   Tobacco Use    Smoking status: Former Smoker     Packs/day: 1.00     Years: 35.00     Pack years: 35.00     Types: Cigarettes     Start date:      Quit date: 1996     Years since quittin.6    Smokeless tobacco: Never Used   Substance and Sexual Activity    Alcohol use: Yes     Alcohol/week: 0.0 standard drinks     Comment: RARE    Drug use: Never    Sexual activity: None   Other Topics Concern    None   Social History Narrative    None     Social Determinants of Health     Financial Resource Strain:     Difficulty of Paying Living Expenses: Not on file   Food Insecurity:     Worried About Running Out of Food in the Last Year: Not on file    Darrell of Food in the Last Year: Not on file   Transportation Needs:     Lack of Transportation (Medical): Not on file    Lack of Transportation (Non-Medical):  Not on file   Physical Activity:     Days of Exercise per Week: Not on file    Minutes of Exercise per Session: Not on file   Stress:     Feeling of Stress : Not on file   Social Connections:     Frequency of Communication with Friends and Family: Not on file    Frequency of Social Gatherings with Friends and Family: Not on file    Attends Hindu Services: Not on file    Active Member of [address] [place] [address] [employer] or Organizations: Not on file    Attends Club or Organization Meetings: Not on file    Marital Status: Not on file   Intimate Partner Violence:     Fear of Current or Ex-Partner: Not on file    Emotionally Abused: Not on file    Physically Abused: Not on file    Sexually Abused: Not on file   Housing Stability:     Unable to Pay for Housing in the Last Year: Not on file    Number of [place] in the Last Year: Not on file    Unstable Housing in the Last Year: Not on file       SCREENINGS    [place] Coma Scale  Eye Opening: Spontaneous  Best Verbal Response: Oriented  Best Motor Response: Obeys commands  Mer[place]ez Coma Scale Score: 15 @FLOW([id number])@      PHYSICAL EXAM    (up to 7 for level 4, 8 or more for level 5)     ED Triage Vitals   BP Temp Temp Source Heart Rate Resp SpO2 Height Weight   07/02/22 1045 07/02/22 1050 07/02/22 1050 07/02/22 1045 07/02/22 1045 07/02/22 1045 07/02/22 1045 07/02/22 1045   (!) 107/53 97.7 °F (36.5 °C) Oral 90 17 97 % 5' 2\" (1.575 m) 110 lb (49.9 kg)       Physical Exam  Vitals and nursing note reviewed. Constitutional:       General: She is awake. She is in acute distress. Appearance: Normal appearance. She is well-developed and normal weight. She is not ill-appearing, toxic-appearing or diaphoretic. Comments: No photophobia. No phonophobia. HENT:      Head: Normocephalic and atraumatic. No Hurtado's sign. Right Ear: External ear normal.      Left Ear: External ear normal.      Nose: Nose normal. No congestion or rhinorrhea. Mouth/Throat:      Mouth: Mucous membranes are dry. Pharynx: Oropharynx is clear. No oropharyngeal exudate or posterior oropharyngeal erythema. Comments: Severely dry mucous membranes. Eyes:      General: No scleral icterus. Right eye: No foreign body or discharge. Left eye: No discharge. Extraocular Movements: Extraocular movements intact. Conjunctiva/sclera: Conjunctivae normal.      Left eye: No exudate. Pupils: Pupils are equal, round, and reactive to light. Neck:      Vascular: No JVD. Trachea: No tracheal deviation. Comments: No meningismus. Cardiovascular:      Rate and Rhythm: Normal rate and regular rhythm. Pulses: Normal pulses. Heart sounds: Normal heart sounds. Heart sounds not distant. No murmur heard. No friction rub. No gallop. Pulmonary:      Effort: Pulmonary effort is normal. No respiratory distress. Breath sounds: Normal breath sounds. No stridor. No wheezing, rhonchi or rales. Chest:      Chest wall: No tenderness. Abdominal:      General: Abdomen is flat. Bowel sounds are normal. There is no distension. Palpations: Abdomen is soft. There is no mass. Tenderness: There is no abdominal tenderness. There is no right CVA tenderness, left CVA tenderness, guarding or rebound. Hernia: No hernia is present. Musculoskeletal:         General: No swelling, tenderness, deformity or signs of injury. Normal range of motion. Cervical back: Normal range of motion and neck supple. No rigidity. Lymphadenopathy:      Head:      Right side of head: No submental adenopathy. Left side of head: No submental adenopathy. Skin:     General: Skin is warm and dry. Capillary Refill: Capillary refill takes less than 2 seconds. Coloration: Skin is not jaundiced or pale. Findings: No bruising, erythema, lesion or rash. Comments: Skin tenting. Neurological:      General: No focal deficit present. Mental Status: She is alert and oriented to person, place, and time. Mental status is at baseline. Cranial Nerves: No cranial nerve deficit. Sensory: No sensory deficit. Motor: No weakness. Coordination: Coordination normal.      Deep Tendon Reflexes: Reflexes are normal and symmetric. Psychiatric:         Mood and Affect: Mood normal.         Behavior: Behavior normal. Behavior is cooperative. Thought Content: Thought content normal.         Judgment: Judgment normal.         DIAGNOSTIC RESULTS     EKG: All EKG's are interpreted by the Emergency Department Physician who either signs or Co-signsthis chart in the absence of a cardiologist.        RADIOLOGY:   Laruth Jaeger such as CT, Ultrasound and MRI are read by the radiologist. Plain radiographic images are visualized and preliminarily interpreted by the emergency physician with the below findings:    Chest X-ray: No infiltrate, no pleural effusion, no pneumothorax, no free air.     Interpretation per the Radiologist below, if available at the time ofthis note:    XR CHEST PORTABLE    (Results Pending)         ED BEDSIDE ULTRASOUND:   Performed by ED Physician - none    LABS:  Labs Reviewed   C-REACTIVE PROTEIN - Abnormal; Notable for the following components:       Result Value    .0 (*)     All other components within normal limits   CBC WITH AUTO DIFFERENTIAL - Abnormal; Notable for the following components:    WBC 24.1 (*)     RBC 3.31 (*)     Hemoglobin 9.5 (*)     Hematocrit 30.3 (*)     MCHC 31.4 (*)     RDW 20.2 (*)     Platelets 887 (*)     Neutrophils Absolute 22.9 (*)     Lymphocytes Absolute 0.5 (*)     All other components within normal limits   COMPREHENSIVE METABOLIC PANEL - Abnormal; Notable for the following components:    Potassium 5.4 (*)     CO2 8 (*)     Anion Gap 23 (*)     BUN 59 (*)     CREATININE 1.81 (*)     GFR Non- 27.2 (*)     GFR  32.9 (*)     Albumin 2.9 (*)     Globulin 3.9 (*)     All other components within normal limits    Narrative:     CALL  [facility]  ED tel. [phone],  CO2 results called to and read back by Dr Ariella Ivy, 07/02/2022 11:35, by Hawk Dunham  Trop results called to and read back by Dr. Ariella Ivy, 07/02/2022 11:34, by Hawk Dunham   MAGNESIUM - Abnormal; Notable for the following components:    Magnesium 2.8 (*)     All other components within normal limits    Narrative:     Carlos Nails  [facility] tel. [phone],  CO2 results called to and read back by Dr Ariella Ivy, 07/02/2022 11:35, by Hawk Dunham  Trop results called to and read back by Dr. Ariella Ivy, 07/02/2022 11:34, by Hakw Dunham   TROPONIN - Abnormal; Notable for the following components:    Troponin 0.015 (*)     All other components within normal limits    Narrative:     Carlos Nails  [facility] tel. [phone],  Trop results called to and read back by Dr. Ariella Ivy, 07/02/2022 11:34, by Hawk Dunham   POCT ARTERIAL - Abnormal; Notable for the following components:    POC Potassium 5.3 (*)     POC Chloride 117 (*)     POC Creatinine 1.9 (*)     GFR Non- 26 (*)     GFR African American 31 (*)     pCO2, Arterial 15 (*) pO2, Arterial 120 (*)     HCO3, Arterial 8.5 (*)     Base Excess, Arterial -17 (*)     O2 Sat, Arterial 99 (*)     TCO2, Arterial 9 (*)     POC Hematocrit 30 (*)     Hemoglobin 10.2 (*)     All other components within normal limits   CULTURE, URINE   LACTIC ACID   CK    Narrative:     Josh Farley  [facility] tel. [phone],  CO2 results called to and read back by Dr Neville Mas, 07/02/2022 11:35, by Osmel Galarza  Trop results called to and read back by Dr. Neville Mas, 07/02/2022 11:34, by [address] [place] [address]   POCT EPOC BLOOD GAS, LACTIC ACID, ICA       All other labs were within normal range or not returned as of this dictation. EMERGENCY DEPARTMENT COURSE and DIFFERENTIAL DIAGNOSIS/MDM:   Vitals:    Vitals:    07/02/22 1050 07/02/22 1100 07/02/22 1140 07/02/22 1200   BP:  (!) 121/59 106/66 (!) 126/102   Pulse:  92     Resp:  18     Temp: 97.7 °F (36.5 °C)      TempSrc: Oral      SpO2:  100% 100%    Weight:       Height:               MDM  Patient has a severe metabolic acidosis. She is not hyperventilating. ABG is done that also confirms this. Patient ordered IV fluids. Patient urinated very little and was purulent and mucus-like. Blood cultures x2 added per the hospitalist request.  Case was discussed with Dr. Josephine Guaman who accepted the patient. He will order the antibiotics. Patient has leukocytosis. Acute renal injury. Orthostatics were attempted but the patient refused to stand. From the laying to seated position there negative. Patient's findings were discussed with the family. Plan is for admission to the hospital with further work-up. CONSULTS:  None    PROCEDURES:  Unless otherwise noted below, none     Procedures    FINAL IMPRESSION      1. Anuria    2. Leukocytosis, unspecified type    3. RICARDO (acute kidney injury) ([facility] [address]    4.  Metabolic acidosis          DISPOSITION/PLAN   DISPOSITION Decision To Admit 07/02/2022 11:57:57 AM      PATIENT REFERRED TO:  No follow-up provider specified.     DISCHARGE MEDICATIONS:  New Prescriptions    No medications on file          (Please note that portions of this note were completed with a voice recognition program.  Efforts were made to edit the dictations but occasionally words are mis-transcribed.)    Baldemar Benavidez DO (electronically signed)  Attending Emergency Physician          Baldemar Benavidez DO  07/02/22 0246

## 2022-07-02 NOTE — H&P
[place] [address] MEDICINE    HISTORY AND PHYSICAL EXAM    PATIENT NAME:  Verónica Veloz    MRN:  [id number]  SERVICE DATE:  7/2/2022   SERVICE TIME:  12:34 PM    Primary Care Physician: Jose L Soto MD     SUBJECTIVE  CHIEF COMPLAINT:  Dysuria    HPI:  Verónica Veloz is a 78 y.o. female who presents from home with complaint of dysuria. PMH significant for  has a past medical history of Bilateral carotid artery disease ([address] CAD (coronary artery disease), Hyperlipidemia, Hypertension, Hypothyroidism, and Osteoarthritis. .      68-year-old female presenting to the ED with complaint of 3 weeks of dysuria. Treatment for previous UTIs with similar symptoms. Recent poor appetite and poor p.o. intake. Family reports she only drinks diet Coke at home. Found in ED to have clinical signs of dehydration including dry tongue and dry mucous membranes as well as skin tenting. Work-up in the ED demonstrating leukocytosis to 24.1, mild hyperkalemia 5.4, and jose alejandro pyuria on straight cath. Metabolic acidosis also noted on BMP and confirmed on ABG. Patient without hyperventilation. Creatinine 1.81, up from recent measurements 1.1-1.2, with historic baseline appearing to be approximately 0.8. Hospitalist service consulted for admission. On exam, patient is found to be resting in bed in no acute distress. Family is present at bedside. She reports that she lives at home with her daughter, who assist in her care. She ambulates with a cane and occasionally with walker at baseline with no significant difficulty, but has had moderate intermittent weakness episodes for approximately the past 3 weeks. Occasional RLE radicular nerve twinges, wears OTC knee brace sometimes. She denies any mechanical falls. She denies any fevers or chills, has had mild intermittent nausea but no vomiting. She denies any other new/acute complaints. She is a full code.        PAST MEDICAL HISTORY:    Past Medical History: Diagnosis Date    Bilateral [address] artery disease ([facility] [address] [address]     CAD (coronary artery disease)     Hyperlipidemia     Hypertension     Hypothyroidism     Osteoarthritis      PAST SURGICAL HISTORY:    Past Surgical History:   Procedure Laterality Date    CARDIAC CATHETERIZATION      SPINE SURGERY  2014    LUMBAR    THYROIDECTOMY, PARTIAL  1962     FAMILY HISTORY:  History reviewed. No pertinent family history. SOCIAL HISTORY:    Social History     Socioeconomic History    Marital status:      Spouse name: Not on file    Number of children: Not on file    Years of education: Not on file    Highest education level: Not on file   Occupational History    Not on file   Tobacco Use    Smoking status: Former Smoker     Packs/day: 1.00     Years: 35.00     Pack years: 35.00     Types: Cigarettes     Start date:      Quit date: 1996     Years since quittin.6    Smokeless tobacco: Never Used   Substance and Sexual Activity    Alcohol use: Yes     Alcohol/week: 0.0 standard drinks     Comment: RARE    Drug use: Never    Sexual activity: Not on file   Other Topics Concern    Not on file   Social History Narrative    Not on file     Social Determinants of Health     Financial Resource Strain:     Difficulty of Paying Living Expenses: Not on file   Food Insecurity:     Worried About Running Out of Food in the Last Year: Not on file    Darrell of Food in the Last Year: Not on file   Transportation Needs:     Lack of Transportation (Medical): Not on file    Lack of Transportation (Non-Medical):  Not on file   Physical Activity:     Days of Exercise per Week: Not on file    Minutes of Exercise per Session: Not on file   Stress:     Feeling of Stress : Not on file   Social Connections:     Frequency of Communication with Friends and Family: Not on file    Frequency of Social Gatherings with Friends and Family: Not on file    Attends Restorationist Services: Not on file   Db Active Member of Clubs or Organizations: Not on file    Attends Club or Organization Meetings: Not on file    Marital Status: Not on file   Intimate Partner Violence:     Fear of Current or Ex-Partner: Not on file    Emotionally Abused: Not on file    Physically Abused: Not on file    Sexually Abused: Not on file   Housing Stability:     Unable to Pay for Housing in the Last Year: Not on file    Number of [place] in the Last Year: Not on file    Unstable Housing in the Last Year: Not on file     MEDICATIONS:   Prior to Admission medications    Medication Sig Start Date End Date Taking? Authorizing Provider   metoprolol tartrate (LOPRESSOR) 25 MG tablet Take 1 tablet by mouth 2 times daily 9/14/21   MARY Haney   albuterol sulfate  (90 Base) MCG/ACT inhaler Inhale 2 puffs into the lungs every 4 hours as needed for Wheezing or Shortness of Breath (Space out to every 6 hours as symptoms improve) 1/4/20 2/3/20  Brain Plunk, APRN - CNP   cyanocobalamin ([employer]S VITAMIN B12) 1000 MCG tablet Take 1 tablet by mouth daily 5/28/19   Makeda Hernandez MD   levothyroxine (SYNTHROID) 112 MCG tablet Take 1 tablet by mouth Daily 5/28/19   Makeda Hernandez MD   amLODIPine (NORVASC) 5 MG tablet Take 5 mg by mouth daily    Historical Provider, MD   albuterol sulfate  (90 Base) MCG/ACT inhaler INHALE 2 PUFFS 4 TIMES DAILY AS NEEDED 3/8/19   Iliana Obrien MD   gabapentin (NEURONTIN) 800 MG tablet Take 1 tablet by mouth daily for 90 days. . 6/20/18 9/24/18  Iliana Obrien MD   isosorbide mononitrate (IMDUR) 30 MG extended release tablet Take 1 tablet by mouth daily 3/16/18   Iliana Obrien MD   fosinopril (MONOPRIL) 10 MG tablet Take 1 tablet by mouth daily 9/15/17   Iliana Obrien MD       ALLERGIES: Bee venom and Pcn [penicillins]    REVIEW OF SYSTEM:   A full 12 point review of systems was completed, and was unremarkable except as specified above.       OBJECTIVE    BP (!) 113/57 1. 28 1.12 - 1.32 mmol/L    pH, Arterial 7.361 7.350 - 7.450    pCO2, Arterial 15 (LL) 35 - 45 mm Hg    pO2, Arterial 120 (HH) 75 - 108 mm Hg    HCO3, Arterial 8.5 (L) 21.0 - 29.0 mmol/L    Base Excess, Arterial -17 (L) -3 - 3    O2 Sat, Arterial 99 (HH) 93 - 100 %    TCO2, Arterial 9 (L) 21 - 32 mmol/L    Lactate 1.04 0.40 - 2.00 mmol/L    POC Hematocrit 30 (L) 36 - 48 %    Hemoglobin 10.2 (L) 12.0 - 16.0 gm/dL    Sample Type ART     Performed on SEE BELOW        IMAGING:  No results found. ASSESSMENT AND PLAN      Acute Problems:  Complicated UTI (urinary tract infection)  Ike pyuria in ED. Sufficient volume for urine culture but not urinalysis on initial straight cath. Plan: Repeat straight cath after rehydration for urinalysis. Blood and urine cultures obtained in ED. Empiric ceftriaxone pending culture results. Acute renal failure with metabolic acidosis and hyperkalemia, in setting of UTI and dehydration  Plan: Rehydration with crystalloid bolus in ED, followed by NS at 75 overnight. Repeat BMP at 1700. Nephrology consult. Renal ultrasound. Generalized weakness/difficulty with ambulation at home in setting of above  Anticipate improvement with rehydration and UTI treatment. PT/OT consulted. Chronic problems:   HTN  Hypothyroid  HLD  CAD  Bilateral carotid artery disease  Osteoarthritis  Plan: Continue/hold home medications as ordered after medication history completed.       Diet: Cardiac  DVT prophylaxis: Lovenox  CODE STATUS: Full      SIGNATURE: Edwardo Hughes DO  DATE: July 2, 2022  TIME: 12:34 PM

## 2022-07-03 LAB
ANION GAP SERPL CALCULATED.3IONS-SCNC: 17 MEQ/L (ref 9–15)
BASOPHILS ABSOLUTE: 0.1 K/UL (ref 0–0.2)
BASOPHILS RELATIVE PERCENT: 0.7 %
BUN BLDV-MCNC: 46 MG/DL (ref 8–23)
CALCIUM SERPL-MCNC: 8.1 MG/DL (ref 8.5–9.9)
CHLORIDE BLD-SCNC: 116 MEQ/L (ref 95–107)
CO2: 9 MEQ/L (ref 20–31)
CREAT SERPL-MCNC: 1.27 MG/DL (ref 0.5–0.9)
EOSINOPHILS ABSOLUTE: 0.2 K/UL (ref 0–0.7)
EOSINOPHILS RELATIVE PERCENT: 1.2 %
GFR AFRICAN AMERICAN: 49.6
GFR NON-AFRICAN AMERICAN: 41
GLUCOSE BLD-MCNC: 69 MG/DL (ref 70–99)
GLUCOSE BLD-MCNC: 77 MG/DL (ref 70–99)
GLUCOSE BLD-MCNC: 82 MG/DL (ref 70–99)
GLUCOSE BLD-MCNC: 90 MG/DL (ref 70–99)
GLUCOSE BLD-MCNC: 98 MG/DL (ref 70–99)
HCT VFR BLD CALC: 25.6 % (ref 37–47)
HEMOGLOBIN: 8 G/DL (ref 12–16)
LYMPHOCYTES ABSOLUTE: 0.6 K/UL (ref 1–4.8)
LYMPHOCYTES RELATIVE PERCENT: 4 %
MCH RBC QN AUTO: 28.9 PG (ref 27–31.3)
MCHC RBC AUTO-ENTMCNC: 31.1 % (ref 33–37)
MCV RBC AUTO: 92.8 FL (ref 82–100)
MONOCYTES ABSOLUTE: 0.6 K/UL (ref 0.2–0.8)
MONOCYTES RELATIVE PERCENT: 4.4 %
NEUTROPHILS ABSOLUTE: 12.5 K/UL (ref 1.4–6.5)
NEUTROPHILS RELATIVE PERCENT: 89.7 %
PDW BLD-RTO: 20.9 % (ref 11.5–14.5)
PERFORMED ON: ABNORMAL
PERFORMED ON: NORMAL
PLATELET # BLD: 264 K/UL (ref 130–400)
POTASSIUM REFLEX MAGNESIUM: 4.5 MEQ/L (ref 3.4–4.9)
RBC # BLD: 2.76 M/UL (ref 4.2–5.4)
SODIUM BLD-SCNC: 142 MEQ/L (ref 135–144)
TROPONIN: 0.01 NG/ML (ref 0–0.01)
WBC # BLD: 13.9 K/UL (ref 4.8–10.8)

## 2022-07-03 PROCEDURE — 93005 ELECTROCARDIOGRAM TRACING: CPT | Performed by: INTERNAL MEDICINE

## 2022-07-03 PROCEDURE — 85025 COMPLETE CBC W/AUTO DIFF WBC: CPT

## 2022-07-03 PROCEDURE — 36415 COLL VENOUS BLD VENIPUNCTURE: CPT

## 2022-07-03 PROCEDURE — 80048 BASIC METABOLIC PNL TOTAL CA: CPT

## 2022-07-03 PROCEDURE — 84484 ASSAY OF TROPONIN QUANT: CPT

## 2022-07-03 PROCEDURE — 6360000002 HC RX W HCPCS: Performed by: INTERNAL MEDICINE

## 2022-07-03 PROCEDURE — 1210000000 HC MED SURG R&B

## 2022-07-03 PROCEDURE — 97162 PT EVAL MOD COMPLEX 30 MIN: CPT

## 2022-07-03 PROCEDURE — 2580000003 HC RX 258: Performed by: INTERNAL MEDICINE

## 2022-07-03 PROCEDURE — 6370000000 HC RX 637 (ALT 250 FOR IP): Performed by: INTERNAL MEDICINE

## 2022-07-03 RX ORDER — HEPARIN SODIUM 5000 [USP'U]/ML
5000 INJECTION, SOLUTION INTRAVENOUS; SUBCUTANEOUS 2 TIMES DAILY
Status: DISCONTINUED | OUTPATIENT
Start: 2022-07-04 | End: 2022-07-13 | Stop reason: HOSPADM

## 2022-07-03 RX ORDER — 0.9 % SODIUM CHLORIDE 0.9 %
500 INTRAVENOUS SOLUTION INTRAVENOUS ONCE
Status: COMPLETED | OUTPATIENT
Start: 2022-07-03 | End: 2022-07-03

## 2022-07-03 RX ORDER — MEGESTROL ACETATE 40 MG/ML
400 SUSPENSION ORAL DAILY
Status: DISCONTINUED | OUTPATIENT
Start: 2022-07-03 | End: 2022-07-13 | Stop reason: HOSPADM

## 2022-07-03 RX ORDER — DEXTROSE MONOHYDRATE 50 MG/ML
100 INJECTION, SOLUTION INTRAVENOUS PRN
Status: DISCONTINUED | OUTPATIENT
Start: 2022-07-03 | End: 2022-07-13 | Stop reason: HOSPADM

## 2022-07-03 RX ADMIN — CEFTRIAXONE SODIUM 1000 MG: 1 INJECTION, POWDER, FOR SOLUTION INTRAMUSCULAR; INTRAVENOUS at 14:08

## 2022-07-03 RX ADMIN — ENOXAPARIN SODIUM 30 MG: 100 INJECTION SUBCUTANEOUS at 09:15

## 2022-07-03 RX ADMIN — Medication 10 ML: at 09:15

## 2022-07-03 RX ADMIN — SODIUM CHLORIDE: 9 INJECTION, SOLUTION INTRAVENOUS at 20:06

## 2022-07-03 RX ADMIN — SODIUM CHLORIDE 500 ML: 9 INJECTION, SOLUTION INTRAVENOUS at 09:23

## 2022-07-03 RX ADMIN — SODIUM CHLORIDE: 9 INJECTION, SOLUTION INTRAVENOUS at 04:07

## 2022-07-03 RX ADMIN — MEGESTROL ACETATE 400 MG: 40 SUSPENSION ORAL at 09:23

## 2022-07-03 RX ADMIN — ISOSORBIDE MONONITRATE 30 MG: 30 TABLET, EXTENDED RELEASE ORAL at 09:15

## 2022-07-03 RX ADMIN — LEVOTHYROXINE SODIUM 100 MCG: 0.1 TABLET ORAL at 06:11

## 2022-07-03 ASSESSMENT — PAIN SCALES - GENERAL: PAINLEVEL_OUTOF10: 0

## 2022-07-03 NOTE — PROGRESS NOTES
Physical Therapy Med Surg Initial Assessment  Facility/Department: [facility]  Room: [place] [id number]       NAME: Gaye Agosto  : 1945 (78 y.o.)  MRN: [id number]  CODE STATUS: Full Code    Date of Service: 7/3/2022    Patient Diagnosis(es): Anuria [N09]  Metabolic acidosis [Y65.9]  RICARDO (acute kidney injury) ([address] [P67.2]  Complicated UTI (urinary tract infection) [N39.0]  Leukocytosis, unspecified type [D72.829]   Chief Complaint   Patient presents with    Dysuria     BURNING WITH Sharyon Paget. WAS TREATED W/ABX 3WEEKS AGO     Patient Active Problem List    Diagnosis Date Noted    Complicated UTI (urinary tract infection) 2022    [facility]     Pneumonia due to COVID-19 virus 09/10/2021    Acute bronchitis 2020    Pancytopenia [facility]     Peripheral polyneuropathy     Weight loss     TIA (transient ischemic attack) 2019    Bilateral carotid artery stenosis 09/15/2017    Hypothyroidism     Hypertension     Hyperlipidemia     Degenerative spondylolisthesis 2015    Lumbar stenosis with neurogenic claudication 2015        Past Medical History:   Diagnosis Date    Bilateral [facility] artery disease ([facility]     CAD (coronary artery disease)     Hyperlipidemia     Hypertension     Hypothyroidism     Osteoarthritis      Past Surgical History:   Procedure Laterality Date    CARDIAC CATHETERIZATION      SPINE SURGERY  2014    LUMBAR    THYROIDECTOMY, PARTIAL         Chart Reviewed: Yes  Patient assessed for rehabilitation services?: Yes    Restrictions:        SUBJECTIVE:   Subjective: Pt reports having Dylan ankle pain and stiffness d/t not moving around or getting out of bed.     Pain     Pre treatment screenin/10 OR Faces 1-10  Location: B ankles  Description: stiff/sore  Onset/duration:d/t laying in bed  [x]  Pt declined intervention  [x]  Pt able to proceed PT session  []  RN or physician notified  []  RN called to bedside to administer meds. Post treatment screening 0/10, described as same as above - pt reports decreased pain after light mobility     Prior Level of Function:  Social/Functional History  Lives With: Daughter  Type of Home: House  Home Layout: Two level,Bed/Bath upstairs (14 R HR ascending)  Home Access: Stairs to enter with rails  Entrance Stairs - Number of Steps: 2-3  Entrance Stairs - Rails: Both  Bathroom Shower/Tub: Walk-in shower  Bathroom Equipment: Shower chair  Home Equipment: Cane,Walker, rolling  Has the patient had two or more falls in the past year or any fall with injury in the past year?: No  ADL Assistance: [address]: Independent  Homemaking Responsibilities: Yes (Does not do as much as she used to, daughter primarily does cleaning)  Ambulation Assistance: Independent  Transfer Assistance: Independent  Active :  Yes  Additional Comments: Pt reports ADL's require increased time though able to perform indep; Uses the SC for ambulation primarily though has a [address] if needed    OBJECTIVE:   Vision  Vision: Impaired  Vision Exceptions: Wears glasses for reading  Hearing: Within functional limits    Cognition:  Overall Orientation Status: Within Normal Limits  Follows Commands: Within Functional Limits  Overall Cognitive Status: WNL    Observation/Palpation  Observation: rests in B ankle PF with flexed knees; poor postural awareness in sitting with increased thoracic kyphosis    ROM:  RLE General AROM: limited ankle mobility  LLE General AROM: limited ankle mobility    Strength:  Strength RLE  Comment: </=3/5 grossly  Strength LLE  Comment: </=3/5 grossly    Neuro:  Balance  Posture: Poor  Sitting - Static: Poor  Comments: Poor sitting balance with R side lean and inability to attain erect posture     Bed mobility  Rolling to Right: Maximum assistance  Supine to Sit: Maximum assistance  Sit to Supine: Maximum assistance  Bed Mobility Comments: pt with increased pain performing supine to sit reporting \"it burns too bad\" in refernce to UTI with heavy posterior push and inability to attain uprgith posture in sitting    Transfers  Comment: NT d/t pt refusal and safety at this time    Ambulation  Comments: NT d/t pt refusal and safety at this time    Activity Tolerance  Activity Tolerance: Patient limited by pain      Patient Education  Education Given To: Patient  Education Provided: Role of Therapy;Plan of Care     ASSESSMENT:   Body Structures, Functions, Activity Limitations Requiring Skilled Therapeutic Intervention: Decreased functional mobility ; Decreased ROM; Decreased strength;Decreased balance;Decreased posture; Increased pain;Decreased safe awareness  Decision Making: Medium Complexity  History: high  Exam: high  Clinical Presentation: med    Therapy Prognosis: Fair    DISCHARGE RECOMMENDATIONS:  Discharge Recommendations: Continue to assess pending progress    Assessment: Pt presents with the above impairments that significantly impact her functional mobility and indep. Pt refused further functional mobility assessment following bed mobility with reports of \"burning\" pain d/t UTI when attempted to sit EOB. Pt unable to safely sit EOB with max encouragement and explanation of benefit for functional mobility. Pt with heavy posterior and R sided pushing requesting to lay back down. Pt would benefit from further PT to address deficits and increase overall functional indep in order to return home [facility].   Requires PT Follow-Up: Yes       PLAN OF CARE:  Plan  Plan: 1 time a day 3-6 times a week  Current Treatment Recommendations: Strengthening,ROM,Balance training,Functional mobility training,Transfer training,Gait training,Neuromuscular re-education,Home exercise program,Safety education & training,Patient/Caregiver education & training,Equipment evaluation, education, & procurement,Positioning  Plan Comment: Pt will need further gait and transfer assessment when appropriate    Safety Devices  Type of Devices: All fall risk precautions in place    Goals:  Short Term Goals  Short term goal 1: The pt will perform bed mobility with min A  Short term goal 2: The pt will tolerate upright sitting posture at EOB with activity >/=5 mins    AMP[facility] (6 CLICK) BASIC MOBILITY  AM-PAC Inpatient Mobility Raw Score : 8     Therapy Time:   Individual   Time In 0801   Time Out 0820   Minutes [address], [place], 07/03/22 at 12:06 PM         Definitions for assistance levels  Independent = pt does not require any physical supervision or assistance from another person for activity completion. Device may be needed.   Stand by assistance = pt requires verbal cues or instructions from another person, close to but not touching, to perform the activity  Minimal assistance= pt performs 75% or more of the activity; assistance is required to complete the activity  Moderate assistance= pt performs 50% of the activity; assistance is required to complete the activity  Maximal assistance = pt performs 25% of the activity; assistance is required to complete the activity  Dependent = pt requires total physical assistance to accomplish the task

## 2022-07-03 NOTE — PROGRESS NOTES
Pt continues to have weakness in BLE and difficulty rolling in bed. Pt states she has minimal left sided flank pain and burning on urination. Pt is incontinent of urine however used the bedpan for a BM earlier in this shift. negative for disorientation and cooperative with care. VS stable and call light in reach. Continue to monitor.

## 2022-07-03 NOTE — PROGRESS NOTES
Progress Note  [id number]       Room:[place] [address]  Patient Name:Edna Doyle     YOB: 1945     Age:77 y.o. Subjective      No acute events overnight. Renal function and white cell count both improving. Afebrile overnight. Mild sinus tachycardia this morning, with no acute ST or T wave changes on EKG. Barely detectable troponin elevation, not unexpected in setting of renal dysfunction and mild tachycardia, stable on recheck. Patient feeling symptomatically improved today. No new/acute complaints. Objective         Vitals Last 24 Hours:  TEMPERATURE:  Temp  Av.4 °F (36.3 °C)  Min: 97.3 °F (36.3 °C)  Max: 97.7 °F (36.5 °C)  RESPIRATIONS RANGE: Resp  Av.8  Min: 17  Max: 18  PULSE OXIMETRY RANGE: SpO2  Av.2 %  Min: 97 %  Max: 100 %  PULSE RANGE: Pulse  Av.3  Min: 90  Max: 103  BLOOD PRESSURE RANGE: Systolic (36DHH), XYE:239 , Min:94 , KXM:312   ; Diastolic (18TBY), JNC:84, Min:46, Max:102    I/O (24Hr): No intake or output data in the 24 hours ending 22 0839    PHYSICAL EXAM:   Constitutional: Small stature thin elderly female reclining in bed no acute distress. Head: NCAT. Bitemporal wasting noted. Eyes: PERRLA, EOMI. Slightly sunken. ENT: Mild Hoopa. Less dry mucous membranes. Upper and lower dentures. Neck: Trachea midline, phonation normal.  No JVD. Cardiovascular: RRR. No significant murmurs appreciated. Warm and well perfused peripherally. No pretibial edema. Pulmonary: Normal rate and effort of respiration on room air. CTAB. No coughing during exam.  Abdomen: Soft, scaphoid, nontense abdomen. Hypoactive bowel sounds. Trace suprapubic urinary urgency provocation on palpation. L >R mild CVA tenderness. Neurologic: Alert, grossly oriented. Suspected mild memory problems, but conversational and mentating fairly appropriately. Muscle strength symmetric in all 4 extremities. No tremors appreciated.   Psychiatric: Pleasant, calm, cooperative. MSK/integumentary: RLE lightweight knee brace. Improving skin tenting on dorsal hands. Labs/Imaging/Diagnostics    Labs:  CBC:Recent Labs     07/02/22  1045 07/02/22  1149 07/03/22  0542   WBC 24.1*  --  13.9*   RBC 3.31*  --  2.76*   HGB 9.5* 10.2* 8.0*   HCT 30.3*  --  25.6*   MCV 91.7  --  92.8   RDW 20.2*  --  20.9*   *  --  264     CHEMISTRIES:  Recent Labs     07/02/22  1057 07/02/22  1149 07/02/22  1244 07/02/22  1746 07/03/22  0542      < > 138 137 142   K 5.4*   < > 5.1* 5.1* 4.5      < > 110* 109* 116*   CO2 8*   < > 6* 6* 9*   BUN 59*   < > 58* 56* 46*   CREATININE 1.81*   < > 1.68* 1.54* 1.27*   GLUCOSE 83   < > 79 66* 90   MG 2.8*  --   --   --   --     < > = values in this interval not displayed. PT/INR:No results for input(s): PROTIME, INR in the last 72 hours. APTT:No results for input(s): APTT in the last 72 hours. LIVER PROFILE:  Recent Labs     07/02/22  1057   AST 16   ALT 8   BILITOT <0.2   ALKPHOS 128       Imaging Last 24 Hours:  XR CHEST PORTABLE    Result Date: 7/2/2022  XR CHEST PORTABLE Clinical History:  Failure to thrive. Comparison:  9/10/2021. RESULT: No consolidation. Hyperinflated lungs. Mildly coarsened lung markings, unchanged. No large pleural effusion. No pneumothorax. Stable cardiomediastinal silhouette. Aortic vascular calcifications. No distinct acute osseous findings. No acute radiographic abnormality. US RETROPERITONEAL COMPLETE    Result Date: 7/2/2022  EXAMINATION:   RENAL ULTRASOUND HISTORY:  Acute renal failure. TECHNIQUE:  Sonography of the kidneys was performed. Images were obtained and stored in a permanent archive. COMPARISON: CT 9/10/2021. RESULT: Right Kidney:      -Renal length: 6.0 cm      -Parenchyma: Parenchyma echogenicity is increased. Diffuse parenchymal thinning present measuring around 5 mm.      -Collecting system: No hydronephrosis.        -Calculus: Echogenic lower pole calculus, similar to the QHS for now to screen for any recurrent episodes. Hypoglycemia management algorithm ordered. Sinus tachycardia  Unclear etiology. Stable vital signs. Only very slightly tachycardic. Barely detectable troponin level, stable on repeat measurement. EKG. Started on telemetry. Given that this is sinus tachycardia, which is almost always compensatory, DO NOT give negative chronotropes in an attempt to slow this down.   Treatment goal is to identify and fix underlying cause.       Chronic problems:   HTN  Hypothyroid  HLD  CAD  Bilateral carotid artery disease  Osteoarthritis  Plan: Continue/hold home medications as ordered after medication history completed.        Diet: Cardiac  DVT prophylaxis: Lovenox  CODE STATUS: Full         Electronically signed by George Hebert DO on 7/3/22 at 8:39 AM EDT

## 2022-07-03 NOTE — CARE COORDINATION
07/03/22    From:Home with daughter.      Admit: Dysuria x 3 weeks     PMH: UTI, CAD, HTN, HLD, Hypothyroidism    Anticipated Discharge Disposition: TBD    Patient Mobility or PT/OT ordered: Yes  Consults: [facility]  Nephrology     Clinical:     Barriers to Discharge: IV Rocephin    Assessments: CMI Done in ER

## 2022-07-03 NOTE — PROGRESS NOTES
5768: [employer] sent to Dr. Dung Purvis re: critical lab-CO2 of 9. Orders put in for POCT glucose AC/HS, tele monitoring, and EKG. 12: Tele monitor applied-sinus tachy. POCT blood sugar 77.     1304: Lab called with critical Troponin of 0.011. Dr. Dung Purvis notified via [employer]. EKG done at bedside.

## 2022-07-03 NOTE — CONSULTS
EXAMINATION:  VITAL SIGNS:  5 feet and 2 inches, 104 pounds. Blood pressure is  100/50, heart rate 100, respirations 18, afebrile. HEENT:  Normocephalic. Pupils reactive to light. Obvious facial muscle  wasting. NECK:  Shows bilateral bruits. No JVD or adenopathy. CHEST:  Lungs show decreased sounds. No wheezing, rales or rhonchi. No  accessory muscle use. CARDIOVASCULAR:  Heart is regular with 1/6 systolic murmur. ABDOMEN:  Soft. No guarding or rigidity. Abdomen is flat. Carl's  sign _____. There is no flank tenderness. EXTREMITIES:  Show muscle wasting. SKIN:  Warm and dry. No edema. No cyanosis. IMPRESSION:  1.  CKD III secondary to nephrosclerosis from longstanding hypertension. 2.  Dehydration, improving. 3.  Tachycardia, suspect due to dehydration. 4.  Hyperlipidemia. 5.  Coronary artery disease. 6.  Carotid artery disease. PLAN:  Maintain fluid intake. Follow BMPs. Avoid hypotension and  nephrotoxic exposure.         Erick Ramos DO    D: 07/03/2022 9:03:36       T: 07/03/2022 9:07:11     [id number]  Job#: [id number]     Doc#: [id number]    CC:

## 2022-07-03 NOTE — PLAN OF CARE
Problem: Safety - Adult  Goal: Free from fall injury  Outcome: Progressing     Problem: Skin/Tissue Integrity  Goal: Absence of new skin breakdown  Description: 1. Monitor for areas of redness and/or skin breakdown  2. Assess vascular access sites hourly  3. Every 4-6 hours minimum:  Change oxygen saturation probe site  4. Every 4-6 hours:  If on nasal continuous positive airway pressure, respiratory therapy assess nares and determine need for appliance change or resting period.   Outcome: Progressing     Problem: Pain  Goal: Verbalizes/displays adequate comfort level or baseline comfort level  Outcome: Progressing

## 2022-07-04 LAB
ANION GAP SERPL CALCULATED.3IONS-SCNC: 16 MEQ/L (ref 9–15)
BASE EXCESS ARTERIAL: -13 (ref -3–3)
BASOPHILS ABSOLUTE: 0.1 K/UL (ref 0–0.2)
BASOPHILS RELATIVE PERCENT: 0.6 %
BUN BLDV-MCNC: 29 MG/DL (ref 8–23)
C-REACTIVE PROTEIN, HIGH SENSITIVITY: 121.7 MG/L (ref 0–5)
CALCIUM IONIZED: 1.15 MMOL/L (ref 1.12–1.32)
CALCIUM SERPL-MCNC: 8.3 MG/DL (ref 8.5–9.9)
CHLORIDE BLD-SCNC: 112 MEQ/L (ref 95–107)
CO2: 12 MEQ/L (ref 20–31)
CREAT SERPL-MCNC: 0.95 MG/DL (ref 0.5–0.9)
EOSINOPHILS ABSOLUTE: 0.2 K/UL (ref 0–0.7)
EOSINOPHILS RELATIVE PERCENT: 2.3 %
GFR AFRICAN AMERICAN: 59
GFR AFRICAN AMERICAN: >60
GFR NON-AFRICAN AMERICAN: 48
GFR NON-AFRICAN AMERICAN: 57.3
GLUCOSE BLD-MCNC: 102 MG/DL (ref 70–99)
GLUCOSE BLD-MCNC: 104 MG/DL (ref 70–99)
GLUCOSE BLD-MCNC: 75 MG/DL (ref 70–99)
GLUCOSE BLD-MCNC: 84 MG/DL (ref 70–99)
GLUCOSE BLD-MCNC: 91 MG/DL (ref 70–99)
HCO3 ARTERIAL: 11.7 MMOL/L (ref 21–29)
HCT VFR BLD CALC: 24.7 % (ref 37–47)
HEMOGLOBIN: 7.6 GM/DL (ref 12–16)
HEMOGLOBIN: 8 G/DL (ref 12–16)
LACTATE: 1.52 MMOL/L (ref 0.4–2)
LACTIC ACID: 2.4 MMOL/L (ref 0.5–2.2)
LV EF: 55 %
LVEF MODALITY: NORMAL
LYMPHOCYTES ABSOLUTE: 0.5 K/UL (ref 1–4.8)
LYMPHOCYTES RELATIVE PERCENT: 4.8 %
MCH RBC QN AUTO: 29.4 PG (ref 27–31.3)
MCHC RBC AUTO-ENTMCNC: 32.3 % (ref 33–37)
MCV RBC AUTO: 90.9 FL (ref 82–100)
MONOCYTES ABSOLUTE: 0.4 K/UL (ref 0.2–0.8)
MONOCYTES RELATIVE PERCENT: 3.8 %
NEUTROPHILS ABSOLUTE: 8.6 K/UL (ref 1.4–6.5)
NEUTROPHILS RELATIVE PERCENT: 88.5 %
O2 SAT, ARTERIAL: 99 % (ref 93–100)
PCO2 ARTERIAL: 20 MM HG (ref 35–45)
PDW BLD-RTO: 20.4 % (ref 11.5–14.5)
PERFORMED ON: ABNORMAL
PERFORMED ON: ABNORMAL
PERFORMED ON: NORMAL
PERFORMED ON: NORMAL
PH ARTERIAL: 7.38 (ref 7.35–7.45)
PLATELET # BLD: 215 K/UL (ref 130–400)
PO2 ARTERIAL: 113 MM HG (ref 75–108)
POC CHLORIDE: 119 MEQ/L (ref 99–110)
POC CREATININE: 1.1 MG/DL (ref 0.6–1.2)
POC FIO2: 21
POC HEMATOCRIT: 22 % (ref 36–48)
POC POTASSIUM: 4.1 MEQ/L (ref 3.5–5.1)
POC SAMPLE TYPE: ABNORMAL
POC SODIUM: 143 MEQ/L (ref 136–145)
POTASSIUM REFLEX MAGNESIUM: 4.2 MEQ/L (ref 3.4–4.9)
RBC # BLD: 2.71 M/UL (ref 4.2–5.4)
SODIUM BLD-SCNC: 140 MEQ/L (ref 135–144)
TCO2 ARTERIAL: 12 MMOL/L (ref 21–32)
WBC # BLD: 9.7 K/UL (ref 4.8–10.8)

## 2022-07-04 PROCEDURE — 86141 C-REACTIVE PROTEIN HS: CPT

## 2022-07-04 PROCEDURE — 82565 ASSAY OF CREATININE: CPT

## 2022-07-04 PROCEDURE — 82435 ASSAY OF BLOOD CHLORIDE: CPT

## 2022-07-04 PROCEDURE — 84132 ASSAY OF SERUM POTASSIUM: CPT

## 2022-07-04 PROCEDURE — 82330 ASSAY OF CALCIUM: CPT

## 2022-07-04 PROCEDURE — 85014 HEMATOCRIT: CPT

## 2022-07-04 PROCEDURE — 84295 ASSAY OF SERUM SODIUM: CPT

## 2022-07-04 PROCEDURE — 1210000000 HC MED SURG R&B

## 2022-07-04 PROCEDURE — 85025 COMPLETE CBC W/AUTO DIFF WBC: CPT

## 2022-07-04 PROCEDURE — 97535 SELF CARE MNGMENT TRAINING: CPT

## 2022-07-04 PROCEDURE — 36415 COLL VENOUS BLD VENIPUNCTURE: CPT

## 2022-07-04 PROCEDURE — 2580000003 HC RX 258: Performed by: INTERNAL MEDICINE

## 2022-07-04 PROCEDURE — 6370000000 HC RX 637 (ALT 250 FOR IP): Performed by: INTERNAL MEDICINE

## 2022-07-04 PROCEDURE — 93306 TTE W/DOPPLER COMPLETE: CPT

## 2022-07-04 PROCEDURE — 83605 ASSAY OF LACTIC ACID: CPT

## 2022-07-04 PROCEDURE — 6360000002 HC RX W HCPCS: Performed by: INTERNAL MEDICINE

## 2022-07-04 PROCEDURE — 82803 BLOOD GASES ANY COMBINATION: CPT

## 2022-07-04 PROCEDURE — 36600 WITHDRAWAL OF ARTERIAL BLOOD: CPT

## 2022-07-04 PROCEDURE — 80048 BASIC METABOLIC PNL TOTAL CA: CPT

## 2022-07-04 RX ADMIN — MEGESTROL ACETATE 400 MG: 40 SUSPENSION ORAL at 08:57

## 2022-07-04 RX ADMIN — Medication 10 ML: at 08:57

## 2022-07-04 RX ADMIN — LEVOTHYROXINE SODIUM 100 MCG: 0.1 TABLET ORAL at 05:41

## 2022-07-04 RX ADMIN — ISOSORBIDE MONONITRATE 30 MG: 30 TABLET, EXTENDED RELEASE ORAL at 08:57

## 2022-07-04 RX ADMIN — SODIUM CHLORIDE: 9 INJECTION, SOLUTION INTRAVENOUS at 23:14

## 2022-07-04 RX ADMIN — HEPARIN SODIUM 5000 UNITS: 5000 INJECTION INTRAVENOUS; SUBCUTANEOUS at 08:57

## 2022-07-04 RX ADMIN — HEPARIN SODIUM 5000 UNITS: 5000 INJECTION INTRAVENOUS; SUBCUTANEOUS at 20:39

## 2022-07-04 RX ADMIN — CEFTRIAXONE SODIUM 1000 MG: 1 INJECTION, POWDER, FOR SOLUTION INTRAMUSCULAR; INTRAVENOUS at 13:08

## 2022-07-04 ASSESSMENT — PAIN SCALES - GENERAL: PAINLEVEL_OUTOF10: 1

## 2022-07-04 NOTE — PROGRESS NOTES
Hospitalist Progress Note      PCP: Maricruz Samuels MD    Date of Admission: 7/2/2022    Chief Complaint:  No acute events, afebrile, stable HD    Medications:  Reviewed    Infusion Medications    dextrose      sodium chloride      sodium chloride 75 mL/hr at 07/03/22 2006     Scheduled Medications    megestrol acetate  400 mg Oral Daily    heparin (porcine)  5,000 Units SubCUTAneous BID    sodium chloride flush  5-40 mL IntraVENous 2 times per day    cefTRIAXone (ROCEPHIN) IV  1,000 mg IntraVENous Q24H    isosorbide mononitrate  30 mg Oral Daily    levothyroxine  100 mcg Oral Daily     PRN Meds: glucose, dextrose bolus **OR** dextrose bolus, glucagon (rDNA), dextrose, sodium chloride flush, sodium chloride, ondansetron **OR** ondansetron, acetaminophen **OR** acetaminophen, polyethylene glycol, albuterol sulfate HFA      Intake/Output Summary (Last 24 hours) at 7/4/2022 1538  Last data filed at 7/4/2022 0542  Gross per 24 hour   Intake 2642.03 ml   Output --   Net 2642.03 ml       Exam:    BP (!) 131/48   Pulse 97   Temp 98.4 °F (36.9 °C) (Oral)   Resp 20   Ht 5' 2\" (1.575 m)   Wt 95 lb 11.2 oz (43.4 kg)   LMP  (LMP Unknown)   SpO2 100%   BMI 17.50 kg/m²     General appearance: appears stated age and cooperative. Respiratory:  clear to auscultation bilaterally . Cardiovascular: Regular rate and rhythm, S1/S2 . Abdomen: Soft, active bowel sounds. Musculoskeletal: No edema bilaterally. Labs:   Recent Labs     07/02/22  1045 07/02/22  1149 07/03/22  0542 07/04/22  0513 07/04/22  0834   WBC 24.1*  --  13.9* 9.7  --    HGB 9.5*   < > 8.0* 8.0* 7.6*   HCT 30.3*  --  25.6* 24.7*  --    *  --  264 215  --     < > = values in this interval not displayed.      Recent Labs     07/02/22  1746 07/02/22  1746 07/03/22  0542 07/04/22  0513 07/04/22  0834     --  142 140  --    K 5.1*  --  4.5 4.2  --    *  --  116* 112*  --    CO2 6*  --  9* 12*  --    BUN 56*  --  46* 29*  -- CREATININE 1.54*   < > 1.27* 0.95* 1.1   CALCIUM 8.9  --  8.1* 8.3*  --     < > = values in this interval not displayed. Recent Labs     07/02/22  1057   AST 16   ALT 8   BILITOT <0.2   ALKPHOS 128     No results for input(s): INR in the last 72 hours. Recent Labs     07/02/22  1057 07/03/22  1052   CKTOTAL 105  --    TROPONINI 0.015* 0.011*       Urinalysis:      Lab Results   Component Value Date/Time    NITRU Negative 09/10/2021 11:30 AM    WBCUA 0-2 09/10/2021 11:30 AM    BACTERIA Negative 09/10/2021 11:30 AM    RBCUA 0-2 09/10/2021 11:30 AM    BLOODU MODERATE 09/10/2021 11:30 AM    SPECGRAV 1.020 09/10/2021 11:30 AM    GLUCOSEU Negative 09/10/2021 11:30 AM       Radiology:  US RETROPERITONEAL COMPLETE   Final Result   Addendum 1 of 1   Addendum:      Reason for addendum is typographical error in the impression of the    report. Here is a new report:      EXAMINATION:   RENAL ULTRASOUND       HISTORY:  Acute renal failure. TECHNIQUE:  Sonography of the kidneys was performed. Images were obtained    and stored in a permanent archive. COMPARISON: CT 9/10/2021. RESULT:      Right Kidney:         -Renal length: 6.0 cm        -Parenchyma: Parenchyma echogenicity is increased. Diffuse    parenchymal thinning present measuring around 5 mm.         -Collecting system: No hydronephrosis. -Calculus: Echogenic lower pole calculus, similar to the prior CT.         -Lesion:  1.2 cm cyst, unchanged. Left Kidney:             -Renal length: 10.0 cm        -Parenchyma: Normal parenchymal echogenicity. Normal parenchymal    thickness measuring around 12 mm.         -Collecting system: No hydronephrosis. -Calculus: No echogenic, shadowing calculus.         -Lesion:  None. Bladder: Decompressed. Final      Right nephrolithiasis. No hydronephrosis. Severe right RENAL ATROPHY, unchanged.                      XR CHEST PORTABLE   Final Result      No acute radiographic abnormality. Assessment/Plan:    79 y/o female with history of HTN, TIA, carotid artery stenosis, right kidney atrophy, COVID pneumonia, peripheral neuropathy, COPD, tobacco use who presented with     UTI  - urine culture is positive for GNB  - blood cultures no growth  - leukocytosis improved  - on IV Rocephin  - follow cultures    RICARDO with severe AG metabolic acidosis  - improving  - kidney u/s showed right kidney atrophy  - on saline infusion  - management per nephrology    Anemia   - follow H/H    HTN  - monitor BP closely    Diet: ADULT DIET; Regular; Low Fat/Low Chol/High Fiber/2 gm Na  ADULT ORAL NUTRITION SUPPLEMENT; Lunch, Dinner;  Low Calorie/High Protein Oral Supplement    Code Status: Full Code              Electronically signed by Steven Park MD on 7/4/2022 at 3:38 PM

## 2022-07-04 NOTE — PROGRESS NOTES
Physical Therapy Med Surg Daily Treatment Note  Facility/Department: [facility]  Room: [place] [address]       NAME: Marcela Tenorio  : 1945 (78 y.o.)  MRN: [id number]  CODE STATUS: Full Code    Date of Service: 2022    Patient Diagnosis(es): Anuria [F53]  Metabolic acidosis [D97.6]  RICARDO (acute kidney injury) ([address] [R40.9]  Complicated UTI (urinary tract infection) [N39.0]  Leukocytosis, unspecified type [D72.829]   Chief Complaint   Patient presents with    Dysuria     BURNING WITH Shiloh Combe. WAS TREATED W/ABX 3WEEKS AGO     Patient Active Problem List    Diagnosis Date Noted    Complicated UTI (urinary tract infection) 2022    Acute kidney injury ([address]     Pneumonia due to COVID-19 virus 09/10/2021    Acute bronchitis 2020    Pancytopenia ([address]     Peripheral polyneuropathy     Weight loss     TIA (transient ischemic attack) 2019    Bilateral carotid artery stenosis 09/15/2017    Hypothyroidism     Hypertension     Hyperlipidemia     Degenerative spondylolisthesis 2015    Lumbar stenosis with neurogenic claudication 2015        Past Medical History:   Diagnosis Date    Bilateral carotid artery disease ([address]     CAD (coronary artery disease)     Hyperlipidemia     Hypertension     Hypothyroidism     Osteoarthritis      Past Surgical History:   Procedure Laterality Date    [facility]    SPINE SURGERY  2014    LUMBAR    THYROIDECTOMY, PARTIAL         Chart Reviewed: Yes  Family / Caregiver Present: No  General Comment  Comments: pt self limiting. Restrictions:       SUBJECTIVE:   Subjective: \"I am having burning between my legs or else I could do more. \"    Pain  Pain: pt reports knee pain and burning from UTI, does not numerically rate when asked.       OBJECTIVE:        Bed mobility  Supine to Sit: Moderate assistance  Sit to Supine: Minimal assistance  Bed Mobility Comments: pt with increased pain performing supine to sit reporting \"it burns\" in reference to UTI, improved ability to obtain and maintain sitting. Transfers  Comment: NT d/t pt refusal heavily encouraged to attempt but pt still unwilling. PT Exercises  Static Sitting Balance Exercises: sitting EOB focus on holding midline, improved ability noted. Activity Tolerance  Activity Tolerance: Patient limited by pain          ASSESSMENT   Assessment: pt self limiting, encouraged to participate further but pt not agreeable. Discharge Recommendations:  Continue to assess pending progress         Goals  Short Term Goals  Short term goal 1: The pt will perform bed mobility with min A  Short term goal 2: The pt will tolerate upright sitting posture at EOB with activity >/=5 mins    PLAN    Plan: 1 time a day 3-6 times a week  Safety Devices  Type of Devices: Bed alarm in place,Call light within reach,Left in bed     [facility] (6 CLICK) BASIC MOBILITY  AM-PAC Inpatient Mobility Raw Score : 9      Therapy Time   Individual   Time In 0907   Time Out 0922   Minutes 15      BM: 10  Therex: [address] [facility], 07/04/22 at 9:31 AM         Definitions for assistance levels  Independent = pt does not require any physical supervision or assistance from another person for activity completion. Device may be needed.   Stand by assistance = pt requires verbal cues or instructions from another person, close to but not touching, to perform the activity  Minimal assistance= pt performs 75% or more of the activity; assistance is required to complete the activity  Moderate assistance= pt performs 50% of the activity; assistance is required to complete the activity  Maximal assistance = pt performs 25% of the activity; assistance is required to complete the activity  Dependent = pt requires total physical assistance to accomplish the task

## 2022-07-04 NOTE — CARE COORDINATION
MET WITH PATIENT, FREEDOM OF CHOICE OFFERED. STATES PLAN IS TO RETURN HOME WITH DAUGHTER, OFFERED SNF, HHC. PATIENT ACCEPTING OF [facility] , WILL NEED ORDER.

## 2022-07-04 NOTE — PROGRESS NOTES
Nephrology Progress Note  Cultures negative  Assessment:  RICARDO resolving  Right atrophy kidney no hydro  Anemia Hgb down 7.6 gm  Tachycardia   Metabolic acidosis etiology-infectious lactic/ elevated chloride  Acidosis noted   Hypertension  Hyperipdemia      Plan:  Maintain hydration  check abg again   CRP today lactic acid  Continue IV   Check Echo urine culture still pending    Patient Active Problem List:     Degenerative spondylolisthesis     Lumbar stenosis with neurogenic claudication     Hypothyroidism     Hypertension     Hyperlipidemia     Bilateral carotid artery stenosis     TIA (transient ischemic attack)     Pancytopenia (HCC)     Peripheral polyneuropathy     Weight loss     Acute bronchitis     Pneumonia due to COVID-19 virus     Acute kidney injury ([address]     Complicated UTI (urinary tract infection)      Subjective:  Admit Date: 7/2/2022    Interval History:no issues doing better    Medications:  Scheduled Meds:   megestrol acetate  400 mg Oral Daily    heparin (porcine)  5,000 Units SubCUTAneous BID    sodium chloride flush  5-40 mL IntraVENous 2 times per day    cefTRIAXone (ROCEPHIN) IV  1,000 mg IntraVENous Q24H    isosorbide mononitrate  30 mg Oral Daily    levothyroxine  100 mcg Oral Daily     Continuous Infusions:   dextrose      sodium chloride      sodium chloride 75 mL/hr at 07/03/22 2006       CBC:   Recent Labs     07/03/22  0542 07/04/22  0513   WBC 13.9* 9.7   HGB 8.0* 8.0*    215     CMP:    Recent Labs     07/02/22  1746 07/03/22  0542 07/04/22  0513    142 140   K 5.1* 4.5 4.2   * 116* 112*   CO2 6* 9* 12*   BUN 56* 46* 29*   CREATININE 1.54* 1.27* 0.95*   GLUCOSE 66* 90 84   CALCIUM 8.9 8.1* 8.3*   LABGLOM 32.8* 41.0* 57.3*     Troponin:   Recent Labs     07/03/22  1052   TROPONINI 0.011*     BNP: No results for input(s): BNP in the last 72 hours. INR: No results for input(s): INR in the last 72 hours.   Lipids: No results for input(s): CHOL, LDLDIRECT, TRIG, HDL, AMYLASE, LIPASE in the last 72 hours. Liver:   Recent Labs     07/02/22  1057   AST 16   ALT 8   ALKPHOS 128   PROT 6.8   LABALBU 2.9*   BILITOT <0.2     Iron:  No results for input(s): IRONS, FERRITIN in the last 72 hours. Invalid input(s): LABIRONS  Urinalysis: No results for input(s): UA in the last 72 hours.     Objective:  Vitals: BP (!) 107/44   Pulse (!) 106   Temp 98.1 °F (36.7 °C) (Oral)   Resp 18   Ht 5' 2\" (1.575 m)   Wt 95 lb 11.2 oz (43.4 kg)   LMP  (LMP Unknown)   SpO2 100%   BMI 17.50 kg/m²    Wt Readings from Last 3 Encounters:   07/04/22 95 lb 11.2 oz (43.4 kg)   09/10/21 125 lb (56.7 kg)   01/04/20 115 lb (52.2 kg)      24HR INTAKE/OUTPUT:      Intake/Output Summary (Last 24 hours) at 7/4/2022 0817  Last data filed at 7/4/2022 0542  Gross per 24 hour   Intake 2642.03 ml   Output --   Net 2642.03 ml       General: alert, in no apparent distress  HEENT: normocephalic, atraumatic, anicteric  Neck: supple, no mass  Lungs: non-labored respirations, clear to auscultation bilaterally  Heart: regular rate and rhythm, no murmurs or rubs  Abdomen: soft, non-tender, non-distended  Ext: no cyanosis, no peripheral edema  Neuro: alert and oriented, no gross abnormalities  Psych: normal mood and affect  Skin: no rash      Electronically signed by Scott Macias DO, MD

## 2022-07-04 NOTE — PROGRESS NOTES
Assessment documented. Vital signs stable. Meds taken well. Denies any discomfort or pain. Call light within reach. 0717 - Monitor room reports 7 beats of SVT. Dr. Pilar Chavez notified.     BP (!) 107/44   Pulse (!) 106   Temp 98.1 °F (36.7 °C) (Oral)   Resp 18   Ht 5' 2\" (1.575 m)   Wt 94 lb 5.7 oz (42.8 kg)   LMP  (LMP Unknown)   SpO2 100%   BMI 17.26 kg/m²

## 2022-07-05 LAB
ALBUMIN SERPL-MCNC: 2.4 G/DL (ref 3.5–4.6)
ANION GAP SERPL CALCULATED.3IONS-SCNC: 16 MEQ/L (ref 9–15)
BUN BLDV-MCNC: 15 MG/DL (ref 8–23)
C-REACTIVE PROTEIN, HIGH SENSITIVITY: 149.9 MG/L (ref 0–5)
CALCIUM SERPL-MCNC: 7.9 MG/DL (ref 8.5–9.9)
CHLORIDE BLD-SCNC: 112 MEQ/L (ref 95–107)
CO2: 11 MEQ/L (ref 20–31)
CREAT SERPL-MCNC: 0.91 MG/DL (ref 0.5–0.9)
EKG ATRIAL RATE: 102 BPM
EKG P AXIS: 84 DEGREES
EKG P-R INTERVAL: 134 MS
EKG Q-T INTERVAL: 332 MS
EKG QRS DURATION: 64 MS
EKG QTC CALCULATION (BAZETT): 432 MS
EKG R AXIS: 44 DEGREES
EKG T AXIS: 220 DEGREES
EKG VENTRICULAR RATE: 102 BPM
GFR AFRICAN AMERICAN: >60
GFR NON-AFRICAN AMERICAN: >60
GLUCOSE BLD-MCNC: 102 MG/DL (ref 70–99)
GLUCOSE BLD-MCNC: 90 MG/DL (ref 70–99)
GLUCOSE BLD-MCNC: 91 MG/DL (ref 70–99)
GLUCOSE BLD-MCNC: 98 MG/DL (ref 70–99)
HCT VFR BLD CALC: 29.4 % (ref 37–47)
HEMOGLOBIN: 9.1 G/DL (ref 12–16)
LACTIC ACID: 1.8 MMOL/L (ref 0.5–2.2)
MAGNESIUM: 2 MG/DL (ref 1.7–2.4)
MCH RBC QN AUTO: 28.6 PG (ref 27–31.3)
MCHC RBC AUTO-ENTMCNC: 31 % (ref 33–37)
MCV RBC AUTO: 92 FL (ref 82–100)
ORGANISM: ABNORMAL
PDW BLD-RTO: 20.9 % (ref 11.5–14.5)
PERFORMED ON: ABNORMAL
PERFORMED ON: NORMAL
PERFORMED ON: NORMAL
PHOSPHORUS: 1.5 MG/DL (ref 2.3–4.8)
PLATELET # BLD: 225 K/UL (ref 130–400)
POTASSIUM SERPL-SCNC: 4 MEQ/L (ref 3.4–4.9)
PROCALCITONIN: 0.75 NG/ML (ref 0–0.15)
RBC # BLD: 3.2 M/UL (ref 4.2–5.4)
SODIUM BLD-SCNC: 139 MEQ/L (ref 135–144)
TSH SERPL DL<=0.05 MIU/L-ACNC: 2 UIU/ML (ref 0.44–3.86)
URINE CULTURE, ROUTINE: ABNORMAL
WBC # BLD: 12.5 K/UL (ref 4.8–10.8)

## 2022-07-05 PROCEDURE — 83605 ASSAY OF LACTIC ACID: CPT

## 2022-07-05 PROCEDURE — 97535 SELF CARE MNGMENT TRAINING: CPT

## 2022-07-05 PROCEDURE — 36415 COLL VENOUS BLD VENIPUNCTURE: CPT

## 2022-07-05 PROCEDURE — 2500000003 HC RX 250 WO HCPCS: Performed by: INTERNAL MEDICINE

## 2022-07-05 PROCEDURE — 6360000002 HC RX W HCPCS: Performed by: INTERNAL MEDICINE

## 2022-07-05 PROCEDURE — 84145 PROCALCITONIN (PCT): CPT

## 2022-07-05 PROCEDURE — 85027 COMPLETE CBC AUTOMATED: CPT

## 2022-07-05 PROCEDURE — 84443 ASSAY THYROID STIM HORMONE: CPT

## 2022-07-05 PROCEDURE — 6370000000 HC RX 637 (ALT 250 FOR IP): Performed by: INTERNAL MEDICINE

## 2022-07-05 PROCEDURE — 97166 OT EVAL MOD COMPLEX 45 MIN: CPT

## 2022-07-05 PROCEDURE — 86141 C-REACTIVE PROTEIN HS: CPT

## 2022-07-05 PROCEDURE — 80069 RENAL FUNCTION PANEL: CPT

## 2022-07-05 PROCEDURE — 83735 ASSAY OF MAGNESIUM: CPT

## 2022-07-05 PROCEDURE — P9047 ALBUMIN (HUMAN), 25%, 50ML: HCPCS | Performed by: INTERNAL MEDICINE

## 2022-07-05 PROCEDURE — 2580000003 HC RX 258: Performed by: INTERNAL MEDICINE

## 2022-07-05 PROCEDURE — 1210000000 HC MED SURG R&B

## 2022-07-05 RX ORDER — ALBUMIN (HUMAN) 12.5 G/50ML
25 SOLUTION INTRAVENOUS ONCE
Status: COMPLETED | OUTPATIENT
Start: 2022-07-05 | End: 2022-07-05

## 2022-07-05 RX ORDER — METOPROLOL SUCCINATE 50 MG/1
50 TABLET, EXTENDED RELEASE ORAL DAILY
Status: DISCONTINUED | OUTPATIENT
Start: 2022-07-05 | End: 2022-07-07

## 2022-07-05 RX ADMIN — ACETAMINOPHEN 650 MG: 325 TABLET ORAL at 21:05

## 2022-07-05 RX ADMIN — ACETAMINOPHEN 650 MG: 325 TABLET ORAL at 12:24

## 2022-07-05 RX ADMIN — METOPROLOL SUCCINATE 50 MG: 50 TABLET, EXTENDED RELEASE ORAL at 08:47

## 2022-07-05 RX ADMIN — SODIUM CHLORIDE: 9 INJECTION, SOLUTION INTRAVENOUS at 12:19

## 2022-07-05 RX ADMIN — HEPARIN SODIUM 5000 UNITS: 5000 INJECTION INTRAVENOUS; SUBCUTANEOUS at 20:57

## 2022-07-05 RX ADMIN — ISOSORBIDE MONONITRATE 30 MG: 30 TABLET, EXTENDED RELEASE ORAL at 08:11

## 2022-07-05 RX ADMIN — Medication 10 ML: at 20:58

## 2022-07-05 RX ADMIN — HEPARIN SODIUM 5000 UNITS: 5000 INJECTION INTRAVENOUS; SUBCUTANEOUS at 08:11

## 2022-07-05 RX ADMIN — CEFTRIAXONE SODIUM 1000 MG: 1 INJECTION, POWDER, FOR SOLUTION INTRAMUSCULAR; INTRAVENOUS at 12:16

## 2022-07-05 RX ADMIN — Medication 10 ML: at 08:12

## 2022-07-05 RX ADMIN — ALBUMIN (HUMAN) 25 G: 0.25 INJECTION, SOLUTION INTRAVENOUS at 08:58

## 2022-07-05 RX ADMIN — SODIUM BICARBONATE: 84 INJECTION, SOLUTION INTRAVENOUS at 16:05

## 2022-07-05 RX ADMIN — LEVOTHYROXINE SODIUM 100 MCG: 0.1 TABLET ORAL at 08:11

## 2022-07-05 RX ADMIN — MEGESTROL ACETATE 400 MG: 40 SUSPENSION ORAL at 08:11

## 2022-07-05 ASSESSMENT — PAIN SCALES - GENERAL
PAINLEVEL_OUTOF10: 0
PAINLEVEL_OUTOF10: 5
PAINLEVEL_OUTOF10: 3

## 2022-07-05 ASSESSMENT — PAIN DESCRIPTION - LOCATION: LOCATION: BACK;LEG

## 2022-07-05 ASSESSMENT — PAIN - FUNCTIONAL ASSESSMENT: PAIN_FUNCTIONAL_ASSESSMENT: PREVENTS OR INTERFERES SOME ACTIVE ACTIVITIES AND ADLS

## 2022-07-05 ASSESSMENT — PAIN DESCRIPTION - ORIENTATION: ORIENTATION: RIGHT

## 2022-07-05 ASSESSMENT — PAIN DESCRIPTION - DESCRIPTORS: DESCRIPTORS: ACHING

## 2022-07-05 NOTE — PROGRESS NOTES
[facility] OCCUPATIONAL THERAPY EVALUATION - ACUTE     NAME: Shreyas Leyva  : 1945 (78 y.o.)  MRN: [id number]  CODE STATUS: Full Code  Room: J520/D407-70    Date of Service: 2022    Patient Diagnosis(es): Anuria [R95]  Metabolic acidosis [Q22.0]  RICARDO (acute kidney injury) ([facility] [H54.3]  Complicated UTI (urinary tract infection) [N39.0]  Leukocytosis, unspecified type [D72.829]   Patient Active Problem List    Diagnosis Date Noted    Complicated UTI (urinary tract infection) 2022    Acute kidney injury ([facility]     Pneumonia due to COVID-19 virus 09/10/2021    Acute bronchitis 2020    Pancytopenia ([facility]     Peripheral polyneuropathy     Weight loss     TIA (transient ischemic attack) 2019    Bilateral carotid artery stenosis 09/15/2017    Hypothyroidism     Hypertension     Hyperlipidemia     Degenerative spondylolisthesis 2015    Lumbar stenosis with neurogenic claudication 2015        Past Medical History:   Diagnosis Date    Bilateral carotid artery disease ([facility]     CAD (coronary artery disease)     Hyperlipidemia     Hypertension     Hypothyroidism     Osteoarthritis      Past Surgical History:   Procedure Laterality Date    CARDIAC CATHETERIZATION      SPINE SURGERY  2014    LUMBAR    THYROIDECTOMY, PARTIAL          Restrictions:Fall, IV                 Safety Devices: Safety Devices  Type of Devices: All fall risk precautions in place;Call light within reach; Left in bed     Patient's date of birth confirmed: Yes    General:       Subjective:Pt seen with daughter present. Pt pleasant and cooperative. \"It burns when I move. \"          Pain at start of treatment: No    Pain at end of treatment: No    Location:   Nursing notified: No  RN:   Intervention: Repositioned    Prior Level of Function:  Social/Functional History  Lives With: Daughter  Type of Home: House  Home Layout: Two level,Bed/Bath upstairs (14 R HR ascending)  Home Access: Stairs to enter with rails  Entrance Stairs - Number of Steps: 2-3  Entrance Stairs - Rails: Both  Bathroom Shower/Tub: Walk-in shower  Bathroom Equipment: Shower chair  Home Equipment: Cane,Walker, rolling  Has the patient had two or more falls in the past year or any fall with injury in the past year?: No  ADL Assistance: [address] [place] [address] Avenue: Independent  Homemaking Responsibilities: Yes (Does not do as much as she used to, daughter primarily does cleaning)  Ambulation Assistance: Independent  Transfer Assistance: Independent  Active : Yes  Additional Comments: Pt's daughter reports that patient uses cane at all times and that patient has exhibited decline in function for the last two months. Pt in agreement    OBJECTIVE:     Orientation Status:  Orientation  Overall Orientation Status: Within Normal Limits    Observation:  Observation/Palpation  Posture: Fair  Observation: flexed posture  Edema: left UE    Cognition Status:  Cognition  Cognition Comment: follows one step commands with verbal cues to encourage participation    Perception Status:  Perception  Overall Perceptual Status: WFL    Vision and Hearing Status:  Vision  Vision Exceptions: Wears glasses for reading  Hearing  Hearing: Within functional limits   Vision - Basic Assessment  Prior Vision: Wears glasses only for reading  Visual History: No significant visual history  Patient Visual Report: No visual complaint reported. Visual Field Cut: No  Oculo Motor Control: WNL    GROSS ASSESSMENT AROM/PROM:  AROM: Within functional limits       ROM:   LUE AROM (degrees)  LUE AROM : WFL  Left Hand AROM (degrees)  Left Hand AROM: WFL  RUE AROM (degrees)  RUE AROM : WFL  Right Hand AROM (degrees)  Right Hand AROM: WFL    UE STRENGTH:  Strength:  Within functional limits (4-/5)    UE COORDINATION:  Coordination: Within functional limits    UE TONE:  Tone: Normal    UE SENSATION:  Sensation: Intact    Hand Dominance:  Hand Dominance  Hand Dominance: Right    ADL Status:  ADL  Feeding: Unable to assess(comment)  Grooming: Setup; Increased time to complete  UE Bathing: Stand by assistance; Increased time to complete  LE Bathing: Minimal assistance; Increased time to complete  UE Dressing: Setup; Increased time to complete  LE Dressing: Minimal assistance; Increased time to complete  Toileting: Unable to assess(comment)    Functional Mobility:  Patient ambulated NT due to Pt only able to stand at this time.      Bed Mobility  Bed mobility  Supine to Sit: Moderate assistance    Seated and Standing Balance:  Balance  Sitting:  (SUpervision)  Standing:  (Min A)    Functional Endurance:  Activity Tolerance  Activity Tolerance: Patient limited by fatigue    D/C Recommendations:  OT D/C RECOMMENDATIONS  REQUIRES OT FOLLOW-UP: Yes    Equipment Recommendations:       OT Education:        OT Follow Up:    OT D/C RECOMMENDATIONS  REQUIRES OT FOLLOW-UP: Yes       Assessment/Discharge Disposition:     Performance deficits / Impairments: Decreased functional mobility ,Decreased balance,Decreased ADL status,Decreased posture,Decreased strength,Decreased endurance,Decreased high-level IADLs  Prognosis: Fair  Discharge Recommendations: Continue to assess pending progress  Decision Making: Medium Complexity  History: 4 complexities  Exam: 7 deficits  Assistance / Modification: Min A    AMPA (Six Click) Self care Score    How much help for putting on and taking off regular lower body clothing?: A Little  How much help for Bathing?: A Little  How much help for Toileting?: None  How much help for putting on and taking off regular upper body clothing?: None  How much help for taking care of personal grooming?: None  How much help for eating meals?: None  AM-PAC Inpatient Daily Activity Raw Score: 22  AM-PAC Inpatient ADL T-Scale Score : 47.1  ADL Inpatient CMS 0-100% Score: 25.8    Therapy key for assistance levels -   Independent/Mod I = Pt. is able to perform

## 2022-07-05 NOTE — FLOWSHEET NOTE
Patient iv pump kept on beeping and per patient the sound s giving her a headache,so a new iv site was located into her left radial vein,she tolerated the iv insertion well. 22:50 patient refused to have her brief changed. 01:10 patient said that she is wet but she refused to be change. 5;20 PATIENT PANTS IS SOAKING WET FROM HER URINE,SO SHE FINALLY AGREED TO BE CHANGE.

## 2022-07-05 NOTE — PROGRESS NOTES
Physical Therapy Med Surg Daily Treatment Note  [facility]  [facility]       NAME: Skye Cantu  : 1945 (78 y.o.)  MRN: [id number]  CODE STATUS: Full Code    Date of Service: 2022    Patient Diagnosis(es): Anuria [X09]  Metabolic acidosis [N00.8]  RICARDO (acute kidney injury) ([facility] [B79.1]  Complicated UTI (urinary tract infection) [N39.0]  Leukocytosis, unspecified type [D72.829]   Chief Complaint   Patient presents with    Dysuria     BURNING WITH Jeni Ivans. WAS TREATED W/ABX 3WEEKS AGO     Patient Active Problem List    Diagnosis Date Noted    Complicated UTI (urinary tract infection) 2022    Acute kidney injury ([facility]     Pneumonia due to COVID-19 virus 09/10/2021    Acute bronchitis 2020    Pancytopenia ([facility]     Peripheral polyneuropathy     Weight loss     TIA (transient ischemic attack) 2019    Bilateral carotid artery stenosis 09/15/2017    Hypothyroidism     Hypertension     Hyperlipidemia     Degenerative spondylolisthesis 2015    Lumbar stenosis with neurogenic claudication 2015        Past Medical History:   Diagnosis Date    Bilateral carotid artery disease ([facility]     CAD (coronary artery disease)     Hyperlipidemia     Hypertension     Hypothyroidism     Osteoarthritis      Past Surgical History:   Procedure Laterality Date    [facility]    SPINE SURGERY  2014    LUMBAR    THYROIDECTOMY, PARTIAL         Chart Reviewed: Yes  Family / Caregiver Present: No  General Comment  Comments: pt self limiting. disoriented to situation    Restrictions:       SUBJECTIVE:   Subjective: \"I have a UTI and I'm going home today. \"    Pain  Pain: pt reports knee pain and burning from UTI, does not numerically rate when asked.       OBJECTIVE:        Bed mobility  Supine to Sit:  (DNT pt sitting EOB with OT at start of tx.)  Sit to Supine: Maximum assistance (pt with poor direction following, Max A needed prevent pt from hitting head on bedside table d/t pt laying down in wrong direction.)  Bed Mobility Comments: pt resistive to movement, stating pain with movement. Transfers  Sit to Stand: Minimal Assistance  Stand to sit: Minimal Assistance  Comment: HHA on R side, pt steady, pt declines to attempt second time or attempt to ambulate. despite encouragement. Ambulation  Comments: pt refusal.            Activity Tolerance  Activity Tolerance: Patient limited by pain;Treatment limited secondary to decreased cognition          ASSESSMENT   Assessment: pt self limiting, encouraged to participate further but pt not agreeable. pt demos good strength for STS but poor direction following during bed mobility. Discharge Recommendations:  Continue to assess pending progress         Goals  Short Term Goals  Short term goal 1: The pt will perform bed mobility with min A  Short term goal 2: The pt will tolerate upright sitting posture at EOB with activity >/=5 mins    PLAN    Plan: 1 time a day 3-6 times a week  Safety Devices  Type of Devices: All fall risk precautions in place,Call light within reach,Left in bed     [facility] (6 CLICK) [address] Inpatient Mobility Raw Score : 11      Therapy Time   Individual   Time In 1100   Time Out 1108   Minutes 8      BM/trsf: SIMA Toscano, 07/05/22 at 1:08 PM         Definitions for assistance levels  Independent = pt does not require any physical supervision or assistance from another person for activity completion. Device may be needed.   Stand by assistance = pt requires verbal cues or instructions from another person, close to but not touching, to perform the activity  Minimal assistance= pt performs 75% or more of the activity; assistance is required to complete the activity  Moderate assistance= pt performs 50% of the activity; assistance is required to complete the activity  Maximal assistance = pt performs 25% of the

## 2022-07-05 NOTE — PROGRESS NOTES
Physician Progress Note      Brisa Pizarro  CSN #:                  [id number]  :                       1945  ADMIT DATE:       2022 10:41 AM  DISCH DATE:  RESPONDING  PROVIDER #:        Bridgette Ballesteros MD          QUERY TEXT:    Per dietician, patient meets criteria for severe malnutrition evidenced by   severe muscle/fat wasting, poor PO intake PTA and weight loss. If possible,   please document in progress notes and discharge summary if you are evaluating   and /or treating any of the following: The medical record reflects the following:  Risk Factors: BMI 17.50  age 78  HTN, TIA, carotid artery stenosis, right   kidney atrophy, COVID pneumonia, COPD, tobacco use  Clinical Indicators: Malnutrition Status:  Severe malnutrition Context:    Chronic Illness  Findings of the 6 clinical characteristics of malnutrition:  Energy Intake:  75% or less estimated energy requirements for 1 month or   longer  Weight Loss:  Unable to assess (hx of stated weights)  Body Fat Loss:  Severe body fat loss Buccal region  Muscle Mass Loss:  Severe muscle mass loss Temples (temporalis)  Treatment: malnutrition assessment  liberalize diet to increase variety at   meals and increase intakes and provide nutrition supplement TID    ASPEN Criteria:    https://aspenjournals. onlinelibrary. la. com/doi/full/10.1177/459051070893250  5    Damián Serrato RN CCDS  [phone]  Options provided:  -- Protein calorie malnutrition severe  -- Other - I will add my own diagnosis  -- Disagree - Not applicable / Not valid  -- Disagree - Clinically unable to determine / Unknown  -- Refer to Clinical Documentation Reviewer    PROVIDER RESPONSE TEXT:    This patient has severe protein calorie malnutrition.     Query created by: Elicia Delcid on 2022 2:10 PM      Electronically signed by:  Bridgette Ballesteros MD 2022 3:38 PM

## 2022-07-05 NOTE — CARE COORDINATION
LSW meet with pt and pt's daughter, Zoltan Garcia at bedside. LSW explain to pt and family that PT and OT recommended SNF for rehab. Pt is very against SNF. Daughter Zoltan Garcia, was not happy with [facility] [phone] and Home PT and OT last time. Pt would like [facility] [phone] with PT and OT. Per Iline Phalen will talk about it and will let you know later\". SNF list was given and let at bedside.    LSW will follow,     Electronically signed by IVORY Guerrero on 7/5/2022 at 1:31 PM

## 2022-07-05 NOTE — PROGRESS NOTES
Hospitalist Progress Note      PCP: Giuliana Rosa MD    Date of Admission: 7/2/2022    Chief Complaint:  No acute events, afebrile, is sinus tachy on telemetry    Medications:  Reviewed    Infusion Medications    dextrose      sodium chloride      sodium chloride 50 mL/hr at 07/05/22 1314     Scheduled Medications    metoprolol succinate  50 mg Oral Daily    megestrol acetate  400 mg Oral Daily    heparin (porcine)  5,000 Units SubCUTAneous BID    sodium chloride flush  5-40 mL IntraVENous 2 times per day    cefTRIAXone (ROCEPHIN) IV  1,000 mg IntraVENous Q24H    isosorbide mononitrate  30 mg Oral Daily    levothyroxine  100 mcg Oral Daily     PRN Meds: glucose, dextrose bolus **OR** dextrose bolus, glucagon (rDNA), dextrose, sodium chloride flush, sodium chloride, ondansetron **OR** ondansetron, acetaminophen **OR** acetaminophen, polyethylene glycol, albuterol sulfate HFA      Intake/Output Summary (Last 24 hours) at 7/5/2022 1339  Last data filed at 7/5/2022 0651  Gross per 24 hour   Intake 1126.5 ml   Output --   Net 1126.5 ml       Exam:    /67   Pulse (!) 122   Temp 98.4 °F (36.9 °C) (Oral)   Resp 18   Ht 5' 2\" (1.575 m)   Wt 95 lb 11.2 oz (43.4 kg)   LMP  (LMP Unknown)   SpO2 100%   BMI 17.50 kg/m²     General appearance: appears stated age and cooperative. Respiratory:  clear to auscultation bilaterally . Cardiovascular: Regular rate and rhythm, S1/S2 . Abdomen: Soft, active bowel sounds. Musculoskeletal: No edema bilaterally. Labs:   Recent Labs     07/03/22  0542 07/03/22  0542 07/04/22  0513 07/04/22  0834 07/05/22  0609   WBC 13.9*  --  9.7  --  12.5*   HGB 8.0*   < > 8.0* 7.6* 9.1*   HCT 25.6*  --  24.7*  --  29.4*     --  215  --  225    < > = values in this interval not displayed.      Recent Labs     07/03/22  0542 07/03/22  0542 07/04/22  0513 07/04/22  0834 07/05/22  0609     --  140  --  139   K 4.5  --  4.2  --  4.0   *  --  112*  --  112* CO2 9*  --  12*  --  11*   BUN 46*  --  29*  --  15   CREATININE 1.27*   < > 0.95* 1.1 0.91*   CALCIUM 8.1*  --  8.3*  --  7.9*   PHOS  --   --   --   --  1.5*    < > = values in this interval not displayed. No results for input(s): AST, ALT, BILIDIR, BILITOT, ALKPHOS in the last 72 hours. No results for input(s): INR in the last 72 hours. Recent Labs     07/03/22  1052   TROPONINI 0.011*       Urinalysis:      Lab Results   Component Value Date/Time    NITRU Negative 09/10/2021 11:30 AM    WBCUA 0-2 09/10/2021 11:30 AM    BACTERIA Negative 09/10/2021 11:30 AM    RBCUA 0-2 09/10/2021 11:30 AM    BLOODU MODERATE 09/10/2021 11:30 AM    SPECGRAV 1.020 09/10/2021 11:30 AM    GLUCOSEU Negative 09/10/2021 11:30 AM       Radiology:  US RETROPERITONEAL COMPLETE   Final Result   Addendum 1 of 1   Addendum:      Reason for addendum is typographical error in the impression of the    report. Here is a new report:      EXAMINATION:   RENAL ULTRASOUND       HISTORY:  Acute renal failure. TECHNIQUE:  Sonography of the kidneys was performed. Images were obtained    and stored in a permanent archive. COMPARISON: CT 9/10/2021. RESULT:      Right Kidney:         -Renal length: 6.0 cm        -Parenchyma: Parenchyma echogenicity is increased. Diffuse    parenchymal thinning present measuring around 5 mm.         -Collecting system: No hydronephrosis. -Calculus: Echogenic lower pole calculus, similar to the prior CT.         -Lesion:  1.2 cm cyst, unchanged. Left Kidney:             -Renal length: 10.0 cm        -Parenchyma: Normal parenchymal echogenicity. Normal parenchymal    thickness measuring around 12 mm.         -Collecting system: No hydronephrosis. -Calculus: No echogenic, shadowing calculus.         -Lesion:  None. Bladder: Decompressed. Final      Right nephrolithiasis. No hydronephrosis. Severe right RENAL ATROPHY, unchanged. XR CHEST PORTABLE   Final Result      No acute radiographic abnormality. Assessment/Plan:    79 y/o female with history of HTN, TIA, carotid artery stenosis, right kidney atrophy, COVID pneumonia, peripheral neuropathy, COPD, tobacco use who presented with     UTI  - urine culture is growing E. Coli and E.Cloacae  - blood cultures no growth  - leukocytosis improved  - on IV Rocephin    RICARDO with severe AG metabolic acidosis  - kidney u/s showed right kidney atrophy  - management per nephrology    Anemia   - follow H/H    HTN  - monitor BP closely    Diet: ADULT DIET; Regular;  No Added Salt (3-4 gm)  ADULT ORAL NUTRITION SUPPLEMENT; Dinner, Breakfast; Standard High Calorie/High Protein Oral Supplement  ADULT ORAL NUTRITION SUPPLEMENT; Lunch; Clear Liquid Oral Supplement    Code Status: Full Code      Disposition - SNF vs home with Kala Blanc          Electronically signed by Renaldo Hartman MD on 7/5/2022 at 1:39 PM

## 2022-07-05 NOTE — PROGRESS NOTES
Assessment complete, vss, pt alert, stating\" im waiting for my ride\", Spoke with daughter, stated pt called her 3 times asking to be picked up to go home. Explained to pt why/where she was, redirected easily. Am medications given per orders, pt denies further needs at this time, call light within reach. Electronically signed by Trang Sorto RN on 7/5/2022 at 10:34 AM   1220 Pt incontinent, washed/dried/ zinc paste applied to helga area, clothing changed, repositioned for comfort. call light within reach.  Electronically signed by Trang Sorto RN on 7/5/2022 at 1:48 PM

## 2022-07-05 NOTE — PROGRESS NOTES
Comprehensive Nutrition Assessment    Type and Reason for Visit:  Initial,RD Nutrition Re-Screen/LOS (Low BMI)    Nutrition Recommendations/Plan:   1. RD to liberalize diet to ROBYN to increase variety at meals   2. Provide high calorie supplement B+D, Clear supplement @ L       Malnutrition Assessment:  Malnutrition Status:  Severe malnutrition (07/05/22 1327)    Context:  Chronic Illness     Findings of the 6 clinical characteristics of malnutrition:  Energy Intake:  75% or less estimated energy requirements for 1 month or longer  Weight Loss:  Unable to assess (hx of stated weights)     Body Fat Loss:  Severe body fat loss Buccal region   Muscle Mass Loss:  Severe muscle mass loss Temples (temporalis)  Fluid Accumulation:  Unable to assess     Strength:  Not Performed    Nutrition Assessment:    Pt meets criteria for severe malnutrition evidenced by severe muscle/fat wasting, poor PO intake PTA and weight loss. UTD severity of weight loss d/t limited bed scale weights in EMR. RD to liberalize diet to increase variety at meals and increase intakes and provide nutrition supplement TID. Nutrition Related Findings:    A&Ox4 with some memory issues. Pt presents with ongoing dysuria. Reported poor appetite/PO intake pta. Only drinks diet coke per daughter. Pmhx: Bilateral carotid artery disease, CAD, HLD, HTN, hypothyroidism, OA. Labs (7/5): Phos=1.5. Meds include megace and Synthroid. NS @75ml/hr. no edema. Ate 51-75% of lunch per observation, 0% of breakfast Wound Type: None       Current Nutrition Intake & Therapies:    Average Meal Intake: 0%,26-50%  Average Supplements Intake: 0%,1-25%  ADULT DIET;  Regular; Low Fat/ Low Chol/High Fiber/2 gm Na    Anthropometric Measures:  Height: 5' 2\" (157.5 cm)  Ideal Body Weight (IBW): 110 lbs (50 kg)    Admission Body Weight: 110 lb (49.9 kg) (stated 7/2)  Current Body Weight: 95 lb 11.2 oz (43.4 kg) (7/3 bed), Weight Source: Bed Scale  Current BMI (kg/m2): 17.5  Usual Body Weight: 125 lb (56.7 kg) (stated 9/10/21; 105 lb bed 2019)  % Weight Change (Calculated): -23.4                    BMI Categories: Underweight (BMI less than 22) age over 72    Estimated Daily Nutrient Needs:  Energy Requirements Based On: Kcal/kg  Weight Used for Energy Requirements: Current  Energy (kcal/day): 1300-1430kcal (30-33kcal/kg)  Weight Used for Protein Requirements: Current  Protein (g/day): 65g (1.5g/kg)  Method Used for Fluid Requirements: 1 ml/kcal  Fluid (ml/day): ~1430ml    Nutrition Diagnosis:   · Severe malnutrition,In context of social or environmental circumstances related to inadequate protein-energy intake as evidenced by poor intake prior to admission,severe muscle loss,severe loss of subcutaneous fat,weight loss    Nutrition Interventions:   Food and/or Nutrient Delivery: Modify Current Diet  Nutrition Education/Counseling: No recommendation at this time  Coordination of Nutrition Care: Continue to monitor while inpatient       Goals:     Goals: PO intake 50% or greater,other (specify)  Specify Other Goals: weight gain    Nutrition Monitoring and Evaluation:   Behavioral-Environmental Outcomes: None Identified  Food/Nutrient Intake Outcomes: Food and Nutrient Intake,Supplement Intake  Physical Signs/Symptoms Outcomes: Biochemical Data,Weight,Skin,Meal Time Behavior,Nutrition Focused Physical Findings    Discharge Planning:     Too soon to determine     Mary Beth Castle, MS, RD, LD

## 2022-07-05 NOTE — PLAN OF CARE
Nutrition Problem #1: Severe malnutrition,In context of social or environmental circumstances  Intervention: Food and/or Nutrient Delivery: Modify Current Diet  Nutritional Goals: PO intake >50%.  Weight gain

## 2022-07-06 LAB
ALBUMIN SERPL-MCNC: 2.8 G/DL (ref 3.5–4.6)
ANION GAP SERPL CALCULATED.3IONS-SCNC: 15 MEQ/L (ref 9–15)
BETA-HYDROXYBUTYRATE: 6.8 MG/DL (ref 0.2–2.8)
BUN BLDV-MCNC: 11 MG/DL (ref 8–23)
C-REACTIVE PROTEIN, HIGH SENSITIVITY: 154.9 MG/L (ref 0–5)
CALCIUM SERPL-MCNC: 8.3 MG/DL (ref 8.5–9.9)
CHLORIDE BLD-SCNC: 110 MEQ/L (ref 95–107)
CO2: 18 MEQ/L (ref 20–31)
CREAT SERPL-MCNC: 0.77 MG/DL (ref 0.5–0.9)
GFR AFRICAN AMERICAN: >60
GFR NON-AFRICAN AMERICAN: >60
GLUCOSE BLD-MCNC: 101 MG/DL (ref 70–99)
GLUCOSE BLD-MCNC: 103 MG/DL (ref 70–99)
GLUCOSE BLD-MCNC: 89 MG/DL (ref 70–99)
GLUCOSE BLD-MCNC: 93 MG/DL (ref 70–99)
HCT VFR BLD CALC: 25.6 % (ref 37–47)
HEMOGLOBIN: 8.5 G/DL (ref 12–16)
MAGNESIUM: 1.8 MG/DL (ref 1.7–2.4)
MCH RBC QN AUTO: 29.2 PG (ref 27–31.3)
MCHC RBC AUTO-ENTMCNC: 33 % (ref 33–37)
MCV RBC AUTO: 88.5 FL (ref 82–100)
PDW BLD-RTO: 20.7 % (ref 11.5–14.5)
PERFORMED ON: ABNORMAL
PERFORMED ON: ABNORMAL
PERFORMED ON: NORMAL
PHOSPHORUS: 1.4 MG/DL (ref 2.3–4.8)
PLATELET # BLD: 232 K/UL (ref 130–400)
POTASSIUM SERPL-SCNC: 3.6 MEQ/L (ref 3.4–4.9)
PROCALCITONIN: 0.7 NG/ML (ref 0–0.15)
RBC # BLD: 2.89 M/UL (ref 4.2–5.4)
SODIUM BLD-SCNC: 143 MEQ/L (ref 135–144)
WBC # BLD: 9.9 K/UL (ref 4.8–10.8)

## 2022-07-06 PROCEDURE — 85027 COMPLETE CBC AUTOMATED: CPT

## 2022-07-06 PROCEDURE — 6370000000 HC RX 637 (ALT 250 FOR IP): Performed by: INTERNAL MEDICINE

## 2022-07-06 PROCEDURE — 2580000003 HC RX 258: Performed by: INTERNAL MEDICINE

## 2022-07-06 PROCEDURE — 2580000003 HC RX 258: Performed by: STUDENT IN AN ORGANIZED HEALTH CARE EDUCATION/TRAINING PROGRAM

## 2022-07-06 PROCEDURE — 2500000003 HC RX 250 WO HCPCS: Performed by: STUDENT IN AN ORGANIZED HEALTH CARE EDUCATION/TRAINING PROGRAM

## 2022-07-06 PROCEDURE — 84145 PROCALCITONIN (PCT): CPT

## 2022-07-06 PROCEDURE — 86141 C-REACTIVE PROTEIN HS: CPT

## 2022-07-06 PROCEDURE — 6360000002 HC RX W HCPCS: Performed by: INTERNAL MEDICINE

## 2022-07-06 PROCEDURE — 1210000000 HC MED SURG R&B

## 2022-07-06 PROCEDURE — 36415 COLL VENOUS BLD VENIPUNCTURE: CPT

## 2022-07-06 PROCEDURE — 83735 ASSAY OF MAGNESIUM: CPT

## 2022-07-06 PROCEDURE — 82010 KETONE BODYS QUAN: CPT

## 2022-07-06 PROCEDURE — 80069 RENAL FUNCTION PANEL: CPT

## 2022-07-06 RX ADMIN — ISOSORBIDE MONONITRATE 30 MG: 30 TABLET, EXTENDED RELEASE ORAL at 08:29

## 2022-07-06 RX ADMIN — SODIUM BICARBONATE: 84 INJECTION, SOLUTION INTRAVENOUS at 18:54

## 2022-07-06 RX ADMIN — Medication 10 ML: at 08:34

## 2022-07-06 RX ADMIN — MEGESTROL ACETATE 400 MG: 40 SUSPENSION ORAL at 08:29

## 2022-07-06 RX ADMIN — HEPARIN SODIUM 5000 UNITS: 5000 INJECTION INTRAVENOUS; SUBCUTANEOUS at 20:24

## 2022-07-06 RX ADMIN — LEVOTHYROXINE SODIUM 100 MCG: 0.1 TABLET ORAL at 08:29

## 2022-07-06 RX ADMIN — ACETAMINOPHEN 650 MG: 325 TABLET ORAL at 13:12

## 2022-07-06 RX ADMIN — CEFTRIAXONE SODIUM 1000 MG: 1 INJECTION, POWDER, FOR SOLUTION INTRAMUSCULAR; INTRAVENOUS at 12:38

## 2022-07-06 RX ADMIN — HEPARIN SODIUM 5000 UNITS: 5000 INJECTION INTRAVENOUS; SUBCUTANEOUS at 08:29

## 2022-07-06 RX ADMIN — METOPROLOL SUCCINATE 50 MG: 50 TABLET, EXTENDED RELEASE ORAL at 08:29

## 2022-07-06 ASSESSMENT — PAIN SCALES - GENERAL: PAINLEVEL_OUTOF10: 4

## 2022-07-06 NOTE — PROGRESS NOTES
Nephrology Progress Note    Assessment:  RICARDO resolved  Metabolic acidosis -Hyperchloremia  E Coli UTI  Hx TIA  Hx Hypertension  Malnutrition  Hyperlipidemia  Carotid disease  Tachycardia improved  Increase b-blocker as needed        Plan: maintain hydration  Treating UTI  Supplements with protein  Increase activity d/c HCO3 drip    Patient Active Problem List:     Degenerative spondylolisthesis     Lumbar stenosis with neurogenic claudication     Hypothyroidism     Hypertension     Hyperlipidemia     Bilateral carotid artery stenosis     TIA (transient ischemic attack)     Pancytopenia (HCC)     Peripheral polyneuropathy     Weight loss     Acute bronchitis     Pneumonia due to COVID-19 virus     Acute kidney injury ([facility] [address]     Complicated UTI (urinary tract infection)      Subjective:  Admit Date: 7/2/2022    Interval History: feeling fine  Medications:  Scheduled Meds:   metoprolol succinate  50 mg Oral Daily    megestrol acetate  400 mg Oral Daily    heparin (porcine)  5,000 Units SubCUTAneous BID    sodium chloride flush  5-40 mL IntraVENous 2 times per day    cefTRIAXone (ROCEPHIN) IV  1,000 mg IntraVENous Q24H    isosorbide mononitrate  30 mg Oral Daily    levothyroxine  100 mcg Oral Daily     Continuous Infusions:   sodium bicarbonate infusion 50 mL/hr at 07/05/22 1605    dextrose      sodium chloride         CBC:   Recent Labs     07/04/22  0513 07/04/22  0513 07/04/22  0834 07/05/22  0609   WBC 9.7  --   --  12.5*   HGB 8.0*   < > 7.6* 9.1*     --   --  225    < > = values in this interval not displayed.      CMP:    Recent Labs     07/03/22  0542 07/03/22  0542 07/04/22  0513 07/04/22  0834 07/05/22  0609     --  140  --  139   K 4.5  --  4.2  --  4.0   *  --  112*  --  112*   CO2 9*  --  12*  --  11*   BUN 46*  --  29*  --  15   CREATININE 1.27*   < > 0.95* 1.1 0.91*   GLUCOSE 90  --  84  --  90   CALCIUM 8.1*  --  8.3*  --  7.9*   LABGLOM 41.0*   < > 57.3* 48* >60.0    < > = values in this interval not displayed. Troponin:   Recent Labs     07/03/22  1052   TROPONINI 0.011*     BNP: No results for input(s): BNP in the last 72 hours. INR: No results for input(s): INR in the last 72 hours. Lipids: No results for input(s): CHOL, LDLDIRECT, TRIG, HDL, AMYLASE, LIPASE in the last 72 hours. Liver:   Recent Labs     07/05/22  0609   LABALBU 2.4*     Iron:  No results for input(s): IRONS, FERRITIN in the last 72 hours. Invalid input(s): LABIRONS  Urinalysis: No results for input(s): UA in the last 72 hours.     Objective:  Vitals: BP (!) 119/50   Pulse 90   Temp 97.9 °F (36.6 °C)   Resp 16   Ht 5' 2\" (1.575 m)   Wt 95 lb 11.2 oz (43.4 kg)   LMP  (LMP Unknown)   SpO2 100%   BMI 17.50 kg/m²    Wt Readings from Last 3 Encounters:   07/04/22 95 lb 11.2 oz (43.4 kg)   09/10/21 125 lb (56.7 kg)   01/04/20 115 lb (52.2 kg)      24HR INTAKE/OUTPUT:      Intake/Output Summary (Last 24 hours) at 7/5/2022 2154  Last data filed at 7/5/2022 4520  Gross per 24 hour   Intake 1126.5 ml   Output --   Net 1126.5 ml       General: alert, in no apparent distress  HEENT: normocephalic, atraumatic, anicteric  Neck: supple, no mass  Lungs: non-labored respirations, clear to auscultation bilaterally  Heart: regular rate and rhythm, no murmurs or rubs  Abdomen: soft, non-tender, non-distended  Ext: no cyanosis, no peripheral edema ms wasting  Neuro: alert and oriented, no gross abnormalities  Psych: normal mood and affect  Skin: no rash      Electronically signed by Janiya Irizarry DO, MD

## 2022-07-06 NOTE — PROGRESS NOTES
Assessment complete, vss, bs active x4. Denies sob/pain. Clothes dry/clean, denies need for bathroom, am medications given per orders, Encouraged pt to consume fluids, pt verbalized understanding. will continue to monitor, call light within reach.  Electronically signed by Marshia Angelucci, RN on 7/6/2022 at 10:16 AM

## 2022-07-06 NOTE — PROGRESS NOTES
PSYCHE CONSULT CONFIRMED WITH [address] [place] [address] Electronically signed by Aria Monreal on 7/6/2022 at 2:40 PM

## 2022-07-06 NOTE — PROGRESS NOTES
input(s): BNP in the last 72 hours. INR: No results for input(s): INR in the last 72 hours. Lipids: No results for input(s): CHOL, LDLDIRECT, TRIG, HDL, AMYLASE, LIPASE in the last 72 hours. Liver:   Recent Labs     07/06/22  0603   LABALBU 2.8*     Iron:  No results for input(s): IRONS, FERRITIN in the last 72 hours. Invalid input(s): LABIRONS  Urinalysis: No results for input(s): UA in the last 72 hours.     Objective:  Vitals: BP (!) 116/54   Pulse (!) 116   Temp 98.2 °F (36.8 °C) (Oral)   Resp 17   Ht 5' 2\" (1.575 m)   Wt 95 lb 11.2 oz (43.4 kg)   LMP  (LMP Unknown)   SpO2 100%   BMI 17.50 kg/m²    Wt Readings from Last 3 Encounters:   07/04/22 95 lb 11.2 oz (43.4 kg)   09/10/21 125 lb (56.7 kg)   01/04/20 115 lb (52.2 kg)      24HR INTAKE/OUTPUT:    No intake or output data in the 24 hours ending 07/06/22 1144    General: alert, in no apparent distress  HEENT: normocephalic, atraumatic, anicteric  Neck: supple, no mass  Lungs: non-labored respirations, clear to auscultation bilaterally  Heart: regular rate and rhythm, no murmurs or rubs  Abdomen: soft, non-tender, non-distended  Ext: no cyanosis, no peripheral edema ms wasting  Neuro: alert and oriented, no gross abnormalities    Electronically signed by Owen Serrano MD, MD

## 2022-07-06 NOTE — PROGRESS NOTES
Hospitalist Progress Note      PCP: Travis De Luna MD    Date of Admission: 7/2/2022    Chief Complaint:  No acute events, afebrile, stable HD    Medications:  Reviewed    Infusion Medications    dextrose      sodium chloride       Scheduled Medications    metoprolol succinate  50 mg Oral Daily    megestrol acetate  400 mg Oral Daily    heparin (porcine)  5,000 Units SubCUTAneous BID    sodium chloride flush  5-40 mL IntraVENous 2 times per day    cefTRIAXone (ROCEPHIN) IV  1,000 mg IntraVENous Q24H    isosorbide mononitrate  30 mg Oral Daily    levothyroxine  100 mcg Oral Daily     PRN Meds: glucose, dextrose bolus **OR** dextrose bolus, glucagon (rDNA), dextrose, sodium chloride flush, sodium chloride, ondansetron **OR** ondansetron, acetaminophen **OR** acetaminophen, polyethylene glycol, albuterol sulfate HFA    No intake or output data in the 24 hours ending 07/06/22 1358    Exam:    BP (!) 116/54   Pulse (!) 116   Temp 98.2 °F (36.8 °C) (Oral)   Resp 17   Ht 5' 2\" (1.575 m)   Wt 95 lb 11.2 oz (43.4 kg)   LMP  (LMP Unknown)   SpO2 100%   BMI 17.50 kg/m²     General appearance: appears stated age and cooperative. Respiratory:  clear to auscultation bilaterally . Cardiovascular: Regular rate and rhythm, S1/S2 . Abdomen: Soft, active bowel sounds. Musculoskeletal: No edema bilaterally. Labs:   Recent Labs     07/04/22 0513 07/04/22 0513 07/04/22  0834 07/05/22  0609 07/06/22  0603   WBC 9.7  --   --  12.5* 9.9   HGB 8.0*   < > 7.6* 9.1* 8.5*   HCT 24.7*  --   --  29.4* 25.6*     --   --  225 232    < > = values in this interval not displayed.      Recent Labs     07/04/22 0513 07/04/22  0834 07/05/22  0609 07/06/22  0603     --  139 143   K 4.2  --  4.0 3.6   *  --  112* 110*   CO2 12*  --  11* 18*   BUN 29*  --  15 11   CREATININE 0.95* 1.1 0.91* 0.77   CALCIUM 8.3*  --  7.9* 8.3*   PHOS  --   --  1.5* 1.4*     No results for input(s): AST, ALT, BILIDIR, BILITOT, ALKPHOS in the last 72 hours. No results for input(s): INR in the last 72 hours. No results for input(s): Andreea Fuchs in the last 72 hours. Urinalysis:      Lab Results   Component Value Date/Time    NITRU Negative 09/10/2021 11:30 AM    WBCUA 0-2 09/10/2021 11:30 AM    BACTERIA Negative 09/10/2021 11:30 AM    RBCUA 0-2 09/10/2021 11:30 AM    BLOODU MODERATE 09/10/2021 11:30 AM    SPECGRAV 1.020 09/10/2021 11:30 AM    GLUCOSEU Negative 09/10/2021 11:30 AM       Radiology:  US RETROPERITONEAL COMPLETE   Final Result   Addendum 1 of 1   Addendum:      Reason for addendum is typographical error in the impression of the    report. Here is a new report:      EXAMINATION:   RENAL ULTRASOUND       HISTORY:  Acute renal failure. TECHNIQUE:  Sonography of the kidneys was performed. Images were obtained    and stored in a permanent archive. COMPARISON: CT 9/10/2021. RESULT:      Right Kidney:         -Renal length: 6.0 cm        -Parenchyma: Parenchyma echogenicity is increased. Diffuse    parenchymal thinning present measuring around 5 mm.         -Collecting system: No hydronephrosis. -Calculus: Echogenic lower pole calculus, similar to the prior CT.         -Lesion:  1.2 cm cyst, unchanged. Left Kidney:             -Renal length: 10.0 cm        -Parenchyma: Normal parenchymal echogenicity. Normal parenchymal    thickness measuring around 12 mm.         -Collecting system: No hydronephrosis. -Calculus: No echogenic, shadowing calculus.         -Lesion:  None. Bladder: Decompressed. Final      Right nephrolithiasis. No hydronephrosis. Severe right RENAL ATROPHY, unchanged. XR CHEST PORTABLE   Final Result      No acute radiographic abnormality.                     Assessment/Plan:    79 y/o female with history of HTN, TIA, carotid artery stenosis, right kidney atrophy, COVID pneumonia, peripheral neuropathy, COPD, tobacco use who presented with     UTI  - urine culture is growing E. Coli and E.Cloacae  - blood cultures no growth  - leukocytosis improved  - on IV Rocephin    RICARDO with severe AG metabolic acidosis  - likely due to volume depletion, poor oral intake  - improved with IVFs  - kidney u/s showed right kidney atrophy  - followed by nephrology    Anemia   - follow H/H    HTN, tachycardia  - started on Toprol    Diet: ADULT DIET; Regular;  No Added Salt (3-4 gm)  ADULT ORAL NUTRITION SUPPLEMENT; Dinner, Breakfast; Standard High Calorie/High Protein Oral Supplement  ADULT ORAL NUTRITION SUPPLEMENT; Lunch; Clear Liquid Oral Supplement    Code Status: Full Code      Disposition - needs SNF, patient wants to go home, but her family is very concerned about her going home, psychiatry consulted to assess decision making capacity, / following          Electronically signed by iKah Melgoza MD on 7/6/2022 at 1:58 PM

## 2022-07-06 NOTE — CARE COORDINATION
This LSW met with patient, Dtr. Sloane Isaac at bedside this afternoon. Patient is in agreement that she is requiring additional help and is not able to return home at this time. Patient requesting that referral be sent to [facility]. I have sent referral to [facility] /  [facility] liaison- awaiting acceptance at this time.   Electronically signed by MAYTE Dimas, IVORY on 7/6/22 at 2:32 PM EDT

## 2022-07-06 NOTE — CARE COORDINATION
This LSW met with patient at bedside this am. Patient found resting comfortably in bed. Patient stated to this LSW that she would like to return home upon discharge. I placed call to dtr., Mina Rob and left VM message- requested a return call to discuss discharge plans. Awaiting a return call at this time.   Electronically signed by MAYTE Peña, IVORY on 7/6/22 at 11:36 AM EDT

## 2022-07-07 PROBLEM — E43 SEVERE MALNUTRITION (HCC): Status: ACTIVE | Noted: 2022-07-07

## 2022-07-07 LAB
ALBUMIN SERPL-MCNC: 2.9 G/DL (ref 3.5–4.6)
ANION GAP SERPL CALCULATED.3IONS-SCNC: 18 MEQ/L (ref 9–15)
BLOOD CULTURE, ROUTINE: NORMAL
BUN BLDV-MCNC: 11 MG/DL (ref 8–23)
C-REACTIVE PROTEIN, HIGH SENSITIVITY: 129.4 MG/L (ref 0–5)
CALCIUM SERPL-MCNC: 8.6 MG/DL (ref 8.5–9.9)
CHLORIDE BLD-SCNC: 106 MEQ/L (ref 95–107)
CO2: 17 MEQ/L (ref 20–31)
CREAT SERPL-MCNC: 0.88 MG/DL (ref 0.5–0.9)
CULTURE, BLOOD 2: NORMAL
GFR AFRICAN AMERICAN: >60
GFR NON-AFRICAN AMERICAN: >60
GLUCOSE BLD-MCNC: 99 MG/DL (ref 70–99)
HCT VFR BLD CALC: 27.6 % (ref 37–47)
HEMOGLOBIN: 8.9 G/DL (ref 12–16)
MAGNESIUM: 1.8 MG/DL (ref 1.7–2.4)
MCH RBC QN AUTO: 28.7 PG (ref 27–31.3)
MCHC RBC AUTO-ENTMCNC: 32.3 % (ref 33–37)
MCV RBC AUTO: 88.8 FL (ref 82–100)
PDW BLD-RTO: 21 % (ref 11.5–14.5)
PHOSPHORUS: 1.5 MG/DL (ref 2.3–4.8)
PLATELET # BLD: 291 K/UL (ref 130–400)
POTASSIUM SERPL-SCNC: 3.7 MEQ/L (ref 3.4–4.9)
PROCALCITONIN: 0.66 NG/ML (ref 0–0.15)
RBC # BLD: 3.11 M/UL (ref 4.2–5.4)
SODIUM BLD-SCNC: 141 MEQ/L (ref 135–144)
WBC # BLD: 15.4 K/UL (ref 4.8–10.8)

## 2022-07-07 PROCEDURE — 6370000000 HC RX 637 (ALT 250 FOR IP): Performed by: INTERNAL MEDICINE

## 2022-07-07 PROCEDURE — 86141 C-REACTIVE PROTEIN HS: CPT

## 2022-07-07 PROCEDURE — 6360000002 HC RX W HCPCS: Performed by: INTERNAL MEDICINE

## 2022-07-07 PROCEDURE — 1210000000 HC MED SURG R&B

## 2022-07-07 PROCEDURE — 83735 ASSAY OF MAGNESIUM: CPT

## 2022-07-07 PROCEDURE — 84145 PROCALCITONIN (PCT): CPT

## 2022-07-07 PROCEDURE — 90792 PSYCH DIAG EVAL W/MED SRVCS: CPT | Performed by: PSYCHIATRY & NEUROLOGY

## 2022-07-07 PROCEDURE — 2580000003 HC RX 258: Performed by: INTERNAL MEDICINE

## 2022-07-07 PROCEDURE — 94640 AIRWAY INHALATION TREATMENT: CPT

## 2022-07-07 PROCEDURE — 80069 RENAL FUNCTION PANEL: CPT

## 2022-07-07 PROCEDURE — 85027 COMPLETE CBC AUTOMATED: CPT

## 2022-07-07 PROCEDURE — 94761 N-INVAS EAR/PLS OXIMETRY MLT: CPT

## 2022-07-07 PROCEDURE — 36415 COLL VENOUS BLD VENIPUNCTURE: CPT

## 2022-07-07 RX ORDER — METOPROLOL SUCCINATE 50 MG/1
50 TABLET, EXTENDED RELEASE ORAL 2 TIMES DAILY
Status: COMPLETED | OUTPATIENT
Start: 2022-07-07 | End: 2022-07-08

## 2022-07-07 RX ORDER — HALOPERIDOL 5 MG/ML
1 INJECTION INTRAMUSCULAR EVERY 6 HOURS PRN
Status: DISCONTINUED | OUTPATIENT
Start: 2022-07-07 | End: 2022-07-09

## 2022-07-07 RX ORDER — LANOLIN ALCOHOL/MO/W.PET/CERES
400 CREAM (GRAM) TOPICAL DAILY
Status: DISCONTINUED | OUTPATIENT
Start: 2022-07-07 | End: 2022-07-13 | Stop reason: HOSPADM

## 2022-07-07 RX ORDER — QUETIAPINE FUMARATE 25 MG/1
25 TABLET, FILM COATED ORAL 2 TIMES DAILY
Status: DISCONTINUED | OUTPATIENT
Start: 2022-07-07 | End: 2022-07-07

## 2022-07-07 RX ADMIN — MEGESTROL ACETATE 400 MG: 40 SUSPENSION ORAL at 10:32

## 2022-07-07 RX ADMIN — ACETAMINOPHEN 650 MG: 325 TABLET ORAL at 01:36

## 2022-07-07 RX ADMIN — HEPARIN SODIUM 5000 UNITS: 5000 INJECTION INTRAVENOUS; SUBCUTANEOUS at 10:33

## 2022-07-07 RX ADMIN — ALBUTEROL SULFATE 2 PUFF: 90 AEROSOL, METERED RESPIRATORY (INHALATION) at 17:46

## 2022-07-07 RX ADMIN — Medication 10 ML: at 20:58

## 2022-07-07 RX ADMIN — ACETAMINOPHEN 650 MG: 325 TABLET ORAL at 20:50

## 2022-07-07 RX ADMIN — Medication 400 MG: at 18:33

## 2022-07-07 RX ADMIN — Medication 10 ML: at 10:33

## 2022-07-07 RX ADMIN — METOPROLOL SUCCINATE 50 MG: 50 TABLET, EXTENDED RELEASE ORAL at 20:51

## 2022-07-07 RX ADMIN — ACETAMINOPHEN 650 MG: 325 TABLET ORAL at 14:22

## 2022-07-07 RX ADMIN — CEFTRIAXONE SODIUM 1000 MG: 1 INJECTION, POWDER, FOR SOLUTION INTRAMUSCULAR; INTRAVENOUS at 14:22

## 2022-07-07 RX ADMIN — ISOSORBIDE MONONITRATE 30 MG: 30 TABLET, EXTENDED RELEASE ORAL at 10:27

## 2022-07-07 RX ADMIN — METOPROLOL SUCCINATE 50 MG: 50 TABLET, EXTENDED RELEASE ORAL at 10:29

## 2022-07-07 ASSESSMENT — PAIN SCALES - GENERAL: PAINLEVEL_OUTOF10: 5

## 2022-07-07 ASSESSMENT — PAIN DESCRIPTION - DESCRIPTORS
DESCRIPTORS: ACHING
DESCRIPTORS: ACHING

## 2022-07-07 ASSESSMENT — PAIN - FUNCTIONAL ASSESSMENT: PAIN_FUNCTIONAL_ASSESSMENT: PREVENTS OR INTERFERES SOME ACTIVE ACTIVITIES AND ADLS

## 2022-07-07 ASSESSMENT — PAIN DESCRIPTION - LOCATION: LOCATION: OTHER (COMMENT)

## 2022-07-07 NOTE — CONSULTS
Cardiology consult for SVT. HPI:  Patient was admitted because of urinary symptoms, and was placed on telemetry. At that time, the patient did have a brief run of supraventricular tachycardia. It was quite rapid, but did not have the classic appearance of atrial fibrillation. It was self-limited. Unfortunately, since that time, the patient has been refusing her telemetry. We do not have any telemetry over the last several hours    Upon our conversation, the patient is awake and alert and able answer questions appropriately. [place] any chest pain. She denies any resting shortness of breath, but apparently gets little short of breath when she goes upstairs. She denies any unexplained lightheaded dizziness or palpitations. This patient follows up with Dr. Ilda Leyva at [facility].  Patient was last seen in 2019. The patient has a history of \"nonischemic cardiomyopathy, but recent echocardiogram shows LV function is normal.  Liliya work-up is notable for in 2014 abnormal stress test, and an echocardiogram at that time shows normal LV function, some discrete upper septal thickening, but no outflow tract obstruction. A mild gradient through the aortic valve, and mild mitral insufficiency. She has a history of stage III renal insufficiency, but her creatinine upon admission is normal.  She has a history of thyroid ectomy. Some lumbar surgery, and a history of DVT. However the she had a negative venous duplex in 2021. 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 EKG does show some ST and T wave changes, but is very similar to what she has had in the past.    The patient is apparently had some changes in her disposition. However she is very pleasant to me. However patient is refusing to wear a monitor and psychiatry was consulted.         An outpatient basis, the patient is on Synthroid, as she has had a thyroid removal, Imdur, but not metoprolol 25 mg twice a day, some ACE inhibitor, amlodipine, gabapentin, and was at one point time on Lipitor. No troponins are on the chart. Patient apparently has an atrophic kidney    The patient had an echocardiogram read by [facility] cardiology which was for the most part unremarkable showing normal LV function, and no significant valvular pathology was described. See below       Summary   Technically limited study   Left ventricular ejection fraction is visually estimated at 55%. Normal right ventricle structure and function. Normal LV size and   function. No evidence of pericardial effusion. Your chest x-ray is completely normal showing no evidence of COPD. Her CRP is elevated, procalcitonin is elevated, recent magnesium was 1.8 most recent creatinine is 0.88. Which is improvement upon admission. Minimally elevated troponin of 0.015, this is consistent with previous values, dating back to 2019. CPK is normal    On exam, HEENT is atraumatic normocephalic no xanthelasmas noted no icterus is noted no visible thyromegaly. She has a thyroid scar noted. She has a right carotid bruit but I do not auscultate 1 on the left. Upper extremities both symmetrical AP and the chest is mildly decreased. She is got regular rate and rhythm on exam PMI is not nondisplaced no RV heave is noted no S3 or S4 is noted. No obvious murmurs noted. Lung exam shows decreased AP diameter, but really just no rhonchi rales or wheeze. Just generalized decreased breath sounds. Abdomen is benign. She has palpable distal pulses, no calf tenderness elicited negative Homans' sign and just no significant edema. She has no thigh tenderness. Past medical history is also noted for above-mentioned carotid disease, presumable coronary disease although is no documentation of the patient having a heart catheterization.   She had a stress test which is normal.  She has a history of hypertension hyperlipidemia and hypothyroidism as well as osteoarthritis. The patient states she had a heart catheterization in 1990s, but I do not see that in the [facility] records. This could have been done at an another institution. She has had spinal surgery as well as partial thyroidectomy. Family history is notable for sclerotic disease    Social history is notable for fact that she used to smoke but quit 1996. She occasionally uses alcohol. Denies any drug abuse. Patient is  and apparently lives by herself. The patient is allergic to bee venom as well as penicillins. Other 12 point review of system is negative except which is outlined in patient's history and physical    Assessment:  Patient did have 1 brief bit of nonsustained most likely ectopic atrial tachycardia. It was quite rapid in the neighborhood of 150 beats a minute. He did not have the appearance of atrial fibrillation. Patient did come come in on Lopressor,--at least that was on her last medical records from Dr. Le Villarreal  Patient also was on an ACE inhibitor, and this may account for Stage III renal insufficiency. However, we have no obvious evidence that this patient has renal artery stenosis, but given her carotid stenosis is certainly in the differential  Trivially elevated troponins-chronic-patient is also anemic. Romain Frazier Elevation of prolactin, CRP, and white count are somewhat concerning--but followed by primary service    Plan: Add magnesium  Increase to Toprol-XL to twice daily  Check lipids and thyroids if not done already--as well as troponins and CPK in a.m. Perhaps the patient will be more cooperative to go on further testing. If unable to wear the telemetry, then please do vital signs perhaps every 6 hours to follow heart rate and blood pressure  Consider renal artery duplex, if not done already. Primary to check the contralateral side to the atrophic kidney.   We may have to wait till the patient is a little more cooperative revascularization is indicated if the patient's creatinine gets worse, or uncontrolled hypertension. However, if anything, the patient is now having normal creatinine. Blood pressures not appear to be particularly difficult to control. So this certainly would not be an emergent procedure or even necessary at this point. Hopefully, the nursing staff can ask her to continue to wear the telemetry.   Further recommendations pending

## 2022-07-07 NOTE — CARE COORDINATION
This LSW met with patient and daughter, Jann Soto, son Shannon Alberto this afternoon. I notified them that patient is not able to be go to [employer] as patient is not vaccinated. Patient and family are in agreement to [facility]. I notified Oren Yancey /[facility] liaison- she started pre-cert today, 9/5/4012.   Electronically signed by MAYTE Dumont, LSLETICIA on 7/7/22 at 3:13 PM EDT

## 2022-07-07 NOTE — PROGRESS NOTES
BILIDIR, BILITOT, ALKPHOS in the last 72 hours. No results for input(s): INR in the last 72 hours. No results for input(s): Denia Murilloarch in the last 72 hours. Urinalysis:      Lab Results   Component Value Date/Time    NITRU Negative 09/10/2021 11:30 AM    WBCUA 0-2 09/10/2021 11:30 AM    BACTERIA Negative 09/10/2021 11:30 AM    RBCUA 0-2 09/10/2021 11:30 AM    BLOODU MODERATE 09/10/2021 11:30 AM    SPECGRAV 1.020 09/10/2021 11:30 AM    GLUCOSEU Negative 09/10/2021 11:30 AM       Radiology:  US RETROPERITONEAL COMPLETE   Final Result   Addendum 1 of 1   Addendum:      Reason for addendum is typographical error in the impression of the    report. Here is a new report:      EXAMINATION:   RENAL ULTRASOUND       HISTORY:  Acute renal failure. TECHNIQUE:  Sonography of the kidneys was performed. Images were obtained    and stored in a permanent archive. COMPARISON: CT 9/10/2021. RESULT:      Right Kidney:         -Renal length: 6.0 cm        -Parenchyma: Parenchyma echogenicity is increased. Diffuse    parenchymal thinning present measuring around 5 mm.         -Collecting system: No hydronephrosis. -Calculus: Echogenic lower pole calculus, similar to the prior CT.         -Lesion:  1.2 cm cyst, unchanged. Left Kidney:             -Renal length: 10.0 cm        -Parenchyma: Normal parenchymal echogenicity. Normal parenchymal    thickness measuring around 12 mm.         -Collecting system: No hydronephrosis. -Calculus: No echogenic, shadowing calculus.         -Lesion:  None. Bladder: Decompressed. Final      Right nephrolithiasis. No hydronephrosis. Severe right RENAL ATROPHY, unchanged. XR CHEST PORTABLE   Final Result      No acute radiographic abnormality.                     Assessment/Plan:    77 y/o female with history of HTN, TIA, carotid artery stenosis, right kidney atrophy, COVID pneumonia, peripheral neuropathy, COPD, tobacco use who presented with     UTI  - urine culture is growing E. Coli and E.Cloacae  - blood cultures no growth  - on IV Rocephin    RICARDO with severe AG metabolic acidosis  - likely due to volume depletion, poor oral intake  - improved with IVFs  - kidney u/s showed right kidney atrophy  - nephrology signed-off    Acute metabolic encephalopathy  - likely delirium in the setting of RICARDO, UTI  - has no decision-making capacity per psychiatry    Anemia   - follow H/H    HTN / SVT  - started on Toprol  - cardiology consulted    Diet: ADULT DIET; Regular;  No Added Salt (3-4 gm)  ADULT ORAL NUTRITION SUPPLEMENT; Dinner, Breakfast; Standard High Calorie/High Protein Oral Supplement  ADULT ORAL NUTRITION SUPPLEMENT; Lunch; Clear Liquid Oral Supplement    Code Status: Full Code      Disposition - SNF, / following          Electronically signed by Alona Garnica MD on 7/7/2022 at 12:25 PM

## 2022-07-07 NOTE — PROGRESS NOTES
Physician Progress Note      Hiren Gonzalez  CSN #:                  [id number]  :                       1945  ADMIT DATE:       2022 10:41 AM  DISCH DATE:  RESPONDING  PROVIDER #:        Palomo Hodges MD          QUERY TEXT:    Pt admitted with E.Coli and E.Cloacae UTI and RICARDO. On admission noted: WBC   24.1  HR   .0. If possible, please document in the progress notes   and discharge summary if you are evaluating and /or treating any of the   following: The medical record reflects the following:  Risk Factors: age 78  HTN  COPD  right kidney atrophy  tobacco use  BMI 17.50    severe malnutrition  Clinical Indicators: WBC 24.1  HR   .0   RICARDO   Dr Emanuel Qiu: Metabolic acidosis etiology-infectious lactic/elevated chloride   Dr Idris Nieto: UTI- urine culture is growing E. Coli and E.Cloacae- blood   cultures no growth- leukocytosis improved  RICARDO with severe AG metabolic   acidosis  Treatment: IV Rocephin  HCO3 drip  UA/UC  blood cultures  CXR  ABGs    nephrology    Jessie Banerjee RN CCDS  [phone]  Options provided:  -- Sepsis, present on admission  -- Localized infection of UTI without sepsis  -- Other - I will add my own diagnosis  -- Disagree - Not applicable / Not valid  -- Disagree - Clinically unable to determine / Unknown  -- Refer to Clinical Documentation Reviewer    PROVIDER RESPONSE TEXT:    This patient has sepsis which was present on admission. Query created by: Penny Kendrick on 2022 9:13 AM      QUERY TEXT:    Pt admitted with E.Coli and E.Cloacae UTI and RICARDO. Pt noted to be verbally   abusive and combative. ..does not know where she is and thinks she is at home. If possible, please document in the progress notes and discharge summary if   you are evaluating and / or treating any of the following:     The medical record reflects the following:  Risk Factors: age 78  HTN  COPD  right kidney atrophy  tobacco use  BMI 17.50 severe malnutrition  Clinical Indicators: WBC 15.4 from 9.9     PCT 0.66  7/4 Dr Lj Hall: Metabolic acidosis etiology-infectious lactic/elevated chloride  7/5 Dr Fabienne Narayanan: UTI- urine culture is growing E. Coli and E.Cloacae- blood   cultures no growth- leukocytosis improved  RICARDO with severe AG metabolic   acidosis  7/7 RN: She is being verbally abusive and combative. She does not know where   she is and thinks she is at home. Patient's heart rhythm showing SVT with HR   in 140s-160s. She is refusing EKG at this time. She continues to be   combative and uncooperative. Refusing heart monitor and fluids. Treatment: IV Rocephin  HCO3 drip  UA/UC  blood cultures  CXR  ABGs    nephrology    Michelle Rocha RN CCDS  [phone]  Options provided:  -- Metabolic encephalopathy  -- Delirium due to, Please specify cause. -- Delirium  -- Other - I will add my own diagnosis  -- Disagree - Not applicable / Not valid  -- Disagree - Clinically unable to determine / Unknown  -- Refer to Clinical Documentation Reviewer    PROVIDER RESPONSE TEXT:    This patient has metabolic encephalopathy.     Query created by: Jack Galloway on 7/7/2022 9:36 AM      Electronically signed by:  Susie Francis MD 7/7/2022 12:23 PM

## 2022-07-07 NOTE — CONSULTS
mouth Daily  amLODIPine (NORVASC) 5 MG tablet, Take 5 mg by mouth daily  albuterol sulfate  (90 Base) MCG/ACT inhaler, INHALE 2 PUFFS 4 TIMES DAILY AS NEEDED  [DISCONTINUED] gabapentin (NEURONTIN) 800 MG tablet, Take 1 tablet by mouth daily for 90 days. .  isosorbide mononitrate (IMDUR) 30 MG extended release tablet, Take 1 tablet by mouth daily  fosinopril (MONOPRIL) 10 MG tablet, Take 1 tablet by mouth daily    Allergies:  Bee venom and Pcn [penicillins]    FAMILY/SOCIAL HISTORY:  History reviewed. No pertinent family history. Social History     Socioeconomic History    Marital status:      Spouse name: Not on file    Number of children: Not on file    Years of education: Not on file    Highest education level: Not on file   Occupational History    Not on file   Tobacco Use    Smoking status: Former Smoker     Packs/day: 1.00     Years: 35.00     Pack years: 35.00     Types: Cigarettes     Start date: 67     Quit date: 1996     Years since quittin.6    Smokeless tobacco: Never Used   Substance and Sexual Activity    Alcohol use: Yes     Alcohol/week: 0.0 standard drinks     Comment: RARE    Drug use: Never    Sexual activity: Not on file   Other Topics Concern    Not on file   Social History Narrative    Not on file     Social Determinants of Health     Financial Resource Strain:     Difficulty of Paying Living Expenses: Not on file   Food Insecurity:     Worried About Running Out of Food in the Last Year: Not on file    Darrell of Food in the Last Year: Not on file   Transportation Needs:     Lack of Transportation (Medical): Not on file    Lack of Transportation (Non-Medical):  Not on file   Physical Activity:     Days of Exercise per Week: Not on file    Minutes of Exercise per Session: Not on file   Stress:     Feeling of Stress : Not on file   Social Connections:     Frequency of Communication with Friends and Family: Not on file    Frequency of Social ill-appearing  Behavior/Motor:  psychomotor agitation  Attitude toward examiner:  evasive  Speech:  rapid   Mood: irritable  Affect:  mood incongruent  Thought processes:  rapid   Association  Thought content:  Suicidal Ideation:  denies suicidal ideation  Delusions:  ideas of reference  Perceptual Disturbance:  denies any perceptual disturbance  Cognition:  oriented   Attention & Concentration poor  Mini Mental Status not completed because   Insight poor   Judgement poor   Fund of Knowledge limited      DIAGNOSIS:     Delirium           RISK ASSESSMENT:        LABS: REVIEWED TODAY:  Recent Labs     07/05/22  0609 07/06/22  0603 07/07/22  0643   WBC 12.5* 9.9 15.4*   HGB 9.1* 8.5* 8.9*    232 291     Recent Labs     07/05/22  0609 07/06/22  0603 07/07/22  0643    143 141   K 4.0 3.6 3.7   * 110* 106   CO2 11* 18* 17*   BUN 15 11 11   CREATININE 0.91* 0.77 0.88   GLUCOSE 90 89 99     No results for input(s): BILITOT, ALKPHOS, AST, ALT in the last 72 hours.   No results found for: [address], BARBSCNU, LABBENZ, CANNAB, COCAINESCRN, LABMETH, OPIATESCREENURINE, PHENCYCLIDINESCREENURINE, PPXUR, ETOH  Lab Results   Component Value Date/Time    TSH 2.000 07/05/2022 06:09 AM     No results found for: LITHIUM  No results found for: VALPROATE, CBMZ  No results found for: LITHIUM, VALPROATE    FURTHER LABS ORDERED :      Radiology   Echocardiogram complete 2D with doppler with color    Result Date: 7/4/2022  Transthoracic Echocardiography Report (TTE)  Demographics   Patient Name   Annelise Mehta Gender              Female                 P   Patient Number [id number]          Race                                                    Ethnicity   Visit Number   [id number]         Room Number         S084   Corporate ID                     Date of Study       07/04/2022   Accession      [id number]        Referring Physician Faheem Malik DO  Number   Date of Birth  1945        Sonographer Hever Nieto   Age            78 year(s)        Interpreting        [facility])                                   Physician           Cardiology                                                       Holiday Jason TAYLOR DO  Procedure Type of Study   TTE procedure:ECHO COMPLETE 2D W/DOP W/COLOR. Procedure Date Date: 07/04/2022 Start: 12:38 PM Study Location: Portable Technical Quality: Adequate visualization Indications:LVF. Patient Status: Routine Height: 62 inches Weight: 95 pounds BSA: 1.39 m^2 BMI: 17.38 kg/m^2 BP: 107/44 mmHg  Conclusions   Summary  Technically limited study  Left ventricular ejection fraction is visually estimated at 55%. Normal right ventricle structure and function. Normal LV size and  function. No evidence of pericardial effusion. Signature   ----------------------------------------------------------------  Electronically signed by Henry Bedoya DO(Interpreting  physician) on 07/04/2022 09:08 PM  ----------------------------------------------------------------   Findings  Left Ventricle Left ventricular ejection fraction is visually estimated at 55%. Right Ventricle Normal right ventricle structure and function. Normal right ventricle systolic pressure. Pericardial Effusion No evidence of pericardial effusion. XR CHEST PORTABLE    Result Date: 7/2/2022  XR CHEST PORTABLE Clinical History:  Failure to thrive. Comparison:  9/10/2021. RESULT: No consolidation. Hyperinflated lungs. Mildly coarsened lung markings, unchanged. No large pleural effusion. No pneumothorax. Stable cardiomediastinal silhouette. Aortic vascular calcifications. No distinct acute osseous findings. No acute radiographic abnormality. US RETROPERITONEAL COMPLETE    Addendum Date: 7/3/2022    Addendum: Reason for addendum is typographical error in the impression of the report. Here is a new report: EXAMINATION:   RENAL ULTRASOUND HISTORY:  Acute renal failure. TECHNIQUE:  Sonography of the kidneys was performed. Images were obtained and stored in a permanent archive. COMPARISON: CT 9/10/2021. RESULT: Right Kidney:      -Renal length: 6.0 cm      -Parenchyma: Parenchyma echogenicity is increased. Diffuse parenchymal thinning present measuring around 5 mm.      -Collecting system: No hydronephrosis. -Calculus: Echogenic lower pole calculus, similar to the prior CT.      -Lesion:  1.2 cm cyst, unchanged. Left Kidney:          -Renal length: 10.0 cm      -Parenchyma: Normal parenchymal echogenicity. Normal parenchymal thickness measuring around 12 mm.      -Collecting system: No hydronephrosis. -Calculus: No echogenic, shadowing calculus.      -Lesion:  None. Bladder: Decompressed. Result Date: 7/3/2022  EXAMINATION:   RENAL ULTRASOUND HISTORY:  Acute renal failure. TECHNIQUE:  Sonography of the kidneys was performed. Images were obtained and stored in a permanent archive. COMPARISON: CT 9/10/2021. RESULT: Right Kidney:      -Renal length: 6.0 cm      -Parenchyma: Parenchyma echogenicity is increased. Diffuse parenchymal thinning present measuring around 5 mm.      -Collecting system: No hydronephrosis. -Calculus: Echogenic lower pole calculus, similar to the prior CT.      -Lesion:  1.2 cm cyst, unchanged. Left Kidney:          -Renal length: 10.0 cm      -Parenchyma: Normal parenchymal echogenicity. Normal parenchymal thickness measuring around 12 mm.      -Collecting system: No hydronephrosis. -Calculus: No echogenic, shadowing calculus.      -Lesion:  None. Bladder: Decompressed. Right nephrolithiasis. No hydronephrosis. Severe right RENAL ATROPHY, unchanged. EKG: TRACING REVIEWED    RECOMMENDATIONS    Risk Management:  1:1 sitter    Based on my assessment today, patient does not have capacity to make decision. However when the delirium clears up, she could regain her capacity to make decision.    Discussed with the treating physician/ team about the patient and treatment plan  Reviewed the chart    Discussed with the patient risk, benefit, alternative and common side effects for the  proposed medication treatment. Patient is consenting to the treatment. Thanks for the consult. Please call me if needed.     Electronically signed by Komal Mendez MD on 7/7/2022 at 10:21 AM

## 2022-07-07 NOTE — PROGRESS NOTES
Nephrology Progress Note    Assessment:  Resolved Renal issue  UTI E coli  Hypertension  Hypothyroid  Malnutrition  Anemia stable etiology ? Nutritional   High CRP      Plan:nothing  Will sign off    Patient Active Problem List:     Degenerative spondylolisthesis     Lumbar stenosis with neurogenic claudication     Hypothyroidism     Hypertension     Hyperlipidemia     Bilateral carotid artery stenosis     TIA (transient ischemic attack)     Pancytopenia (HCC)     Peripheral polyneuropathy     Weight loss     Acute bronchitis     Pneumonia due to COVID-19 virus     Acute kidney injury ([facility] [address]     Complicated UTI (urinary tract infection)      Subjective:  Admit Date: 7/2/2022    Interval History: no issues weak    Medications:  Scheduled Meds:   metoprolol succinate  50 mg Oral Daily    megestrol acetate  400 mg Oral Daily    heparin (porcine)  5,000 Units SubCUTAneous BID    sodium chloride flush  5-40 mL IntraVENous 2 times per day    cefTRIAXone (ROCEPHIN) IV  1,000 mg IntraVENous Q24H    isosorbide mononitrate  30 mg Oral Daily    levothyroxine  100 mcg Oral Daily     Continuous Infusions:   dextrose      sodium chloride         CBC:   Recent Labs     07/06/22  0603 07/07/22  0643   WBC 9.9 15.4*   HGB 8.5* 8.9*    291     CMP:    Recent Labs     07/05/22  0609 07/06/22  0603 07/07/22  0643    143 141   K 4.0 3.6 3.7   * 110* 106   CO2 11* 18* 17*   BUN 15 11 11   CREATININE 0.91* 0.77 0.88   GLUCOSE 90 89 99   CALCIUM 7.9* 8.3* 8.6   LABGLOM >60.0 >60.0 >60.0     Troponin: No results for input(s): TROPONINI in the last 72 hours. BNP: No results for input(s): BNP in the last 72 hours. INR: No results for input(s): INR in the last 72 hours. Lipids: No results for input(s): CHOL, LDLDIRECT, TRIG, HDL, AMYLASE, LIPASE in the last 72 hours. Liver:   Recent Labs     07/07/22  0643   LABALBU 2.9*     Iron:  No results for input(s): IRONS, FERRITIN in the last 72 hours.     Invalid input(s): LABIRONS  Urinalysis: No results for input(s): UA in the last 72 hours.     Objective:  Vitals: /65   Pulse (!) 123   Temp 97.7 °F (36.5 °C)   Resp 17   Ht 5' 2\" (1.575 m)   Wt 95 lb 11.2 oz (43.4 kg)   LMP  (LMP Unknown)   SpO2 99%   BMI 17.50 kg/m²    Wt Readings from Last 3 Encounters:   07/04/22 95 lb 11.2 oz (43.4 kg)   09/10/21 125 lb (56.7 kg)   01/04/20 115 lb (52.2 kg)      24HR INTAKE/OUTPUT:  No intake or output data in the 24 hours ending 07/07/22 0855    General: alert, in no apparent distress  HEENT: normocephalic, atraumatic, anicteric  Neck: supple, no mass soft bruits  Lungs: non-labored respirations, clear to auscultation bilaterally  Heart: regular rate and rhythm, no murmurs or rubs  Abdomen: soft, non-tender, non-distended  Ext: no cyanosis, no peripheral edema  Neuro: alert and oriented, no gross abnormalities  Psych: normal mood and affect  Skin: no rash      Electronically signed by Shavon Corbin DO, MD

## 2022-07-07 NOTE — PROGRESS NOTES
Physical Therapy Missed Treatment   Facility/Department: [facility] MED SURG [id number]    NAME: Sesar Alfaro    : 1945 (78 y.o.)  MRN: [id number]    Account: [de-identified]  Gender: female        [x] Patient Declines PT Treatment: Patient resting in bed. 1:1 caregiver present. Patient adamantly declines to participate du to fatigue/lack of sleep. Provided encouragement and explained benefits of PT. Patient continues decline. Will attempt PT treatment again at earliest convenience.         Electronically signed by Raina Grimes PTA on 22 at 1:17 PM EDT

## 2022-07-07 NOTE — PROGRESS NOTES
0121: Patient is refusing brief change even though it is wet. She is being verbally abusive and combative. She does not know where she is and thinks she is at home.     0301: Monitor room called. Patient's heart rhythm showing SVT with HR in 140s-160s. She is refusing EKG at this time. Dr. Vidya Looney aware. 1026: Patient agreed to brief change at 3:30 am. She is refusing to let anyone change it again. She continues to be combative and uncooroperative. Refusing heart monitor and fluids.

## 2022-07-07 NOTE — FLOWSHEET NOTE
Pt allowed this writer to change her brief. Pt had a small BM and helga care was done and zinc protectant applied. 1:1  maintained.

## 2022-07-08 LAB
ALBUMIN SERPL-MCNC: 2.6 G/DL (ref 3.5–4.6)
ANION GAP SERPL CALCULATED.3IONS-SCNC: 14 MEQ/L (ref 9–15)
BUN BLDV-MCNC: 10 MG/DL (ref 8–23)
C-REACTIVE PROTEIN, HIGH SENSITIVITY: 115.8 MG/L (ref 0–5)
CALCIUM SERPL-MCNC: 8 MG/DL (ref 8.5–9.9)
CHLORIDE BLD-SCNC: 112 MEQ/L (ref 95–107)
CO2: 19 MEQ/L (ref 20–31)
CREAT SERPL-MCNC: 0.8 MG/DL (ref 0.5–0.9)
GFR AFRICAN AMERICAN: >60
GFR NON-AFRICAN AMERICAN: >60
GLUCOSE BLD-MCNC: 91 MG/DL (ref 70–99)
HCT VFR BLD CALC: 25 % (ref 37–47)
HEMOGLOBIN: 8.1 G/DL (ref 12–16)
MAGNESIUM: 1.9 MG/DL (ref 1.7–2.4)
MCH RBC QN AUTO: 29.5 PG (ref 27–31.3)
MCHC RBC AUTO-ENTMCNC: 32.5 % (ref 33–37)
MCV RBC AUTO: 90.7 FL (ref 82–100)
PDW BLD-RTO: 21 % (ref 11.5–14.5)
PHOSPHORUS: 2 MG/DL (ref 2.3–4.8)
PLATELET # BLD: 213 K/UL (ref 130–400)
POTASSIUM SERPL-SCNC: 3.5 MEQ/L (ref 3.4–4.9)
PROCALCITONIN: 0.67 NG/ML (ref 0–0.15)
RBC # BLD: 2.75 M/UL (ref 4.2–5.4)
SODIUM BLD-SCNC: 145 MEQ/L (ref 135–144)
TOTAL CK: 34 U/L (ref 0–170)
TROPONIN: <0.01 NG/ML (ref 0–0.01)
WBC # BLD: 10.1 K/UL (ref 4.8–10.8)

## 2022-07-08 PROCEDURE — 6370000000 HC RX 637 (ALT 250 FOR IP): Performed by: INTERNAL MEDICINE

## 2022-07-08 PROCEDURE — 2580000003 HC RX 258: Performed by: INTERNAL MEDICINE

## 2022-07-08 PROCEDURE — 97535 SELF CARE MNGMENT TRAINING: CPT

## 2022-07-08 PROCEDURE — 84145 PROCALCITONIN (PCT): CPT

## 2022-07-08 PROCEDURE — 6370000000 HC RX 637 (ALT 250 FOR IP): Performed by: NURSE PRACTITIONER

## 2022-07-08 PROCEDURE — 6360000002 HC RX W HCPCS: Performed by: INTERNAL MEDICINE

## 2022-07-08 PROCEDURE — 36415 COLL VENOUS BLD VENIPUNCTURE: CPT

## 2022-07-08 PROCEDURE — 84484 ASSAY OF TROPONIN QUANT: CPT

## 2022-07-08 PROCEDURE — 82550 ASSAY OF CK (CPK): CPT

## 2022-07-08 PROCEDURE — 83735 ASSAY OF MAGNESIUM: CPT

## 2022-07-08 PROCEDURE — 94761 N-INVAS EAR/PLS OXIMETRY MLT: CPT

## 2022-07-08 PROCEDURE — 1210000000 HC MED SURG R&B

## 2022-07-08 PROCEDURE — 86141 C-REACTIVE PROTEIN HS: CPT

## 2022-07-08 PROCEDURE — 80069 RENAL FUNCTION PANEL: CPT

## 2022-07-08 PROCEDURE — 85027 COMPLETE CBC AUTOMATED: CPT

## 2022-07-08 PROCEDURE — 94640 AIRWAY INHALATION TREATMENT: CPT

## 2022-07-08 RX ORDER — METOPROLOL SUCCINATE 100 MG/1
100 TABLET, EXTENDED RELEASE ORAL DAILY
Status: DISCONTINUED | OUTPATIENT
Start: 2022-07-09 | End: 2022-07-08

## 2022-07-08 RX ORDER — METOPROLOL SUCCINATE 25 MG/1
75 TABLET, EXTENDED RELEASE ORAL DAILY
Qty: 30 TABLET | Refills: 3 | Status: ON HOLD | OUTPATIENT
Start: 2022-07-09 | End: 2022-08-01 | Stop reason: HOSPADM

## 2022-07-08 RX ORDER — LANOLIN ALCOHOL/MO/W.PET/CERES
400 CREAM (GRAM) TOPICAL DAILY
Qty: 30 TABLET | Refills: 3 | Status: SHIPPED | OUTPATIENT
Start: 2022-07-09

## 2022-07-08 RX ADMIN — ACETAMINOPHEN 650 MG: 325 TABLET ORAL at 20:09

## 2022-07-08 RX ADMIN — LEVOTHYROXINE SODIUM 100 MCG: 0.1 TABLET ORAL at 05:55

## 2022-07-08 RX ADMIN — ALBUTEROL SULFATE 2 PUFF: 90 AEROSOL, METERED RESPIRATORY (INHALATION) at 20:53

## 2022-07-08 RX ADMIN — Medication 400 MG: at 08:42

## 2022-07-08 RX ADMIN — ISOSORBIDE MONONITRATE 30 MG: 30 TABLET, EXTENDED RELEASE ORAL at 08:41

## 2022-07-08 RX ADMIN — METOPROLOL SUCCINATE 50 MG: 50 TABLET, EXTENDED RELEASE ORAL at 20:10

## 2022-07-08 RX ADMIN — ACETAMINOPHEN 650 MG: 325 TABLET ORAL at 05:55

## 2022-07-08 RX ADMIN — CEFTRIAXONE SODIUM 1000 MG: 1 INJECTION, POWDER, FOR SOLUTION INTRAMUSCULAR; INTRAVENOUS at 14:36

## 2022-07-08 RX ADMIN — Medication 10 ML: at 20:39

## 2022-07-08 RX ADMIN — METOPROLOL SUCCINATE 50 MG: 50 TABLET, EXTENDED RELEASE ORAL at 08:42

## 2022-07-08 RX ADMIN — MEGESTROL ACETATE 400 MG: 40 SUSPENSION ORAL at 08:41

## 2022-07-08 RX ADMIN — Medication 10 ML: at 09:05

## 2022-07-08 RX ADMIN — HEPARIN SODIUM 5000 UNITS: 5000 INJECTION INTRAVENOUS; SUBCUTANEOUS at 08:43

## 2022-07-08 ASSESSMENT — PAIN SCALES - GENERAL
PAINLEVEL_OUTOF10: 6
PAINLEVEL_OUTOF10: 6

## 2022-07-08 NOTE — PLAN OF CARE
Problem: Discharge Planning  Goal: Discharge to home or other facility with appropriate resources  Outcome: Progressing  Flowsheets (Taken 7/7/2022 1954 by Dung Buckner RN)  Discharge to home or other facility with appropriate resources: Identify barriers to discharge with patient and caregiver     Problem: Safety - Adult  Goal: Free from fall injury  Outcome: Progressing     Problem: Skin/Tissue Integrity  Goal: Absence of new skin breakdown  Outcome: Progressing     Problem: Pain  Goal: Verbalizes/displays adequate comfort level or baseline comfort level  Outcome: Progressing     Problem: Nutrition Deficit:  Goal: Optimize nutritional status  Outcome: Progressing

## 2022-07-08 NOTE — PROGRESS NOTES
PT A&OX1 she was able to tell me her name and . Patient allowed me to place her tele monitor back on her. VSS and assessment complete. Pt stated she had zero pain. Required med's were given. Pt denied any further needs and will continue to monitor.  Electronically signed by Connie Rojas RN on 2022 at 11:08 AM

## 2022-07-08 NOTE — PROGRESS NOTES
Physical Therapy Missed Treatment   Facility/Department: [facility] MED SURG [id number]    NAME: Beata Fisher    : 1945 (78 y.o.)  MRN: [id number]    Account: [de-identified]  Gender: female    Chart reviewed, attempted PT at 36. Patient unavailable 2° to:        [] Pt adamantly declined therapy this AM secondary to amber LE achiness and fatigue. Patient educated on importance of therapy, in particularly position changes and sitting at EOB but patient continued to adamantly decline. \"I don't think I can right now, maybe later. \" Numerous attempts to have patient participate but met with declines. [x] Nsg notified (1:1 present)         Will attempt PT treatment again at earliest convenience.       Electronically signed by Kervin Castellanos PTA on 22 at 9:26 AM EDT

## 2022-07-08 NOTE — PROGRESS NOTES
[facility]CC[facility]ER[facility] MED SURG TREATMENT NOTE     Date: 2022  Patient Name: William Ordoñez        MRN: [id number]  Account: [de-identified]   : 1945  (78 y.o.)  [facility]    Chart Review:    Restrictions  Restrictions/Precautions  Restrictions/Precautions: Fall Risk     Safety:  Safety Devices  Type of Devices: All fall risk precautions in place;Call light within reach;Nurse notified Marlene Disla)    Patient's birthday verified: Yes    Subjective:    Subjective: \"I'm just so tired, I don't know if I can. \"       Pain at start of treatment: No    Pain at end of treatment: No    Location:   Nursing notified: Not Applicable  RN:   Intervention: None    Objective:    ADL Status:  ADL  Feeding: Setup  Feeding Skilled Clinical Factors: Per staff  Grooming: Minimal assistance  Grooming Skilled Clinical Factors: Assist for combing hair in the back; pt declined to complete independently, significantly self limiting. Completed oral care.   UE Bathing Skilled Clinical Factors: Previous level SBA, refused to attempt this date  LE Bathing Skilled Clinical Factors: Previous level min A, refused to attempt this date  UE Dressing Skilled Clinical Factors: Previous level setup, refused to attempt this date  LE Dressing Skilled Clinical Factors: Previous level min A, refused to attempt this date  Toileting Skilled Clinical Factors: Refused to attempt  Additional Comments: Pt required max encouragement and completed only minimal grooming tasks with max encouragement  Toilet Transfers  Toilet Transfer: Unable to assess  Toilet Transfers Comments: Pt refused to attempt    YIFAN Hose donned: No  If no - why  not ordered    Therapy key for assistance levels -   Independent/Mod I = Pt. is able to perform task with no assistance but may require a device   Stand by assistance = Pt. does not perform task at an independent level but does not need physical assistance, requires verbal cues  Minimal, Moderate, Maximal Assistance = Pt. requires physical assistance (25%, 50%, 75% assist from helper) for task but is able to actively participate in task   Dependent = Pt. requires total assistance with task and is not able to actively participate with task completion    Orientation Status:  Orientation  Overall Orientation Status: Within Functional Limits    Observation:  Observation/Palpation  Posture: Fair  Observation: flexed posture, alert and attentive, max encouragement for participation  Edema: left UE    Cognition Status:  Cognition  Overall Cognitive Status: Exceptions  Arousal/Alertness: Appropriate responses to stimuli  Following Commands: Follows one step commands consistently  Attention Span: Difficulty attending to directions  Memory: Decreased recall of precautions  Safety Judgement: Decreased awareness of need for assistance;Decreased awareness of need for safety  Problem Solving: Assistance required to generate solutions;Assistance required to implement solutions;Assistance required to identify errors made;Assistance required to correct errors made  Insights: Not aware of deficits  Initiation: Requires cues for some  Sequencing: Requires cues for some  Cognition Comment: Max encouragement to participate    Perception Status:  Perception  Overall Perceptual Status: WFL    Vision and Hearing Status:  Vision  Vision Exceptions: Wears glasses for reading  Hearing  Hearing: Within functional limits   Vision - Basic Assessment  Prior Vision: Wears glasses only for reading  Visual History: No significant visual history  Patient Visual Report: No visual complaint reported. Visual Field Cut: No  Oculo Motor Control: WNL    GROSS ASSESSMENT AROM/PROM:  AROM: Within functional limits       ROM:   LUE AROM (degrees)  LUE AROM : WFL  Left Hand AROM (degrees)  Left Hand AROM: WFL  RUE AROM (degrees)  RUE AROM : WFL  Right Hand AROM (degrees)  Right Hand AROM: WFL    UE STRENGTH:  Strength:  Within functional limits (4-/5)    UE COORDINATION:  Coordination: Within functional limits    UE TONE:  Tone: Normal    UE SENSATION:  Sensation: Intact    Functional Mobility:  Patient ambulated NT due to Pt refused to attempt even sitting EOB    Transfers:  Transfers  Sit to stand: Unable to assess  Stand to sit: Unable to assess  Transfer Comments: Pt refused to attempt  Toilet Transfers  Toilet Transfer: Unable to assess  Toilet Transfers Comments: Pt refused to attempt    Bed Mobility  Bed mobility  Rolling to Right: Minimal assistance  Supine to Sit: Unable to assess  Sit to Supine: Unable to assess  Bed Mobility Comments: Pt declined to sit at EOB    Seated and Standing Balance:  Balance  Sitting:  (Refused to come to EOB)  Standing:  (Refused to attempt)    Functional Endurance:  Activity Tolerance  Activity Tolerance: Treatment limited secondary to decreased cognition  Activity Tolerance Comments: Pt very self limiting    Treatment consisted of:    ADL training    Assessment/Discharge Disposition:     Assessment: Pt demonstrates signficant deficits with mobility d/t resistance to attempt and self limiting behaviors. Pt. also state she feels 'weak' but denies need to attempt mobility. Pt. significantly fatigued and required encouragement to participate even minimally. Pt would continue to benefit from OT to maximize independence and safety with ADL tasks.     [employer]  How much help for putting on and taking off regular lower body clothing?: A Little  How much help for Bathing?: A Little  How much help for Toileting?: A Little  How much help for putting on and taking off regular upper body clothing?: A Little  How much help for taking care of personal grooming?: A Little  How much help for eating meals?: A Little  AM-PAC Inpatient Daily Activity Raw Score: 18  AM-PAC Inpatient ADL T-Scale Score : 38.66  ADL Inpatient CMS 0-100% Score: 46.65  ADL Inpatient CMS G-Code Modifier : CK    Plan:    Continue OT per POC    Patient Education:  Patient Education  Education Given To: Patient  Education Provided: Role of Therapy;Plan of Care  Education Method: Verbal  Barriers to Learning: Cognition  Education Outcome: Continued education needed    Equipment recommendations:  OT Equipment Recommendations  Other: Continue to assess    Goals/Plan of care addressed during this session:        Patient Goal: Patient goals : To return to home    Improve Jenkins with ADLs    Therapy Time:   Individual Group Co-Treat   Time In 1442       Time Out 1450         Minutes 8         ADL/IADL trainin minutes    Electronically signed by:     DULCE Campos    2022, 3:17 PM

## 2022-07-08 NOTE — PROGRESS NOTES
Hospitalist Progress Note      PCP: Bari Souza MD    Date of Admission: 7/2/2022    Chief Complaint:  No acute events, afebrile, stable HD    Medications:  Reviewed    Infusion Medications    dextrose      sodium chloride       Scheduled Medications    [START ON 7/9/2022] metoprolol succinate  75 mg Oral Daily    magnesium oxide  400 mg Oral Daily    metoprolol succinate  50 mg Oral BID    megestrol acetate  400 mg Oral Daily    heparin (porcine)  5,000 Units SubCUTAneous BID    sodium chloride flush  5-40 mL IntraVENous 2 times per day    cefTRIAXone (ROCEPHIN) IV  1,000 mg IntraVENous Q24H    isosorbide mononitrate  30 mg Oral Daily    levothyroxine  100 mcg Oral Daily     PRN Meds: haloperidol lactate, glucose, dextrose bolus **OR** dextrose bolus, glucagon (rDNA), dextrose, sodium chloride flush, sodium chloride, ondansetron **OR** ondansetron, acetaminophen **OR** acetaminophen, polyethylene glycol, albuterol sulfate HFA      Intake/Output Summary (Last 24 hours) at 7/8/2022 1214  Last data filed at 7/8/2022 0800  Gross per 24 hour   Intake 370 ml   Output --   Net 370 ml       Exam:    /61   Pulse 92   Temp 97.7 °F (36.5 °C) (Oral)   Resp 16   Ht 5' 2\" (1.575 m)   Wt 94 lb 6.4 oz (42.8 kg)   LMP  (LMP Unknown)   SpO2 100%   BMI 17.27 kg/m²     General appearance: appears stated age and cooperative. Respiratory:  clear to auscultation bilaterally . Cardiovascular: Regular rate and rhythm, S1/S2 . Abdomen: Soft, active bowel sounds. Musculoskeletal: No edema bilaterally.       Labs:   Recent Labs     07/06/22  0603 07/07/22  0643 07/08/22  0647   WBC 9.9 15.4* 10.1   HGB 8.5* 8.9* 8.1*   HCT 25.6* 27.6* 25.0*    291 213     Recent Labs     07/06/22  0603 07/07/22  0643 07/08/22  0648    141 145*   K 3.6 3.7 3.5   * 106 112*   CO2 18* 17* 19*   BUN 11 11 10   CREATININE 0.77 0.88 0.80   CALCIUM 8.3* 8.6 8.0*   PHOS 1.4* 1.5* 2.0*     No results for input(s): peripheral neuropathy, COPD, tobacco use who presented with     UTI  - urine culture is growing E. Coli and E.Cloacae  - blood cultures no growth  - on IV Rocephin    RICARDO with severe AG metabolic acidosis  - likely due to volume depletion, poor oral intake  - improved with IVFs  - kidney u/s showed right kidney atrophy  - nephrology signed-off    Acute metabolic encephalopathy  - likely delirium in the setting of RICARDO, UTI  - has no decision-making capacity per psychiatry    Anemia   - follow H/H    HTN / SVT  - Toprol increased   - cardiology signed-off    Diet: ADULT DIET; Regular;  No Added Salt (3-4 gm)  ADULT ORAL NUTRITION SUPPLEMENT; Dinner, Breakfast; Standard High Calorie/High Protein Oral Supplement  ADULT ORAL NUTRITION SUPPLEMENT; Lunch; Clear Liquid Oral Supplement    Code Status: Full Code      Disposition - SNF, / following          Electronically signed by Quin Araya MD on 7/8/2022 at 12:14 PM

## 2022-07-08 NOTE — PROGRESS NOTES
Comprehensive Nutrition Assessment    Type and Reason for Visit:  Reassess    Nutrition Recommendations/Plan:   1. Continue current POC     Malnutrition Assessment:  Malnutrition Status:  Severe malnutrition (07/05/22 1327)    Context:  Chronic Illness     Findings of the 6 clinical characteristics of malnutrition:  Energy Intake:  75% or less estimated energy requirements for 1 month or longer  Weight Loss:  Unable to assess (hx of stated weights)     Body Fat Loss:  Severe body fat loss Buccal region   Muscle Mass Loss:  Severe muscle mass loss Temples (temporalis)  Fluid Accumulation:  Unable to assess     Strength:  Not Performed    Nutrition Assessment:    Pt meets criteria for severe malnutrition evidenced by severe muscle/fat wasting, poor PO intake PTA and weight loss. UTD severity of weight loss d/t limited bed scale weights in EMR. RD to continue current diet and supplements. D/t pts current AMS UTD compliance with supplementation but PO intake seems to be improving. Nutrition Related Findings:    Pt presents with ongoing dysuria. Reported poor appetite/PO intake pta. Only drinks diet coke per daughter. Pmhx: Bilateral carotid artery disease, CAD, HLD, HTN, hypothyroidism, OA. Labs (7/5): Phos=1.5. Meds include megace and Synthroid. no edema. Intakes seem to be improving, but still variable. Currently pt with increasing confusion and is A&Ox0-1. UTD compliance with supplements. Wound Type: None       Current Nutrition Intake & Therapies:    Average Meal Intake: %,0%  Average Supplements Intake: Unable to assess  ADULT DIET; Regular;  No Added Salt (3-4 gm)  ADULT ORAL NUTRITION SUPPLEMENT; Dinner, Breakfast; Standard High Calorie/High Protein Oral Supplement  ADULT ORAL NUTRITION SUPPLEMENT; Lunch; Clear Liquid Oral Supplement    Anthropometric Measures:  Height: 5' 2\" (157.5 cm)  Ideal Body Weight (IBW): 110 lbs (50 kg)    Admission Body Weight: 110 lb (49.9 kg) (stated 7/2)  Current Body Weight: 95 lb 11.2 oz (43.4 kg) (7/3 bed),    Weight Source: Bed Scale  Current BMI (kg/m2): 17.5  Usual Body Weight: 125 lb (56.7 kg) (stated 9/10/21; 105 lb bed 2019)  % Weight Change (Calculated): -23.4                    BMI Categories: Underweight (BMI less than 22) age over 72    Estimated Daily Nutrient Needs:  Energy Requirements Based On: Kcal/kg  Weight Used for Energy Requirements: Current  Energy (kcal/day): 1300-1430kcal (30-33kcal/kg)  Weight Used for Protein Requirements: Current  Protein (g/day): 65g (1.5g/kg)  Method Used for Fluid Requirements: 1 ml/kcal  Fluid (ml/day): ~1430ml    Nutrition Diagnosis:   · Severe malnutrition,In context of social or environmental circumstances related to inadequate protein-energy intake as evidenced by poor intake prior to admission,severe muscle loss,severe loss of subcutaneous fat,weight loss      Nutrition Interventions:   Food and/or Nutrient Delivery: Continue Current Diet,Continue Oral Nutrition Supplement  Nutrition Education/Counseling: No recommendation at this time  Coordination of Nutrition Care: Continue to monitor while inpatient       Goals:     Goals: PO intake 50% or greater,other (specify)  Specify Other Goals: weight gain    Nutrition Monitoring and Evaluation:   Behavioral-Environmental Outcomes: None Identified  Food/Nutrient Intake Outcomes: Food and Nutrient Intake,Supplement Intake  Physical Signs/Symptoms Outcomes: Biochemical Data,Weight,Skin,Meal Time Behavior,Nutrition Focused Physical Findings    Discharge Planning:     Too soon to determine     Cameron Zarate, MS, RD, LD

## 2022-07-08 NOTE — PROGRESS NOTES
Physical Therapy Med Surg Daily Treatment Note  Facility/Department: [facility]  Room: [place] [address]       NAME: Cedric Roman  : 1945 (78 y.o.)  MRN: [id number]  CODE STATUS: Full Code    Date of Service: 2022    Patient Diagnosis(es): Anuria [D73]  Metabolic acidosis [G59.3]  RICARDO (acute kidney injury) ([address] [M27.5]  Complicated UTI (urinary tract infection) [N39.0]  Leukocytosis, unspecified type [D72.829]   Chief Complaint   Patient presents with    Dysuria     BURNING WITH Wilnette Coins. WAS TREATED W/ABX 3WEEKS AGO     Patient Active Problem List    Diagnosis Date Noted    Severe malnutrition ([address]     Complicated UTI (urinary tract infection) 2022    Acute kidney injury ([address]     Pneumonia due to COVID-19 virus 09/10/2021    Acute bronchitis 2020    Pancytopenia (HCC)     Peripheral polyneuropathy     Weight loss     TIA (transient ischemic attack) 2019    Bilateral carotid artery stenosis 09/15/2017    Hypothyroidism     Hypertension     Hyperlipidemia     Degenerative spondylolisthesis 2015    Lumbar stenosis with neurogenic claudication 2015        Past Medical History:   Diagnosis Date    Bilateral carotid artery disease ([address]     CAD (coronary artery disease)     Hyperlipidemia     Hypertension     Hypothyroidism     Osteoarthritis      Past Surgical History:   Procedure Laterality Date    CARDIAC CATHETERIZATION      SPINE SURGERY  2014    LUMBAR    THYROIDECTOMY, PARTIAL         Chart Reviewed: Yes  Family / Caregiver Present: No  General Comment  Comments: 1:1, Patient self limiting. Restrictions:       SUBJECTIVE:   Subjective: \"I just ach all over. \"    Pain   \"My back aches. \"    OBJECTIVE:        Bed mobility  Supine to Sit: Moderate assistance  Sit to Supine:  Moderate assistance (Poor direction following, requires vc's and tactile cueing to initiate proper technique.)  Bed Mobility Comments: Patient resistive to movements. Able to perform half transfer then requested to lay back down. Motivation by RN Ross Tirado to complete full transfer to supine to sit. Transfers  Comment: Declines, self limiting and requested to lay back down once Supine to Sit transfer was complete                                   Activity Tolerance  Activity Tolerance: Patient limited by pain;Treatment limited secondary to decreased cognition;Patient limited by fatigue          ASSESSMENT   Assessment: Patient self limiting and needed max encouragement from PTA and RN to participate. Poor follow through with cues and directions to improve bed mobility as well as poor tolerance once at EOB. Discharge Recommendations:  Continue to assess pending progress         Goals  Short Term Goals  Short term goal 1: The pt will perform bed mobility with min A  Short term goal 2: The pt will tolerate upright sitting posture at EOB with activity >/=5 mins    PLAN    Plan: 1 time a day 3-6 times a week  Safety Devices  Type of Devices: All fall risk precautions in place,Call light within reach,Nurse notified (1:1)     [facility] (6 CLICK) [address] Inpatient Mobility Raw Score : 11     Therapy Time   Individual   Time In 1335   Time Out 1350   Minutes 15      BM: [address] [place], [place], 07/08/22 at 1:58 PM         Definitions for assistance levels  Independent = pt does not require any physical supervision or assistance from another person for activity completion. Device may be needed.   Stand by assistance = pt requires verbal cues or instructions from another person, close to but not touching, to perform the activity  Minimal assistance= pt performs 75% or more of the activity; assistance is required to complete the activity  Moderate assistance= pt performs 50% of the activity; assistance is required to complete the activity  Maximal assistance = pt performs 25% of the activity; assistance is required to complete the activity  Dependent = pt requires total physical assistance to accomplish the task

## 2022-07-08 NOTE — PROGRESS NOTES
[facility] Daily Progress Note  Name: Malia Gallo  Age: 78 y.o. Gender: female  CodeStatus: Full Code    Primary Cardiology: Dr. Zina Choudhury MD  [address] [facility] Cardiology : Dr. Ericka Burch APRN-CNP  Primary Care Provider: Zac Nieto MD  Admission Date: 7/2/2022     Cardiologist note  I have personally performed a complete face to face history and physical on this patient. I have reviewed the notations as entered by ELLIOT [place] I have personally  formulated the impression and plan on this patient and amended as necessary. Ajay Alvarado MD [facility]    Patient without symptoms at this time. She refuses to wear the monitor regularly. For a short period time she did and there has been no recurrence of SVT. She has been on 50 of Toprol at home. She was increased to 50 twice daily but she is rather frail to 78years of age. She currently feels quite well and had no symptoms when the SVT was noted. Exam is without evidence of congestive heart failure at this time heart rate is regular at 75-80. She has no murmurs to suggest valvular heart disease echo was noted to have normal LV function  Current blood pressure heart rate are normal after just 1 dose of 100 mg in 24 hours. Given that she is rather frail would increase to 75 daily rather than 100 with further increase at a later date. Ideally would have an outpatient monitor but she will not even wear telemetry in the hospital for an hour or 2. Hemodynamically she has done quite well we will plan to have cardiology sign off the case she will have follow-up with cardiology not too distant future. Oneida Carias MD      Chief complaint/Associated symptoms: Michelle Looney was seen and examined at bedside    Assessment:  1. SVT: One noted episode of SVT 7/7/2022. Started on Toprol and magnesium. She has been refusing her tele  Monitor most of the time, but for the brief period of time she has had it on there is no SVT noted.      2. Anemia: Hgb/Hct 8.1/25.0    3. Change in mental status/ Possible secondary to UTI/underlying dementia. More alert today. Plan:  1. Monitor on telemetry    2. Change Troprol XL to 75 mg PO daily starting tomorrow 7/9/2022    3. Cardiology to sign off, follow up with Dr. Edwardo Dasilva office     4. Further recommendations per Dr. Colletta Chimes. HPI:   Patient was admitted because of urinary symptoms, and was placed on telemetry. At that time, the patient did have a brief run of supraventricular tachycardia. It was quite rapid, but did not have the classic appearance of atrial fibrillation. It was self-limited. Unfortunately, since that time, the patient has been refusing her telemetry. We do not have any telemetry over the last several hours     Upon our conversation, the patient is awake and alert and able answer questions appropriately. [place] any chest pain. She denies any resting shortness of breath, but apparently gets little short of breath when she goes upstairs. She denies any unexplained lightheaded dizziness or palpitations.     This patient follows up with Dr. Baron Landau at [facility].  Patient was last seen in 2019. The patient has a history of \"nonischemic cardiomyopathy, but recent echocardiogram shows LV function is normal.  Liliya work-up is notable for in 2014 abnormal stress test, and an echocardiogram at that time shows normal LV function, some discrete upper septal thickening, but no outflow tract obstruction. A mild gradient through the aortic valve, and mild mitral insufficiency. She has a history of stage III renal insufficiency, but her creatinine upon admission is normal.  She has a history of thyroid ectomy. Some lumbar surgery, and a history of DVT.   However the she had a negative venous duplex in 2021.     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     EKG does show some ST and T wave changes, but is very similar to what she has had in the past.     The patient is apparently had some changes in her disposition. However she is very pleasant to me. However patient is refusing to wear a monitor and psychiatry was consulted. Physical Exam  Constitutional:  Well developed, awake/alert/oriented x3, no distress, alert and cooperative. Respiratory/Thorax: Patent airways, CTAB,  normal breath sounds with good chest expansion, thorax symmetric. Cardiovascular: Regular, rate and rhythm, no murmurs,  normal S1 and S2, PMI non displaced. Gastrointestinal:  Non distended, soft, non-tender, no rebound tenderness or guarding, no masses palpable, no organomegaly. Morbidly obese. Genitourinary:  deferred  Musculoskeletal:  No apparent injury. Extremities:  No cyanosis, edema, contusions or wounds, no clubbing. Neurological:  Alert and oriented x3. Moves extremities spontaneous with purpose  Psychological:  Appropriate mood and behavior  Skin:  Warm and dry,  no lesions or rashes. [facility] Medication List 3/2019   1. Aleve 220 MG Oral Capsule; as needed; Therapy: (Recorded:16Oct2017) to Recorded   2. amLODIPine Besylate 10 MG Oral Tablet; TAKE 1 TABLET DAILY; Therapy: (Recorded:[id number]) to Recorded   3. Atorvastatin Calcium 40 MG Oral Tablet; TAKE 1 TABLET DAILY AT BEDTIME; Therapy: [id number] to (Evaluate:29Jan2017)  Requested for: 63OKZ8751; Last   Rx:[id number] Ordered   4. Fosinopril Sodium 10 MG Oral Tablet; TAKE 1 TABLET TWICE DAILY; Therapy: 60OFO7640 to (Evaluate:20Oct2019)  Requested for: 26Oct2018; Last   Rx:[id number] Ordered   5. Gabapentin 800 MG Oral Tablet; take 1 tablet daily; Therapy: (Recorded:16Oct2017) to Recorded   6. Isosorbide Mononitrate ER 30 MG Oral Tablet Extended Release 24 Hour; Take 1 tablet   by mouth daily;    Therapy: 09XAP8993 to (Evaluate:56Mzb4464)  Requested for: 18Dlt2688; Last   Rx:[id number]; results for input(s): AST, ALT, BILIDIR, BILITOT, ALKPHOS in the last 72 hours. No results for input(s): INR in the last 72 hours. Recent Labs     07/08/22  0648   CKTOTAL 34   TROPONINI <0.010       Urinalysis:   Lab Results   Component Value Date/Time    NITRU Negative 09/10/2021 11:30 AM    WBCUA 0-2 09/10/2021 11:30 AM    BACTERIA Negative 09/10/2021 11:30 AM    RBCUA 0-2 09/10/2021 11:30 AM    BLOODU MODERATE 09/10/2021 11:30 AM    SPECGRAV 1.020 09/10/2021 11:30 AM    GLUCOSEU Negative 09/10/2021 11:30 AM       Radiology:     Portable: Results for orders placed during the hospital encounter of 07/02/22    XR CHEST PORTABLE    Narrative  XR CHEST PORTABLE    Clinical History:  Failure to thrive. Comparison:  9/10/2021. RESULT:    No consolidation. Hyperinflated lungs. Mildly coarsened lung markings, unchanged. No large pleural effusion. No pneumothorax. Stable cardiomediastinal silhouette. Aortic vascular calcifications. No distinct acute osseous findings. Impression  No acute radiographic abnormality. Active Hospital Problems    Diagnosis Date Noted    Severe malnutrition ([facility] [address] [E43] 07/07/2022     Priority: Medium    Complicated UTI (urinary tract infection) [N39.0] 07/02/2022     Priority: Medium       Additional work up or/and treatment plan may be added today or then after based on clinical progression. I am managing a portion of pt care. Some medical issues are handled by other specialists. Additional work up and treatment should be done in out pt setting by pt PCP and other out pt providers. In addition to examining and evaluating pt, I spent additional time explaining care, normaland abnormal findings, and treatment plan. All of pt questions were answered. Counseling, diet and education were provided. Case will be discussed with nursing staff when appropriate. Family will be updated if and whenappropriate.       Electronically signed by VALARIE Perdue CNP on 7/8/2022 at 11:04 AM

## 2022-07-08 NOTE — CARE COORDINATION
I notified [facility] Loyd liaison that PT, OT notes are completed. Pre-cert to be initiated for [employer].  LSW/ CM to follow.   Electronically signed by MAYTE Johnson, ZULEIKAW on 7/8/22 at 4:25 PM EDT

## 2022-07-08 NOTE — PROGRESS NOTES
PT A&OX1 she was able to tell me her name and birth date. Pt refused to let  Me place her tele monitor back on her, Allison Bustillo RN the night nurse is going to try to see if pt will allow her to place it back on. VSS and assessment complete. Pt stated she had pain in her thighs but could not give a number due to her confusion, tylenol was given for the pain. Required med's were given. Pt denied any further needs and will continue to monitor.  Electronically signed by Dallin Pierson RN on 7/7/2022 at 8:07 PM

## 2022-07-08 NOTE — PLAN OF CARE
Nutrition Problem #1: Severe malnutrition,In context of social or environmental circumstances  Intervention: Food and/or Nutrient Delivery: Continue Current Diet,Continue Oral Nutrition Supplement  Nutritional  goals: PO intake >75%. Weight gain.

## 2022-07-09 ENCOUNTER — APPOINTMENT (OUTPATIENT)
Dept: CT IMAGING | Age: 77
DRG: 871 | End: 2022-07-09
Payer: MEDICARE

## 2022-07-09 LAB
ALBUMIN SERPL-MCNC: 2.4 G/DL (ref 3.5–4.6)
ANION GAP SERPL CALCULATED.3IONS-SCNC: 13 MEQ/L (ref 9–15)
BUN BLDV-MCNC: 12 MG/DL (ref 8–23)
C-REACTIVE PROTEIN, HIGH SENSITIVITY: 52.5 MG/L (ref 0–5)
CALCIUM SERPL-MCNC: 7.7 MG/DL (ref 8.5–9.9)
CHLORIDE BLD-SCNC: 109 MEQ/L (ref 95–107)
CO2: 19 MEQ/L (ref 20–31)
CREAT SERPL-MCNC: 0.97 MG/DL (ref 0.5–0.9)
GFR AFRICAN AMERICAN: >60
GFR NON-AFRICAN AMERICAN: 55.9
GLUCOSE BLD-MCNC: 96 MG/DL (ref 70–99)
HCT VFR BLD CALC: 24 % (ref 37–47)
HEMOGLOBIN: 7.7 G/DL (ref 12–16)
MAGNESIUM: 2 MG/DL (ref 1.7–2.4)
MCH RBC QN AUTO: 29.1 PG (ref 27–31.3)
MCHC RBC AUTO-ENTMCNC: 32.1 % (ref 33–37)
MCV RBC AUTO: 90.8 FL (ref 82–100)
PDW BLD-RTO: 21.1 % (ref 11.5–14.5)
PHOSPHORUS: 1.7 MG/DL (ref 2.3–4.8)
PLATELET # BLD: 208 K/UL (ref 130–400)
POTASSIUM SERPL-SCNC: 3.7 MEQ/L (ref 3.4–4.9)
PROCALCITONIN: 1.98 NG/ML (ref 0–0.15)
RBC # BLD: 2.65 M/UL (ref 4.2–5.4)
SODIUM BLD-SCNC: 141 MEQ/L (ref 135–144)
WBC # BLD: 8.8 K/UL (ref 4.8–10.8)

## 2022-07-09 PROCEDURE — 36415 COLL VENOUS BLD VENIPUNCTURE: CPT

## 2022-07-09 PROCEDURE — 83735 ASSAY OF MAGNESIUM: CPT

## 2022-07-09 PROCEDURE — 80069 RENAL FUNCTION PANEL: CPT

## 2022-07-09 PROCEDURE — 85027 COMPLETE CBC AUTOMATED: CPT

## 2022-07-09 PROCEDURE — 6370000000 HC RX 637 (ALT 250 FOR IP): Performed by: NURSE PRACTITIONER

## 2022-07-09 PROCEDURE — 84145 PROCALCITONIN (PCT): CPT

## 2022-07-09 PROCEDURE — 6370000000 HC RX 637 (ALT 250 FOR IP): Performed by: INTERNAL MEDICINE

## 2022-07-09 PROCEDURE — 86141 C-REACTIVE PROTEIN HS: CPT

## 2022-07-09 PROCEDURE — 6360000002 HC RX W HCPCS: Performed by: PSYCHIATRY & NEUROLOGY

## 2022-07-09 PROCEDURE — 70450 CT HEAD/BRAIN W/O DYE: CPT

## 2022-07-09 PROCEDURE — 1210000000 HC MED SURG R&B

## 2022-07-09 RX ORDER — HALOPERIDOL 5 MG/ML
1 INJECTION INTRAMUSCULAR EVERY 6 HOURS PRN
Status: DISCONTINUED | OUTPATIENT
Start: 2022-07-09 | End: 2022-07-13 | Stop reason: HOSPADM

## 2022-07-09 RX ORDER — HALOPERIDOL 5 MG/ML
2 INJECTION INTRAMUSCULAR EVERY 6 HOURS PRN
Status: DISCONTINUED | OUTPATIENT
Start: 2022-07-09 | End: 2022-07-13 | Stop reason: HOSPADM

## 2022-07-09 RX ORDER — HALOPERIDOL 5 MG/ML
2 INJECTION INTRAMUSCULAR EVERY 6 HOURS PRN
Status: DISCONTINUED | OUTPATIENT
Start: 2022-07-09 | End: 2022-07-09

## 2022-07-09 RX ADMIN — HALOPERIDOL LACTATE 1 MG: 5 INJECTION, SOLUTION INTRAMUSCULAR at 20:03

## 2022-07-09 RX ADMIN — ISOSORBIDE MONONITRATE 30 MG: 30 TABLET, EXTENDED RELEASE ORAL at 09:54

## 2022-07-09 RX ADMIN — Medication 400 MG: at 09:53

## 2022-07-09 RX ADMIN — ACETAMINOPHEN 650 MG: 325 TABLET ORAL at 06:05

## 2022-07-09 RX ADMIN — METOPROLOL SUCCINATE 75 MG: 50 TABLET, EXTENDED RELEASE ORAL at 09:53

## 2022-07-09 RX ADMIN — LEVOTHYROXINE SODIUM 100 MCG: 0.1 TABLET ORAL at 06:05

## 2022-07-09 RX ADMIN — ACETAMINOPHEN 650 MG: 325 TABLET ORAL at 17:19

## 2022-07-09 ASSESSMENT — PAIN DESCRIPTION - LOCATION: LOCATION: LEG

## 2022-07-09 ASSESSMENT — PAIN SCALES - GENERAL
PAINLEVEL_OUTOF10: 0
PAINLEVEL_OUTOF10: 5
PAINLEVEL_OUTOF10: 2

## 2022-07-09 ASSESSMENT — PAIN DESCRIPTION - ORIENTATION: ORIENTATION: LEFT

## 2022-07-09 NOTE — PROGRESS NOTES
Physical Therapy Missed Treatment   Facility/Department: [facility] MED SURG X011/[id number]    NAME: Jessie Miller    : 1945 (78 y.o.)  MRN: [id number]    Account: [de-identified]  Gender: female    Chart reviewed, attempted PT at 36 Patient unavailable 2° to:        [x] Pt declined adamantly this date. Patient is very self limiting and declines attempts made to bed mobility and strengthening exercises in bed. \"I don't know, I don't think I need to do that right now\" patient states. Continued to educate patient on importance of movement and changing positions to help with pain, fatigue, and weakness. Will attempt PT treatment again at earliest convenience.       Electronically signed by Martinez Truong PTA on 22 at 9:35 AM EDT

## 2022-07-09 NOTE — PROGRESS NOTES
Monitor room reported 8 beat run V tach. Informed Dr Sanket Kinney via Otila Forget. No new orders.   Electronically signed by Rogelio Pitts RN on 7/9/2022 at 3:54 PM

## 2022-07-09 NOTE — PROGRESS NOTES
NEURO CONSULT CALLED TO DR CHONG VIA ANSWERING SERVICE Electronically signed by Yonny Olivia on 7/9/2022 at 4:39 PM

## 2022-07-09 NOTE — PROGRESS NOTES
Assessment completed this shift. Alert and oriented x1. Patient's mood and willingness to cooperate fluctuates from pleasantly answeing questions to angrily declining medications. Incontinent of small black stool this am. Bathed and linens changed. Hgb 7.7 this am. PerfectServ to Dr Wally Sharp.   Electronically signed by Joanna George RN on 7/9/2022 at 11:43 AM

## 2022-07-09 NOTE — PROGRESS NOTES
Pt in b ed Avasyis camera in place for safety. Pt alert to self. Can be impulsive at times. meds given per orders. Vitals are stable. Incontinent of bladder. resistant to care at times. Fall precautions in place. Hourly rounds continue. Call light in reach.

## 2022-07-09 NOTE — PLAN OF CARE
Problem: Discharge Planning  Goal: Discharge to home or other facility with appropriate resources  Outcome: Progressing  Flowsheets (Taken 7/8/2022 1056 by Fernando Paz, RN)  Discharge to home or other facility with appropriate resources: Identify barriers to discharge with patient and caregiver     Problem: Safety - Adult  Goal: Free from fall injury  Outcome: Progressing     Problem: Pain  Goal: Verbalizes/displays adequate comfort level or baseline comfort level  Outcome: Progressing     Problem: Nutrition Deficit:  Goal: Optimize nutritional status  7/8/2022 2248 by Julia Aguilar RN  Outcome: Progressing  7/8/2022 1230 by Vanessa Reynoso, MS, RD, LD  Flowsheets (Taken 7/5/2022 1348)  Nutrient intake appropriate for improving, restoring, or maintaining nutritional needs:   Assess nutritional status and recommend course of action   Monitor oral intake, labs, and treatment plans   Recommend appropriate diets, oral nutritional supplements, and vitamin/mineral supplements   Order, calculate, and assess calorie counts as needed   Recommend, monitor, and adjust tube feedings and TPN/PPN based on assessed needs   Provide specific nutrition education to patient or family as appropriate

## 2022-07-09 NOTE — PROGRESS NOTES
Physical Therapy Missed Treatment   Facility/Department: [facility] MED SURG -17    NAME: Skye Cantu    : 1945 (78 y.o.)  MRN: [id number]    Account: [de-identified]  Gender: female    Chart reviewed, attempted PT at 1101. Patient unavailable 2° to:    [] Hold per nsg request    [x] Pt declined pt adamantly declines to participate, pt yelling at daughter and very agitated at this time. Max encouragement and education provided, pt still unwilling. [x] Nsg notified   [] Other notified    [] Pt. . off floor for test/procedure. [] Pt. Unavailable       Will attempt PT treatment again at earliest convenience.       Electronically signed by Meredith Colunga PTA on 22 at 12:07 PM EDT

## 2022-07-09 NOTE — PROGRESS NOTES
Hospitalist Progress Note      PCP: Espinoza German MD    Date of Admission: 7/2/2022    Chief Complaint:  Patient is more confused today, is alert only to self, afebrile, stable HD    Medications:  Reviewed    Infusion Medications    dextrose      sodium chloride       Scheduled Medications    metoprolol succinate  75 mg Oral Daily    magnesium oxide  400 mg Oral Daily    megestrol acetate  400 mg Oral Daily    heparin (porcine)  5,000 Units SubCUTAneous BID    sodium chloride flush  5-40 mL IntraVENous 2 times per day    isosorbide mononitrate  30 mg Oral Daily    levothyroxine  100 mcg Oral Daily     PRN Meds: haloperidol lactate, glucose, dextrose bolus **OR** dextrose bolus, glucagon (rDNA), dextrose, sodium chloride flush, sodium chloride, ondansetron **OR** ondansetron, acetaminophen **OR** acetaminophen, polyethylene glycol, albuterol sulfate HFA    No intake or output data in the 24 hours ending 07/09/22 1306    Exam:    BP (!) 131/54   Pulse 88   Temp 97.9 °F (36.6 °C) (Oral)   Resp 18   Ht 5' 2\" (1.575 m)   Wt 94 lb 6.4 oz (42.8 kg)   LMP  (LMP Unknown)   SpO2 100%   BMI 17.27 kg/m²     General appearance: appears stated age and cooperative. Respiratory:  clear to auscultation bilaterally . Cardiovascular: Regular rate and rhythm, S1/S2 . Abdomen: Soft, active bowel sounds. Musculoskeletal: No edema bilaterally. Labs:   Recent Labs     07/07/22  0643 07/08/22  0647 07/09/22  0553   WBC 15.4* 10.1 8.8   HGB 8.9* 8.1* 7.7*   HCT 27.6* 25.0* 24.0*    213 208     Recent Labs     07/07/22  0643 07/08/22  0648 07/09/22  0456    145* 141   K 3.7 3.5 3.7    112* 109*   CO2 17* 19* 19*   BUN 11 10 12   CREATININE 0.88 0.80 0.97*   CALCIUM 8.6 8.0* 7.7*   PHOS 1.5* 2.0* 1.7*     No results for input(s): AST, ALT, BILIDIR, BILITOT, ALKPHOS in the last 72 hours. No results for input(s): INR in the last 72 hours.   Recent Labs     07/08/22  0648   CKTOTAL 34   TROPONINI <0.010       Urinalysis:      Lab Results   Component Value Date/Time    NITRU Negative 09/10/2021 11:30 AM    WBCUA 0-2 09/10/2021 11:30 AM    BACTERIA Negative 09/10/2021 11:30 AM    RBCUA 0-2 09/10/2021 11:30 AM    BLOODU MODERATE 09/10/2021 11:30 AM    SPECGRAV 1.020 09/10/2021 11:30 AM    GLUCOSEU Negative 09/10/2021 11:30 AM       Radiology:  US RETROPERITONEAL COMPLETE   Final Result   Addendum 1 of 1   Addendum:      Reason for addendum is typographical error in the impression of the    report. Here is a new report:      EXAMINATION:   RENAL ULTRASOUND       HISTORY:  Acute renal failure. TECHNIQUE:  Sonography of the kidneys was performed. Images were obtained    and stored in a permanent archive. COMPARISON: CT 9/10/2021. RESULT:      Right Kidney:         -Renal length: 6.0 cm        -Parenchyma: Parenchyma echogenicity is increased. Diffuse    parenchymal thinning present measuring around 5 mm.         -Collecting system: No hydronephrosis. -Calculus: Echogenic lower pole calculus, similar to the prior CT.         -Lesion:  1.2 cm cyst, unchanged. Left Kidney:             -Renal length: 10.0 cm        -Parenchyma: Normal parenchymal echogenicity. Normal parenchymal    thickness measuring around 12 mm.         -Collecting system: No hydronephrosis. -Calculus: No echogenic, shadowing calculus.         -Lesion:  None. Bladder: Decompressed. Final      Right nephrolithiasis. No hydronephrosis. Severe right RENAL ATROPHY, unchanged. XR CHEST PORTABLE   Final Result      No acute radiographic abnormality. CT HEAD WO CONTRAST    (Results Pending)           Assessment/Plan:    79 y/o female with history of HTN, TIA, carotid artery stenosis, right kidney atrophy, COVID pneumonia, peripheral neuropathy, COPD, tobacco use who presented with     UTI  - urine culture is growing E. Coli and E.Cloacae  - blood cultures no growth  - completed course of IV Rocephin    RICARDO with severe AG metabolic acidosis  - likely due to volume depletion, poor oral intake  - improved with IVFs  - kidney u/s showed right kidney atrophy  - nephrology signed-off    Acute metabolic encephalopathy  - likely delirium in the setting of RICARDO, UTI  - has no decision-making capacity per psychiatry  - getting worse  - CT head was negative  - neurology consulted    Anemia   - follow H/H    HTN / SVT / NSVT  - Toprol increased   - cardiology signed-off    Diet: ADULT DIET; Regular;  No Added Salt (3-4 gm)  ADULT ORAL NUTRITION SUPPLEMENT; Dinner, Breakfast; Standard High Calorie/High Protein Oral Supplement  ADULT ORAL NUTRITION SUPPLEMENT; Lunch; Clear Liquid Oral Supplement    Code Status: Full Code      Disposition - SNF, / following          Electronically signed by Quin Araya MD on 7/9/2022 at 1:06 PM

## 2022-07-10 LAB
ALBUMIN SERPL-MCNC: 2.8 G/DL (ref 3.5–4.6)
ANION GAP SERPL CALCULATED.3IONS-SCNC: 13 MEQ/L (ref 9–15)
BUN BLDV-MCNC: 12 MG/DL (ref 8–23)
C-REACTIVE PROTEIN, HIGH SENSITIVITY: 101.4 MG/L (ref 0–5)
CALCIUM SERPL-MCNC: 8.3 MG/DL (ref 8.5–9.9)
CHLORIDE BLD-SCNC: 104 MEQ/L (ref 95–107)
CO2: 22 MEQ/L (ref 20–31)
CREAT SERPL-MCNC: 0.83 MG/DL (ref 0.5–0.9)
GFR AFRICAN AMERICAN: >60
GFR NON-AFRICAN AMERICAN: >60
GLUCOSE BLD-MCNC: 106 MG/DL (ref 70–99)
GLUCOSE BLD-MCNC: 85 MG/DL (ref 70–99)
GLUCOSE BLD-MCNC: 93 MG/DL (ref 70–99)
GLUCOSE BLD-MCNC: 94 MG/DL (ref 70–99)
GLUCOSE BLD-MCNC: 97 MG/DL (ref 70–99)
HCT VFR BLD CALC: 27.5 % (ref 37–47)
HEMOGLOBIN: 8.8 G/DL (ref 12–16)
MAGNESIUM: 2 MG/DL (ref 1.7–2.4)
MCH RBC QN AUTO: 29.1 PG (ref 27–31.3)
MCHC RBC AUTO-ENTMCNC: 31.9 % (ref 33–37)
MCV RBC AUTO: 91 FL (ref 82–100)
PDW BLD-RTO: 21.5 % (ref 11.5–14.5)
PERFORMED ON: ABNORMAL
PERFORMED ON: NORMAL
PHOSPHORUS: 2.2 MG/DL (ref 2.3–4.8)
PLATELET # BLD: 230 K/UL (ref 130–400)
POTASSIUM SERPL-SCNC: 4.5 MEQ/L (ref 3.4–4.9)
PROCALCITONIN: 1.37 NG/ML (ref 0–0.15)
RBC # BLD: 3.02 M/UL (ref 4.2–5.4)
SODIUM BLD-SCNC: 139 MEQ/L (ref 135–144)
WBC # BLD: 11.9 K/UL (ref 4.8–10.8)

## 2022-07-10 PROCEDURE — 6370000000 HC RX 637 (ALT 250 FOR IP): Performed by: NURSE PRACTITIONER

## 2022-07-10 PROCEDURE — 85027 COMPLETE CBC AUTOMATED: CPT

## 2022-07-10 PROCEDURE — 83735 ASSAY OF MAGNESIUM: CPT

## 2022-07-10 PROCEDURE — 36415 COLL VENOUS BLD VENIPUNCTURE: CPT

## 2022-07-10 PROCEDURE — 1210000000 HC MED SURG R&B

## 2022-07-10 PROCEDURE — 84145 PROCALCITONIN (PCT): CPT

## 2022-07-10 PROCEDURE — 2580000003 HC RX 258: Performed by: INTERNAL MEDICINE

## 2022-07-10 PROCEDURE — 80069 RENAL FUNCTION PANEL: CPT

## 2022-07-10 PROCEDURE — 6370000000 HC RX 637 (ALT 250 FOR IP): Performed by: INTERNAL MEDICINE

## 2022-07-10 PROCEDURE — 86141 C-REACTIVE PROTEIN HS: CPT

## 2022-07-10 PROCEDURE — 99222 1ST HOSP IP/OBS MODERATE 55: CPT | Performed by: PSYCHIATRY & NEUROLOGY

## 2022-07-10 RX ADMIN — Medication 10 ML: at 08:17

## 2022-07-10 RX ADMIN — MEGESTROL ACETATE 400 MG: 40 SUSPENSION ORAL at 08:15

## 2022-07-10 RX ADMIN — ACETAMINOPHEN 650 MG: 325 TABLET ORAL at 08:15

## 2022-07-10 RX ADMIN — METOPROLOL SUCCINATE 75 MG: 50 TABLET, EXTENDED RELEASE ORAL at 08:16

## 2022-07-10 RX ADMIN — ISOSORBIDE MONONITRATE 30 MG: 30 TABLET, EXTENDED RELEASE ORAL at 08:16

## 2022-07-10 RX ADMIN — Medication 400 MG: at 08:16

## 2022-07-10 RX ADMIN — LEVOTHYROXINE SODIUM 100 MCG: 0.1 TABLET ORAL at 06:46

## 2022-07-10 ASSESSMENT — PAIN DESCRIPTION - DESCRIPTORS: DESCRIPTORS: ACHING

## 2022-07-10 ASSESSMENT — ENCOUNTER SYMPTOMS
SHORTNESS OF BREATH: 0
VOMITING: 0
COLOR CHANGE: 0
TROUBLE SWALLOWING: 0
CHOKING: 0
NAUSEA: 0
PHOTOPHOBIA: 0
BACK PAIN: 0

## 2022-07-10 ASSESSMENT — PAIN DESCRIPTION - ORIENTATION: ORIENTATION: RIGHT;LEFT

## 2022-07-10 ASSESSMENT — PAIN SCALES - GENERAL
PAINLEVEL_OUTOF10: 3
PAINLEVEL_OUTOF10: 3
PAINLEVEL_OUTOF10: 0

## 2022-07-10 ASSESSMENT — PAIN DESCRIPTION - LOCATION: LOCATION: LEG

## 2022-07-10 NOTE — CONSULTS
Subjective:      Patient ID: Marky Portillo is a 78 y.o. female who presents today for confusion. HPI 78 right-handed female admitted with a tract infection recently she became very confused and agitated and therefore my consultation CT scan of the head was obtained which appears to be normal.  This morning she completely appears to be normal and oriented. She gives me a good history. She tells me she had COVID a few weeks ago and became generalized weak. She had fatigue and was not able to walk very well. She is now recovering from the same. Is likely that she may have had some neuritis with the same but has not had any sustained issues. She lives with her daughter and reports she is independent though we will confirm this with her daughter regarding her memory. She does have risk factors of vascular disease and underlying subtle vascular dementia with worsening. Secondary to intact infection is more likely. She is otherwise nonfocal examination. Review of Systems   Constitutional: Negative for fever. HENT: Negative for ear pain, tinnitus and trouble swallowing. Eyes: Negative for photophobia and visual disturbance. Respiratory: Negative for choking and shortness of breath. Cardiovascular: Negative for chest pain and palpitations. Gastrointestinal: Negative for nausea and vomiting. Musculoskeletal: Negative for back pain, gait problem, joint swelling, myalgias, neck pain and neck stiffness. Skin: Negative for color change. Allergic/Immunologic: Negative for food allergies. Neurological: Negative for dizziness, tremors, seizures, syncope, facial asymmetry, speech difficulty, weakness, light-headedness, numbness and headaches. Psychiatric/Behavioral: Negative for behavioral problems, confusion, hallucinations and sleep disturbance.        Past Medical History:   Diagnosis Date    Bilateral carotid artery disease ([facility] [address]     CAD (coronary artery disease)     Hyperlipidemia Emotionally Abused: Not on file    Physically Abused: Not on file    Sexually Abused: Not on file   Housing Stability:     Unable to Pay for Housing in the Last Year: Not on file    Number of Places Lived in the Last Year: Not on file    Unstable Housing in the Last Year: Not on file     History reviewed. No pertinent family history.   Allergies   Allergen Reactions    Bee Venom     Pcn [Penicillins] Hives and Itching     Current Facility-Administered Medications   Medication Dose Route Frequency Provider Last Rate Last Admin    haloperidol lactate (HALDOL) injection 1 mg  1 mg IntraMUSCular Q6H PRN Azul Butt, APRN - CNP        Or    haloperidol lactate (HALDOL) injection 2 mg  2 mg IntraMUSCular Q6H PRN Azul Butt, APRN - CNP        metoprolol succinate (TOPROL XL) extended release tablet 75 mg  75 mg Oral Daily Rutherford Beverage, APRN - CNP   75 mg at 07/10/22 0816    magnesium oxide (MAG-OX) tablet 400 mg  400 mg Oral Daily Vaibhav Harris MD   400 mg at 07/10/22 0816    glucose chewable tablet 16 g  4 tablet Oral PRN Donnamaria Malady, DO        dextrose bolus 10% 125 mL  125 mL IntraVENous PRN Donnamaria Malady, DO        Or    dextrose bolus 10% 250 mL  250 mL IntraVENous PRN Donnamaria Malady, DO        glucagon (rDNA) injection 1 mg  1 mg IntraMUSCular PRN Donnamaria Malady, DO        dextrose 5 % solution  100 mL/hr IntraVENous PRN Donnamaria Malady, DO        megestrol acetate (MEGACE) 400 MG/10ML suspension 400 mg  400 mg Oral Daily Rashawn Ham, DO   400 mg at 07/10/22 0815    [Held by provider] heparin (porcine) injection 5,000 Units  5,000 Units SubCUTAneous BID Donnamaria Malady, DO   5,000 Units at 07/08/22 0843    sodium chloride flush 0.9 % injection 5-40 mL  5-40 mL IntraVENous 2 times per day Donnamaria Malady, DO   10 mL at 07/10/22 0817    sodium chloride flush 0.9 % injection 5-40 mL  5-40 mL IntraVENous PRN Donnamaria Malady, DO        0.9 % sodium chloride infusion   IntraVENous PRN Libby BRADFORD Haven Blinks, DO        ondansetron (ZOFRAN-ODT) disintegrating tablet 4 mg  4 mg Oral Q8H PRN Ulysees Comings, DO        Or    ondansetron [facility]) injection 4 mg  4 mg IntraVENous Q6H PRN Ulysees Comings, DO        acetaminophen (TYLENOL) tablet 650 mg  650 mg Oral Q6H PRN Ulysees Comings, DO   650 mg at 07/10/22 0815    Or    acetaminophen (TYLENOL) suppository 650 mg  650 mg Rectal Q6H PRN Ulysees Comings, DO        polyethylene glycol (GLYCOLAX) packet 17 g  17 g Oral Daily PRN Ulysees Comings, DO        albuterol sulfate HFA (PROVENTIL;VENTOLIN;PROAIR) 108 (90 Base) MCG/ACT inhaler 2 puff  2 puff Inhalation Q6H PRN Ulysees Comings, DO   2 puff at 07/08/22 2053    isosorbide mononitrate (IMDUR) extended release tablet 30 mg  30 mg Oral Daily Ulysees Comings, DO   30 mg at 07/10/22 0816    levothyroxine (SYNTHROID) tablet 100 mcg  100 mcg Oral Daily Ulysees Comings, DO   100 mcg at 07/10/22 0646        Objective:   BP (!) 156/68   Pulse 99   Temp 98.1 °F (36.7 °C) (Oral)   Resp 18   Ht 5' 2\" (1.575 m)   Wt 94 lb 6.4 oz (42.8 kg)   LMP  (LMP Unknown)   SpO2 100%   BMI 17.27 kg/m²     Physical Exam  Vitals reviewed. Eyes:      Pupils: Pupils are equal, round, and reactive to light. Cardiovascular:      Rate and Rhythm: Normal rate and regular rhythm. Heart sounds: No murmur heard. Pulmonary:      Effort: Pulmonary effort is normal.      Breath sounds: Normal breath sounds. Abdominal:      General: Bowel sounds are normal.   Musculoskeletal:         General: Normal range of motion. Cervical back: Normal range of motion. Skin:     General: Skin is warm. Neurological:      Mental Status: She is alert and oriented to person, place, and time. Cranial Nerves: No cranial nerve deficit. Sensory: No sensory deficit. Motor: No abnormal muscle tone. Coordination: Coordination normal.      Deep Tendon Reflexes: Reflexes are normal and symmetric. Babinski sign absent on the right side. Babinski sign absent on the left side. Psychiatric:         Mood and Affect: Mood normal.         XR CHEST PORTABLE    Result Date: 7/2/2022  XR CHEST PORTABLE Clinical History:  Failure to thrive. Comparison:  9/10/2021. RESULT: No consolidation. Hyperinflated lungs. Mildly coarsened lung markings, unchanged. No large pleural effusion. No pneumothorax. Stable cardiomediastinal silhouette. Aortic vascular calcifications. No distinct acute osseous findings. No acute radiographic abnormality. US RETROPERITONEAL COMPLETE    Addendum Date: 7/3/2022    Addendum: Reason for addendum is typographical error in the impression of the report. Here is a new report: EXAMINATION:   RENAL ULTRASOUND HISTORY:  Acute renal failure. TECHNIQUE:  Sonography of the kidneys was performed. Images were obtained and stored in a permanent archive. COMPARISON: CT 9/10/2021. RESULT: Right Kidney:      -Renal length: 6.0 cm      -Parenchyma: Parenchyma echogenicity is increased. Diffuse parenchymal thinning present measuring around 5 mm.      -Collecting system: No hydronephrosis. -Calculus: Echogenic lower pole calculus, similar to the prior CT.      -Lesion:  1.2 cm cyst, unchanged. Left Kidney:          -Renal length: 10.0 cm      -Parenchyma: Normal parenchymal echogenicity. Normal parenchymal thickness measuring around 12 mm.      -Collecting system: No hydronephrosis. -Calculus: No echogenic, shadowing calculus.      -Lesion:  None. Bladder: Decompressed. Result Date: 7/3/2022  EXAMINATION:   RENAL ULTRASOUND HISTORY:  Acute renal failure. TECHNIQUE:  Sonography of the kidneys was performed. Images were obtained and stored in a permanent archive. COMPARISON: CT 9/10/2021. RESULT: Right Kidney:      -Renal length: 6.0 cm      -Parenchyma: Parenchyma echogenicity is increased. Diffuse parenchymal thinning present measuring around 5 mm.      -Collecting system: No hydronephrosis.        -Calculus: Echogenic lower pole calculus, similar to the prior CT.      -Lesion:  1.2 cm cyst, unchanged. Left Kidney:          -Renal length: 10.0 cm      -Parenchyma: Normal parenchymal echogenicity. Normal parenchymal thickness measuring around 12 mm.      -Collecting system: No hydronephrosis. -Calculus: No echogenic, shadowing calculus.      -Lesion:  None. Bladder: Decompressed. Right nephrolithiasis. No hydronephrosis. Severe right RENAL ATROPHY, unchanged.        Lab Results   Component Value Date/Time    WBC 11.9 07/10/2022 08:15 AM    RBC 3.02 07/10/2022 08:15 AM    HGB 8.8 07/10/2022 08:15 AM    HCT 27.5 07/10/2022 08:15 AM    MCV 91.0 07/10/2022 08:15 AM    MCH 29.1 07/10/2022 08:15 AM    MCHC 31.9 07/10/2022 08:15 AM    RDW 21.5 07/10/2022 08:15 AM     07/10/2022 08:15 AM    MPV 8.4 08/04/2014 12:12 PM     Lab Results   Component Value Date/Time     07/10/2022 08:15 AM    K 4.5 07/10/2022 08:15 AM    K 4.2 07/04/2022 05:13 AM     07/10/2022 08:15 AM    CO2 22 07/10/2022 08:15 AM    BUN 12 07/10/2022 08:15 AM    CREATININE 0.83 07/10/2022 08:15 AM    GFRAA >60.0 07/10/2022 08:15 AM    LABGLOM >60.0 07/10/2022 08:15 AM    GLUCOSE 94 07/10/2022 08:15 AM    PROT 6.8 07/02/2022 10:57 AM    LABALBU 2.8 07/10/2022 08:15 AM    CALCIUM 8.3 07/10/2022 08:15 AM    BILITOT <0.2 07/02/2022 10:57 AM    ALKPHOS 128 07/02/2022 10:57 AM    AST 16 07/02/2022 10:57 AM    ALT 8 07/02/2022 10:57 AM     Lab Results   Component Value Date/Time    PROTIME 10.6 08/04/2014 12:12 PM    INR 1.0 08/04/2014 12:12 PM     Lab Results   Component Value Date/Time    TSH 2.000 07/05/2022 06:09 AM    OGBNAHZV43 <150 05/27/2019 05:45 AM    FOLATE 19.6 05/27/2019 05:45 AM    FERRITIN 678.0 05/27/2019 07:43 AM    IRON 133 05/27/2019 07:43 AM    TIBC 176 05/27/2019 07:43 AM     Lab Results   Component Value Date/Time    TRIG 127 11/16/2018 07:22 AM    HDL 53 11/16/2018 07:22 AM    LDLCALC 50 11/16/2018 07:22 AM     No results found for: [address], BARBSCNU, LABBENZ, CANNAB, COCAINESCRN, LABMETH, OPIATESCREENURINE, PHENCYCLIDINESCREENURINE, PPXUR, ETOH  No results found for: LITHIUM, DILFRTOT, VALPROATE    Assessment:   Encephalopathy secondary  tract infection and sundowning and sleep deprivation. Patient's examination today is entirely normal and she is oriented to self place month and year. She is able to give me good account of her history and she had COVID about a few weeks ago and became generally weak. It is likely that she may have developed some form of neuropathy which we have seen with COVID though she has not sustained any effects of the same. We will keep an eye on this. For now not recommended any intervention. She has risk factors for cerebrovascular disease with underlying vasculopathy with mild vascular dementia is a consideration. If she has recurrent issues we will further evaluate. Extensive chart review done as patient has been here for few days      Graham White MD, Jerrod Moya, American Board of Psychiatry & Neurology  Board Certified in Vascular Neurology  Board Certified in Neuromuscular Medicine  Certified in [facility]:

## 2022-07-10 NOTE — PROGRESS NOTES
07/10/22     From:Home with daughter. Admit: Dysuria x 3 weeks      PMH: UTI, CAD, HTN, HLD, Hypothyroidism     Anticipated Discharge Disposition: [facility] [address]       Patient Mobility or PT/OT ordered: Yes  Consults: [facility] services, NEPHRO, NEURO        Clinical: URINE CX / BLOOD CX PENDING     Barriers to Discharge: IV Rocephin, AVASYS      Assessments: CMI Done in ER

## 2022-07-10 NOTE — PROGRESS NOTES
Hospitalist Progress Note      PCP: Sherlyn Hammans, MD    Date of Admission: 7/2/2022    Chief Complaint:  No acute events, afebrile, stable HD, is more awake and alert today    Medications:  Reviewed    Infusion Medications    dextrose      sodium chloride       Scheduled Medications    metoprolol succinate  75 mg Oral Daily    magnesium oxide  400 mg Oral Daily    megestrol acetate  400 mg Oral Daily    [Held by provider] heparin (porcine)  5,000 Units SubCUTAneous BID    sodium chloride flush  5-40 mL IntraVENous 2 times per day    isosorbide mononitrate  30 mg Oral Daily    levothyroxine  100 mcg Oral Daily     PRN Meds: haloperidol lactate **OR** haloperidol lactate, glucose, dextrose bolus **OR** dextrose bolus, glucagon (rDNA), dextrose, sodium chloride flush, sodium chloride, ondansetron **OR** ondansetron, acetaminophen **OR** acetaminophen, polyethylene glycol, albuterol sulfate HFA    No intake or output data in the 24 hours ending 07/10/22 1332    Exam:    BP (!) 156/68   Pulse 99   Temp 98.1 °F (36.7 °C) (Oral)   Resp 18   Ht 5' 2\" (1.575 m)   Wt 94 lb 6.4 oz (42.8 kg)   LMP  (LMP Unknown)   SpO2 100%   BMI 17.27 kg/m²     General appearance: appears stated age and cooperative. Respiratory:  clear to auscultation bilaterally . Cardiovascular: Regular rate and rhythm, S1/S2 . Abdomen: Soft, active bowel sounds. Musculoskeletal: No edema bilaterally. Labs:   Recent Labs     07/08/22  0647 07/09/22  0553 07/10/22  0815   WBC 10.1 8.8 11.9*   HGB 8.1* 7.7* 8.8*   HCT 25.0* 24.0* 27.5*    208 230     Recent Labs     07/08/22  0648 07/09/22  0456 07/10/22  0815   * 141 139   K 3.5 3.7 4.5   * 109* 104   CO2 19* 19* 22   BUN 10 12 12   CREATININE 0.80 0.97* 0.83   CALCIUM 8.0* 7.7* 8.3*   PHOS 2.0* 1.7* 2.2*     No results for input(s): AST, ALT, BILIDIR, BILITOT, ALKPHOS in the last 72 hours. No results for input(s): INR in the last 72 hours.   Recent Labs 07/08/22  0648   CKTOTAL 34   TROPONINI <0.010       Urinalysis:      Lab Results   Component Value Date/Time    NITRU Negative 09/10/2021 11:30 AM    WBCUA 0-2 09/10/2021 11:30 AM    BACTERIA Negative 09/10/2021 11:30 AM    RBCUA 0-2 09/10/2021 11:30 AM    BLOODU MODERATE 09/10/2021 11:30 AM    SPECGRAV 1.020 09/10/2021 11:30 AM    GLUCOSEU Negative 09/10/2021 11:30 AM       Radiology:  CT HEAD WO CONTRAST   Final Result      NO ACUTE INTRACRANIAL PROCESSOR SIGNIFICANT CHANGE FROM PRIOR STUDIES IDENTIFIED. US RETROPERITONEAL COMPLETE   Final Result   Addendum 1 of 1   Addendum:      Reason for addendum is typographical error in the impression of the    report. Here is a new report:      EXAMINATION:   RENAL ULTRASOUND       HISTORY:  Acute renal failure. TECHNIQUE:  Sonography of the kidneys was performed. Images were obtained    and stored in a permanent archive. COMPARISON: CT 9/10/2021. RESULT:      Right Kidney:         -Renal length: 6.0 cm        -Parenchyma: Parenchyma echogenicity is increased. Diffuse    parenchymal thinning present measuring around 5 mm.         -Collecting system: No hydronephrosis. -Calculus: Echogenic lower pole calculus, similar to the prior CT.         -Lesion:  1.2 cm cyst, unchanged. Left Kidney:             -Renal length: 10.0 cm        -Parenchyma: Normal parenchymal echogenicity. Normal parenchymal    thickness measuring around 12 mm.         -Collecting system: No hydronephrosis. -Calculus: No echogenic, shadowing calculus.         -Lesion:  None. Bladder: Decompressed. Final      Right nephrolithiasis. No hydronephrosis. Severe right RENAL ATROPHY, unchanged. XR CHEST PORTABLE   Final Result      No acute radiographic abnormality.                     Assessment/Plan:    77 y/o female with history of HTN, TIA, carotid artery stenosis, right kidney atrophy, COVID

## 2022-07-10 NOTE — PROGRESS NOTES
Pt continues to make inappropriate calls as well as using profanity and showing aggression towards staff. PRN order of haldol given. Pt unable to be reoriented to time or place. Spoke with daughter Clark Garcia and gave update on pts current status. Personal extension provided if family has any question or concerns. Pt is currently sitting in bed with fall precautions in place and avasys in room. Will continue to monitor.

## 2022-07-10 NOTE — PROGRESS NOTES
Assessment completed this shift. Alert and oriented x4. Answers questions appropriately, but confused at times. Takes pills whole one at a time. Lungs clear and BS present. No edema noted. Incontinent bowel and bladder. In bed with call light in reach.   Electronically signed by Dariel Veliz RN on 7/10/2022 at 8:38 AM

## 2022-07-11 LAB
ALBUMIN SERPL-MCNC: 2.6 G/DL (ref 3.5–4.6)
ANION GAP SERPL CALCULATED.3IONS-SCNC: 13 MEQ/L (ref 9–15)
BUN BLDV-MCNC: 15 MG/DL (ref 8–23)
CALCIUM SERPL-MCNC: 8.6 MG/DL (ref 8.5–9.9)
CHLORIDE BLD-SCNC: 105 MEQ/L (ref 95–107)
CO2: 22 MEQ/L (ref 20–31)
CREAT SERPL-MCNC: 0.9 MG/DL (ref 0.5–0.9)
GFR AFRICAN AMERICAN: >60
GFR NON-AFRICAN AMERICAN: >60
GLUCOSE BLD-MCNC: 84 MG/DL (ref 70–99)
GLUCOSE BLD-MCNC: 85 MG/DL (ref 70–99)
HCT VFR BLD CALC: 27.4 % (ref 37–47)
HEMOGLOBIN: 8.8 G/DL (ref 12–16)
MAGNESIUM: 2.1 MG/DL (ref 1.7–2.4)
MCH RBC QN AUTO: 29.1 PG (ref 27–31.3)
MCHC RBC AUTO-ENTMCNC: 32 % (ref 33–37)
MCV RBC AUTO: 90.8 FL (ref 82–100)
PDW BLD-RTO: 21.1 % (ref 11.5–14.5)
PERFORMED ON: NORMAL
PHOSPHORUS: 2.1 MG/DL (ref 2.3–4.8)
PLATELET # BLD: 205 K/UL (ref 130–400)
POTASSIUM SERPL-SCNC: 4.2 MEQ/L (ref 3.4–4.9)
RBC # BLD: 3.01 M/UL (ref 4.2–5.4)
SODIUM BLD-SCNC: 140 MEQ/L (ref 135–144)
WBC # BLD: 11.3 K/UL (ref 4.8–10.8)

## 2022-07-11 PROCEDURE — 6370000000 HC RX 637 (ALT 250 FOR IP): Performed by: NURSE PRACTITIONER

## 2022-07-11 PROCEDURE — 1210000000 HC MED SURG R&B

## 2022-07-11 PROCEDURE — 36415 COLL VENOUS BLD VENIPUNCTURE: CPT

## 2022-07-11 PROCEDURE — 6370000000 HC RX 637 (ALT 250 FOR IP): Performed by: INTERNAL MEDICINE

## 2022-07-11 PROCEDURE — 85027 COMPLETE CBC AUTOMATED: CPT

## 2022-07-11 PROCEDURE — 2580000003 HC RX 258: Performed by: INTERNAL MEDICINE

## 2022-07-11 PROCEDURE — APPSS30 APP SPLIT SHARED TIME 16-30 MINUTES: Performed by: NURSE PRACTITIONER

## 2022-07-11 PROCEDURE — 97535 SELF CARE MNGMENT TRAINING: CPT

## 2022-07-11 PROCEDURE — 83735 ASSAY OF MAGNESIUM: CPT

## 2022-07-11 PROCEDURE — 80069 RENAL FUNCTION PANEL: CPT

## 2022-07-11 PROCEDURE — 99233 SBSQ HOSP IP/OBS HIGH 50: CPT | Performed by: PSYCHIATRY & NEUROLOGY

## 2022-07-11 RX ORDER — CALCIUM CARBONATE 200(500)MG
500 TABLET,CHEWABLE ORAL 3 TIMES DAILY PRN
Status: DISCONTINUED | OUTPATIENT
Start: 2022-07-11 | End: 2022-07-13 | Stop reason: HOSPADM

## 2022-07-11 RX ADMIN — Medication 10 ML: at 10:40

## 2022-07-11 RX ADMIN — LEVOTHYROXINE SODIUM 100 MCG: 0.1 TABLET ORAL at 05:31

## 2022-07-11 RX ADMIN — MEGESTROL ACETATE 400 MG: 40 SUSPENSION ORAL at 10:39

## 2022-07-11 RX ADMIN — ANTACID TABLETS 500 MG: 500 TABLET, CHEWABLE ORAL at 16:48

## 2022-07-11 RX ADMIN — Medication 400 MG: at 10:39

## 2022-07-11 RX ADMIN — ISOSORBIDE MONONITRATE 30 MG: 30 TABLET, EXTENDED RELEASE ORAL at 10:39

## 2022-07-11 RX ADMIN — METOPROLOL SUCCINATE 75 MG: 50 TABLET, EXTENDED RELEASE ORAL at 10:39

## 2022-07-11 ASSESSMENT — ENCOUNTER SYMPTOMS
TROUBLE SWALLOWING: 0
SHORTNESS OF BREATH: 0
NAUSEA: 0
CHEST TIGHTNESS: 0
WHEEZING: 0
COLOR CHANGE: 0
ABDOMINAL DISTENTION: 0
COUGH: 0
DIARRHEA: 1
ABDOMINAL PAIN: 1
VOMITING: 0
CONSTIPATION: 0

## 2022-07-11 ASSESSMENT — PAIN SCALES - GENERAL
PAINLEVEL_OUTOF10: 0
PAINLEVEL_OUTOF10: 0

## 2022-07-11 NOTE — DISCHARGE SUMMARY
Discharge Summary    Date: 7/11/2022  Patient Name: Harvy Duane YOB: 1945 Age: 78 y.o. Admit Date: 7/2/2022  Discharge Date: 7/11/2022  Discharge Condition:    Admission Diagnosis  Anuria (R34); Metabolic acidosis (Z96.3); RICARDO (acute kidney injury) ([address] (N17.9); Complicated UTI (urinary tract infection) (N39.0); Leukocytosis, unspecified type (D72.829)     Discharge Diagnosis  Principal Problem: Complicated UTI (urinary tract infection)Active Problems: Severe malnutrition (HCC)Resolved Problems: * No resolved hospital problems. [facility] Stay  Narrative of Hospital Course:  Patient comes UTI and acute kidney injury. Finished antibiotic treatment. Patient was also evaluated for encephalopathy. Resolved. Patient has history of dementia. As per psych patient is no capacity. Physical therapy evaluated patient recommended SNIF placement    Consultants:  IP CONSULT TO NEPHROLOGYIP CONSULT TO SPIRITUAL SERVICESIP CONSULT TO PSYCHIATRYIP CONSULT TO CARDIOLOGYIP CONSULT TO NEUROLOGY    Surgeries/procedures Performed:       Treatments:    Antibiotics    Ceftriaxone    Discharge Plan/Disposition:  Home    Hospital/Incidental Findings Requiring Follow Up:    Patient Instructions:    Diet:    Activity:Activity as Tolerated  For number of days (if applicable): Other Instructions:    Provider Follow-Up:   No follow-ups on file. Significant Diagnostic Studies:    Recent Labs:  Admission on 07/02/2022No results displayed because visit has over 200 results. ------------    Radiology last 7 days:  CT HEAD WO CONTRASTResult Date: 7/9/2022NO ACUTE INTRACRANIAL PROCESSOR SIGNIFICANT CHANGE FROM PRIOR STUDIES IDENTIFIED.       Pending Labs   Order Current Status  POCT EPOC BLOOD GAS, LACTIC ACID, ICA Collected (07/04/22 0843)  POCT EPOC BLOOD GAS, LACTIC ACID, ICA Run All Components: Yes Collected (07/02/22 1153)  Urinalysis with Reflex to Culture Collected (07/02/22 1103)      Discharge Medications Current Discharge Medication ListSTART taking these medicationsmetoprolol succinate (TOPROL XL) 25 MG extended release tabletTake 3 tablets by mouth dailyQty: 30 tablet Refills: 3magnesium oxide (MAG-OX) 400 (240 Mg) MG tabletTake 1 tablet by mouth dailyQty: 30 tablet Refills: 3    Current Discharge Medication List    Current Discharge Medication ListCONTINUE these medications which have NOT CHANGEDcyanocobalamin ([employer] VITAMIN B12) 1000 MCG tabletTake 1 tablet by mouth dailyQty: 30 tablet Refills: 3levothyroxine (SYNTHROID) 112 MCG tabletTake 1 tablet by mouth DailyQty: 30 tablet Refills: 1albuterol sulfate  (90 Base) MCG/ACT inhalerINHALE 2 PUFFS 4 TIMES DAILY AS NEEDEDQty: 8.5 Inhaler Refills: 3isosorbide mononitrate (IMDUR) 30 MG extended release tabletTake 1 tablet by mouth dailyQty: 90 tablet Refills: 3    Current Discharge Medication ListSTOP taking these medicationsmetoprolol tartrate (LOPRESSOR) 25 MG tabletComments:Reason for Stopping:amLODIPine (NORVASC) 5 MG tabletComments:Reason for Stopping:gabapentin (NEURONTIN) 800 MG tabletComments:Reason for Stopping:fosinopril (MONOPRIL) 10 MG tabletComments:Reason for Stopping:    Time Spent on Discharge:E] minutes were spent in patient examination, evaluation, counseling as well as medication reconciliation, prescriptions for required medications, discharge plan, and follow up.     Electronically signed by Elvis Seymour MD on 7/11/22 at 11:12 AM EDT   overtime on dc summary was 45 min

## 2022-07-11 NOTE — PROGRESS NOTES
Pt refused assessment, Pt just wanted to go back to sleep. During report I was able to talk to her and help her call her daughter, but when it came time for me to go in and assess and flush IV she was sleeping and refused. Pt denies any further needs. Will continue to monitor.

## 2022-07-11 NOTE — PROGRESS NOTES
[id number] [facility] Neurology Daily Progress Note  Name: Cris Hill  Age: 78 y.o. Gender: female  CodeStatus: Full Code  Allergies: Bee Venom  Pcn [Penicillins]    Chief Complaint:Dysuria (BURNING WITH Naomi [place]. WAS TREATED W/ABX 3WEEKS AGO)    Primary Care Provider: Marc Rodriguez MD  InpatientTreatment Team: Treatment Team: Attending Provider: Chase Manjarrez MD; Utilization Reviewer: Kiki Oppenheim, RN; Utilization Reviewer: Bernice Ramon RN; Consulting Physician: Carlos Herzog MD; Consulting Physician: Onelia Kang MD; Registered Nurse: Nathan Estrada, RN; : Aloan Hampton, RN; Registered Nurse: Iris Nguyen RN  Admission Date: 7/2/2022      HPI   Pt seen and examined for neuro follow up for encephalopathy in the setting of UTI. Currently alert and oriented x3, no acute distress, cooperative. Intermittent confusion. Afebrile. Encephalopathy resolved. Nonfocal.  No seizures. Veins noted and patient is remained quite stable the last 2 days and we not see any other neurological dysfunction. Vitals:    07/10/22 0812   BP: (!) 156/68   Pulse: 99   Resp: 18   Temp: 98.1 °F (36.7 °C)   SpO2: 100%      Review of Systems   Constitutional: Negative for appetite change and fever. HENT: Negative for hearing loss and trouble swallowing. Eyes: Negative for visual disturbance. Respiratory: Negative for cough, chest tightness, shortness of breath and wheezing. Cardiovascular: Negative for chest pain, palpitations and leg swelling. Gastrointestinal: Positive for abdominal pain and diarrhea. Negative for abdominal distention, constipation, nausea and vomiting. Skin: Negative for color change and rash. Neurological: Negative for dizziness, tremors, seizures, syncope, facial asymmetry, speech difficulty, weakness, light-headedness, numbness and headaches. Psychiatric/Behavioral: Negative for agitation, confusion and hallucinations. The patient is not nervous/anxious. 4.2   * 104 105   CO2 19* 22 22   BUN 12 12 15   CREATININE 0.97* 0.83 0.90   CALCIUM 7.7* 8.3* 8.6   PHOS 1.7* 2.2* 2.1*     No results for input(s): AST, ALT, BILIDIR, BILITOT, ALKPHOS in the last 72 hours. No results for input(s): INR in the last 72 hours. No results for input(s): Florencia Parisian in the last 72 hours. Urinalysis:   Lab Results   Component Value Date/Time    NITRU Negative 09/10/2021 11:30 AM    WBCUA 0-2 09/10/2021 11:30 AM    BACTERIA Negative 09/10/2021 11:30 AM    RBCUA 0-2 09/10/2021 11:30 AM    BLOODU MODERATE 09/10/2021 11:30 AM    SPECGRAV 1.020 09/10/2021 11:30 AM    GLUCOSEU Negative 09/10/2021 11:30 AM       Radiology:   Most recent    EEG No valid procedures specified. MRI of Brain Results for orders placed during the hospital encounter of 05/26/19    MRI brain with and without contrast    Narrative  EXAMINATION: MRI BRAIN W WO CONTRAST  DATE AND TIME:5/26/2019 6:30 PM    CLINICAL HISTORY: Headache   STROKE    COMPARISON: None    TECHNIQUE:  Multi-sequence multiplanar imaging of the brain was performed. Sequences included T1-weighted, T2-weighted, FLAIR, diffusion-weighted, ADC maps, and  susceptibility weighted imaging. VOLUME: 09 mL   MR CONTRAST: ProHance    FINDINGS    Acute change: No restricted diffusion to suggest an acute infarct. No magnetic susceptibility artifact on gradient echo pulse sequence to suggest the presence of blood products. Ventricles: Ventricles and sulci are age-appropriate. Brain parenchyma There are multiple bilateral white matter hyperintensities that may be related to  arteriosclerosis, however there can be significant overlap between normal age-related changes and microvascular disease. No mass or mass effect. No  extra-axial fluid    Brainstem:Brainstem is unremarkable. Skull base: Cerebellar tonsils are normally positioned. No evidence of a marrow replacement process. Vasculature: The major intracranial arterial structures and dural venous sinuses showed typical flow void, suggesting patency by spin-echo criteria. Sinuses: The  mastoids and visualized paranasal sinuses are clear. Impression  AGE-RELATED CHANGES. NOTHING ACUTE INTRACRANIALLY                EXAMINATION: , MRA HEAD WO CONTRAST    DATE AND TIME:5/26/2019 6:30 PM    CLINICAL HISTORY: Acute stroke. STROKE    COMPARISON: None    TECHNIQUE: 3-D time-of-flight MRA images of the intracranial circulation were obtained. FINDINGS: Motion artifact limits the accuracy of the exam    Intracranial: Absence of normal flow enhancement in the cavernous and supraclinoid ICAs may be related motion artifact. Consider CTA. Anterior cerebral arteries: Flow related enhancement is noted within the bilateral anterior cerebral arteries. Minimal atherosclerotic narrowing in the right KEERTHI A1 segment,    Middle cerebral arteries:  Unremarkable flow related enhancement is noted in the middle cerebral artery branches bilaterally. Posterior cervical arteries: The bilateral posterior cerebral arteries are patent. Basilar arteries: Flow is visualized in the basilar artery. Questionable basilar tip aneurysm versus motion artifact. IMPRESSION:  Nondiagnostic MRI of the brain motion artifact. Consider repeat to exam or CTA  No results found for this or any previous visit. MRA of the Head and Neck: No results found for this or any previous visit. No results found for this or any previous visit. Results for orders placed during the hospital encounter of 05/26/19    MRA HEAD WO CONTRAST    Narrative  EXAMINATION: MRI BRAIN W [address] TIME:5/26/2019 6:30 PM    CLINICAL HISTORY: Headache   STROKE    COMPARISON: None    TECHNIQUE:  Multi-sequence multiplanar imaging of the brain was performed. Sequences included T1-weighted, T2-weighted, FLAIR, diffusion-weighted, ADC maps, and  susceptibility weighted imaging.   VOLUME: 09 mL   MR CONTRAST: ProHance    FINDINGS    Acute change: No restricted diffusion to suggest an acute infarct. No magnetic susceptibility artifact on gradient echo pulse sequence to suggest the presence of blood products. Ventricles: Ventricles and sulci are age-appropriate. Brain parenchyma There are multiple bilateral white matter hyperintensities that may be related to  arteriosclerosis, however there can be significant overlap between normal age-related changes and microvascular disease. No mass or mass effect. No  extra-axial fluid    Brainstem:Brainstem is unremarkable. Skull base: Cerebellar tonsils are normally positioned. No evidence of a marrow replacement process. Vasculature: The major intracranial arterial structures and dural venous sinuses showed typical flow void, suggesting patency by spin-echo criteria. Sinuses: The  mastoids and visualized paranasal sinuses are clear. Impression  AGE-RELATED CHANGES. NOTHING ACUTE INTRACRANIALLY                EXAMINATION: , MRA HEAD WO CONTRAST    DATE AND TIME:5/26/2019 6:30 PM    CLINICAL HISTORY: Acute stroke. STROKE    COMPARISON: None    TECHNIQUE: 3-D time-of-flight MRA images of the intracranial circulation were obtained. FINDINGS: Motion artifact limits the accuracy of the exam    Intracranial: Absence of normal flow enhancement in the cavernous and supraclinoid ICAs may be related motion artifact. Consider CTA. Anterior cerebral arteries: Flow related enhancement is noted within the bilateral anterior cerebral arteries. Minimal atherosclerotic narrowing in the right KEERTHI A1 segment,    Middle cerebral arteries:  Unremarkable flow related enhancement is noted in the middle cerebral artery branches bilaterally. Posterior cervical arteries: The bilateral posterior cerebral arteries are patent. Basilar arteries: Flow is visualized in the basilar artery. Questionable basilar tip aneurysm versus motion artifact.         IMPRESSION: will keep an eye on this. For now not recommended any intervention. She has risk factors for cerebrovascular disease with underlying vasculopathy with mild vascular dementia is a consideration. If she has recurrent issues we will further evaluate. Extensive chart review done as patient has been here for few days    7/11/22:  Encephalopathy resolved. Nonfocal.  Neurology to sign off. Call with questions or concerns. I have personally performed a face to face diagnostic evaluation on this patient, reviewed all data and investigations, and am the sole provider of all clinical decisions on the neurological status of this patient. Examination as noted above and nonfocal patient's encephalopathy has resolved and I do not see any session of dementia either. We will sign off. More than 50% time spent on evaluation and management patient myself      Graham Doan MD, [address] American Board of Psychiatry & Neurology  Board Certified in Vascular Neurology  Board Certified in Neuromuscular Medicine  Certified in Neurorehabilitation            Collaborating physicians: Dr Dianne Doan    Electronically signed by VALARIE Noonan CNP on 7/11/2022 at 12:16 PM

## 2022-07-11 NOTE — CARE COORDINATION
This LSW called and spoke with patient daughter, Kevin Galarza this afternoon. I notified her that pre-cert for patient to be transferred to [facility] is pending at this time. ZULEIKAW / PREM to follow.   Electronically signed by MAYTE Pace, IVORY on 7/11/22 at 3:55 PM EDT

## 2022-07-11 NOTE — PROGRESS NOTES
Physical Therapy Missed Treatment   Facility/Department: [facility] MED SURG [id number]    NAME: Froylan Max  Patient Status:   : 1945 (78 y.o.)  MRN: [id number]  Account: [de-identified]  Gender: female        [x] Patient Declines PT Treatment            [] Patient Unavailable:     Pt states she is having stomach issues and has had diarrhea most of the day. Declined PT at this time due to not feeling well. Will attempt PT Treatment again at earliest convenience.         Electronically signed by Marychuy Moura PTA on 22 at 2:10 PM EDT

## 2022-07-11 NOTE — PROGRESS NOTES
1200: Assessment complete. Alert and oriented x4 with intermittent confusion. No complaints. Incontinent-- repositioned throughout shift. Daughter, Jason Masker, at bedside. AvaSys in place for pt safety. Call light within reach. Bed alarm on. Safety maintained. 1545: Pt called requesting tums-- [employer] sent to Dr. Chasity Crowe. Given per [facility]    1800: Pt refusing to have bottom cleaned, yelling \"who do you think you are, leave me alone, don't touch me. \" Pt educated with no evidence of learning.      Electronically signed by Mignon Shipley RN on 7/11/2022 at 2:33 PM

## 2022-07-12 PROCEDURE — 6370000000 HC RX 637 (ALT 250 FOR IP): Performed by: NURSE PRACTITIONER

## 2022-07-12 PROCEDURE — 97535 SELF CARE MNGMENT TRAINING: CPT

## 2022-07-12 PROCEDURE — 6370000000 HC RX 637 (ALT 250 FOR IP): Performed by: INTERNAL MEDICINE

## 2022-07-12 PROCEDURE — 2580000003 HC RX 258: Performed by: INTERNAL MEDICINE

## 2022-07-12 PROCEDURE — 97110 THERAPEUTIC EXERCISES: CPT

## 2022-07-12 PROCEDURE — 1210000000 HC MED SURG R&B

## 2022-07-12 RX ADMIN — METOPROLOL SUCCINATE 75 MG: 50 TABLET, EXTENDED RELEASE ORAL at 10:19

## 2022-07-12 RX ADMIN — Medication 10 ML: at 20:37

## 2022-07-12 RX ADMIN — MEGESTROL ACETATE 400 MG: 40 SUSPENSION ORAL at 10:19

## 2022-07-12 RX ADMIN — ACETAMINOPHEN 650 MG: 325 TABLET ORAL at 20:33

## 2022-07-12 RX ADMIN — Medication 400 MG: at 10:17

## 2022-07-12 RX ADMIN — ISOSORBIDE MONONITRATE 30 MG: 30 TABLET, EXTENDED RELEASE ORAL at 10:19

## 2022-07-12 ASSESSMENT — ENCOUNTER SYMPTOMS
SHORTNESS OF BREATH: 0
DIARRHEA: 0
NAUSEA: 0
COUGH: 0
VOMITING: 0

## 2022-07-12 ASSESSMENT — PAIN SCALES - GENERAL
PAINLEVEL_OUTOF10: 5
PAINLEVEL_OUTOF10: 0

## 2022-07-12 NOTE — PROGRESS NOTES
Comprehensive Nutrition Assessment    Type and Reason for Visit:  Reassess    Nutrition Recommendations/Plan:   1. Discontinue high calorie/high protein supplement   2. Continue clear supplement BID  3. Continue current diet      Malnutrition Assessment:  Malnutrition Status:  Severe malnutrition (07/05/22 1327)    Context:  Chronic Illness     Findings of the 6 clinical characteristics of malnutrition:  Energy Intake:  75% or less estimated energy requirements for 1 month or longer  Weight Loss:  Unable to assess (hx of stated weights)     Body Fat Loss:  Severe body fat loss Buccal region   Muscle Mass Loss:  Severe muscle mass loss Temples (temporalis)  Fluid Accumulation:  Unable to assess     Strength:  Not Performed    Nutrition Assessment:    Pt meets criteria for severe malnutrition evidenced by severe muscle/fat wasting, poor PO intake PTA and weight loss. UTD severity of weight loss d/t limited bed scale weights in EMR. Pts po intake varies but is typically <50%. Nutrition supplements in progress but multiple unopened supplements observed in room. Nutrition Related Findings:    Pt presents with ongoing dysuria. Reported poor appetite/PO intake pta. Only drinks diet coke per daughter. Pmhx: Bilateral carotid artery disease, CAD, HLD, HTN, hypothyroidism, OA. Labs (7/10): Phos=2.1. Meds include megace and Synthroid. no edema. Intakes vary, 1-50%. Ate 50% of lunch per observation. Multiple unopened supplements in room. Pt states she dislikes regular ensure and that the ensure clear is ok. Noted pt drank ~25% of it. Wound Type: None       Current Nutrition Intake & Therapies:    Average Meal Intake: 0%,1-25%,26-50%  Average Supplements Intake: 1-25%,0%  ADULT DIET; Regular;  No Added Salt (3-4 gm)  ADULT ORAL NUTRITION SUPPLEMENT; Lunch, Breakfast, Dinner; Clear Liquid Oral Supplement    Anthropometric Measures:  Height: 5' 2\" (157.5 cm)  Ideal Body Weight (IBW): 110 lbs (50 kg)    Admission Body Weight: 110 lb (49.9 kg) (stated 7/2)  Current Body Weight: 95 lb 11.2 oz (43.4 kg) (7/3 bed),   Weight Source: Bed Scale  Current BMI (kg/m2): 17.5  Usual Body Weight: 125 lb (56.7 kg) (stated 9/10/21; 105 lb bed 2019)  % Weight Change (Calculated): -23.4                    BMI Categories: Underweight (BMI less than 22) age over 72    Estimated Daily Nutrient Needs:  Energy Requirements Based On: Kcal/kg  Weight Used for Energy Requirements: Current  Energy (kcal/day): 1300-1430kcal (30-33kcal/kg)  Weight Used for Protein Requirements: Current  Protein (g/day): 65g (1.5g/kg)  Method Used for Fluid Requirements: 1 ml/kcal  Fluid (ml/day): ~1430ml    Nutrition Diagnosis:   · Severe malnutrition,In context of social or environmental circumstances related to inadequate protein-energy intake as evidenced by poor intake prior to admission,severe muscle loss,severe loss of subcutaneous fat,weight loss    Nutrition Interventions:   Food and/or Nutrient Delivery: Continue Current Diet,Modify Oral Nutrition Supplement  Nutrition Education/Counseling: No recommendation at this time  Coordination of Nutrition Care: Continue to monitor while inpatient       Goals:  Previous Goal Met: No Progress toward Goal(s)  Goals: PO intake 50% or greater,other (specify)  Specify Other Goals: weight gain    Nutrition Monitoring and Evaluation:   Behavioral-Environmental Outcomes: None Identified  Food/Nutrient Intake Outcomes: Food and Nutrient Intake,Supplement Intake  Physical Signs/Symptoms Outcomes: Biochemical Data,Weight,Skin,Meal Time Behavior,Nutrition Focused Physical Findings    Discharge Planning:    Continue Oral Nutrition Supplement     Kelley Membreno, MS, RD, LD

## 2022-07-12 NOTE — PROGRESS NOTES
Physical Therapy Med Surg Daily Treatment Note  [facility]Department[facility]  [facility]       NAME: Beata Fisher  : 1945 (78 y.o.)  MRN: [id number]  CODE STATUS: Full Code    Date of Service: 2022    Patient Diagnosis(es): Anuria [P05]  Metabolic acidosis [N47.6]  RICARDO (acute kidney injury) ([address] [I11.7]  Complicated UTI (urinary tract infection) [N39.0]  Leukocytosis, unspecified type [D72.829]   Chief Complaint   Patient presents with    Dysuria     BURNING WITH Vanita Sterling. WAS TREATED W/ABX 3WEEKS AGO     Patient Active Problem List    Diagnosis Date Noted    Severe malnutrition ([address]     Complicated UTI (urinary tract infection) 2022    Acute kidney injury ([address]     Pneumonia due to COVID-19 virus 09/10/2021    Acute bronchitis 2020    Pancytopenia (HCC)     Peripheral polyneuropathy     Weight loss     TIA (transient ischemic attack) 2019    Bilateral carotid artery stenosis 09/15/2017    Hypothyroidism     Hypertension     Hyperlipidemia     Degenerative spondylolisthesis 2015    Lumbar stenosis with neurogenic claudication 2015        Past Medical History:   Diagnosis Date    Bilateral carotid artery disease ([address]     CAD (coronary artery disease)     Hyperlipidemia     Hypertension     Hypothyroidism     Osteoarthritis      Past Surgical History:   Procedure Laterality Date    CARDIAC CATHETERIZATION      SPINE SURGERY  2014    LUMBAR    THYROIDECTOMY, PARTIAL         Chart Reviewed: Yes  General Comment  Comments: pt confused and easily agitated, poor understanding of situation and education. Restrictions:  Restrictions/Precautions: Fall Risk    SUBJECTIVE:   Subjective: \"I have a UTI. \"    Pain  Pain: notably increased pain in groin with bed mobility, pt does not want RN to change her soiled brief, becomes agitated and combative with attempts at changing brief.       OBJECTIVE: Bed mobility  Rolling to Left: Minimal assistance (vc's for sequencing and bed rail use.)  Rolling to Right: Minimal assistance  Supine to Sit: Unable to assess (pt gets BLE off EOB but then adamantly declines to complete sitting up on EOB.)    Transfers  Comment: unable to assess d/t pt adamantly declining. Activity Tolerance  Activity Tolerance: Treatment limited secondary to agitation;Treatment limited secondary to decreased cognition          ASSESSMENT   Assessment: agitation and pt adamantly declining to sit up on EOB despite education and encouragement. pt confused and disoriented, doesn't seem to understand the education we are providing. RN present for tx, CM made aware of pt status. Discharge Recommendations:  Continue to assess pending progress         Goals  Short Term Goals  Short term goal 1: The pt will perform bed mobility with min A  Short term goal 2: The pt will tolerate upright sitting posture at EOB with activity >/=5 mins    PLAN    Plan: 1 time a day 3-6 times a week  Safety Devices  Type of Devices: All fall risk precautions in place,Bed alarm in place,Call light within reach,Left in bed,Nurse notified     [facility] ([address] [place] [address]) Todd Vinson 28 Inpatient Mobility Raw Score : 10      Therapy Time   Individual   Time In 1024   Time Out 1040   Minutes 16      BM: [facility], 07/12/22 at 10:54 AM         Definitions for assistance levels  Independent = pt does not require any physical supervision or assistance from another person for activity completion. Device may be needed.   Stand by assistance = pt requires verbal cues or instructions from another person, close to but not touching, to perform the activity  Minimal assistance= pt performs 75% or more of the activity; assistance is required to complete the activity  Moderate assistance= pt performs 50% of the activity; assistance is required to complete the activity  Maximal assistance = pt performs 25% of the activity; assistance is required to complete the activity  Dependent = pt requires total physical assistance to accomplish the task

## 2022-07-12 NOTE — CARE COORDINATION
MET WITH PATIENT, CONFUSED, NONCOMPLIANT WITH CARE AND PT/OT. DC PLAN IS KIRSTIN GEE, MAY REQUIRE LONG TERM CARE. LSW SPOKE WITH CASSIDY COSTA AND WILL MEET WITH TADEO WITH FINANCIAL TO COMPLETE MEDICAID APPLICATION FOR LONG TERM PLACEMENT.

## 2022-07-12 NOTE — PROGRESS NOTES
[facility] OCCUPATIONAL THERAP[facility] MED SURG TREATMENT NOTE     Date: 2022  Patient Name: Génesis Steven        MRN: [id number]  Account: [de-identified]   : 1945  (78 y.o.)  Room: J37/L064-41    Chart Review:    Restrictions  Restrictions/Precautions  Restrictions/Precautions: Fall Risk     Safety:  Safety Devices  Type of Devices: All fall risk precautions in place; Bed alarm in place;Call light within reach; Left in bed;Telesitter in use    Patient's birthday verified: Yes    Subjective:            Pain at start of treatment: No    Pain at end of treatment: Yes: Pt. unable to rate pain- expressed pain in groin and L upper leg    Location: groin and left upper leg   Nursing notified: Not Applicable-nsg aware- pain is ongoing     Objective:  OT tx completed in pt room. Attempted ADLs but pt reported that she had already completed these this am. Pt was agreeable to BUE exercises.  Pt attempted to transfer from supine to sit, however was limited by pain     ADL Status:  ADL  LE Dressing: Setup  LE Dressing Skilled Clinical Factors: pt was able to put on sock while supine in bed and was able to simulate lower body dressing by bridging and adjusting pants at waistline without difficulty  Toileting: Unable to assess(comment)  Toileting Skilled Clinical Factors: declined need, declined need for brief to be changed    Therapy key for assistance levels -   Independent/Mod I = Pt. is able to perform task with no assistance but may require a device   Stand by assistance = Pt. does not perform task at an independent level but does not need physical assistance, requires verbal cues  Minimal, Moderate, Maximal Assistance = Pt. requires physical assistance (25%, 50%, 75% assist from helper) for task but is able to actively participate in task   Dependent = Pt. requires total assistance with task and is not able to actively participate with task completion    Orientation Status:  Orientation  Orientation Level: Disoriented to situation;Disoriented to place;Oriented to person (pt thought she was home, became upset when OT asked her if she knew where she was, stating \"Not this again. First I am at the hospital and then I'm home and now I'm here\". Also stated \"I need to see what is going on downstairs. This is my house\". )    Observation:   Observation/Palpation  Observation: pt in bed with legs crossed, dressed and pleasant. Pt was agreeable to therapy this date, however she refused transfers or anything that elicited pain. Pt not oriented to place or situation    Cognition Status:  Cognition  Arousal/Alertness: Appropriate responses to stimuli  Following Commands: Follows one step commands with repetition; Follows multistep commands with repitition  Attention Span: Appears intact  Memory: Decreased recall of recent events;Decreased short term memory  Safety Judgement: Decreased awareness of need for assistance;Decreased awareness of need for safety  Problem Solving: Decreased awareness of errors  Insights: Not aware of deficits  Initiation: Requires cues for some  Sequencing: Requires cues for some       Transfers:  Transfers  Sit to stand: Unable to assess (pt was able to prop on elbow but was unable to complete remaining transfer d/t pain)  Stand to sit: Unable to assess (pt was able to prop on elbow but was unable to complete remaining transfer d/t pain)     Bed Mobility  Bed mobility  Bridging: Supervision  Rolling to Right: Supervision  Supine to Sit: Unable to assess (pt was able to prop on elbow but was unable to complete remaining transfer d/t pain)  Scooting: Supervision  Functional Endurance:  Activity Tolerance  Activity Tolerance: Patient limited by pain;Treatment limited secondary to decreased cognition    Treatment consisted of:     Therapeutic activities    Assessment/Discharge Disposition:  Assessment  Activity Tolerance: Treatment limited secondary to decreased cognition;Patient limited by pain;Treatment limited secondary to agitation  Discharge Recommendations: Vaishnavi  How much help for putting on and taking off regular lower body clothing?: A Little  How much help for Bathing?: A Little  How much help for Toileting?: A Little  How much help for putting on and taking off regular upper body clothing?: A Little  How much help for taking care of personal grooming?: A Little  How much help for eating meals?: A Little  AM-PAC Inpatient Daily Activity Raw Score: 18  AM-PAC Inpatient ADL T-Scale Score : 38.66  ADL Inpatient CMS 0-100% Score: 46.65  ADL Inpatient CMS G-Code Modifier : CK    Plan:    Continue OT per POC    Patient Education:  Patient Education  Education Given To: Patient  Education Provided: Home Exercise Program  Education Provided Comments: pt educated on BUE arm exercises to complete in bed; pt demonstrated understanding  Education Method: Demonstration;Verbal  Barriers to Learning: Cognition  Education Outcome: Continued education needed;Verbalized understanding;Demonstrated understanding    Equipment recommendations:  OT Equipment Recommendations  Other: continue to assess    Goals/Plan of care addressed during this session:        Patient Goal: Patient goals :  To return to home    Improve Balance, Improve Strength, Improve [place] with ADLs, Improve [place] with Functional Transfers, Improve Cognition/Safety awareness and Improve Endurance    Therapy Time:   Individual Group Co-Treat   Time In 1545       Time Out 1601         Minutes 16                Therapeutic activities: 16 minutes    Electronically signed by:    DULCE Flores    7/12/2022, 4:43 PM

## 2022-07-12 NOTE — PROGRESS NOTES
Shift assessments completed as much as this RN was able to with pt refusing assessment and documented, see flowsheets. A&OX1-2, confused and agitated, refusing care-- refused assessments, turns, and to be changed despite being wet with incontinent brief. Medications refused per MAR. Pt educated on the topics of these refusals but continues to refuse. Call light within reach. No further needs verbalized at this time by pt except to call daughter Mina Rob, but multiple attempts to call were unsuccessful. Avasys in place for pt safety. 0050: Pt agreeable to be changed. Pt bed and brief changed, zinc paste applied to red buttocks. No further needs verbalized by pt. Avasys in place for safety.

## 2022-07-12 NOTE — PROGRESS NOTES
Assessment completed  this shift. Patient alert and oriented x 4 at times. Confused and uncooperative at others. Took pills whole with water without issur this am. About 15 min later, incontinent of  Medium formed, soft BM. Refused to let this nurse clean her up properly or put a new brief on. Refused to stand with therapy because\" it hurts too bad\" in helga area. Explained to her that cleaning the area would help. Patient said, \" Just get out of here! \" In bed with bed alarm on. AVASYS for safety.   Electronically signed by Corin Peterson RN on 7/12/2022 at 10:51 AM

## 2022-07-12 NOTE — PLAN OF CARE
Nutrition Problem #1: Severe malnutrition,In context of social or environmental circumstances  Intervention: Food and/or Nutrient Delivery: Continue Current Diet,Modify Oral Nutrition Supplement  Nutritional  PO intake >50%

## 2022-07-12 NOTE — CARE COORDINATION
This LSW spoke with Saulr., Zoltan Garcia and Ankit Linton re: placement. Son stated that patient will be private pay at [facility]. I have left Brigette / liaison message, awaiting a return call at this time.   Electronically signed by MAYTE Mcneal, LSW on 7/12/22 at 1:42 PM EDT

## 2022-07-12 NOTE — PROGRESS NOTES
Progress Note  Date:2022       Room:[place] [id number]-  Patient Name:Edna Doyle     YOB: 1945     Age:77 y.o. Subjective    Subjective:  Symptoms:  She reports malaise and weakness. No shortness of breath, cough, chest pain, headache, chest pressure, anorexia, diarrhea or anxiety. Diet:  No nausea or vomiting. Review of Systems   Respiratory: Negative for cough and shortness of breath. Cardiovascular: Negative for chest pain. Gastrointestinal: Negative for anorexia, diarrhea, nausea and vomiting. Neurological: Positive for weakness. Objective         Vitals Last 24 Hours:  TEMPERATURE:  Temp  Av.5 °F (36.9 °C)  Min: 98.1 °F (36.7 °C)  Max: 99.3 °F (37.4 °C)  RESPIRATIONS RANGE: Resp  Av  Min: 16  Max: 16  PULSE OXIMETRY RANGE: SpO2  Av %  Min: 93 %  Max: 99 %  PULSE RANGE: Pulse  Av.3  Min: 93  Max: 106  BLOOD PRESSURE RANGE: Systolic (01XQH), GPF:965 , Min:122 , IFD:884   ; Diastolic (09GSA), WBK:76, Min:55, Max:62    I/O (24Hr): Intake/Output Summary (Last 24 hours) at 2022 1213  Last data filed at 2022 1056  Gross per 24 hour   Intake 700 ml   Output 0 ml   Net 700 ml     Objective:  General Appearance:  Comfortable, well-appearing and in no acute distress. Vital signs: (most recent): Blood pressure (!) 145/58, pulse 95, temperature 98.2 °F (36.8 °C), temperature source Oral, resp. rate 16, height 5' 2\" (1.575 m), weight 95 lb 14.4 oz (43.5 kg), SpO2 99 %, not currently breastfeeding. HEENT: Normal HEENT exam.    Lungs:  Normal effort. Heart: S1 normal and S2 normal.    Abdomen: Abdomen is soft. Bowel sounds are normal.   There is no epigastric area or suprapubic area tenderness. Neurological: Patient is alert. Pupils:  Pupils are equal, round, and reactive to light. Skin:  Warm.       Labs/Imaging/Diagnostics    Labs:  CBC:  Recent Labs     07/10/22  0815 22  0441   WBC 11.9* 11.3*   RBC 3.02* 3.01*   HGB 8. 8* 8.8*   HCT 27.5* 27.4*   MCV 91.0 90.8   RDW 21.5* 21.1*    205     CHEMISTRIES:  Recent Labs     07/10/22  0815 07/11/22  0441    140   K 4.5 4.2    105   CO2 22 22   BUN 12 15   CREATININE 0.83 0.90   GLUCOSE 94 84   PHOS 2.2* 2.1*   MG 2.0 2.1     PT/INR:No results for input(s): PROTIME, INR in the last 72 hours. APTT:No results for input(s): APTT in the last 72 hours. LIVER PROFILE:No results for input(s): AST, ALT, BILIDIR, BILITOT, ALKPHOS in the last 72 hours. Imaging Last 24 Hours:  No results found. Assessment//Plan           Hospital Problems           Last Modified POA    * (Principal) Complicated UTI (urinary tract infection) 7/2/2022 Yes    Severe malnutrition ([address] 7/7/2022 Yes      UTI  Metabolic encephalopathy  RICARDO  Assessment & Plan  7/12: Patient already cleared for discharge waiting for pre-CERT. DC daily labs. Continue with current meds. Denies any needs.   Level 2  Electronically signed by Karishma Day MD on 7/12/22 at 12:13 PM EDT

## 2022-07-13 VITALS
BODY MASS INDEX: 17.65 KG/M2 | SYSTOLIC BLOOD PRESSURE: 118 MMHG | HEART RATE: 96 BPM | HEIGHT: 62 IN | WEIGHT: 95.9 LBS | RESPIRATION RATE: 16 BRPM | OXYGEN SATURATION: 98 % | DIASTOLIC BLOOD PRESSURE: 61 MMHG | TEMPERATURE: 98.1 F

## 2022-07-13 LAB
EKG ATRIAL RATE: 105 BPM
EKG ATRIAL RATE: 109 BPM
EKG P AXIS: 74 DEGREES
EKG P AXIS: 83 DEGREES
EKG P-R INTERVAL: 136 MS
EKG P-R INTERVAL: 148 MS
EKG Q-T INTERVAL: 302 MS
EKG Q-T INTERVAL: 324 MS
EKG QRS DURATION: 64 MS
EKG QRS DURATION: 66 MS
EKG QTC CALCULATION (BAZETT): 406 MS
EKG QTC CALCULATION (BAZETT): 428 MS
EKG R AXIS: 29 DEGREES
EKG R AXIS: 31 DEGREES
EKG T AXIS: 173 DEGREES
EKG T AXIS: 246 DEGREES
EKG VENTRICULAR RATE: 105 BPM
EKG VENTRICULAR RATE: 109 BPM
GLUCOSE BLD-MCNC: 95 MG/DL (ref 70–99)
PERFORMED ON: NORMAL
SARS-COV-2, NAAT: NOT DETECTED

## 2022-07-13 PROCEDURE — 6370000000 HC RX 637 (ALT 250 FOR IP): Performed by: INTERNAL MEDICINE

## 2022-07-13 PROCEDURE — 2580000003 HC RX 258: Performed by: INTERNAL MEDICINE

## 2022-07-13 PROCEDURE — 6370000000 HC RX 637 (ALT 250 FOR IP): Performed by: NURSE PRACTITIONER

## 2022-07-13 PROCEDURE — 87635 SARS-COV-2 COVID-19 AMP PRB: CPT

## 2022-07-13 RX ORDER — CIPROFLOXACIN 500 MG/1
500 TABLET, FILM COATED ORAL 2 TIMES DAILY
Qty: 6 TABLET | Refills: 0 | Status: SHIPPED | OUTPATIENT
Start: 2022-07-13 | End: 2022-07-16

## 2022-07-13 RX ADMIN — Medication 10 ML: at 10:05

## 2022-07-13 RX ADMIN — MEGESTROL ACETATE 400 MG: 40 SUSPENSION ORAL at 10:04

## 2022-07-13 RX ADMIN — ISOSORBIDE MONONITRATE 30 MG: 30 TABLET, EXTENDED RELEASE ORAL at 10:04

## 2022-07-13 RX ADMIN — METOPROLOL SUCCINATE 75 MG: 50 TABLET, EXTENDED RELEASE ORAL at 10:04

## 2022-07-13 RX ADMIN — LEVOTHYROXINE SODIUM 100 MCG: 0.1 TABLET ORAL at 05:50

## 2022-07-13 RX ADMIN — Medication 400 MG: at 10:04

## 2022-07-13 RX ADMIN — ACETAMINOPHEN 650 MG: 325 TABLET ORAL at 05:50

## 2022-07-13 ASSESSMENT — ENCOUNTER SYMPTOMS
DIARRHEA: 0
VOMITING: 0
COUGH: 0
SHORTNESS OF BREATH: 0
NAUSEA: 0

## 2022-07-13 ASSESSMENT — PAIN SCALES - GENERAL: PAINLEVEL_OUTOF10: 5

## 2022-07-13 NOTE — PLAN OF CARE
Problem: Discharge Planning  Goal: Discharge to home or other facility with appropriate resources  7/12/2022 2329 by Mati Pérez RN  Outcome: Progressing  7/12/2022 1054 by Tyrell Iglesias RN  Outcome: Progressing     Problem: Safety - Adult  Goal: Free from fall injury  7/12/2022 2329 by Mati Pérez RN  Outcome: Progressing  7/12/2022 1054 by Tyrell Iglesias RN  Outcome: Progressing     Problem: Skin/Tissue Integrity  Goal: Absence of new skin breakdown  7/12/2022 2329 by Mati Pérez RN  Outcome: Progressing  7/12/2022 1054 by Tyrell Iglesias RN  Outcome: Progressing     Problem: Pain  Goal: Verbalizes/displays adequate comfort level or baseline comfort level  7/12/2022 2329 by Mati Pérez RN  Outcome: Progressing  7/12/2022 1054 by Tyrell Iglesias RN  Outcome: Progressing     Problem: Nutrition Deficit:  Goal: Optimize nutritional status  7/12/2022 2329 by Mati Pérez RN  Outcome: Progressing  7/12/2022 1341 by Sander Martinez, MS, RD, LD  Flowsheets (Taken 7/5/2022 1348)  Nutrient intake appropriate for improving, restoring, or maintaining nutritional needs:   Assess nutritional status and recommend course of action   Monitor oral intake, labs, and treatment plans   Recommend appropriate diets, oral nutritional supplements, and vitamin/mineral supplements   Order, calculate, and assess calorie counts as needed   Recommend, monitor, and adjust tube feedings and TPN/PPN based on assessed needs   Provide specific nutrition education to patient or family as appropriate  7/12/2022 1054 by Tyrell Iglesias RN  Outcome: Progressing

## 2022-07-13 NOTE — PROGRESS NOTES
Progress Note  Date:2022       Room:[place] 527325-01  Patient Name:Edna Doyle     YOB: 1945     Age:77 y.o. Subjective    Subjective:  Symptoms:  She reports malaise and weakness. No shortness of breath, cough, chest pain, headache, chest pressure, anorexia, diarrhea or anxiety. Diet:  No nausea or vomiting. Review of Systems   Respiratory: Negative for cough and shortness of breath. Cardiovascular: Negative for chest pain. Gastrointestinal: Negative for anorexia, diarrhea, nausea and vomiting. Neurological: Positive for weakness. Objective         Vitals Last 24 Hours:  TEMPERATURE:  Temp  Av.8 °F (36.6 °C)  Min: 97.5 °F (36.4 °C)  Max: 98.1 °F (36.7 °C)  RESPIRATIONS RANGE: Resp  Av  Min: 16  Max: 16  PULSE OXIMETRY RANGE: SpO2  Av.5 %  Min: 97 %  Max: 98 %  PULSE RANGE: Pulse  Av.7  Min: 86  Max: 96  BLOOD PRESSURE RANGE: Systolic (40TZX), JEC:927 , Min:104 , IZZ:179   ; Diastolic (30AYH), FRU:99, Min:46, Max:61    I/O (24Hr): Intake/Output Summary (Last 24 hours) at 2022 1150  Last data filed at 2022 0504  Gross per 24 hour   Intake 210 ml   Output --   Net 210 ml     Objective:  General Appearance:  Comfortable, well-appearing and in no acute distress. Vital signs: (most recent): Blood pressure 118/61, pulse 96, temperature 98.1 °F (36.7 °C), temperature source Oral, resp. rate 16, height 5' 2\" (1.575 m), weight 95 lb 14.4 oz (43.5 kg), SpO2 98 %, not currently breastfeeding. HEENT: Normal HEENT exam.    Lungs:  Normal effort. Heart: S1 normal and S2 normal.    Abdomen: Abdomen is soft. Bowel sounds are normal.   There is no epigastric area or suprapubic area tenderness. Neurological: Patient is alert. Pupils:  Pupils are equal, round, and reactive to light. Skin:  Warm.       Labs/Imaging/Diagnostics    Labs:  CBC:  Recent Labs     22  0441   WBC 11.3*   RBC 3.01*   HGB 8.8*   HCT 27.4*   MCV 90.8 RDW 21.1*        CHEMISTRIES:  Recent Labs     07/11/22  0441      K 4.2      CO2 22   BUN 15   CREATININE 0.90   GLUCOSE 84   PHOS 2.1*   MG 2.1     PT/INR:No results for input(s): PROTIME, INR in the last 72 hours. APTT:No results for input(s): APTT in the last 72 hours. LIVER PROFILE:No results for input(s): AST, ALT, BILIDIR, BILITOT, ALKPHOS in the last 72 hours. Imaging Last 24 Hours:  No results found. Assessment//Plan           Hospital Problems           Last Modified POA    * (Principal) Complicated UTI (urinary tract infection) 7/2/2022 Yes    Severe malnutrition ([address] 7/7/2022 Yes      UTI  Metabolic encephalopathy  RICARDO  Assessment & Plan    7/12: Patient already cleared for discharge waiting for pre-CERT. DC daily labs. Continue with current meds. Denies any needs. Level 2  7/13: Patient was seen and evaluated. Awaiting for pre-CERT. No overnight events no new complaints.  Level 2  Electronically signed by Irene Hendrix MD on 7/12/22 at 12:13 PM EDT

## 2022-07-13 NOTE — PROGRESS NOTES
Pt resting in bed. av5 CUPS and some sugaris camera in place for safety. Assessment completed. Pt turned and repositioned frequently. Medicated with Tylenol PRN. IV heplock flushed but needs to be changed. Pt yelled and refused to let me attempt IV. Alert but confused. Janelle area redden. Zinc applied with brief changes. Hourly rounds continue. Call light in reach. Fall precautions in place.

## 2022-07-13 NOTE — DISCHARGE INSTR - COC
Continuity of Care Form    Patient Name: Froylan Max   :  1945  MRN:  [id number]    Admit date:  2022  Discharge date:  2022    Code Status Order: Full Code   Advance Directives:      Admitting Physician:  No admitting provider for patient encounter. PCP: Beto Salguero MD    Discharging Nurse: [address] [place], [place] [address] Unit/Room#: L335/M628-23  Discharging Unit Phone Number: [phone]    Emergency Contact:   Extended Emergency Contact Information  Primary Emergency Contact: Abdulkadir Lowe of [address]  Phone: [phone]  Relation: Child  Secondary Emergency Contact: Socorro Moyer of [facility] Phone: [phone]  Relation: Child    Past Surgical History:  Past Surgical History:   Procedure Laterality Date    [address]  2014    LUMBAR    THYROIDECTOMY, PARTIAL         Immunization History: There is no immunization history on file for this patient.     Active Problems:  Patient Active Problem List   Diagnosis Code    Degenerative spondylolisthesis M43.10    Lumbar stenosis with neurogenic claudication M48.062    Hypothyroidism E03.9    Hypertension I10    Hyperlipidemia E78.5    Bilateral carotid artery stenosis I65.23    TIA (transient ischemic attack) G45.9    Pancytopenia (HCC) D61.818    Peripheral polyneuropathy G62.9    Weight loss R63.4    Acute bronchitis J20.9    Pneumonia due to COVID-19 virus U07.1, J12.82    Acute kidney injury ([address] O66.8    Complicated UTI (urinary tract infection) N39.0    Severe malnutrition ([address] E43       Isolation/Infection:   Isolation            No Isolation          Patient Infection Status       Infection Onset Added Last Indicated Last Indicated By Review Planned Expiration Resolved Resolved By    None active    Resolved    COVID-19 09/10/21 09/10/21 09/10/21 COVID-19, Rapid   21     COVID-19 (Rule Out) 09/10/21 09/10/21 09/10/21 COVID-19, Rapid (Ordered)   09/10/21 Rule-Out Test Resulted            Nurse Assessment:  Last Vital Signs: /61   Pulse 96   Temp 98.1 °F (36.7 °C) (Oral)   Resp 16   Ht 5' 2\" (1.575 m)   Wt 95 lb 14.4 oz (43.5 kg)   LMP  (LMP Unknown)   SpO2 98%   BMI 17.54 kg/m²     Last documented pain score (0-10 scale): Pain Level: 5  Last Weight:   Wt Readings from Last 1 Encounters:   07/11/22 95 lb 14.4 oz (43.5 kg)     Mental Status:  oriented and confused at times     IV Access:  - None    Nursing Mobility/ADLs:  Walking   Dependent  Transfer  Dependent  Bathing  Dependent  Dressing  Dependent  Toileting  Dependent  Feeding  Assisted (set up and gives cues)   Med Admin  Dependent  Med Delivery   whole    Wound Care Documentation and Therapy:        Elimination:  Continence: Bowel: No  Bladder: No  Urinary Catheter: None   Colostomy/Ileostomy/Ileal Conduit: No       Date of Last BM: 7/13/2022    Intake/Output Summary (Last 24 hours) at 7/13/2022 1603  Last data filed at 7/13/2022 0504  Gross per 24 hour   Intake 210 ml   Output --   Net 210 ml     I/O last 3 completed shifts: In: 310 [P.O.:300; I.V.:10]  Out: -     Safety Concerns:     Sundowners [place] and At Risk for Falls    Impairments/Disabilities:      None    Nutrition Therapy:  Current Nutrition Therapy:   - Oral Diet:  General    Routes of Feeding: Oral  Liquids: No Restrictions  Daily Fluid Restriction: no  Last Modified Barium Swallow with Video (Video Swallowing Test): not done    Treatments at the Time of Hospital Discharge:   Respiratory Treatments: NA  Oxygen Therapy:  is not on home oxygen therapy. Ventilator:    - No ventilator support    Rehab Therapies:   Weight Bearing Status/Restrictions: No weight bearing restrictions  Other Medical Equipment (for information only, NOT a DME order):     Other Treatments:     Patient's personal belongings (please select all that are sent with patient):  Dentures upper and lower, clothing, purse     RN SIGNATURE:  Electronically signed by Leslie Anaya RN on 7/13/22 at 4:08 PM EDT    CASE MANAGEMENT/SOCIAL WORK SECTION    Inpatient Status Date: ***    Readmission Risk Assessment Score:  Readmission Risk              Risk of Unplanned Readmission:  19           Discharging to Facility/ Agency   Name:   Address:  Phone:  Fax:    Dialysis Facility (if applicable)   Name:  Address:  Dialysis Schedule:  Phone:  Fax:    / signature: {Esignature:[id number]}    PHYSICIAN SECTION    Prognosis: {Prognosis:9987882316}    Condition at Discharge: [address] Phani Patient Condition:[id number]}    Rehab Potential (if transferring to Rehab): {Prognosis:9803431845}    Recommended Labs or Other Treatments After Discharge: FU ob gyn as outpt  CBC and CMP in 2 to 3 days    Physician Certification: I certify the above information and transfer of Maury Ham  is necessary for the continuing treatment of the diagnosis listed and that she requires {Admit to Appropriate Level of Care:67101} for {GREATER/LESS:436840191} 30 days.      Update Admission H&P: {CHP DME Changes in ROIIE:275108453}    PHYSICIAN SIGNATURE:  Electronically signed by Rowan Oscar MD on 7/13/22 at 4:09 PM EDT

## 2022-07-13 NOTE — PROGRESS NOTES
Physical Therapy Missed Treatment   Facility/Department: [facility] MED SURG U527/H300-29    NAME: Lamine Vann    : 1945 (78 y.o.)  MRN: [id number]    Account: [de-identified]  Gender: female    Chart reviewed, attempted PT at [facility]. Patient unavailable 2° to:    [] Hold per nsg request    [x] Pt declined pt agitated and not agreeable to tx. Max encouragement and education given pt still unwilling, stating \"you sound ridiculous. \"        [] Pt. . off floor for test/procedure. [] Pt. Unavailable       Will attempt PT treatment again at earliest convenience.       Electronically signed by Laura Sheehan PTA on 22 at 11:46 AM EDT

## 2022-07-13 NOTE — CARE COORDINATION
This LSW received message that patient can be admitted to [facility]. Patient will be private pay. I have arranged transport via [address] for patient at 4:00pm today. Dtr. Deni Tejeda, patient, Dionte Galindo and Brigette/liaison are all aware. IMM completed with dtr. , Deni Tejeda.   Electronically signed by MAYTE Morgan, LSW on 7/13/22 at 2:53 PM EDT

## 2022-07-13 NOTE — PROGRESS NOTES
Shift assessment complete. Patient A&Ox3, she is unsure of why she is here. She is calm and cooperative. Medication given per [facility]. She denies other needs at this time. Call light in reach. AVASYS in place for patient safety.        Report [facility]

## 2022-07-15 ENCOUNTER — OFFICE VISIT (OUTPATIENT)
Dept: GERIATRIC MEDICINE | Age: 77
End: 2022-07-15
Payer: MEDICARE

## 2022-07-15 DIAGNOSIS — D50.9 IRON DEFICIENCY ANEMIA, UNSPECIFIED IRON DEFICIENCY ANEMIA TYPE: ICD-10-CM

## 2022-07-15 DIAGNOSIS — N93.9 ABNORMAL VAGINAL BLEEDING: Primary | ICD-10-CM

## 2022-07-15 PROCEDURE — 99310 SBSQ NF CARE HIGH MDM 45: CPT | Performed by: PHYSICIAN ASSISTANT

## 2022-07-15 PROCEDURE — G9692 HOSP RECD BY PT DUR MSMT PER: HCPCS | Performed by: PHYSICIAN ASSISTANT

## 2022-07-17 NOTE — PROGRESS NOTES
Physical Therapy  Facility/Department: [facility] MED SURG D386/U367-36  Physical Therapy Discharge      NAME: Beata Fisher    : 1945 (78 y.o.)  MRN: [id number]    Account: [de-identified]  Gender: female      Patient has been discharged from acute care hospital. DC patient from current PT program.      Electronically signed by Christie Leyva PT on 22 at 2:41 PM EDT

## 2022-07-18 LAB
ALBUMIN SERPL-MCNC: 2.9 G/DL
ALP BLD-CCNC: 84 U/L
ALT SERPL-CCNC: 7 U/L
ANION GAP SERPL CALCULATED.3IONS-SCNC: NORMAL MMOL/L
AST SERPL-CCNC: 16 U/L
BILIRUB SERPL-MCNC: 0.5 MG/DL (ref 0.1–1.4)
BUN BLDV-MCNC: 38 MG/DL
CALCIUM SERPL-MCNC: 8.7 MG/DL
CHLORIDE BLD-SCNC: 102 MMOL/L
CO2: 19 MMOL/L
CREAT SERPL-MCNC: 1.8 MG/DL
GFR CALCULATED: NORMAL
GLUCOSE BLD-MCNC: 51 MG/DL
POTASSIUM SERPL-SCNC: 4.8 MMOL/L
SODIUM BLD-SCNC: 140 MMOL/L
TOTAL PROTEIN: 6.3

## 2022-07-19 ENCOUNTER — OFFICE VISIT (OUTPATIENT)
Dept: GERIATRIC MEDICINE | Age: 77
End: 2022-07-19
Payer: MEDICARE

## 2022-07-19 DIAGNOSIS — N17.9 AKI (ACUTE KIDNEY INJURY) (HCC): ICD-10-CM

## 2022-07-19 DIAGNOSIS — N39.0 COMPLICATED UTI (URINARY TRACT INFECTION): Primary | ICD-10-CM

## 2022-07-19 DIAGNOSIS — E43 SEVERE MALNUTRITION (HCC): ICD-10-CM

## 2022-07-19 DIAGNOSIS — M48.062 LUMBAR STENOSIS WITH NEUROGENIC CLAUDICATION: ICD-10-CM

## 2022-07-19 DIAGNOSIS — G62.9 PERIPHERAL POLYNEUROPATHY: ICD-10-CM

## 2022-07-19 DIAGNOSIS — E78.5 DYSLIPIDEMIA: ICD-10-CM

## 2022-07-19 PROCEDURE — G9692 HOSP RECD BY PT DUR MSMT PER: HCPCS | Performed by: INTERNAL MEDICINE

## 2022-07-19 PROCEDURE — 99305 1ST NF CARE MODERATE MDM 35: CPT | Performed by: INTERNAL MEDICINE

## 2022-07-21 DIAGNOSIS — E44.0 PROTEIN-CALORIE MALNUTRITION, MODERATE (HCC): Primary | ICD-10-CM

## 2022-07-21 RX ORDER — DRONABINOL 2.5 MG/1
2.5 CAPSULE ORAL
Qty: 60 CAPSULE | Refills: 0 | Status: SHIPPED | OUTPATIENT
Start: 2022-07-21 | End: 2022-08-20

## 2022-07-22 ENCOUNTER — OFFICE VISIT (OUTPATIENT)
Dept: GERIATRIC MEDICINE | Age: 77
End: 2022-07-22
Payer: MEDICARE

## 2022-07-22 DIAGNOSIS — R63.4 WEIGHT LOSS: ICD-10-CM

## 2022-07-22 DIAGNOSIS — N18.30 STAGE 3 CHRONIC KIDNEY DISEASE, UNSPECIFIED WHETHER STAGE 3A OR 3B CKD (HCC): ICD-10-CM

## 2022-07-22 DIAGNOSIS — R62.7 FTT (FAILURE TO THRIVE) IN ADULT: ICD-10-CM

## 2022-07-22 DIAGNOSIS — E43 SEVERE MALNUTRITION (HCC): Primary | ICD-10-CM

## 2022-07-22 DIAGNOSIS — N93.9 VAGINAL BLEEDING, ABNORMAL: ICD-10-CM

## 2022-07-22 PROCEDURE — G9692 HOSP RECD BY PT DUR MSMT PER: HCPCS | Performed by: PHYSICIAN ASSISTANT

## 2022-07-22 PROCEDURE — 99309 SBSQ NF CARE MODERATE MDM 30: CPT | Performed by: PHYSICIAN ASSISTANT

## 2022-07-23 ENCOUNTER — APPOINTMENT (OUTPATIENT)
Dept: CT IMAGING | Age: 77
DRG: 329 | End: 2022-07-23
Payer: MEDICARE

## 2022-07-23 ENCOUNTER — APPOINTMENT (OUTPATIENT)
Dept: GENERAL RADIOLOGY | Age: 77
DRG: 329 | End: 2022-07-23
Payer: MEDICARE

## 2022-07-23 ENCOUNTER — HOSPITAL ENCOUNTER (INPATIENT)
Age: 77
LOS: 8 days | Discharge: NURSING, REHAB OR LONG-TERM CARE | DRG: 329 | End: 2022-08-01
Attending: INTERNAL MEDICINE | Admitting: INTERNAL MEDICINE
Payer: MEDICARE

## 2022-07-23 DIAGNOSIS — J18.9 PNEUMONIA OF RIGHT LOWER LOBE DUE TO INFECTIOUS ORGANISM: ICD-10-CM

## 2022-07-23 DIAGNOSIS — N30.00 ACUTE CYSTITIS WITHOUT HEMATURIA: ICD-10-CM

## 2022-07-23 DIAGNOSIS — N82.3 RECTOVAGINAL FISTULA: ICD-10-CM

## 2022-07-23 DIAGNOSIS — A41.9 SEPTICEMIA (HCC): Primary | ICD-10-CM

## 2022-07-23 DIAGNOSIS — Z79.899 HIGH RISK MEDICATION USE: ICD-10-CM

## 2022-07-23 DIAGNOSIS — N17.9 ACUTE KIDNEY INJURY (HCC): ICD-10-CM

## 2022-07-23 LAB
ALBUMIN SERPL-MCNC: 2.7 G/DL (ref 3.5–4.6)
ALP BLD-CCNC: 101 U/L (ref 40–130)
ALT SERPL-CCNC: 8 U/L (ref 0–33)
ANION GAP SERPL CALCULATED.3IONS-SCNC: 13 MEQ/L (ref 9–15)
ANISOCYTOSIS: ABNORMAL
AST SERPL-CCNC: 15 U/L (ref 0–35)
BACTERIA: ABNORMAL /HPF
BANDED NEUTROPHILS RELATIVE PERCENT: 16 % (ref 5–11)
BASOPHILS ABSOLUTE: 0 K/UL (ref 0–0.2)
BASOPHILS RELATIVE PERCENT: 0.2 %
BILIRUB SERPL-MCNC: 0.3 MG/DL (ref 0.2–0.7)
BILIRUBIN URINE: ABNORMAL
BLOOD, URINE: ABNORMAL
BUN BLDV-MCNC: 57 MG/DL (ref 8–23)
CALCIUM SERPL-MCNC: 10.1 MG/DL (ref 8.5–9.9)
CHLORIDE BLD-SCNC: 102 MEQ/L (ref 95–107)
CLARITY: ABNORMAL
CO2: 22 MEQ/L (ref 20–31)
COLOR: ABNORMAL
CREAT SERPL-MCNC: 1.69 MG/DL (ref 0.5–0.9)
CRYSTALS, UA: ABNORMAL /HPF
EOSINOPHILS ABSOLUTE: 0 K/UL (ref 0–0.7)
EOSINOPHILS RELATIVE PERCENT: 1.2 %
GFR AFRICAN AMERICAN: 35.7
GFR NON-AFRICAN AMERICAN: 29.5
GLOBULIN: 4.5 G/DL (ref 2.3–3.5)
GLUCOSE BLD-MCNC: 99 MG/DL (ref 70–99)
GLUCOSE URINE: NEGATIVE MG/DL
HCT VFR BLD CALC: 32.8 % (ref 37–47)
HEMOGLOBIN: 10.3 G/DL (ref 12–16)
HYPOCHROMIA: ABNORMAL
KETONES, URINE: NEGATIVE MG/DL
LACTIC ACID: 1.7 MMOL/L (ref 0.5–2.2)
LEUKOCYTE ESTERASE, URINE: ABNORMAL
LYMPHOCYTES ABSOLUTE: 0.7 K/UL (ref 1–4.8)
LYMPHOCYTES RELATIVE PERCENT: 4 %
MCH RBC QN AUTO: 28.5 PG (ref 27–31.3)
MCHC RBC AUTO-ENTMCNC: 31.5 % (ref 33–37)
MCV RBC AUTO: 90.6 FL (ref 82–100)
MONOCYTES ABSOLUTE: 0.4 K/UL (ref 0.2–0.8)
MONOCYTES RELATIVE PERCENT: 1.9 %
NEUTROPHILS ABSOLUTE: 17 K/UL (ref 1.4–6.5)
NEUTROPHILS RELATIVE PERCENT: 78 %
NITRITE, URINE: NEGATIVE
PDW BLD-RTO: 19.3 % (ref 11.5–14.5)
PH UA: 8 (ref 5–9)
PLATELET # BLD: 297 K/UL (ref 130–400)
PLATELET SLIDE REVIEW: NORMAL
POC CREATININE WHOLE BLOOD: 1.8
POTASSIUM SERPL-SCNC: 4.9 MEQ/L (ref 3.4–4.9)
PROCALCITONIN: 2.87 NG/ML (ref 0–0.15)
PROTEIN UA: 100 MG/DL
RBC # BLD: 3.62 M/UL (ref 4.2–5.4)
RBC UA: ABNORMAL /HPF (ref 0–2)
SODIUM BLD-SCNC: 137 MEQ/L (ref 135–144)
SPECIFIC GRAVITY UA: 1.02 (ref 1–1.03)
TOTAL PROTEIN: 7.2 G/DL (ref 6.3–8)
URINE REFLEX TO CULTURE: YES
UROBILINOGEN, URINE: 0.2 E.U./DL
VACUOLATED NEUTROPHILS: PRESENT
WBC # BLD: 18.1 K/UL (ref 4.8–10.8)
WBC UA: ABNORMAL /HPF (ref 0–5)

## 2022-07-23 PROCEDURE — 96361 HYDRATE IV INFUSION ADD-ON: CPT

## 2022-07-23 PROCEDURE — 74176 CT ABD & PELVIS W/O CONTRAST: CPT

## 2022-07-23 PROCEDURE — 83605 ASSAY OF LACTIC ACID: CPT

## 2022-07-23 PROCEDURE — 87040 BLOOD CULTURE FOR BACTERIA: CPT

## 2022-07-23 PROCEDURE — 2500000003 HC RX 250 WO HCPCS

## 2022-07-23 PROCEDURE — 6370000000 HC RX 637 (ALT 250 FOR IP)

## 2022-07-23 PROCEDURE — 2580000003 HC RX 258

## 2022-07-23 PROCEDURE — 36415 COLL VENOUS BLD VENIPUNCTURE: CPT

## 2022-07-23 PROCEDURE — 85025 COMPLETE CBC W/AUTO DIFF WBC: CPT

## 2022-07-23 PROCEDURE — 71045 X-RAY EXAM CHEST 1 VIEW: CPT

## 2022-07-23 PROCEDURE — 87086 URINE CULTURE/COLONY COUNT: CPT

## 2022-07-23 PROCEDURE — 80053 COMPREHEN METABOLIC PANEL: CPT

## 2022-07-23 PROCEDURE — 99285 EMERGENCY DEPT VISIT HI MDM: CPT

## 2022-07-23 PROCEDURE — 84145 PROCALCITONIN (PCT): CPT

## 2022-07-23 PROCEDURE — 81001 URINALYSIS AUTO W/SCOPE: CPT

## 2022-07-23 PROCEDURE — 96365 THER/PROPH/DIAG IV INF INIT: CPT

## 2022-07-23 PROCEDURE — 6360000002 HC RX W HCPCS

## 2022-07-23 PROCEDURE — 96375 TX/PRO/DX INJ NEW DRUG ADDON: CPT

## 2022-07-23 RX ORDER — 0.9 % SODIUM CHLORIDE 0.9 %
1000 INTRAVENOUS SOLUTION INTRAVENOUS ONCE
Status: COMPLETED | OUTPATIENT
Start: 2022-07-23 | End: 2022-07-24

## 2022-07-23 RX ORDER — ACETAMINOPHEN 500 MG
1000 TABLET ORAL ONCE
Status: COMPLETED | OUTPATIENT
Start: 2022-07-23 | End: 2022-07-23

## 2022-07-23 RX ORDER — FENTANYL CITRATE 50 UG/ML
12.5 INJECTION, SOLUTION INTRAMUSCULAR; INTRAVENOUS ONCE
Status: COMPLETED | OUTPATIENT
Start: 2022-07-23 | End: 2022-07-23

## 2022-07-23 RX ORDER — METRONIDAZOLE 500 MG/100ML
500 INJECTION, SOLUTION INTRAVENOUS ONCE
Status: COMPLETED | OUTPATIENT
Start: 2022-07-23 | End: 2022-07-23

## 2022-07-23 RX ADMIN — METRONIDAZOLE 500 MG: 500 INJECTION, SOLUTION INTRAVENOUS at 22:54

## 2022-07-23 RX ADMIN — SODIUM CHLORIDE 1000 ML: 9 INJECTION, SOLUTION INTRAVENOUS at 21:15

## 2022-07-23 RX ADMIN — CEFAZOLIN 2000 MG: 10 INJECTION, POWDER, FOR SOLUTION INTRAVENOUS at 23:56

## 2022-07-23 RX ADMIN — ACETAMINOPHEN 1000 MG: 500 TABLET ORAL at 21:12

## 2022-07-23 RX ADMIN — FENTANYL CITRATE 12.5 MCG: 50 INJECTION, SOLUTION INTRAMUSCULAR; INTRAVENOUS at 22:21

## 2022-07-23 ASSESSMENT — ENCOUNTER SYMPTOMS
ABDOMINAL DISTENTION: 0
CONSTIPATION: 0
VOMITING: 0
ANAL BLEEDING: 0
PHOTOPHOBIA: 0
RHINORRHEA: 0
BLOOD IN STOOL: 0
DIARRHEA: 0
NAUSEA: 0
SHORTNESS OF BREATH: 0
ABDOMINAL PAIN: 0
COUGH: 0

## 2022-07-23 ASSESSMENT — PAIN - FUNCTIONAL ASSESSMENT: PAIN_FUNCTIONAL_ASSESSMENT: NONE - DENIES PAIN

## 2022-07-24 PROBLEM — N82.3 RECTOVAGINAL FISTULA: Status: ACTIVE | Noted: 2022-07-24

## 2022-07-24 LAB
ALBUMIN SERPL-MCNC: 2.3 G/DL (ref 3.5–4.6)
ALP BLD-CCNC: 78 U/L (ref 40–130)
ALT SERPL-CCNC: 6 U/L (ref 0–33)
ANION GAP SERPL CALCULATED.3IONS-SCNC: 14 MEQ/L (ref 9–15)
AST SERPL-CCNC: 12 U/L (ref 0–35)
BASOPHILS ABSOLUTE: 0 K/UL (ref 0–0.2)
BASOPHILS RELATIVE PERCENT: 0.2 %
BILIRUB SERPL-MCNC: <0.2 MG/DL (ref 0.2–0.7)
BUN BLDV-MCNC: 46 MG/DL (ref 8–23)
CALCIUM SERPL-MCNC: 8.6 MG/DL (ref 8.5–9.9)
CHLORIDE BLD-SCNC: 110 MEQ/L (ref 95–107)
CO2: 19 MEQ/L (ref 20–31)
CREAT SERPL-MCNC: 1.29 MG/DL (ref 0.5–0.9)
EOSINOPHILS ABSOLUTE: 0.3 K/UL (ref 0–0.7)
EOSINOPHILS RELATIVE PERCENT: 1.9 %
GFR AFRICAN AMERICAN: 33
GFR AFRICAN AMERICAN: 48.7
GFR NON-AFRICAN AMERICAN: 27
GFR NON-AFRICAN AMERICAN: 40.2
GLOBULIN: 3.7 G/DL (ref 2.3–3.5)
GLUCOSE BLD-MCNC: 81 MG/DL (ref 70–99)
HCT VFR BLD CALC: 24.9 % (ref 37–47)
HEMOGLOBIN: 7.7 G/DL (ref 12–16)
LYMPHOCYTES ABSOLUTE: 0.4 K/UL (ref 1–4.8)
LYMPHOCYTES RELATIVE PERCENT: 2.5 %
MCH RBC QN AUTO: 28.6 PG (ref 27–31.3)
MCHC RBC AUTO-ENTMCNC: 30.9 % (ref 33–37)
MCV RBC AUTO: 92.4 FL (ref 82–100)
MONOCYTES ABSOLUTE: 0.6 K/UL (ref 0.2–0.8)
MONOCYTES RELATIVE PERCENT: 4 %
NEUTROPHILS ABSOLUTE: 13.4 K/UL (ref 1.4–6.5)
NEUTROPHILS RELATIVE PERCENT: 91.4 %
PDW BLD-RTO: 18.9 % (ref 11.5–14.5)
PERFORMED ON: ABNORMAL
PLATELET # BLD: 264 K/UL (ref 130–400)
POC CREATININE: 1.8 MG/DL (ref 0.6–1.2)
POC SAMPLE TYPE: ABNORMAL
POTASSIUM REFLEX MAGNESIUM: 4.6 MEQ/L (ref 3.4–4.9)
RBC # BLD: 2.69 M/UL (ref 4.2–5.4)
SODIUM BLD-SCNC: 143 MEQ/L (ref 135–144)
TOTAL PROTEIN: 6 G/DL (ref 6.3–8)
WBC # BLD: 14.6 K/UL (ref 4.8–10.8)

## 2022-07-24 PROCEDURE — 6360000002 HC RX W HCPCS

## 2022-07-24 PROCEDURE — 6370000000 HC RX 637 (ALT 250 FOR IP): Performed by: COLON & RECTAL SURGERY

## 2022-07-24 PROCEDURE — 2580000003 HC RX 258

## 2022-07-24 PROCEDURE — 36415 COLL VENOUS BLD VENIPUNCTURE: CPT

## 2022-07-24 PROCEDURE — 6370000000 HC RX 637 (ALT 250 FOR IP)

## 2022-07-24 PROCEDURE — 2500000003 HC RX 250 WO HCPCS

## 2022-07-24 PROCEDURE — 99221 1ST HOSP IP/OBS SF/LOW 40: CPT | Performed by: COLON & RECTAL SURGERY

## 2022-07-24 PROCEDURE — 80053 COMPREHEN METABOLIC PANEL: CPT

## 2022-07-24 PROCEDURE — 85025 COMPLETE CBC W/AUTO DIFF WBC: CPT

## 2022-07-24 PROCEDURE — 1210000000 HC MED SURG R&B

## 2022-07-24 PROCEDURE — 2580000003 HC RX 258: Performed by: INTERNAL MEDICINE

## 2022-07-24 RX ORDER — METRONIDAZOLE 500 MG/100ML
500 INJECTION, SOLUTION INTRAVENOUS EVERY 8 HOURS
Status: DISCONTINUED | OUTPATIENT
Start: 2022-07-24 | End: 2022-08-01 | Stop reason: HOSPADM

## 2022-07-24 RX ORDER — LEVOTHYROXINE SODIUM 0.1 MG/1
100 TABLET ORAL DAILY
Status: DISCONTINUED | OUTPATIENT
Start: 2022-07-25 | End: 2022-08-01 | Stop reason: HOSPADM

## 2022-07-24 RX ORDER — ISOSORBIDE MONONITRATE 30 MG/1
30 TABLET, EXTENDED RELEASE ORAL DAILY
Status: DISCONTINUED | OUTPATIENT
Start: 2022-07-25 | End: 2022-08-01 | Stop reason: HOSPADM

## 2022-07-24 RX ORDER — ONDANSETRON 4 MG/1
4 TABLET, ORALLY DISINTEGRATING ORAL EVERY 8 HOURS PRN
Status: DISCONTINUED | OUTPATIENT
Start: 2022-07-24 | End: 2022-08-01 | Stop reason: HOSPADM

## 2022-07-24 RX ORDER — SODIUM CHLORIDE 9 MG/ML
INJECTION, SOLUTION INTRAVENOUS PRN
Status: DISCONTINUED | OUTPATIENT
Start: 2022-07-24 | End: 2022-08-01 | Stop reason: HOSPADM

## 2022-07-24 RX ORDER — SODIUM CHLORIDE 9 MG/ML
INJECTION, SOLUTION INTRAVENOUS CONTINUOUS
Status: DISCONTINUED | OUTPATIENT
Start: 2022-07-24 | End: 2022-07-27

## 2022-07-24 RX ORDER — POLYETHYLENE GLYCOL 3350 17 G/17G
17 POWDER, FOR SOLUTION ORAL DAILY PRN
Status: DISCONTINUED | OUTPATIENT
Start: 2022-07-24 | End: 2022-08-01 | Stop reason: HOSPADM

## 2022-07-24 RX ORDER — CIPROFLOXACIN 2 MG/ML
400 INJECTION, SOLUTION INTRAVENOUS EVERY 24 HOURS
Status: DISCONTINUED | OUTPATIENT
Start: 2022-07-24 | End: 2022-07-26

## 2022-07-24 RX ORDER — ACETAMINOPHEN 650 MG/1
650 SUPPOSITORY RECTAL EVERY 6 HOURS PRN
Status: DISCONTINUED | OUTPATIENT
Start: 2022-07-24 | End: 2022-08-01 | Stop reason: HOSPADM

## 2022-07-24 RX ORDER — ONDANSETRON 2 MG/ML
4 INJECTION INTRAMUSCULAR; INTRAVENOUS EVERY 6 HOURS PRN
Status: DISCONTINUED | OUTPATIENT
Start: 2022-07-24 | End: 2022-08-01 | Stop reason: HOSPADM

## 2022-07-24 RX ORDER — SODIUM CHLORIDE 0.9 % (FLUSH) 0.9 %
5-40 SYRINGE (ML) INJECTION EVERY 12 HOURS SCHEDULED
Status: DISCONTINUED | OUTPATIENT
Start: 2022-07-24 | End: 2022-08-01 | Stop reason: HOSPADM

## 2022-07-24 RX ORDER — SODIUM CHLORIDE 0.9 % (FLUSH) 0.9 %
5-40 SYRINGE (ML) INJECTION PRN
Status: DISCONTINUED | OUTPATIENT
Start: 2022-07-24 | End: 2022-08-01 | Stop reason: HOSPADM

## 2022-07-24 RX ORDER — ENOXAPARIN SODIUM 100 MG/ML
30 INJECTION SUBCUTANEOUS DAILY
Status: DISCONTINUED | OUTPATIENT
Start: 2022-07-24 | End: 2022-07-24

## 2022-07-24 RX ORDER — HEPARIN SODIUM 5000 [USP'U]/ML
5000 INJECTION, SOLUTION INTRAVENOUS; SUBCUTANEOUS 2 TIMES DAILY
Status: DISCONTINUED | OUTPATIENT
Start: 2022-07-24 | End: 2022-07-26 | Stop reason: ALTCHOICE

## 2022-07-24 RX ORDER — 0.9 % SODIUM CHLORIDE 0.9 %
1000 INTRAVENOUS SOLUTION INTRAVENOUS ONCE
Status: COMPLETED | OUTPATIENT
Start: 2022-07-24 | End: 2022-07-24

## 2022-07-24 RX ORDER — ACETAMINOPHEN 325 MG/1
650 TABLET ORAL EVERY 6 HOURS PRN
Status: DISCONTINUED | OUTPATIENT
Start: 2022-07-24 | End: 2022-08-01 | Stop reason: HOSPADM

## 2022-07-24 RX ORDER — POLYETHYLENE GLYCOL 3350 17 G/17G
238 POWDER, FOR SOLUTION ORAL ONCE
Status: COMPLETED | OUTPATIENT
Start: 2022-07-24 | End: 2022-07-24

## 2022-07-24 RX ADMIN — POLYETHYLENE GLYCOL 3350 238 G: 17 POWDER, FOR SOLUTION ORAL at 14:15

## 2022-07-24 RX ADMIN — SODIUM CHLORIDE 1000 ML: 9 INJECTION, SOLUTION INTRAVENOUS at 03:50

## 2022-07-24 RX ADMIN — METRONIDAZOLE 500 MG: 500 INJECTION, SOLUTION INTRAVENOUS at 06:32

## 2022-07-24 RX ADMIN — CIPROFLOXACIN 400 MG: 2 INJECTION, SOLUTION INTRAVENOUS at 02:47

## 2022-07-24 RX ADMIN — METRONIDAZOLE 500 MG: 500 INJECTION, SOLUTION INTRAVENOUS at 14:52

## 2022-07-24 RX ADMIN — Medication 10 ML: at 21:05

## 2022-07-24 RX ADMIN — ACETAMINOPHEN 650 MG: 325 TABLET, FILM COATED ORAL at 11:10

## 2022-07-24 RX ADMIN — METRONIDAZOLE 500 MG: 500 INJECTION, SOLUTION INTRAVENOUS at 22:54

## 2022-07-24 RX ADMIN — SODIUM CHLORIDE 1000 ML: 9 INJECTION, SOLUTION INTRAVENOUS at 11:00

## 2022-07-24 RX ADMIN — CAMPHOR (SYNTHETIC), MENTHOL, UNSPECIFIED FORM, AND METHYL SALICYLATE: 40; 100; 300 CREAM TOPICAL at 22:39

## 2022-07-24 RX ADMIN — HEPARIN SODIUM 5000 UNITS: 5000 INJECTION INTRAVENOUS; SUBCUTANEOUS at 11:06

## 2022-07-24 RX ADMIN — HEPARIN SODIUM 5000 UNITS: 5000 INJECTION INTRAVENOUS; SUBCUTANEOUS at 21:05

## 2022-07-24 RX ADMIN — SODIUM CHLORIDE: 9 INJECTION, SOLUTION INTRAVENOUS at 05:20

## 2022-07-24 RX ADMIN — ACETAMINOPHEN 650 MG: 325 TABLET, FILM COATED ORAL at 21:04

## 2022-07-24 RX ADMIN — SODIUM CHLORIDE 1000 ML: 9 INJECTION, SOLUTION INTRAVENOUS at 00:01

## 2022-07-24 ASSESSMENT — PAIN SCALES - GENERAL
PAINLEVEL_OUTOF10: 3
PAINLEVEL_OUTOF10: 3

## 2022-07-24 ASSESSMENT — PAIN DESCRIPTION - DESCRIPTORS: DESCRIPTORS: ACHING

## 2022-07-24 ASSESSMENT — PAIN DESCRIPTION - ORIENTATION: ORIENTATION: RIGHT

## 2022-07-24 ASSESSMENT — PAIN DESCRIPTION - LOCATION: LOCATION: KNEE

## 2022-07-24 NOTE — CONSULTS
other delusions. PAST PSYCHIATRIC HISTORY: dementia, per [facility] records     PAST MEDICAL HISTORY:       Diagnosis Date    Bilateral carotid artery disease (HCC)     CAD (coronary artery disease)     Hyperlipidemia     Hypertension     Hypothyroidism     Osteoarthritis        PAST SURGICAL HISTORY:       Procedure Laterality Date    CARDIAC CATHETERIZATION  1990s    SPINE SURGERY  8/11/2014    LUMBAR    THYROIDECTOMY, PARTIAL  1962       MEDICATIONS: Current Facility-Administered Medications: sodium chloride flush 0.9 % injection 5-40 mL, 5-40 mL, IntraVENous, 2 times per day  sodium chloride flush 0.9 % injection 5-40 mL, 5-40 mL, IntraVENous, PRN  0.9 % sodium chloride infusion, , IntraVENous, PRN  ondansetron (ZOFRAN-ODT) disintegrating tablet 4 mg, 4 mg, Oral, Q8H PRN **OR** ondansetron (ZOFRAN) injection 4 mg, 4 mg, IntraVENous, Q6H PRN  polyethylene glycol (GLYCOLAX) packet 17 g, 17 g, Oral, Daily PRN  acetaminophen (TYLENOL) tablet 650 mg, 650 mg, Oral, Q6H PRN **OR** acetaminophen (TYLENOL) suppository 650 mg, 650 mg, Rectal, Q6H PRN  0.9 % sodium chloride infusion, , IntraVENous, Continuous  ciprofloxacin (CIPRO) IVPB 400 mg, 400 mg, IntraVENous, Q24H  metronidazole (FLAGYL) 500 mg in 0.9% NaCl 100 mL IVPB premix, 500 mg, IntraVENous, Q8H  heparin (porcine) injection 5,000 Units, 5,000 Units, SubCUTAneous, BID    ALLERGIES:  Bee venom and Pcn [penicillins]    FAMILY PSYCHIATRIC HISTORY: \"I don't know\"     SOCIAL HISTORY: She was born and raised in [facility]. She is a highschool graduate. She said she went to college at [employer] for two years, but did not get a degree. She said she had worked for a [employer] She said she had three children, ages 46, 50 and 45. She said she lives with her daughter for the past 7 years. SUBSTANCE ABUSE HISTORY:  reports that she quit smoking about 25 years ago. Her smoking use included cigarettes. She started smoking about 61 years ago.  She has a 35.00 pack-year smoking history. She has never used smokeless tobacco. She reports current alcohol use. She reports that she does not use drugs. VITALS:   Vitals:    07/24/22 1225   BP: (!) 117/40   Pulse: 82   Resp: 18   Temp: 97.3 °F (36.3 °C)   SpO2: 95%       LABS:   Admission on 07/23/2022   Component Date Value Ref Range Status    Sodium 07/23/2022 137  135 - 144 mEq/L Final    Potassium 07/23/2022 4.9  3.4 - 4.9 mEq/L Final    Chloride 07/23/2022 102  95 - 107 mEq/L Final    CO2 07/23/2022 22  20 - 31 mEq/L Final    Anion Gap 07/23/2022 13  9 - 15 mEq/L Final    Glucose 07/23/2022 99  70 - 99 mg/dL Final    BUN 07/23/2022 57 (A) 8 - 23 mg/dL Final    Creatinine 07/23/2022 1.69 (A) 0.50 - 0.90 mg/dL Final    GFR Non- 07/23/2022 29.5 (A) >60 Final    Comment: >60 mL/min/1.73m2 EGFR, calc. for ages 25 and older using the  MDRD formula (not corrected for weight), is valid for stable  renal function. GFR  07/23/2022 35.7 (A) >60 Final    Comment: >60 mL/min/1.73m2 EGFR, calc. for ages 25 and older using the  MDRD formula (not corrected for weight), is valid for stable  renal function.       Calcium 07/23/2022 10.1 (A) 8.5 - 9.9 mg/dL Final    Total Protein 07/23/2022 7.2  6.3 - 8.0 g/dL Final    Albumin 07/23/2022 2.7 (A) 3.5 - 4.6 g/dL Final    Total Bilirubin 07/23/2022 0.3  0.2 - 0.7 mg/dL Final    Alkaline Phosphatase 07/23/2022 101  40 - 130 U/L Final    ALT 07/23/2022 8  0 - 33 U/L Final    AST 07/23/2022 15  0 - 35 U/L Final    Globulin 07/23/2022 4.5 (A) 2.3 - 3.5 g/dL Final    WBC 07/23/2022 18.1 (A) 4.8 - 10.8 K/uL Final    RBC 07/23/2022 3.62 (A) 4.20 - 5.40 M/uL Final    Hemoglobin 07/23/2022 10.3 (A) 12.0 - 16.0 g/dL Final    Hematocrit 07/23/2022 32.8 (A) 37.0 - 47.0 % Final    MCV 07/23/2022 90.6  82.0 - 100.0 fL Final    MCH 07/23/2022 28.5  27.0 - 31.3 pg Final    MCHC 07/23/2022 31.5 (A) 33.0 - 37.0 % Final    RDW 07/23/2022 19.3 (A) 11.5 - 14.5 % Final Platelets 38/97/9855 297  130 - 400 K/uL Final    PLATELET SLIDE REVIEW 07/23/2022 Normal   Final    Neutrophils % 07/23/2022 78.0  % Final    Lymphocytes % 07/23/2022 4.0  % Final    Monocytes % 07/23/2022 1.9  % Final    Eosinophils % 07/23/2022 1.2  % Final    Basophils % 07/23/2022 0.2  % Final    Neutrophils Absolute 07/23/2022 17.0 (A) 1.4 - 6.5 K/uL Final    Lymphocytes Absolute 07/23/2022 0.7 (A) 1.0 - 4.8 K/uL Final    Monocytes Absolute 07/23/2022 0.4  0.2 - 0.8 K/uL Final    Eosinophils Absolute 07/23/2022 0.0  0.0 - 0.7 K/uL Final    Basophils Absolute 07/23/2022 0.0  0.0 - 0.2 K/uL Final    Bands Relative 07/23/2022 16 (A) 5 - 11 % Final    Vacuolated Neutrophils 07/23/2022 Present   Final    Anisocytosis 07/23/2022 1+   Final    Hypochromia 07/23/2022 1+   Final    Color, UA 07/23/2022 DARK YELLOW (A) Straw/Yellow Final    Clarity, UA 07/23/2022 TURBID (A) Clear Final    Glucose, Ur 07/23/2022 Negative  Negative mg/dL Final    Bilirubin Urine 07/23/2022 SMALL (A) Negative Final    Ketones, Urine 07/23/2022 Negative  Negative mg/dL Final    Specific Gravity, UA 07/23/2022 1.022  1.005 - 1.030 Final    Blood, Urine 07/23/2022 LARGE (A) Negative Final    pH, UA 07/23/2022 8.0  5.0 - 9.0 Final    Protein, UA 07/23/2022 100 (A) Negative mg/dL Final    Urobilinogen, Urine 07/23/2022 0.2  <2.0 E.U./dL Final    Nitrite, Urine 07/23/2022 Negative  Negative Final    Leukocyte Esterase, Urine 07/23/2022 LARGE (A) Negative Final    Urine Reflex to Culture 07/23/2022 Yes   Final    Procalcitonin 07/23/2022 2.87 (A) 0.00 - 0.15 ng/mL Final    Comment: Suspected Sepsis:  Low likelihood of sepsis  <.50 ng/mL    Increased likelihood of sepsis 0.50-2.00 ng/mL  Antibiotics encouraged    High risk of sepsis/shock   >2.00 ng/mL  Antibiotics strongly encouraged    Suspected Lower Respiratory Tract Infections:  Low likelihood of bacterial infection  <0.24 ng/mL    Increased likelihood of bacterial infection >0.24 ng/mL  Antibiotics encouraged    With successful antibiotic therapy, PCT levels should decrease  rapidly. (Half-life of 24 to 36 hours.)    Procalcitonin values from samples collected within the first  6 hours of systemic infection may still be low. Retesting may be indicated. Values from day 1 and day 4 can be entered into the Change in  Procalcitonin Calculator to determine the patient's  Mortality Risk Prognosis  ([email or web address])    In healthy neonates, plasma Procalcitonin (PCT) concentrations  increase gradually after birth, reaching peak values at about  24 hours of age then decrease to normal values below 0.5                            ng/mL  by 48-72 hours of age.       Lactic Acid 07/23/2022 1.7  0.5 - 2.2 mmol/L Final    POC CREATININE WHOLE BLOOD 07/23/2022 1.8   Final    WBC, UA 07/23/2022 21-50  0 - 5 /HPF Final    RBC, UA 07/23/2022 20-50 (A) 0 - 2 /HPF Final    Bacteria, UA 07/23/2022 MANY (A) Negative /HPF Final    Crystals, UA 07/23/2022 3+ Triple Phos (A) None Seen /HPF Final    WBC 07/24/2022 14.6 (A) 4.8 - 10.8 K/uL Final    RBC 07/24/2022 2.69 (A) 4.20 - 5.40 M/uL Final    Hemoglobin 07/24/2022 7.7 (A) 12.0 - 16.0 g/dL Final    Hematocrit 07/24/2022 24.9 (A) 37.0 - 47.0 % Final    MCV 07/24/2022 92.4  82.0 - 100.0 fL Final    MCH 07/24/2022 28.6  27.0 - 31.3 pg Final    MCHC 07/24/2022 30.9 (A) 33.0 - 37.0 % Final    RDW 07/24/2022 18.9 (A) 11.5 - 14.5 % Final    Platelets 06/42/3547 264  130 - 400 K/uL Final    Neutrophils % 07/24/2022 91.4  % Final    Lymphocytes % 07/24/2022 2.5  % Final    Monocytes % 07/24/2022 4.0  % Final    Eosinophils % 07/24/2022 1.9  % Final    Basophils % 07/24/2022 0.2  % Final    Neutrophils Absolute 07/24/2022 13.4 (A) 1.4 - 6.5 K/uL Final    Lymphocytes Absolute 07/24/2022 0.4 (A) 1.0 - 4.8 K/uL Final    Monocytes Absolute 07/24/2022 0.6  0.2 - 0.8 K/uL Final    Eosinophils Absolute 07/24/2022 0.3  0.0 - 0.7 K/uL Final    Basophils Absolute 07/24/2022 0.0  0.0 - 0.2 K/uL Final    Sodium 07/24/2022 143  135 - 144 mEq/L Final    Potassium reflex Magnesium 07/24/2022 4.6  3.4 - 4.9 mEq/L Final    Chloride 07/24/2022 110 (A) 95 - 107 mEq/L Final    CO2 07/24/2022 19 (A) 20 - 31 mEq/L Final    Anion Gap 07/24/2022 14  9 - 15 mEq/L Final    Glucose 07/24/2022 81  70 - 99 mg/dL Final    BUN 07/24/2022 46 (A) 8 - 23 mg/dL Final    Creatinine 07/24/2022 1.29 (A) 0.50 - 0.90 mg/dL Final    GFR Non- 07/24/2022 40.2 (A) >60 Final    Comment: >60 mL/min/1.73m2 EGFR, calc. for ages 25 and older using the  MDRD formula (not corrected for weight), is valid for stable  renal function. GFR  07/24/2022 48.7 (A) >60 Final    Comment: >60 mL/min/1.73m2 EGFR, calc. for ages 25 and older using the  MDRD formula (not corrected for weight), is valid for stable  renal function. Calcium 07/24/2022 8.6  8.5 - 9.9 mg/dL Final    Total Protein 07/24/2022 6.0 (A) 6.3 - 8.0 g/dL Final    Albumin 07/24/2022 2.3 (A) 3.5 - 4.6 g/dL Final    Total Bilirubin 07/24/2022 <0.2  0.2 - 0.7 mg/dL Final    Alkaline Phosphatase 07/24/2022 78  40 - 130 U/L Final    ALT 07/24/2022 6  0 - 33 U/L Final    AST 07/24/2022 12  0 - 35 U/L Final    Globulin 07/24/2022 3.7 (A) 2.3 - 3.5 g/dL Final    POC Creatinine 07/23/2022 1.8 (A) 0.6 - 1.2 mg/dL Final    GFR Non- 07/23/2022 27 (A) >60 Final    Comment: >60 mL/min/1.73m2 EGFR, calc. for ages 25 and older using the  MDRD formula (not corrected for weight), is valid for stable  renal function. GFR  07/23/2022 33 (A) >60 Final    Comment: >60 mL/min/1.73m2 EGFR, calc. for ages 25 and older using the  MDRD formula (not corrected for weight), is valid for stable  renal function.       Sample Type 07/23/2022 SAPPHIRE   Final    Performed on 07/23/2022 SEE BELOW   Final    Performed on POC           IMAGING:   CT ABDOMEN PELVIS WO CONTRAST Additional Contrast? None   Final Result   1.  2

## 2022-07-24 NOTE — FLOWSHEET NOTE
1005-- Notified hospitalist of hypotension, 1L Ns ordered. 1225-- Bp 117/40, NS running at 75ml/hr. Daughter at bedside and given an update.

## 2022-07-24 NOTE — CONSULTS
Department of General Surgery - Adult  Surgical Service colorectal surgery  Attending Consult Note      Reason for Consult: Rectovaginal fistula      CHIEF COMPLAINT: Feculent vaginal drainage    History Obtained From:  patient, electronic medical record    HISTORY OF PRESENT ILLNESS:                The patient is a 78 y.o. female who presents with feculent vaginal drainage. Patient had a CAT scan through the emergency department which showed some sigmoid colon wall thickening along with evidence of stool in her vagina    I was consulted for surgical evaluation. Patient cannot remember if she is ever had a colonoscopy in the past.    She is currently comfortable.     Past Medical History:        Diagnosis Date    Bilateral carotid artery disease (HCC)     CAD (coronary artery disease)     Hyperlipidemia     Hypertension     Hypothyroidism     Osteoarthritis      Past Surgical History:        Procedure Laterality Date    CARDIAC CATHETERIZATION  1990s    SPINE SURGERY  8/11/2014    LUMBAR    THYROIDECTOMY, PARTIAL  1962     Current Medications:   Current Facility-Administered Medications: sodium chloride flush 0.9 % injection 5-40 mL, 5-40 mL, IntraVENous, 2 times per day  sodium chloride flush 0.9 % injection 5-40 mL, 5-40 mL, IntraVENous, PRN  0.9 % sodium chloride infusion, , IntraVENous, PRN  ondansetron (ZOFRAN-ODT) disintegrating tablet 4 mg, 4 mg, Oral, Q8H PRN **OR** ondansetron (ZOFRAN) injection 4 mg, 4 mg, IntraVENous, Q6H PRN  polyethylene glycol (GLYCOLAX) packet 17 g, 17 g, Oral, Daily PRN  acetaminophen (TYLENOL) tablet 650 mg, 650 mg, Oral, Q6H PRN **OR** acetaminophen (TYLENOL) suppository 650 mg, 650 mg, Rectal, Q6H PRN  0.9 % sodium chloride infusion, , IntraVENous, Continuous  ciprofloxacin (CIPRO) IVPB 400 mg, 400 mg, IntraVENous, Q24H  metronidazole (FLAGYL) 500 mg in 0.9% NaCl 100 mL IVPB premix, 500 mg, IntraVENous, Q8H  heparin (porcine) injection 5,000 Units, 5,000 Units, SubCUTAneous, BID  Allergies:  Bee venom and Pcn [penicillins]    Social History:   TOBACCO:   reports that she quit smoking about 25 years ago. Her smoking use included cigarettes. She started smoking about 61 years ago. She has a 35.00 pack-year smoking history. She has never used smokeless tobacco.  ETOH:   reports current alcohol use. DRUGS:   reports no history of drug use. Family History:   History reviewed. No pertinent family history. REVIEW OF SYSTEMS:    CONSTITUTIONAL:  negative  EYES:  negative  HEENT:  negative  RESPIRATORY:  negative  CARDIOVASCULAR:  negative  GASTROINTESTINAL:  positive for stool from her vagina. Denies current abdominal pain. HEMATOLOGIC/LYMPHATIC:  negative  MUSCULOSKELETAL:  negative  NEUROLOGICAL:  negative  BEHAVIOR/PSYCH:  negative    PHYSICAL EXAM:    VITALS:  BP (!) 97/38   Pulse 100   Temp 97.9 °F (36.6 °C) (Oral)   Resp 20   Ht 5' 2\" (1.575 m)   Wt 110 lb (49.9 kg)   LMP  (LMP Unknown)   SpO2 99%   BMI 20.12 kg/m²   CONSTITUTIONAL:  awake, alert, cooperative, no apparent distress, and appears stated age  EYES:  Lids and lashes normal, pupils equal, round and reactive to light, extra ocular muscles intact, sclera clear, conjunctiva normal  ENT:  Normocephalic, without obvious abnormality, atraumatic, sinuses nontender on palpation, external ears without lesions, oral pharynx with moist mucus membranes, tonsils without erythema or exudates, gums normal and good dentition. NECK:  Supple, symmetrical, trachea midline, no adenopathy, thyroid symmetric, not enlarged and no tenderness, skin normal  HEMATOLOGIC/LYMPHATICS:  no cervical lymphadenopathy  BACK:  Symmetric, no curvature, spinous processes are non-tender on palpation, paraspinous muscles are non-tender on palpation, no costal vertebral tenderness  LUNGS:  No increased work of breathing, good air exchange, clear to auscultation bilaterally, no crackles or wheezing  CARDIOVASCULAR: Some  ABDOMEN: Soft nondistended.   She deferred vaginal exam at this time  MUSCULOSKELETAL:  There is no redness, warmth, or swelling of the joints. Full range of motion noted. Motor strength is 5 out of 5 all extremities bilaterally. Tone is normal.  NEUROLOGIC: Awake alert answers questions appropriately  SKIN:  no bruising or bleeding  DATA:    CBC:   Lab Results   Component Value Date/Time    WBC 14.6 07/24/2022 07:52 AM    RBC 2.69 07/24/2022 07:52 AM    HGB 7.7 07/24/2022 07:52 AM    HCT 24.9 07/24/2022 07:52 AM    MCV 92.4 07/24/2022 07:52 AM    MCH 28.6 07/24/2022 07:52 AM    MCHC 30.9 07/24/2022 07:52 AM    RDW 18.9 07/24/2022 07:52 AM     07/24/2022 07:52 AM    MPV 8.4 08/04/2014 12:12 PM     CMP:    Lab Results   Component Value Date/Time     07/24/2022 07:52 AM    K 4.6 07/24/2022 07:52 AM     07/24/2022 07:52 AM    CO2 19 07/24/2022 07:52 AM    BUN 46 07/24/2022 07:52 AM    CREATININE 1.29 07/24/2022 07:52 AM    GFRAA 48.7 07/24/2022 07:52 AM    LABGLOM 40.2 07/24/2022 07:52 AM    GLUCOSE 81 07/24/2022 07:52 AM    PROT 6.0 07/24/2022 07:52 AM    LABALBU 2.3 07/24/2022 07:52 AM    CALCIUM 8.6 07/24/2022 07:52 AM    BILITOT <0.2 07/24/2022 07:52 AM    ALKPHOS 78 07/24/2022 07:52 AM    AST 12 07/24/2022 07:52 AM    ALT 6 07/24/2022 07:52 AM     Radiology Review: CAT scan reviewed    IMPRESSION/RECOMMENDATIONS:      Rectovaginal or colovaginal fistula    I discussed the potential etiologies including malignancy or diverticulitis. Lower endoscopy is important for delineating for treatment options. I offered to proceed with bowel prep followed by flexible sigmoidoscopy/colonoscopy tomorrow. Patient currently has a desire to get things organized and does not wish to proceed with any type of diagnostic intervention.   She says that she would like to get this done in the future but she has to get organized first.    Currently I will continue to see her while she is in the hospital.  If she changes her mind regarding endoscopic evaluation, I am available for assistance.

## 2022-07-24 NOTE — PLAN OF CARE
Problem: Safety - Adult  Goal: Free from fall injury  Outcome: Progressing  Flowsheets (Taken 7/24/2022 1148)  Free From Fall Injury:   Instruct family/caregiver on patient safety   Based on caregiver fall risk screen, instruct family/caregiver to ask for assistance with transferring infant if caregiver noted to have fall risk factors     Problem: ABCDS Injury Assessment  Goal: Absence of physical injury  Outcome: Progressing  Flowsheets (Taken 7/24/2022 1148)  Absence of Physical Injury: Implement safety measures based on patient assessment     Problem: Skin/Tissue Integrity  Goal: Absence of new skin breakdown  Outcome: Progressing

## 2022-07-24 NOTE — CARE COORDINATION
Patient admitted from [facility], patient recently d/cd from [facility] 2 weeks ago. Patient went to [facility] private pay--and they were to be working on getting the patient medicaid for long term placement. Patient now has returned with a rectovaginal fistula requiring surgery. Patient will need to have an extensive bowel prep--possibly over 1-2 days per general surgery notes. Message sent to Liaison to notify of patient being admitted to hospital and to verify what is needed for the patient to return. CM/SW to follow up on Monday.

## 2022-07-24 NOTE — PROGRESS NOTES
Patient being evaluated for incompetence given her age and mental status. I will try to proceed with bowel preparation for evaluation with endoscopy. Bowel prep orders placed in the chart     It might take 1 to 2 days to adequately prep her bowels. .  There is no rush or urgency but will proceed with bowel prep and see how she does with that.   Discussed with RN

## 2022-07-24 NOTE — PROGRESS NOTES
DVT / VTE PROPHYLAXIS EVALUATION    Estimated Creatinine Clearance: 23 mL/min (A) (based on SCr of 1.69 mg/dL (H)). Recent Labs     07/23/22 2015   BUN 57*   CREATININE 1.69*      HGB 10.3*   HCT 32.8*     ADMITTING DX OR CHIEF COMPLAINT? Poss rectal vaginal fistula  WARFARIN? DOAC'S? no  ANY APPARENT BLEEDING? no  SCHEDULED SURGERY?  Surgical consult     Current order:  Enoxaparin 30 mg SUBQ once daily      Plan:  Pharmacologic VTE prophylaxis modified based on patient weight per [facility]/P&T approved protocol     Patient Weight (kg)      50.9 and below .9 101-150.9 151-174.9 175 or greater   Estimated   CrCl  (ml/min) 30 or greater []   30 mg   SUBQ daily   []   40 mg   SUBQ daily []  30 mg SUBQ   BID  []  40 mg   SUBQ   BID []  60mg SUBQ BID    15-29.9 [x]  UFH 5000   units SUBQ BID []  30 mg   SUBQ daily [] 30 mg SUBQ   daily []  40 mg SUBQ   daily [] 60 mg SUBQ   daily    Less than 15 or dialysis []  UFH 5000   units SUBQ BID [] UFH 5000 units SUBQ TID []  UFH 7500   units   SUBQ TID

## 2022-07-24 NOTE — PROGRESS NOTES
Hospitalist Progress Note      PCP: Torres Randolph MD    Date of Admission: 7/23/2022    Chief Complaint:    Chief Complaint   Patient presents with    Other     Sent in from [facility], nurses aides reported stool in vaginal vault, no history of rectal vaginal fistula to date, but this did occur a couple times prior to being sent to ED for evaluation, patient is afebrile, alert and oriented x2 which is baseline       Subjective:  Patient denies fevers, chills, sweats, CP, SOB, diarrhea or burning micturition. States she tired and doesn't want to do anything today. 12 point ROS negative other than mentioned above       Medications:  Reviewed    Infusion Medications    sodium chloride      sodium chloride 75 mL/hr at 07/24/22 0520     Scheduled Medications    sodium chloride flush  5-40 mL IntraVENous 2 times per day    ciprofloxacin  400 mg IntraVENous Q24H    metroNIDAZOLE  500 mg IntraVENous Q8H    heparin (porcine)  5,000 Units SubCUTAneous BID     PRN Meds: sodium chloride flush, sodium chloride, ondansetron **OR** ondansetron, polyethylene glycol, acetaminophen **OR** acetaminophen      Intake/Output Summary (Last 24 hours) at 7/24/2022 1248  Last data filed at 7/24/2022 1134  Gross per 24 hour   Intake 1340 ml   Output --   Net 1340 ml       Exam:    BP (!) 117/40   Pulse 82   Temp 97.3 °F (36.3 °C) (Axillary)   Resp 18   Ht 5' 2\" (1.575 m)   Wt 110 lb (49.9 kg)   LMP  (LMP Unknown)   SpO2 95%   BMI 20.12 kg/m²     General appearance: No apparent distress, appears stated age and cooperative. HEENT:  Conjunctivae/corneas clear. Neck: Trachea midline. Respiratory:  Normal respiratory effort. Clear to auscultation  Cardiovascular: Regular rate and rhythm  Abdomen: Soft, non-tender, non-distended with normal bowel sounds.   Musculoskeletal: No clubbing, cyanosis or edema bilaterally  Neuro: Non Focal.   Capillary Refill: Brisk,< 3 seconds   Peripheral Pulses: +2 palpable, equal bilaterally       Labs: Recent Labs     07/23/22 2015 07/24/22  0752   WBC 18.1* 14.6*   HGB 10.3* 7.7*   HCT 32.8* 24.9*    264     Recent Labs     07/23/22 2015 07/23/22 2127 07/24/22  0752     --  143   K 4.9  --  4.6     --  110*   CO2 22  --  19*   BUN 57*  --  46*   CREATININE 1.69* 1.8* 1.29*   CALCIUM 10.1*  --  8.6     Recent Labs     07/23/22 2015 07/24/22  0752   AST 15 12   ALT 8 6   BILITOT 0.3 <0.2   ALKPHOS 101 78     No results for input(s): INR in the last 72 hours. No results for input(s): Ardelle Chalk in the last 72 hours. Urinalysis:      Lab Results   Component Value Date/Time    NITRU Negative 07/23/2022 10:52 PM    WBCUA 21-50 07/23/2022 10:52 PM    BACTERIA MANY 07/23/2022 10:52 PM    RBCUA 20-50 07/23/2022 10:52 PM    BLOODU LARGE 07/23/2022 10:52 PM    SPECGRAV 1.022 07/23/2022 10:52 PM    GLUCOSEU Negative 07/23/2022 10:52 PM       Radiology:  CT ABDOMEN PELVIS WO CONTRAST Additional Contrast? None   Final Result   1.  2 x 8 cm area of infiltrate in the right lung base suspicious for    pneumonia. There is underlying emphysema. 2.  Severe circumferential wall thickening involving the distal colon and    rectum measuring up to 2.5 cm thick. This is contiguous with the wall of    the vagina. There is stool in the vagina suggesting rectovaginal fistula. XR CHEST PORTABLE   Final Result   NO ACUTE CARDIOPULMONARY DISEASE.            Assessment/Plan:    #Rectovaginal fistula    - continue abx     - patient hesitant right now to proceed but likely due to not understanding her condition; daughter would like ot proceed; psych consult for capacity    #RICARDO    - improving with IVF    #Hypothyroidism    - continue home meds    #HTN    - continue home meds    Active Hospital Problems    Diagnosis Date Noted    Rectovaginal fistula [N82.3] 07/24/2022     Priority: Medium    RICARDO (acute kidney injury) ([facility] [address] [N17.9]     Hypothyroidism [E03.9]     Hypertension [I10] Additional work up or/and treatment plan may be added today or then after based on clinical progression. I am managing a portion of pt care. Some medical issues are handled by other specialists. Additional work up and treatment should be done in out pt setting by pt PCP and other out pt providers. In addition to examining and evaluating pt, I spent additional time explaining care, normal and abnormal findings, and treatment plan. All of pt questions were answered. Counseling, diet and education were  provided. Case will be discussed with nursing staff when appropriate. Family will be updated if and when appropriate. Diet: ADULT DIET;  Clear Liquid    Code Status: Full Code    PT/OT Eval     Electronically signed by Dominguez Reyes MD on 7/24/2022 at 12:48 PM

## 2022-07-24 NOTE — ED PROVIDER NOTES
[address] [facility] ED  eMERGENCY dEPARTMENT eNCOUnter      Pt Name: Sesar Alfaro  MRN: [id number]  Armsaustingfjuan carlos 1945  Date of evaluation: 7/23/2022  Provider: MARY Sparrow        HISTORY OF PRESENT ILLNESS    Sesar Alfaro is a 78 y.o. female per chart review has ah/o complicated UTI, malnutrition, hypothyroidism, hypertension, hyperlipidemia, dementia, TIA, pancytopenia. Patient presents emergency department from nursing home with concern for stool in the vaginal vault. Patient does not have a rectal vaginal fistula history. On examination patient states she is unsure why she is here, states she keeps getting \"shipped off to the hospital.\"  She is fully alert and oriented x4. She is very dry appearing. States she has been tolerating oral intake. No nausea vomiting diarrhea or constipation. States she has been urinating appropriately for her. No upper respiratory complaints. No fever or chills sensation. No general weakness or fatigue. Family later arrives at bedside and states patient is acting herself for them she is at her baseline for her. REVIEW OF SYSTEMS       Review of Systems   Constitutional:  Negative for activity change, appetite change, chills and fever. HENT:  Negative for congestion, postnasal drip and rhinorrhea. Eyes:  Negative for photophobia. Respiratory:  Negative for cough and shortness of breath. Cardiovascular:  Negative for chest pain. Gastrointestinal:  Negative for abdominal distention, abdominal pain, anal bleeding, blood in stool, constipation, diarrhea, nausea and vomiting. Genitourinary:  Negative for difficulty urinating, dysuria, frequency, vaginal bleeding, vaginal discharge and vaginal pain. Musculoskeletal:  Negative for myalgias. Neurological:  Negative for syncope, weakness, light-headedness, numbness and headaches. Psychiatric/Behavioral:  Negative for behavioral problems and confusion.       Except as noted above the remainder of the review of systems was reviewed and negative. PAST MEDICAL HISTORY     Past Medical History:   Diagnosis Date    Bilateral carotid artery disease (HCC)     CAD (coronary artery disease)     Hyperlipidemia     Hypertension     Hypothyroidism     Osteoarthritis          SURGICAL HISTORY       Past Surgical History:   Procedure Laterality Date    CARDIAC CATHETERIZATION      SPINE SURGERY  2014    LUMBAR    THYROIDECTOMY, PARTIAL  89         CURRENT MEDICATIONS       Previous Medications    ALBUTEROL SULFATE  (90 BASE) MCG/ACT INHALER    INHALE 2 PUFFS 4 TIMES DAILY AS NEEDED    ALBUTEROL SULFATE  (90 BASE) MCG/ACT INHALER    Inhale 2 puffs into the lungs every 4 hours as needed for Wheezing or Shortness of Breath (Space out to every 6 hours as symptoms improve)    CYANOCOBALAMIN ([employer] VITAMIN B12) 1000 MCG TABLET    Take 1 tablet by mouth daily    DRONABINOL (MARINOL) 2.5 MG CAPSULE    Take 1 capsule by mouth in the morning and 1 capsule in the evening. Take before meals. Do all this for 30 days. ISOSORBIDE MONONITRATE (IMDUR) 30 MG EXTENDED RELEASE TABLET    Take 1 tablet by mouth daily    LEVOTHYROXINE (SYNTHROID) 112 MCG TABLET    Take 1 tablet by mouth Daily    MAGNESIUM OXIDE (MAG-OX) 400 (240 MG) MG TABLET    Take 1 tablet by mouth daily    METOPROLOL SUCCINATE (TOPROL XL) 25 MG EXTENDED RELEASE TABLET    Take 3 tablets by mouth daily       ALLERGIES     Bee venom and Pcn [penicillins]    FAMILY HISTORY     History reviewed. No pertinent family history. SOCIAL HISTORY       Social History     Socioeconomic History    Marital status:       Spouse name: None    Number of children: None    Years of education: None    Highest education level: None   Tobacco Use    Smoking status: Former     Packs/day: 1.00     Years: 35.00     Pack years: 35.00     Types: Cigarettes     Start date: 67     Quit date: 1996     Years since quittin.6    Smokeless tobacco: Never   Substance and Sexual Activity    Alcohol use: Yes     Alcohol/week: 0.0 standard drinks     Comment: RARE    Drug use: Never         PHYSICAL EXAM        ED Triage Vitals [07/23/22 1939]   BP Temp Temp Source Heart Rate Resp SpO2 Height Weight   106/62 97.7 °F (36.5 °C) Oral (!) 107 16 98 % 5' 2\" (1.575 m) 110 lb (49.9 kg)       Physical Exam  Exam conducted with a chaperone present. Constitutional:       General: She is not in acute distress. Appearance: Normal appearance. She is not ill-appearing, toxic-appearing or diaphoretic. HENT:      Head: Normocephalic and atraumatic. Right Ear: External ear normal.      Left Ear: External ear normal.      Nose: Nose normal.      Mouth/Throat:      Mouth: Mucous membranes are dry. Pharynx: Oropharynx is clear. Comments: Cracked lips dry oral mucosa  Eyes:      Extraocular Movements: Extraocular movements intact. Cardiovascular:      Rate and Rhythm: Normal rate and regular rhythm. Pulmonary:      Effort: Pulmonary effort is normal. No respiratory distress. Breath sounds: Normal breath sounds. Abdominal:      General: Bowel sounds are normal. There is no distension. Palpations: Abdomen is soft. There is no mass. Tenderness: There is no abdominal tenderness. There is no guarding or rebound. Hernia: No hernia is present. Genitourinary:     Vagina: No foreign body. Vaginal discharge (evidence of likely stool discharge from vaginal canal) and tenderness (exam limited by patient tolerance) present. No erythema or bleeding. Comments: View of cervix obscured by stool in vaginal vault  Musculoskeletal:         General: Normal range of motion. Cervical back: Normal range of motion. Skin:     General: Skin is warm. Neurological:      Mental Status: She is alert and oriented to person, place, and time.    Psychiatric:         Mood and Affect: Mood normal.         Behavior: Behavior normal. LABS:  Labs Reviewed   COMPREHENSIVE METABOLIC PANEL - Abnormal; Notable for the following components:       Result Value    BUN 57 (*)     Creatinine 1.69 (*)     GFR Non- 29.5 (*)     GFR  35.7 (*)     Calcium 10.1 (*)     Albumin 2.7 (*)     Globulin 4.5 (*)     All other components within normal limits   CBC WITH AUTO DIFFERENTIAL - Abnormal; Notable for the following components:    WBC 18.1 (*)     RBC 3.62 (*)     Hemoglobin 10.3 (*)     Hematocrit 32.8 (*)     MCHC 31.5 (*)     RDW 19.3 (*)     Neutrophils Absolute 17.0 (*)     Lymphocytes Absolute 0.7 (*)     Bands Relative 16 (*)     All other components within normal limits   URINALYSIS WITH REFLEX TO CULTURE - Abnormal; Notable for the following components:    Color, UA DARK YELLOW (*)     Clarity, UA TURBID (*)     Bilirubin Urine SMALL (*)     Blood, Urine LARGE (*)     Protein,  (*)     Leukocyte Esterase, Urine LARGE (*)     All other components within normal limits   PROCALCITONIN - Abnormal; Notable for the following components:    Procalcitonin 2.87 (*)     All other components within normal limits   MICROSCOPIC URINALYSIS - Abnormal; Notable for the following components:    RBC, UA 20-50 (*)     Bacteria, UA MANY (*)     Crystals, UA 3+ Triple Phos (*)     All other components within normal limits   POCT CREATININE - URINE - Normal   CULTURE, BLOOD 1   CULTURE, BLOOD 2   CULTURE, URINE   LACTIC ACID         MDM:   Vitals:    Vitals:    07/23/22 1939   BP: 106/62   Pulse: (!) 107   Resp: 16   Temp: 97.7 °F (36.5 °C)   TempSrc: Oral   SpO2: 98%   Weight: 110 lb (49.9 kg)   Height: 5' 2\" (1.575 m)       72-year-old female patient presents to the emergency department from nursing home due to concern for stool in the vaginal vault. Patient fully alert and oriented x4 but unsure why she is here. Reported by family at bedside to be at baseline. She is afebrile on arrival, tachycardic to low 100s.   Given 3 L IVF in the ED. Started empirically with consultation of pharmacy on Ancef and Flagyl due to anaphylactic penicillin allergy. Patient with history of septic UTI may have UTI today also likely very contaminated specimen. Also right lower lobe pneumonia by way of CT imaging patient denying complaints. COVID-negative. Lactic 1.7.  2.81 procalcitonin. 15.1 WBC. Patient's oromucosa appears very dry, she has an RICARDO today creatinine 1.69, BUN 57. In regards to concern for rectovaginal fistula, on examination there is demonstration of large amount of diarrhea to the vaginal canal, nonbloody. CT abdomen pelvis also remarkable for concern for rectovaginal fistula of which patient does not have a history. Per chart review and nursing home paperwork patient has not had recent surgeries or procedures to explain her fistula. No history of IBD. Patient will be admitted to hospitalist service for further management with consultation for surgery. Admitted to Dr. Penaloza Seen. CRITICAL CARE TIME   Total CriticalCare time was 0 minutes, excluding separately reportable procedures. There was a high probability of clinically significant/life threatening deterioration in the patient's condition which required my urgent intervention. PROCEDURES:  Unlessotherwise noted below, none      Procedures      FINAL IMPRESSION      1. Septicemia ([address]    2. Acute cystitis without hematuria    3. Pneumonia of right lower lobe due to infectious organism    4. Rectovaginal fistula    5.  Acute kidney injury [facility])          DISPOSITION/PLAN   DISPOSITION Decision To Admit 07/23/2022 09:54:21 PM          MARY CADENA (electronically signed)  Attending Emergency Physician          Stephanie CADENAma  07/24/22 Roz Raygoza [place]  07/24/22 0041

## 2022-07-24 NOTE — H&P
[facility]  MEDICINE    HISTORY AND PHYSICAL EXAM    PATIENT NAME:  Maury Ham    MRN:  [id number]  SERVICE DATE:  7/24/2022   SERVICE TIME:  12:48 AM    Primary Care Physician: Chavez Travis MD     SUBJECTIVE  CHIEF COMPLAINT:  Other (Sent in from [facility], nurses aides reported stool in vaginal vault, no history of rectal vaginal fistula to date, but this did occur a couple times prior to being sent to ED for evaluation, patient is afebrile, alert and oriented x2 which is baseline)       HPI: Maury Ham is a 78 y.o. female with a past medical history of with past medical history significant for carotid artery disease, CAD, HLD, HTN, hypothyroid, OA, thyroidectomy (partial), heart cath in the 90s  that is being admitted for Other (Sent in from [facility], nurses aides reported stool in vaginal vault, no history of rectal vaginal fistula to date, but this did occur a couple times prior to being sent to ED for evaluation, patient is afebrile, alert and oriented x2 which is baseline)  . Upon assessment in ED, Jessi Gavin was angry and uncooperative. She refused physical exam of vagina because it hurt the last time they did an exam too much, but she did allow stethoscope assessment. She refused to answer questions. I am unable to determine her orientation due to the fact that she would not answer any questions. When asked what happened and why she is here she responded \"I am not telling you again\". Per chart review and ED note, patient lives in a nursing home where the stool was found in the vagina. She has a negative history of fistula.   HPI    PAST MEDICAL HISTORY:    Past Medical History:   Diagnosis Date    Bilateral carotid artery disease (HCC)     CAD (coronary artery disease)     Hyperlipidemia     Hypertension     Hypothyroidism     Osteoarthritis      PAST SURGICAL HISTORY:    Past Surgical History:   Procedure Laterality Date    CARDIAC CATHETERIZATION  1990s    SPINE SURGERY 2014    LUMBAR    THYROIDECTOMY, PARTIAL  1962     FAMILY HISTORY:  History reviewed. No pertinent family history. SOCIAL HISTORY:    Social History     Socioeconomic History    Marital status:      Spouse name: Not on file    Number of children: Not on file    Years of education: Not on file    Highest education level: Not on file   Occupational History    Not on file   Tobacco Use    Smoking status: Former     Packs/day: 1.00     Years: 35.00     Pack years: 35.00     Types: Cigarettes     Start date:      Quit date: 1996     Years since quittin.6    Smokeless tobacco: Never   Substance and Sexual Activity    Alcohol use: Yes     Alcohol/week: 0.0 standard drinks     Comment: RARE    Drug use: Never    Sexual activity: Not on file   Other Topics Concern    Not on file   Social History Narrative    Not on file     Social Determinants of Health     Financial Resource Strain: Not on file   Food Insecurity: Not on file   Transportation Needs: Not on file   Physical Activity: Not on file   Stress: Not on file   Social Connections: Not on file   Intimate Partner Violence: Not on file   Housing Stability: Not on file     MEDICATIONS:   Prior to Admission medications    Medication Sig Start Date End Date Taking? Authorizing Provider   dronabinol (MARINOL) 2.5 MG capsule Take 1 capsule by mouth in the morning and 1 capsule in the evening. Take before meals. Do all this for 30 days.  22  MARY Pires   metoprolol succinate (TOPROL XL) 25 MG extended release tablet Take 3 tablets by mouth daily 22   VALARIE Gotti CNP   magnesium oxide (MAG-OX) 400 (240 Mg) MG tablet Take 1 tablet by mouth daily 22   VALARIE Gotti CNP   albuterol sulfate  (90 Base) MCG/ACT inhaler Inhale 2 puffs into the lungs every 4 hours as needed for Wheezing or Shortness of Breath (Space out to every 6 hours as symptoms improve) 1/4/20 2/3/20  VALARIE Loyola CNP angry. Speech: Speech normal.         Behavior: Behavior is uncooperative and agitated. Judgment: Judgment is inappropriate. Neurologic Exam     Mental Status   Speech: speech is normal      DATA:     Diagnostic tests reviewed for today's visit:    Most recent labs and imaging results reviewed.      LABS:    Recent Results (from the past 24 hour(s))   Comprehensive Metabolic Panel    Collection Time: 07/23/22  8:15 PM   Result Value Ref Range    Sodium 137 135 - 144 mEq/L    Potassium 4.9 3.4 - 4.9 mEq/L    Chloride 102 95 - 107 mEq/L    CO2 22 20 - 31 mEq/L    Anion Gap 13 9 - 15 mEq/L    Glucose 99 70 - 99 mg/dL    BUN 57 (H) 8 - 23 mg/dL    Creatinine 1.69 (H) 0.50 - 0.90 mg/dL    GFR Non-African American 29.5 (L) >60    GFR  35.7 (L) >60    Calcium 10.1 (H) 8.5 - 9.9 mg/dL    Total Protein 7.2 6.3 - 8.0 g/dL    Albumin 2.7 (L) 3.5 - 4.6 g/dL    Total Bilirubin 0.3 0.2 - 0.7 mg/dL    Alkaline Phosphatase 101 40 - 130 U/L    ALT 8 0 - 33 U/L    AST 15 0 - 35 U/L    Globulin 4.5 (H) 2.3 - 3.5 g/dL   CBC with Auto Differential    Collection Time: 07/23/22  8:15 PM   Result Value Ref Range    WBC 18.1 (H) 4.8 - 10.8 K/uL    RBC 3.62 (L) 4.20 - 5.40 M/uL    Hemoglobin 10.3 (L) 12.0 - 16.0 g/dL    Hematocrit 32.8 (L) 37.0 - 47.0 %    MCV 90.6 82.0 - 100.0 fL    MCH 28.5 27.0 - 31.3 pg    MCHC 31.5 (L) 33.0 - 37.0 %    RDW 19.3 (H) 11.5 - 14.5 %    Platelets 847 721 - 617 K/uL    PLATELET SLIDE REVIEW Normal     Neutrophils % 78.0 %    Lymphocytes % 4.0 %    Monocytes % 1.9 %    Eosinophils % 1.2 %    Basophils % 0.2 %    Neutrophils Absolute 17.0 (H) 1.4 - 6.5 K/uL    Lymphocytes Absolute 0.7 (L) 1.0 - 4.8 K/uL    Monocytes Absolute 0.4 0.2 - 0.8 K/uL    Eosinophils Absolute 0.0 0.0 - 0.7 K/uL    Basophils Absolute 0.0 0.0 - 0.2 K/uL    Bands Relative 16 (H) 5 - 11 %    Vacuolated Neutrophils Present     Anisocytosis 1+     Hypochromia 1+    Procalcitonin    Collection Time: 07/23/22 8:15 PM   Result Value Ref Range    Procalcitonin 2.87 (H) 0.00 - 0.15 ng/mL   Lactic Acid    Collection Time: 07/23/22  8:15 PM   Result Value Ref Range    Lactic Acid 1.7 0.5 - 2.2 mmol/L   POCT CREATININE    Collection Time: 07/23/22  9:49 PM   Result Value Ref Range    POC CREATININE WHOLE BLOOD 1.8    Urinalysis with Reflex to Culture    Collection Time: 07/23/22 10:52 PM    Specimen: Urine   Result Value Ref Range    Color, UA DARK YELLOW (A) Straw/Yellow    Clarity, UA TURBID (A) Clear    Glucose, Ur Negative Negative mg/dL    Bilirubin Urine SMALL (A) Negative    Ketones, Urine Negative Negative mg/dL    Specific Gravity, UA 1.022 1.005 - 1.030    Blood, Urine LARGE (A) Negative    pH, UA 8.0 5.0 - 9.0    Protein,  (A) Negative mg/dL    Urobilinogen, Urine 0.2 <2.0 E.U./dL    Nitrite, Urine Negative Negative    Leukocyte Esterase, Urine LARGE (A) Negative    Urine Reflex to Culture Yes    Microscopic Urinalysis    Collection Time: 07/23/22 10:52 PM   Result Value Ref Range    WBC, UA 21-50 0 - 5 /HPF    RBC, UA 20-50 (A) 0 - 2 /HPF    Bacteria, UA MANY (A) Negative /HPF    Crystals, UA 3+ Triple Phos (A) None Seen /HPF       IMAGING:   CT ABDOMEN PELVIS WO CONTRAST Additional Contrast? None    Result Date: 7/23/2022  1.  2 x 8 cm area of infiltrate in the right lung base suspicious for pneumonia. There is underlying emphysema. 2.  Severe circumferential wall thickening involving the distal colon and rectum measuring up to 2.5 cm thick. This is contiguous with the wall of the vagina. There is stool in the vagina suggesting rectovaginal fistula. VTE Prophylaxis: low molecular weight heparin -  start     ASSESSMENT AND PLAN    Principal Problem:      Rectovaginal fistula - per ED note, stool found in vagina. No other symptoms.    Plan: admit, general surgery consult, cipro, flagyl, IVF, NPO until seen by surgery, daily labs    Active Problems:      RICARDO (acute kidney injury) ([facility] [address] - Scr 1.69 (baseline 0.9) dehydration likely the cause. Plan: IVF, avoid nephrotoxic agents    Dehydration -elevation in creatinine, patient skin tenting, no edema noted. Plan: IV fluids      Hypothyroidism -patient had partial thyroidectomy, last TSH was done 7/5/2022 was 2.0 patient on Synthroid no arrhythmias. Plan: Will resume home meds, as tolerated, when home med list is completed by nursing. Hypertension -patient on a beta-blocker blood pressure 106/62 with a heart rate of 107. Plan: Will resume home meds, as tolerated, when home med list is completed by nursing.         Plan of care discussed with: patient    SIGNATURE: VALARIE Valle - CNP  DATE: July 24, 2022  TIME: 12:48 AM     Mana Vyas MD - supervising physician

## 2022-07-24 NOTE — PROGRESS NOTES
Renal Adjustment Per Protocol: Cipro 400 mg IV every 12 hours changed to Cipro 400 mg IV every 24 hours based on CrCl 10-30 mL/min     Recent Labs     07/23/22 2015   CREATININE 1.69*   Estimated Creatinine Clearance: 23 mL/min (A) (based on SCr of 1.69 mg/dL (H)). Moni Benitez

## 2022-07-25 ENCOUNTER — APPOINTMENT (OUTPATIENT)
Dept: GENERAL RADIOLOGY | Age: 77
DRG: 329 | End: 2022-07-25
Payer: MEDICARE

## 2022-07-25 LAB
ABO/RH: NORMAL
ABO/RH: NORMAL
ALBUMIN SERPL-MCNC: 1.8 G/DL (ref 3.5–4.6)
ALP BLD-CCNC: 65 U/L (ref 40–130)
ALT SERPL-CCNC: <5 U/L (ref 0–33)
ANION GAP SERPL CALCULATED.3IONS-SCNC: 12 MEQ/L (ref 9–15)
ANTIBODY SCREEN: NORMAL
AST SERPL-CCNC: 11 U/L (ref 0–35)
BASOPHILS ABSOLUTE: 0 K/UL (ref 0–0.2)
BASOPHILS RELATIVE PERCENT: 0.5 %
BILIRUB SERPL-MCNC: <0.2 MG/DL (ref 0.2–0.7)
BLOOD BANK DISPENSE STATUS: NORMAL
BLOOD BANK PRODUCT CODE: NORMAL
BPU ID: NORMAL
BUN BLDV-MCNC: 33 MG/DL (ref 8–23)
CALCIUM SERPL-MCNC: 7.8 MG/DL (ref 8.5–9.9)
CHLORIDE BLD-SCNC: 116 MEQ/L (ref 95–107)
CO2: 14 MEQ/L (ref 20–31)
CREAT SERPL-MCNC: 1.02 MG/DL (ref 0.5–0.9)
DESCRIPTION BLOOD BANK: NORMAL
EOSINOPHILS ABSOLUTE: 0.2 K/UL (ref 0–0.7)
EOSINOPHILS RELATIVE PERCENT: 2.4 %
FERRITIN: 929 NG/ML (ref 13–150)
GFR AFRICAN AMERICAN: >60
GFR NON-AFRICAN AMERICAN: 52.8
GLOBULIN: 3.3 G/DL (ref 2.3–3.5)
GLUCOSE BLD-MCNC: 64 MG/DL (ref 70–99)
HCT VFR BLD CALC: 21.5 % (ref 37–47)
HEMOGLOBIN: 6.7 G/DL (ref 12–16)
IRON SATURATION: 16 % (ref 20–55)
IRON: 22 UG/DL (ref 37–145)
LYMPHOCYTES ABSOLUTE: 0.3 K/UL (ref 1–4.8)
LYMPHOCYTES RELATIVE PERCENT: 3.7 %
MAGNESIUM: 2.1 MG/DL (ref 1.7–2.4)
MCH RBC QN AUTO: 29.4 PG (ref 27–31.3)
MCHC RBC AUTO-ENTMCNC: 31.1 % (ref 33–37)
MCV RBC AUTO: 94.3 FL (ref 82–100)
MONOCYTES ABSOLUTE: 0.3 K/UL (ref 0.2–0.8)
MONOCYTES RELATIVE PERCENT: 3.5 %
NEUTROPHILS ABSOLUTE: 8.4 K/UL (ref 1.4–6.5)
NEUTROPHILS RELATIVE PERCENT: 89.9 %
PDW BLD-RTO: 19.1 % (ref 11.5–14.5)
PLATELET # BLD: 202 K/UL (ref 130–400)
POTASSIUM REFLEX MAGNESIUM: 3.9 MEQ/L (ref 3.4–4.9)
RBC # BLD: 2.28 M/UL (ref 4.2–5.4)
SODIUM BLD-SCNC: 142 MEQ/L (ref 135–144)
TOTAL IRON BINDING CAPACITY: 134 UG/DL (ref 250–450)
TOTAL PROTEIN: 5.1 G/DL (ref 6.3–8)
UNSATURATED IRON BINDING CAPACITY: 112 UG/DL (ref 112–347)
WBC # BLD: 9.4 K/UL (ref 4.8–10.8)

## 2022-07-25 PROCEDURE — 2500000003 HC RX 250 WO HCPCS: Performed by: INTERNAL MEDICINE

## 2022-07-25 PROCEDURE — 36430 TRANSFUSION BLD/BLD COMPNT: CPT

## 2022-07-25 PROCEDURE — 82607 VITAMIN B-12: CPT

## 2022-07-25 PROCEDURE — 83550 IRON BINDING TEST: CPT

## 2022-07-25 PROCEDURE — 83735 ASSAY OF MAGNESIUM: CPT

## 2022-07-25 PROCEDURE — 2580000003 HC RX 258

## 2022-07-25 PROCEDURE — 80053 COMPREHEN METABOLIC PANEL: CPT

## 2022-07-25 PROCEDURE — 6360000002 HC RX W HCPCS

## 2022-07-25 PROCEDURE — 6370000000 HC RX 637 (ALT 250 FOR IP): Performed by: INTERNAL MEDICINE

## 2022-07-25 PROCEDURE — 82746 ASSAY OF FOLIC ACID SERUM: CPT

## 2022-07-25 PROCEDURE — 86850 RBC ANTIBODY SCREEN: CPT

## 2022-07-25 PROCEDURE — 36415 COLL VENOUS BLD VENIPUNCTURE: CPT

## 2022-07-25 PROCEDURE — 73562 X-RAY EXAM OF KNEE 3: CPT

## 2022-07-25 PROCEDURE — 82728 ASSAY OF FERRITIN: CPT

## 2022-07-25 PROCEDURE — 6370000000 HC RX 637 (ALT 250 FOR IP)

## 2022-07-25 PROCEDURE — 6360000002 HC RX W HCPCS: Performed by: NURSE PRACTITIONER

## 2022-07-25 PROCEDURE — 2580000003 HC RX 258: Performed by: INTERNAL MEDICINE

## 2022-07-25 PROCEDURE — 99213 OFFICE O/P EST LOW 20 MIN: CPT

## 2022-07-25 PROCEDURE — P9016 RBC LEUKOCYTES REDUCED: HCPCS

## 2022-07-25 PROCEDURE — 93005 ELECTROCARDIOGRAM TRACING: CPT | Performed by: INTERNAL MEDICINE

## 2022-07-25 PROCEDURE — 86923 COMPATIBILITY TEST ELECTRIC: CPT

## 2022-07-25 PROCEDURE — 86901 BLOOD TYPING SEROLOGIC RH(D): CPT

## 2022-07-25 PROCEDURE — 6370000000 HC RX 637 (ALT 250 FOR IP): Performed by: NURSE PRACTITIONER

## 2022-07-25 PROCEDURE — 2500000003 HC RX 250 WO HCPCS

## 2022-07-25 PROCEDURE — 83540 ASSAY OF IRON: CPT

## 2022-07-25 PROCEDURE — 2060000000 HC ICU INTERMEDIATE R&B

## 2022-07-25 PROCEDURE — 85025 COMPLETE CBC W/AUTO DIFF WBC: CPT

## 2022-07-25 PROCEDURE — 86900 BLOOD TYPING SEROLOGIC ABO: CPT

## 2022-07-25 RX ORDER — SODIUM CHLORIDE 9 MG/ML
INJECTION, SOLUTION INTRAVENOUS PRN
Status: DISCONTINUED | OUTPATIENT
Start: 2022-07-25 | End: 2022-08-01 | Stop reason: HOSPADM

## 2022-07-25 RX ORDER — MAGNESIUM SULFATE IN WATER 40 MG/ML
4000 INJECTION, SOLUTION INTRAVENOUS ONCE
Status: COMPLETED | OUTPATIENT
Start: 2022-07-25 | End: 2022-07-25

## 2022-07-25 RX ORDER — TRAMADOL HYDROCHLORIDE 50 MG/1
50 TABLET ORAL EVERY 6 HOURS PRN
Status: DISCONTINUED | OUTPATIENT
Start: 2022-07-25 | End: 2022-08-01 | Stop reason: HOSPADM

## 2022-07-25 RX ORDER — METOPROLOL TARTRATE 5 MG/5ML
5 INJECTION INTRAVENOUS ONCE
Status: COMPLETED | OUTPATIENT
Start: 2022-07-25 | End: 2022-07-25

## 2022-07-25 RX ORDER — SODIUM CHLORIDE 9 MG/ML
INJECTION, SOLUTION INTRAVENOUS
Status: COMPLETED
Start: 2022-07-25 | End: 2022-07-25

## 2022-07-25 RX ADMIN — METRONIDAZOLE 500 MG: 500 INJECTION, SOLUTION INTRAVENOUS at 23:00

## 2022-07-25 RX ADMIN — LEVOTHYROXINE SODIUM 100 MCG: 100 TABLET ORAL at 06:11

## 2022-07-25 RX ADMIN — CIPROFLOXACIN 400 MG: 2 INJECTION, SOLUTION INTRAVENOUS at 02:55

## 2022-07-25 RX ADMIN — HEPARIN SODIUM 5000 UNITS: 5000 INJECTION INTRAVENOUS; SUBCUTANEOUS at 20:18

## 2022-07-25 RX ADMIN — METOPROLOL TARTRATE 25 MG: 25 TABLET, FILM COATED ORAL at 13:07

## 2022-07-25 RX ADMIN — METOPROLOL TARTRATE 25 MG: 25 TABLET, FILM COATED ORAL at 20:18

## 2022-07-25 RX ADMIN — METRONIDAZOLE 500 MG: 500 INJECTION, SOLUTION INTRAVENOUS at 17:05

## 2022-07-25 RX ADMIN — SODIUM CHLORIDE: 9 INJECTION, SOLUTION INTRAVENOUS at 02:49

## 2022-07-25 RX ADMIN — METOPROLOL TARTRATE 5 MG: 1 INJECTION, SOLUTION INTRAVENOUS at 08:51

## 2022-07-25 RX ADMIN — ACETAMINOPHEN 650 MG: 325 TABLET, FILM COATED ORAL at 05:00

## 2022-07-25 RX ADMIN — TRAMADOL HYDROCHLORIDE 50 MG: 50 TABLET, COATED ORAL at 14:01

## 2022-07-25 RX ADMIN — HEPARIN SODIUM 5000 UNITS: 5000 INJECTION INTRAVENOUS; SUBCUTANEOUS at 08:14

## 2022-07-25 RX ADMIN — METOPROLOL SUCCINATE 75 MG: 50 TABLET, EXTENDED RELEASE ORAL at 08:14

## 2022-07-25 RX ADMIN — SODIUM CHLORIDE: 9 INJECTION, SOLUTION INTRAVENOUS at 20:19

## 2022-07-25 RX ADMIN — DILTIAZEM HYDROCHLORIDE 5 MG/HR: 5 INJECTION, SOLUTION INTRAVENOUS at 12:59

## 2022-07-25 RX ADMIN — METOPROLOL TARTRATE 5 MG: 1 INJECTION, SOLUTION INTRAVENOUS at 09:04

## 2022-07-25 RX ADMIN — MAGNESIUM SULFATE HEPTAHYDRATE 4000 MG: 40 INJECTION, SOLUTION INTRAVENOUS at 13:48

## 2022-07-25 RX ADMIN — SODIUM CHLORIDE: 9 INJECTION, SOLUTION INTRAVENOUS at 13:18

## 2022-07-25 RX ADMIN — Medication 10 ML: at 08:24

## 2022-07-25 RX ADMIN — ISOSORBIDE MONONITRATE 30 MG: 30 TABLET, EXTENDED RELEASE ORAL at 08:13

## 2022-07-25 RX ADMIN — METRONIDAZOLE 500 MG: 500 INJECTION, SOLUTION INTRAVENOUS at 06:12

## 2022-07-25 ASSESSMENT — PAIN SCALES - GENERAL
PAINLEVEL_OUTOF10: 6
PAINLEVEL_OUTOF10: 7

## 2022-07-25 ASSESSMENT — PAIN DESCRIPTION - LOCATION: LOCATION: BACK

## 2022-07-25 ASSESSMENT — ENCOUNTER SYMPTOMS
DIARRHEA: 0
COUGH: 0
SHORTNESS OF BREATH: 0

## 2022-07-25 NOTE — PROGRESS NOTES
Patient with rectovaginal or colovaginal fistula based on clinical presentation and physical exam    She had started a bowel prep yesterday for colonoscopy this hospitalization. She has not taken much of the bowel prep and has been dealing with issues of atrial fibrillation new onset with rapid ventricular response. I discussed with the nurse her clinical course. I would like to have the bowel prep completed so I can proceed with colonoscopy on Wednesday. This gives ample time for bowel preparation to investigate the underlying cause of this fistula.

## 2022-07-25 NOTE — CONSENT
Informed Consent for Blood Component Transfusion Note    I have discussed with the daughter Teddy Chester ([phone]) the rationale for blood component transfusion; its benefits in treating or preventing fatigue, organ damage, or death; and its risk which includes mild transfusion reactions, rare risk of blood borne infection, or more serious but rare reactions. I have discussed the alternatives to transfusion, including the risk and consequences of not receiving transfusion. The daughter had an opportunity to ask questions and had agreed to proceed with transfusion of blood components.     Electronically signed by Shai Foster DO on 7/25/22 at 7:11 AM EDT

## 2022-07-25 NOTE — FLOWSHEET NOTE
1318 to one [place]. Iv cardizem started 5mg/hr. Pt complaints of back pain. Afib 140s. Iv fluids maintained. Pt blood is now ready in blood bank. Electronically signed by Jaqueline Ulloa RN on 7/25/2022 at 1:23 PM    1324 Dr Evette Bey by for rounds. 1400 iv cardizem monitored 10mg/hr. Iv mag started per order. I unit PRBC started per order. Prn ultram for back pain. Assist reposition and pillow support. Electronically signed by Jaqueline Ulloa RN on 7/25/2022 at 2:04 PM    1500 iv site change. Noted blood leaking site left AC. Pt has family members visiting. 1639 resting quietly. No complaints of pain or discomfort. Iv cardizem paused for bp 76/48. Afib 96. Will send secure message to cardiologist. Electronically signed by Jaqueline Ulloa RN on 7/25/2022 at 4:39 PM    [phone] Dr Evette Bey updated. No new orders.  Electronically signed by Jaqueline Ulloa RN on 7/25/2022 at 4:54 PM

## 2022-07-25 NOTE — PROGRESS NOTES
07/25/22    From: [facility]--PRIVATE PAY    Admit: +STOOL IN VAGINAL VAULT X 2 @ NURSING HOME ([facility]). PMH: CAD, HLD, HTN, OA    Anticipated Discharge Disposition: [facility]--? [facility]/PRIVATE PAY    Patient Mobility or PT/OT ordered: WILL NEED ORDERED S/P SX  Consults: ALISE PSYCH EVAL FOR COMPETENCY    Clinical:   CT A/P: +RECTOVAGINAL FISTULA--STOOL IN VAGINAL VAULT. CIPRO/FLAGYL  HGB 10.3--7.7--6.7 1UPRBC    Barriers to Discharge:   NEED [facility]/PRIVATE PAY TO RETURN  SX?  BOWEL PREP  COMPETENCY EVAL- PER PSYCH PT LACKS CAPACITY  AF W/RVR- IV LOPRESSOR    Assessments: NH NOTE

## 2022-07-25 NOTE — CONSULTS
Cardiology Consult Note      Date:   7/25/2022  Patient name:  John Calvo  Date of admission:  7/23/2022  7:35 PM  MRN:   [id number]  YOB: 1945  Time of Consult:  10:36 AM    Consulting Cardiologist: Dr. Dimple Torres APRN-CNP  Primary Cardiologist:  Dr. Rome Brown    Referring Provider: Dr. Isidoro Guardado MD    Consult Reason:  Atrial Fib with RVR       Assessment  Atrial Fib with RVR: New onset of atrial fib as of 0823 his AM with current 's. She has received Lopressor 5 mg IV twice in addition to her PO beta blocker. Ideally she should be place on anticoagulation but unfortunately she is anemic with noted Hgb/hct of 6.7/21.5 today. She is currently refusing cardioversion. 2. V-Tach: Noted 14 beat V-Tach run last night 7/24/2022 2100. 3. RICARDO:  Bun/creat 57/1.69 on admission, currently 33/1.02.     4. Anemia:  Hgb/hct 10.3/32.8 on admission 6.7/21.5 today. New onset of afib with RVR may be response to significant anemia. Suggest blood transfusion and or IV Iron. Plan:  Monitor on telemetry  Obtain EKG  Obtain mag level today  Magnesium 4 gm IV x 1 today  Rate control strategy, will adjust beta blocker. Further recommendations per Dr. Paola Nunez     HPI:   John Calvo is a 78 y.o.  female patient who is being at the request of Dr. Renetta Najera  for inpatient consultation of atrial fibrillation. She was admitted on 7/23/2022. Previous [address] and [id number] [facility] records have been reviewed in detail. 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 No EKG noted, chest -ray negative.  Abnormal labs; Bun/creat 33/1.02, GFR 52.8, albumin 1.8, Hgb/hct 6.7/21.5,       She was recently admitted 2022 for UTI and was noted to have SVT for which we were consulted. Unfortunately she had some confusion at that time and refused to wear the heart monitor for most of the hospital visit     She was last seen in our office by Dr. Clari Walters . She is currently resting in bed comfortably without any complaints, She denies any chest pain, palpitations, lightheadedness or dizziness. She admits to some shortness of breath but states that she is always short of breath and that this is not new of her. We have discussed her  new onset of atrial fibrillation and the possibility of a cardioversion and she states that it is something that she would have to think about, not something that she would like to do right now. Cardiac History/Testin2022 ECHO: LVEF 55%,    ECHO:  LVEF 60%, mild AR,    Normal Lexiscan, LVEF 47%   ECHO: LVEF 55-60%    Allergies: Allergies   Allergen Reactions    Bee Venom     Pcn [Penicillins] Hives and Itching       Past Medical History:  Past Medical History:   Diagnosis Date    Bilateral carotid artery disease (HCC)     CAD (coronary artery disease)     Hyperlipidemia     Hypertension     Hypothyroidism     Osteoarthritis        Past Surgical History:  Past Surgical History:   Procedure Laterality Date    CARDIAC CATHETERIZATION      SPINE SURGERY  2014    LUMBAR    THYROIDECTOMY, PARTIAL         Family History:   History reviewed. No pertinent family history. Social  History:     Social History     Tobacco Use    Smoking status: Former     Packs/day: 1.00     Years: 35.00     Pack years: 35.00     Types: Cigarettes     Start date:      Quit date: 1996     Years since quittin.6    Smokeless tobacco: Never   Substance Use Topics    Alcohol use: Yes     Alcohol/week: 0.0 standard drinks     Comment: RARE       Home Medications:    Prior to Admission medications    Medication Sig Start Date End Date Taking?  Authorizing Provider dronabinol (MARINOL) 2.5 MG capsule Take 1 capsule by mouth in the morning and 1 capsule in the evening. Take before meals. Do all this for 30 days.  7/21/22 8/20/22  MARY Mora   metoprolol succinate (TOPROL XL) 25 MG extended release tablet Take 3 tablets by mouth daily 7/9/22   Shaan Beverage, APRN - CNP   magnesium oxide (MAG-OX) 400 (240 Mg) MG tablet Take 1 tablet by mouth daily 7/9/22   Shaan Beverage APRN - CNP   albuterol sulfate  (90 Base) MCG/ACT inhaler Inhale 2 puffs into the lungs every 4 hours as needed for Wheezing or Shortness of Breath (Space out to every 6 hours as symptoms improve) 1/4/20 2/3/20  VALARIE Montano CNP   cyanocobalamin ([employer] VITAMIN B12) 1000 MCG tablet Take 1 tablet by mouth daily 5/28/19   Jacinta Vasques MD   levothyroxine (SYNTHROID) 112 MCG tablet Take 1 tablet by mouth Daily 5/28/19   Jacinta Vasques MD   albuterol sulfate  (90 Base) MCG/ACT inhaler INHALE 2 PUFFS 4 TIMES DAILY AS NEEDED 3/8/19   Lark Lefort, MD   isosorbide mononitrate (IMDUR) 30 MG extended release tablet Take 1 tablet by mouth daily 3/16/18   Lark Lefort, MD       Current Medications:   sodium chloride      dilTIAZem (CARDIZEM) 125 mg in dextrose 5% 125 mL infusion      sodium chloride      sodium chloride 75 mL/hr at 07/25/22 0249       IV Medications:  Current Facility-Administered Medications   Medication Dose Route Frequency Provider Last Rate Last Admin    0.9 % sodium chloride infusion   IntraVENous PRN Eric Cueva,         dilTIAZem 125 mg in dextrose 5 % 125 mL infusion  2.5-15 mg/hr IntraVENous Continuous Junie Shukla MD        sodium chloride flush 0.9 % injection 5-40 mL  5-40 mL IntraVENous 2 times per day VALARIE Morocho - CNP   10 mL at 07/25/22 0824    sodium chloride flush 0.9 % injection 5-40 mL  5-40 mL IntraVENous PRN VALARIE Gan CNP        0.9 % sodium chloride infusion   IntraVENous PRN Azul De Paz APRMARY - JUDY        ondansetron (ZOFRAN-ODT) disintegrating tablet 4 mg  4 mg Oral Q8H PRN VALARIE Gan CNP        Or    ondansetron (ZOFRAN) injection 4 mg  4 mg IntraVENous Q6H PRN VALARIE Gan CNP        polyethylene glycol (GLYCOLAX) packet 17 g  17 g Oral Daily PRN VALARIE Gonzalez CNP        acetaminophen (TYLENOL) tablet 650 mg  650 mg Oral Q6H PRN VALARIE Gan CNP   650 mg at 07/25/22 0500    Or    acetaminophen (TYLENOL) suppository 650 mg  650 mg Rectal Q6H PRN VALARIE Gan CNP        0.9 % sodium chloride infusion   IntraVENous Continuous VALARIE Gonzalez CNP 75 mL/hr at 07/25/22 0249 New Bag at 07/25/22 0249    ciprofloxacin (CIPRO) IVPB 400 mg  400 mg IntraVENous Q24H VALARIE Gonzalez CNP   Stopped at 07/25/22 0349    metronidazole (FLAGYL) 500 mg in 0.9% NaCl 100 mL IVPB premix  500 mg IntraVENous Q8H VALARIE Gan CNP   Stopped at 07/25/22 0534    heparin (porcine) injection 5,000 Units  5,000 Units SubCUTAneous BID VALARIE Gonzalez CNP   5,000 Units at 07/25/22 6123    isosorbide mononitrate (IMDUR) extended release tablet 30 mg  30 mg Oral Daily VALARIE Gan CNP   30 mg at 07/25/22 0813    levothyroxine (SYNTHROID) tablet 100 mcg  100 mcg Oral Daily VALARIE Gonzalez CNP   100 mcg at 07/25/22 8858    metoprolol succinate (TOPROL XL) extended release tablet 75 mg  75 mg Oral Daily VALARIE Gan CNP   75 mg at 07/25/22 8726    camphor-menthol-methyl salicylate (BENGAY ULTRA STRENGTH) 4-10-30 % cream   Apply externally TID PRN VALARIE Gonzalez CNP   Given at 07/24/22 1619       ROS:   12 point review of systems was obtained in detail and is negative other than that noted above or below. Review of Systems  Constitutional: No weight loss, fever, chills, weakness or fatigue. HEENT: No visual loss, blurred vision, double vision or yellow sclerae. Skin: No rash or itching  Cardiovascular:  No chest pain, pressure or discomfort.  No palpitations or edema. Respiratory:  Positive for chronic  shortness of breath, denies cough or sputum. Gastrointestinal:  No  nausea, vomiting or diarrhea. No abdominal pain. No bloody or dark tarry stools. Neurological:  No headache, dizziness, syncope, paralysis, ataxia, numbness or tingling in the extremities. No change in bowel or bladder control. Musculoskeletal:  No muscle, back pain, joint pain or stiffness. Hematologic: No bleeding or bruising. Vital Signs:  Vitals:    07/24/22 1713 07/25/22 0258 07/25/22 0646 07/25/22 0715   BP: (!) 134/54 (!) 147/60 (!) 125/54 (!) 128/48   Pulse: 89 97 82 85   Resp: 18 16  16   Temp: 98.6 °F (37 °C)  98.8 °F (37.1 °C) 97.7 °F (36.5 °C)   TempSrc: Axillary  Oral Oral   SpO2: 98% 93% 94% 100%   Weight:       Height:           Intake/Output Summary (Last 24 hours) at 7/25/2022 1036  Last data filed at 7/24/2022 1134  Gross per 24 hour   Intake 240 ml   Output --   Net 240 ml       Patient Vitals for the past 96 hrs (Last 3 readings):   Weight   07/23/22 1939 110 lb (49.9 kg)       Physical exam   Constitutional:  Ill appearing, awake/alert/oriented x3, no distress, alert and cooperative. Eyes:  PERRL, sclera clear, conjunctiva pink. EMMT:  mucous membranes  moist, no apparent injury, no lesion seen. Head/Neck:  Neck supple, no apparent injury, thyroid without mass or tenderness, No JVD, trachea midline, no bruits. Respiratory/Thorax: Patent airways, CTAB,  normal breath sounds with good chest expansion, thorax symmetric. Cardiovascular: Irregular, rate and rhythm, no murmurs, normal S1 and S2, PMI non displaced. Gastrointestinal:  Non distended, soft, non-tender, no rebound tenderness or guarding, Genitourinary:  deferred  Musculoskeletal:  No apparent injury. Extremities:  No edema, contusions or wounds, no clubbing. Rught great tow noted to be purple in color/cynatotic   Neurological:  Alert and oriented x3.   Moves extremities spontaneous with purpose  Psychological:  Appropriate mood and behavior  Skin:  Pale. Warm and dry,  no lesions or rashes. Diagnostics:    EKG:  Pending    Telemetry: atrial fibrillation - rapid. Lab Data:  BMP:  Recent Labs     07/23/22 2015 07/23/22 2127 07/24/22 0752 07/25/22 0456     --  143 142   K 4.9  --  4.6 3.9     --  110* 116*   CO2 22  --  19* 14*   BUN 57*  --  46* 33*   CREATININE 1.69* 1.8* 1.29* 1.02*   LABGLOM 29.5* 27* 40.2* 52.8*       CBC:  Recent Labs     07/23/22 2015 07/24/22 0752 07/25/22 0456   WBC 18.1* 14.6* 9.4   RBC 3.62* 2.69* 2.28*   HGB 10.3* 7.7* 6.7*   HCT 32.8* 24.9* 21.5*   MCV 90.6 92.4 94.3   MCH 28.5 28.6 29.4   MCHC 31.5* 30.9* 31.1*   RDW 19.3* 18.9* 19.1*    264 202       Cardiac Enzymes:   No results for input(s): CKTOTAL, CKMB, TROPONINI in the last 72 hours. Hepatic Function Panel:  Recent Labs     07/23/22 2015 07/24/22 0752 07/25/22 0456   ALKPHOS 101 78 65   ALT 8 6 <5   AST 15 12 11   PROT 7.2 6.0* 5.1*   BILITOT 0.3 <0.2 <0.2   LABALBU 2.7* 2.3* 1.8*       Magnesium:  No results for input(s): MG in the last 72 hours. Pro-BNP:  No results found for: PROBNP    INR:  No results for input(s): PROTIME, INR in the last 72 hours.     TSH:  Lab Results   Component Value Date/Time    TSH 2.000 07/05/2022 06:09 AM       Lipid Profile:  Lab Results   Component Value Date/Time    TRIG 127 11/16/2018 07:22 AM    HDL 53 11/16/2018 07:22 AM    LDLCALC 50 11/16/2018 07:22 AM       HgbA1C:  Lab Results   Component Value Date/Time    LABA1C 5.2 03/16/2018 09:18 AM       Lactate Level:  Recent Labs     07/23/22 2015   LACTA 1.7       CMP:  Recent Labs     07/23/22 2015 07/23/22 2127 07/24/22 0752 07/25/22  0456     --  143 142   K 4.9  --  4.6 3.9     --  110* 116*   CO2 22  --  19* 14*   BUN 57*  --  46* 33*   CREATININE 1.69* 1.8* 1.29* 1.02*   GLUCOSE 99  --  81 64*   CALCIUM 10.1*  --  8.6 7.8*   PROT 7.2  --  6.0* 5.1*   LABALBU 2.7*  -- 2. 3* 1.8*   BILITOT 0.3  --  <0.2 <0.2   ALKPHOS 101  --  78 65   AST 15  --  12 11   ALT 8  --  6 <5       Amylase:  No results for input(s): AMYLASE in the last 72 hours. Lipase:  No results for input(s): LIPASE in the last 72 hours. ABG:  No results for input(s): PH, PO2, PCO2, HCO3, BE, O2SAT in the last 72 hours. Radiology:       CT ABDOMEN PELVIS WO CONTRAST Additional Contrast? None    Result Date: 7/23/2022  Patient: Wild Notice  Time Out: 23:56 Exam(s): CT ABDOMEN + PELVIS Without Contrast  EXAM:   CT Abdomen and Pelvis Without Intravenous Contrast  CLINICAL HISTORY:    Reason for exam: concern for rectovaginal fistula. Chief complaint concern for rectovaginal fistula  TECHNIQUE:   Axial computed tomography images of the abdomen and pelvis without intravenous contrast.  All CT scan at this facility use dose modulation, iterative reconstruction, and/or weight based dosing when appropriate to reduce radiation dose to as low as reasonably achievable. COMPARISON:   September 10, 2021  FINDINGS:   Lung bases:  2 x 8 cm area of infiltrate in the right lung base suspicious for pneumonia. There is underlying emphysema. ABDOMEN:   Liver:  Unremarkable. Gallbladder and bile ducts:  Unremarkable. No calcified stones. No ductal dilation. Pancreas:  Unremarkable. No ductal dilation. Spleen:  Unremarkable. No splenomegaly. Adrenals:  Unremarkable. No mass. Kidneys and ureters:  Atrophic right kidney. There are calculi in the right renal pelvis measuring up to 4 mm. No hydronephrosis. Left kidney is unremarkable. Stomach and bowel:  Severe circumferential wall thickening involving the distal colon and rectum measuring up to 2.5 cm thick. This is contiguous with the wall of the vagina. There is stool in the vagina suggesting rectovaginal fistula. No obstruction. PELVIS:   Appendix:  No findings to suggest acute appendicitis. Bladder:  Unremarkable. No stones. Reproductive:  Unremarkable as visualized. ABDOMEN and PELVIS:   Intraperitoneal space:  Unremarkable. No free air. No significant fluid collection. Bones/joints:  Metallic artifact from hardware from prior fusion of the lumbar spine at L4-5. There are moderate degenerative changes in the spine. No acute fracture or subluxation. Soft tissues:  Unremarkable. Vasculature: The abdominal aorta is severely calcified but nondilated. Lymph nodes:  Unremarkable. No enlarged lymph nodes. Electronically signed by Howie Lozada MD on 07-23-22 at 2356    1.  2 x 8 cm area of infiltrate in the right lung base suspicious for pneumonia. There is underlying emphysema. 2.  Severe circumferential wall thickening involving the distal colon and rectum measuring up to 2.5 cm thick. This is contiguous with the wall of the vagina. There is stool in the vagina suggesting rectovaginal fistula. CT HEAD WO CONTRAST    XR CHEST PORTABLE    Result Date: 7/24/2022  EXAMINATION: XR CHEST PORTABLE CLINICAL HISTORY: ALTERED MENTAL STATUS COMPARISONS: JULY 2, 2022 FINDINGS: Osseous structures are intact. Cardiopericardial silhouette is normal. Pulmonary vasculature is normal. Lungs are clear. NO ACUTE CARDIOPULMONARY DISEASE. Impression:   Principal Problem:    Rectovaginal fistula  Active Problems:    Hypothyroidism    Hypertension    RICARDO (acute kidney injury) ([address]  Resolved Problems:    * No resolved hospital problems.  *    Patient Active Problem List   Diagnosis    Degenerative spondylolisthesis    Lumbar stenosis with neurogenic claudication    Hypothyroidism    Hypertension    Hyperlipidemia    Bilateral carotid artery stenosis    TIA (transient ischemic attack)    Pancytopenia (HCC)    Peripheral polyneuropathy    Weight loss    Acute bronchitis    Pneumonia due to COVID-19 virus    RICARDO (acute kidney injury) ([address]    Complicated UTI (urinary tract infection)    Severe malnutrition ([address]    Rectovaginal fistula           Thank you for the opportunity to participate in the care of your patient. Do not hesitate to call if you have any questions.     Electronically signed by VALARIE Rios CNP, FACC on 7/25/2022 at 10:36 AM

## 2022-07-25 NOTE — PROGRESS NOTES
Progress Note  Date:2022       [facility]:[id number]  Patient Name:Edna Doyle     YOB: 1945     Age:77 y.o. Subjective    Subjective:  Symptoms:  No shortness of breath, malaise, cough, chest pain, weakness, headache, chest pressure, anorexia, diarrhea or anxiety. Review of Systems   Respiratory:  Negative for cough and shortness of breath. Cardiovascular:  Negative for chest pain. Gastrointestinal:  Negative for anorexia and diarrhea. Neurological:  Negative for weakness. Objective         Vitals Last 24 Hours:  TEMPERATURE:  Temp  Av.4 °F (36.9 °C)  Min: 97.7 °F (36.5 °C)  Max: 98.8 °F (37.1 °C)  RESPIRATIONS RANGE: Resp  Av.7  Min: 16  Max: 18  PULSE OXIMETRY RANGE: SpO2  Av.3 %  Min: 93 %  Max: 100 %  PULSE RANGE: Pulse  Av.3  Min: 82  Max: 97  BLOOD PRESSURE RANGE: Systolic (00GYO), SKM:608 , Min:125 , HZP:523   ; Diastolic (66PSX), MKZ:37, Min:48, Max:60    I/O (24Hr): No intake or output data in the 24 hours ending 22 1320  Objective:  General Appearance:  Comfortable, well-appearing and in no acute distress. Vital signs: (most recent): Blood pressure (!) 128/48, pulse 85, temperature 97.7 °F (36.5 °C), temperature source Oral, resp. rate 16, height 5' 2\" (1.575 m), weight 110 lb (49.9 kg), SpO2 100 %, not currently breastfeeding. HEENT: Normal HEENT exam.    Lungs:  Normal effort. Heart: Normal rate. S1 normal and S2 normal.    Abdomen: Abdomen is soft. Bowel sounds are normal.   There is no epigastric area or suprapubic area tenderness. Pulses: Distal pulses are intact. Neurological: Patient is alert. Pupils:  Pupils are equal, round, and reactive to light. Skin:  Warm and dry.     Labs/Imaging/Diagnostics    Labs:  CBC:  Recent Labs     22  0752 22  0456   WBC 18.1* 14.6* 9.4   RBC 3.62* 2.69* 2.28*   HGB 10.3* 7.7* 6.7*   HCT 32.8* 24.9* 21.5*   MCV 90.6 92.4 94.3   RDW 19.3* 18.9* 19.1*    264 202     CHEMISTRIES:  Recent Labs     07/23/22 2015 07/23/22 2127 07/24/22 0752 07/25/22  0456     --  143 142   K 4.9  --  4.6 3.9     --  110* 116*   CO2 22  --  19* 14*   BUN 57*  --  46* 33*   CREATININE 1.69* 1.8* 1.29* 1.02*   GLUCOSE 99  --  81 64*   MG  --   --   --  2.1     PT/INR:No results for input(s): PROTIME, INR in the last 72 hours. APTT:No results for input(s): APTT in the last 72 hours. LIVER PROFILE:  Recent Labs     07/23/22 2015 07/24/22 0752 07/25/22 0456   AST 15 12 11   ALT 8 6 <5   BILITOT 0.3 <0.2 <0.2   ALKPHOS 101 78 65       Imaging Last 24 Hours:  CT ABDOMEN PELVIS WO CONTRAST Additional Contrast? None    Result Date: 7/23/2022  Patient: Di Glez  Time Out: 23:56 Exam(s): CT ABDOMEN + PELVIS Without Contrast  EXAM:   CT Abdomen and Pelvis Without Intravenous Contrast  CLINICAL HISTORY:    Reason for exam: concern for rectovaginal fistula. Chief complaint concern for rectovaginal fistula  TECHNIQUE:   Axial computed tomography images of the abdomen and pelvis without intravenous contrast.  All CT scan at this facility use dose modulation, iterative reconstruction, and/or weight based dosing when appropriate to reduce radiation dose to as low as reasonably achievable. COMPARISON:   September 10, 2021  FINDINGS:   Lung bases:  2 x 8 cm area of infiltrate in the right lung base suspicious for pneumonia. There is underlying emphysema. ABDOMEN:   Liver:  Unremarkable. Gallbladder and bile ducts:  Unremarkable. No calcified stones. No ductal dilation. Pancreas:  Unremarkable. No ductal dilation. Spleen:  Unremarkable. No splenomegaly. Adrenals:  Unremarkable. No mass. Kidneys and ureters:  Atrophic right kidney. There are calculi in the right renal pelvis measuring up to 4 mm. No hydronephrosis. Left kidney is unremarkable.    Stomach and bowel:  Severe circumferential wall thickening involving the distal colon and rectum measuring up to 2.5 cm thick. This is contiguous with the wall of the vagina. There is stool in the vagina suggesting rectovaginal fistula. No obstruction. PELVIS:   Appendix:  No findings to suggest acute appendicitis. Bladder:  Unremarkable. No stones. Reproductive:  Unremarkable as visualized. ABDOMEN and PELVIS:   Intraperitoneal space:  Unremarkable. No free air. No significant fluid collection. Bones/joints:  Metallic artifact from hardware from prior fusion of the lumbar spine at L4-5. There are moderate degenerative changes in the spine. No acute fracture or subluxation. Soft tissues:  Unremarkable. Vasculature: The abdominal aorta is severely calcified but nondilated. Lymph nodes:  Unremarkable. No enlarged lymph nodes. Electronically signed by Alonso Bardales MD on 07-23-22 at 2356    1.  2 x 8 cm area of infiltrate in the right lung base suspicious for pneumonia. There is underlying emphysema. 2.  Severe circumferential wall thickening involving the distal colon and rectum measuring up to 2.5 cm thick. This is contiguous with the wall of the vagina. There is stool in the vagina suggesting rectovaginal fistula. XR KNEE RIGHT (3 VIEWS)    Result Date: 7/25/2022  INDICATION: Pain. COMPARISON: None. TECHNIQUE: 4 views of the right knee were obtained. FINDINGS: Increased density visualized in the distal femur with subtle lucency is visualized in the femoral condyles bilaterally, increased density visualized along the articular surface  with subchondral cysts seen but no evidence of cortical irregularity or lucency to suggest a fracture. There is no evidence of acute fracture or dislocation. Mild to moderate narrowing of the medial, lateral and patellofemoral joint space is seen. There is no evidence of suprapatellar effusion. Vascular calcifications are seen. Overlying soft tissues are grossly unremarkable. Degenerative bone changes seen.  No evidence of acute osseous abnormality     XR CHEST PORTABLE    Result Date: 7/24/2022  EXAMINATION: XR CHEST PORTABLE CLINICAL HISTORY: ALTERED MENTAL STATUS COMPARISONS: JULY 2, 2022 FINDINGS: Osseous structures are intact. Cardiopericardial silhouette is normal. Pulmonary vasculature is normal. Lungs are clear. NO ACUTE CARDIOPULMONARY DISEASE. Assessment//Plan           Hospital Problems             Last Modified POA    * (Principal) Rectovaginal fistula 7/24/2022 Yes    Hypothyroidism 7/24/2022 Yes    Hypertension 7/24/2022 Yes    RICARDO (acute kidney injury) ([address] 7/24/2022 Yes     Assessment & Plan  7/25: Notedd A. fib with RVR we will transfer the patient to cardiac floor, cardiology evaluation,transfuse to keep Hgb> 7  keep K > 4 Spoke with nursing about the care, as per psych pt has not capacity. C/w current care, started Cardizem drip.   Electronically signed by Rowan Oscar MD on 7/25/22 at 1:20 PM EDT

## 2022-07-25 NOTE — PROGRESS NOTES
0830 monitor room called stated patient HR is in 140s, EKG obtained, shows afib with RVR.    [id number] message sent to Dr Stephanie Hardin. Electronically signed by Raiza Feng RN on 7/25/2022 at 8:42 AM     8019 medicated with lopressor 5mg IV    0904 medicated with second dose of lopressor 5mg IV    0950 Dr Bianca Feliciano here update given order received to transfer to [address] [place] [address].  Electronically signed by Raiza Feng RN on 7/25/2022 at 10:02 AM     1025 attempted to call report to [address] [place] [address], nurse unavailable. 1042 left unit via cart for xray. Electronically signed by Raiza Feng RN on 7/25/2022 at 10:43 AM     1103 attempted to call report to [address] [place] [address], nurse unavailable. Electronically signed by Raiza Feng RN on 7/25/2022 at 11:03 AM     [phone] report given to Jovanna WELLS on [address] [place] [address] Electronically signed by Raiza Feng RN on 7/25/2022 at 11:42 AM     [phone] spoke to patient's dtr [facility] informed of transfer to [address] [place] [address] room 179.  Electronically signed by Raiza Feng RN on 7/25/2022 at 11:47 AM

## 2022-07-25 NOTE — PROGRESS NOTES
Wound Ostomy Continence Nurse  Consult Note       NAME:  Hernandez Wilburn  MEDICAL RECORD NUMBER:  [id number]  AGE: 78 y.o. GENDER: female  : 1945  TODAY'S DATE:  2022    Subjective   Reason for [id number] Nurse Evaluation and Assessment: Sacrum & right Hip pressure injuries      Hernandez Wilburn is a 78 y.o. female referred by:   [x] Physician  [] Nursing  [] Other:     Wound Identification:  Wound Type: pressure  Contributing Factors: chronic pressure, decreased mobility, shear force, malnutrition, incontinence of stool, and incontinence of urine    Wound History: patient admitted to [facility] with Deep Tissue Injury (DTI) to the sacrum and to the right hip. Patient is confused, unable to provide wound history  Current Wound Care Treatment:  Recommending 1) continue pressure injury prevention interventions 2) protective barrier cream for incontinence care and for care of the DTIs. Patient Goal of Care:  [x] Wound Healing  [] Odor Control  [] Palliative Care  [] Pain Control   [] Other:         PAST MEDICAL HISTORY        Diagnosis Date    Bilateral carotid artery disease (HCC)     CAD (coronary artery disease)     Hyperlipidemia     Hypertension     Hypothyroidism     Osteoarthritis        PAST SURGICAL HISTORY    Past Surgical History:   Procedure Laterality Date    CARDIAC CATHETERIZATION  [facility]    SPINE SURGERY  2014    LUMBAR    THYROIDECTOMY, PARTIAL         FAMILY HISTORY    History reviewed. No pertinent family history. SOCIAL HISTORY    Social History     Tobacco Use    Smoking status: Former     Packs/day: 1.00     Years: 35.00     Pack years: 35.00     Types: Cigarettes     Start date:      Quit date: 1996     Years since quittin.6    Smokeless tobacco: Never   Substance Use Topics    Alcohol use:  Yes     Alcohol/week: 0.0 standard drinks     Comment: RARE    Drug use: Never       ALLERGIES    Allergies   Allergen Reactions    Bee Venom Pcn [Penicillins] Hives and Itching       MEDICATIONS    No current facility-administered medications on file prior to encounter. Current Outpatient Medications on File Prior to Encounter   Medication Sig Dispense Refill    dronabinol (MARINOL) 2.5 MG capsule Take 1 capsule by mouth in the morning and 1 capsule in the evening. Take before meals. Do all this for 30 days.  60 capsule 0    metoprolol succinate (TOPROL XL) 25 MG extended release tablet Take 3 tablets by mouth daily 30 tablet 3    magnesium oxide (MAG-OX) 400 (240 Mg) MG tablet Take 1 tablet by mouth daily 30 tablet 3    albuterol sulfate  (90 Base) MCG/ACT inhaler Inhale 2 puffs into the lungs every 4 hours as needed for Wheezing or Shortness of Breath (Space out to every 6 hours as symptoms improve) 1 Inhaler 0    cyanocobalamin ([employer] VITAMIN B12) 1000 MCG tablet Take 1 tablet by mouth daily 30 tablet 3    levothyroxine (SYNTHROID) 112 MCG tablet Take 1 tablet by mouth Daily 30 tablet 1    albuterol sulfate  (90 Base) MCG/ACT inhaler INHALE 2 PUFFS 4 TIMES DAILY AS NEEDED 8.5 Inhaler 3    isosorbide mononitrate (IMDUR) 30 MG extended release tablet Take 1 tablet by mouth daily 90 tablet 3       Objective    BP (!) 128/48   Pulse 85   Temp 97.7 °F (36.5 °C) (Oral)   Resp 16   Ht 5' 2\" (1.575 m)   Wt 110 lb (49.9 kg)   LMP  (LMP Unknown)   SpO2 100%   BMI 20.12 kg/m²     LABS:  WBC:    Lab Results   Component Value Date/Time    WBC 9.4 07/25/2022 04:56 AM     H/H:    Lab Results   Component Value Date/Time    HGB 6.7 07/25/2022 04:56 AM    HCT 21.5 07/25/2022 04:56 AM     PTT:    Lab Results   Component Value Date/Time    APTT 26.1 08/04/2014 12:12 PM   [APTT}  PT/INR:    Lab Results   Component Value Date/Time    PROTIME 10.6 08/04/2014 12:12 PM    INR 1.0 08/04/2014 12:12 PM     HgBA1c:    Lab Results   Component Value Date/Time    LABA1C 5.2 03/16/2018 09:18 AM       Assessment   Gilberto Risk Score: Gilberto Scale Score: 13    Patient Active Problem List   Diagnosis    Degenerative spondylolisthesis    Lumbar stenosis with neurogenic claudication    Hypothyroidism    Hypertension    Hyperlipidemia    Bilateral carotid artery stenosis    TIA (transient ischemic attack)    Pancytopenia (HCC)    Peripheral polyneuropathy    Weight loss    Acute bronchitis    Pneumonia due to COVID-19 virus    RICARDO (acute kidney injury) ([facility] [address]    Complicated UTI (urinary tract infection)    Severe malnutrition ([facility] [address]    Rectovaginal fistula       Measurements:  Wound 07/23/22 Sacrum (Active)   Wound Etiology Deep tissue/Injury 07/25/22 1220   Dressing/Treatment Zinc paste 07/25/22 1220   Dressing Change Due 07/26/22 07/25/22 1220   Wound Length (cm) 2 cm 07/25/22 1220   Wound Width (cm) 2 cm 07/25/22 1220   Wound Surface Area (cm^2) 4 cm^2 07/25/22 1220   Wound Assessment Purple/maroon 07/25/22 1220   Drainage Amount None 07/25/22 1220   Odor None 07/25/22 1220   Janelle-wound Assessment Blanchable erythema 07/25/22 1220   Margins Defined edges 07/25/22 1220   Number of days: 2       Wound 07/23/22 Hip Right (Active)   Wound Etiology Deep tissue/Injury 07/25/22 1220   Dressing/Treatment Zinc paste 07/25/22 1220   Dressing Change Due 07/26/22 07/25/22 1220   Wound Length (cm) 3 cm 07/25/22 1220   Wound Width (cm) 3 cm 07/25/22 1220   Wound Surface Area (cm^2) 9 cm^2 07/25/22 1220   Wound Assessment Purple/maroon 07/25/22 1220   Drainage Amount None 07/25/22 1220   Odor None 07/25/22 1220   Janelle-wound Assessment Blanchable erythema 07/25/22 1220   Margins Defined edges 07/25/22 1220   Number of days: 2     Assessment:    Patient seen by this [id number] [facility] Nurse and Renetta Alvarez. Patient was confused and difficult with skin evaluation, unable to obtain photo for documentation as patient was yelling to be left alone. Brief evaluation of skin , patient found to have DTI to the sacrococcygeal area and to the right hip. Sacral DTI is dark purple, intact, and non-blanchable.

## 2022-07-26 LAB
ALBUMIN SERPL-MCNC: 1.8 G/DL (ref 3.5–4.6)
ALP BLD-CCNC: 66 U/L (ref 40–130)
ALT SERPL-CCNC: <5 U/L (ref 0–33)
ANION GAP SERPL CALCULATED.3IONS-SCNC: 12 MEQ/L (ref 9–15)
AST SERPL-CCNC: 9 U/L (ref 0–35)
BASOPHILS ABSOLUTE: 0.1 K/UL (ref 0–0.2)
BASOPHILS RELATIVE PERCENT: 0.4 %
BILIRUB SERPL-MCNC: 0.3 MG/DL (ref 0.2–0.7)
BUN BLDV-MCNC: 24 MG/DL (ref 8–23)
CALCIUM SERPL-MCNC: 7.8 MG/DL (ref 8.5–9.9)
CHLORIDE BLD-SCNC: 114 MEQ/L (ref 95–107)
CO2: 15 MEQ/L (ref 20–31)
CREAT SERPL-MCNC: 0.89 MG/DL (ref 0.5–0.9)
EKG ATRIAL RATE: 300 BPM
EKG Q-T INTERVAL: 236 MS
EKG QRS DURATION: 62 MS
EKG QTC CALCULATION (BAZETT): 383 MS
EKG R AXIS: 7 DEGREES
EKG T AXIS: 237 DEGREES
EKG VENTRICULAR RATE: 159 BPM
EOSINOPHILS ABSOLUTE: 0.2 K/UL (ref 0–0.7)
EOSINOPHILS RELATIVE PERCENT: 1.1 %
FOLATE: 3 NG/ML
GFR AFRICAN AMERICAN: >60
GFR NON-AFRICAN AMERICAN: >60
GLOBULIN: 3.4 G/DL (ref 2.3–3.5)
GLUCOSE BLD-MCNC: 76 MG/DL (ref 70–99)
HCT VFR BLD CALC: 28.7 % (ref 37–47)
HEMOGLOBIN: 9 G/DL (ref 12–16)
LYMPHOCYTES ABSOLUTE: 0.4 K/UL (ref 1–4.8)
LYMPHOCYTES RELATIVE PERCENT: 2.7 %
MCH RBC QN AUTO: 28.3 PG (ref 27–31.3)
MCHC RBC AUTO-ENTMCNC: 31.4 % (ref 33–37)
MCV RBC AUTO: 90 FL (ref 82–100)
MONOCYTES ABSOLUTE: 0.5 K/UL (ref 0.2–0.8)
MONOCYTES RELATIVE PERCENT: 3.3 %
NEUTROPHILS ABSOLUTE: 13.5 K/UL (ref 1.4–6.5)
NEUTROPHILS RELATIVE PERCENT: 92.5 %
PDW BLD-RTO: 19.2 % (ref 11.5–14.5)
PLATELET # BLD: 249 K/UL (ref 130–400)
POTASSIUM REFLEX MAGNESIUM: 3.9 MEQ/L (ref 3.4–4.9)
RBC # BLD: 3.18 M/UL (ref 4.2–5.4)
SODIUM BLD-SCNC: 141 MEQ/L (ref 135–144)
TOTAL PROTEIN: 5.2 G/DL (ref 6.3–8)
URINE CULTURE, ROUTINE: NORMAL
VITAMIN B-12: 709 PG/ML (ref 232–1245)
WBC # BLD: 14.6 K/UL (ref 4.8–10.8)

## 2022-07-26 PROCEDURE — 36415 COLL VENOUS BLD VENIPUNCTURE: CPT

## 2022-07-26 PROCEDURE — 2500000003 HC RX 250 WO HCPCS: Performed by: NURSE PRACTITIONER

## 2022-07-26 PROCEDURE — 6370000000 HC RX 637 (ALT 250 FOR IP)

## 2022-07-26 PROCEDURE — 80053 COMPREHEN METABOLIC PANEL: CPT

## 2022-07-26 PROCEDURE — 85025 COMPLETE CBC W/AUTO DIFF WBC: CPT

## 2022-07-26 PROCEDURE — 6360000002 HC RX W HCPCS: Performed by: NURSE PRACTITIONER

## 2022-07-26 PROCEDURE — 2060000000 HC ICU INTERMEDIATE R&B

## 2022-07-26 PROCEDURE — 6360000002 HC RX W HCPCS

## 2022-07-26 PROCEDURE — 2500000003 HC RX 250 WO HCPCS

## 2022-07-26 PROCEDURE — 2580000003 HC RX 258

## 2022-07-26 PROCEDURE — 6370000000 HC RX 637 (ALT 250 FOR IP): Performed by: NURSE PRACTITIONER

## 2022-07-26 RX ORDER — ENOXAPARIN SODIUM 100 MG/ML
30 INJECTION SUBCUTANEOUS DAILY
Status: DISCONTINUED | OUTPATIENT
Start: 2022-07-27 | End: 2022-08-01 | Stop reason: HOSPADM

## 2022-07-26 RX ORDER — DIGOXIN 0.25 MG/ML
250 INJECTION INTRAMUSCULAR; INTRAVENOUS EVERY 6 HOURS
Status: DISCONTINUED | OUTPATIENT
Start: 2022-07-26 | End: 2022-07-27

## 2022-07-26 RX ORDER — CIPROFLOXACIN 2 MG/ML
400 INJECTION, SOLUTION INTRAVENOUS EVERY 12 HOURS
Status: DISCONTINUED | OUTPATIENT
Start: 2022-07-26 | End: 2022-08-01 | Stop reason: HOSPADM

## 2022-07-26 RX ORDER — MIDODRINE HYDROCHLORIDE 5 MG/1
5 TABLET ORAL
Status: DISCONTINUED | OUTPATIENT
Start: 2022-07-26 | End: 2022-08-01 | Stop reason: HOSPADM

## 2022-07-26 RX ORDER — METOPROLOL TARTRATE 5 MG/5ML
5 INJECTION INTRAVENOUS EVERY 4 HOURS PRN
Status: DISCONTINUED | OUTPATIENT
Start: 2022-07-26 | End: 2022-08-01 | Stop reason: HOSPADM

## 2022-07-26 RX ADMIN — METRONIDAZOLE 500 MG: 500 INJECTION, SOLUTION INTRAVENOUS at 23:00

## 2022-07-26 RX ADMIN — SODIUM CHLORIDE: 9 INJECTION, SOLUTION INTRAVENOUS at 14:15

## 2022-07-26 RX ADMIN — LEVOTHYROXINE SODIUM 100 MCG: 100 TABLET ORAL at 06:26

## 2022-07-26 RX ADMIN — CIPROFLOXACIN 400 MG: 2 INJECTION, SOLUTION INTRAVENOUS at 03:00

## 2022-07-26 RX ADMIN — METRONIDAZOLE 500 MG: 500 INJECTION, SOLUTION INTRAVENOUS at 06:26

## 2022-07-26 RX ADMIN — ISOSORBIDE MONONITRATE 30 MG: 30 TABLET, EXTENDED RELEASE ORAL at 09:13

## 2022-07-26 RX ADMIN — CIPROFLOXACIN 400 MG: 2 INJECTION, SOLUTION INTRAVENOUS at 17:03

## 2022-07-26 RX ADMIN — HEPARIN SODIUM 5000 UNITS: 5000 INJECTION INTRAVENOUS; SUBCUTANEOUS at 09:13

## 2022-07-26 RX ADMIN — Medication 10 ML: at 09:49

## 2022-07-26 RX ADMIN — DIGOXIN 250 MCG: 250 INJECTION, SOLUTION INTRAMUSCULAR; INTRAVENOUS at 20:41

## 2022-07-26 RX ADMIN — METOPROLOL TARTRATE 25 MG: 25 TABLET, FILM COATED ORAL at 09:13

## 2022-07-26 RX ADMIN — METOPROLOL TARTRATE 5 MG: 5 INJECTION INTRAVENOUS at 17:01

## 2022-07-26 RX ADMIN — MIDODRINE HYDROCHLORIDE 5 MG: 2.5 TABLET ORAL at 18:23

## 2022-07-26 RX ADMIN — METOPROLOL TARTRATE 25 MG: 25 TABLET, FILM COATED ORAL at 06:26

## 2022-07-26 RX ADMIN — METRONIDAZOLE 500 MG: 500 INJECTION, SOLUTION INTRAVENOUS at 14:22

## 2022-07-26 RX ADMIN — MIDODRINE HYDROCHLORIDE 5 MG: 2.5 TABLET ORAL at 14:14

## 2022-07-26 RX ADMIN — DIGOXIN 250 MCG: 250 INJECTION, SOLUTION INTRAMUSCULAR; INTRAVENOUS at 14:15

## 2022-07-26 ASSESSMENT — ENCOUNTER SYMPTOMS
DIARRHEA: 0
SHORTNESS OF BREATH: 0
COUGH: 0

## 2022-07-26 NOTE — FLOWSHEET NOTE
AM assessment completed. Patient resting in bed at this time. Denies any chest pain. Remains atrial fibrillation on the monitor. No edema noted. Pedal pulses palpable. No shortness of breath noted at this time. Lungs are diminished bilaterally. SATS 100% on RA. She is A/Ox2-to person and place. Denies any pain with elimination, but is incontinent of bowel and bladder. Skin remains warm and pink. Slight redness noted to buttocks ant to helga-area. Cleaned up from a medium sized liquid stool. New depends placed and zinc cream applied liberally. Patient does complain of burning when cleaning her up. Scabbed area noted to right knee. Purplish/reddish areas noted to right hip and upper sacral area. Mepilex dressings applied to bilateral heels. #22g SL removed from right lower forearm secondary to site leaking. Catheter intact and dressing applied. Dressing changed to #22g SL to right lower forearm and IV fluids changed to this site, NS infusing at 75ml/hr. Vital signs stable and AM medications provided. Call light remains in reach.  Electronically signed by Bianca Drummond RN on 7/26/2022 at 11:35 AM

## 2022-07-26 NOTE — PROGRESS NOTES
Pharmacy Dose Adjustment Per Protocol    Jhon Real is a 78 y.o. female. Recent Labs     07/25/22  0456 07/26/22  0458   BUN 33* 24*   CREATININE 1.02* 0.89   Estimated Creatinine Clearance: 38 mL/min (based on SCr of 0.89 mg/dL). Isabel Red     Height: 5' 2\" (157.5 cm), Weight: 96 lb (43.5 kg)      Current order:  UFH 5000 units SUBQ 2 times daily      Plan:  Pharmacologic VTE prophylaxis modified based on patient renal function per [facility]/P&T approved protocol     Patient Weight (kg)      50.9 and below .9 101-150.9 151-174.9 175 or greater   Estimated   CrCl  (ml/min) 30 or greater [x]   30 mg   SUBQ daily   []   40 mg   SUBQ daily []  30 mg SUBQ   BID  []  40 mg   SUBQ   BID []  60mg SUBQ BID    15-29.9 []  UFH 5000   units SUBQ BID []  30 mg   SUBQ daily [] 30 mg SUBQ   daily []  40 mg SUBQ   daily [] 60 mg SUBQ   daily    Less than 15 or dialysis []  UFH 5000   units SUBQ BID [] UFH 5000 units SUBQ TID []  UFH 7500   units   SUBQ TID       Thank Kely Dennis [facility]  07/26/22  2:33 PM

## 2022-07-26 NOTE — PROGRESS NOTES
Patient doing well clinically. Bowel prep being completed today. Evaluation with colonoscopy tomorrow for rectovaginal versus colovaginal fistula.

## 2022-07-26 NOTE — PROGRESS NOTES
2000 - Assessment completed. Pt Ax0x2. Pt is alert but confused. She is cooperative at this time. NSR on tele. VSS at rest. PM medications given per [facility] Denies any pain or needs at this time. Pt is resting comfortably in bed. Call light in reach, bed alarm on, will note any deviations. 2100 - This RN attempted to get pt to drink Miralax. Pt became angry and stated Mark Lozano was not in the mood to drink that. \"     0000 - Pt resting quietly. 0400 - Pt yelling out for help. This RN into room, pt unsure of why she was making so much noise.      Electronically signed by Marbella Patiño RN on 7/26/2022 at 6:02 AM

## 2022-07-26 NOTE — FLOWSHEET NOTE
Medicated with 5mg IV lopressor for elevated heart rate 135. Resting in bed at this time. Call light remains in reach.  Electronically signed by Doss Schilder, RN on 7/26/2022 at 5:07 PM

## 2022-07-26 NOTE — PROGRESS NOTES
Comprehensive Nutrition Assessment    Type and Reason for Visit:  Initial, RD Nutrition Re-Screen/LOS (LOW BMI)    Nutrition Recommendations/Plan:   Start clear nutrition supplement TID  Continue clear liquid diet until medically appropriate to advance      Malnutrition Assessment:  Malnutrition Status:  Severe malnutrition (07/26/22 1054)    Context:  Chronic Illness     Findings of the 6 clinical characteristics of malnutrition:  Energy Intake:  75% or less estimated energy requirements for 1 month or longer  Weight Loss:  Unable to assess (hx of stated weights)     Body Fat Loss:  Severe body fat loss Buccal region   Muscle Mass Loss:  Severe muscle mass loss Temples (temporalis)  Fluid Accumulation:  Unable to assess     Strength:  Not Performed    Nutrition Assessment:    Pt meets criteria for severe malnutrition evidenced by severe muscle/fat wasting, hx of poor PO intake and weight loss. UTD severity of weight loss d/t limited bed scale weights in EMR. Pt with increased nutrient needs for wound healing. Currently on clear liquid diet for bowel prep. RD to start clear supplement TID. Nutrition Related Findings:    Pt presents with feculent vaginal drainage and RICARDO. Reports good appetite but has a hx of poor intakes with previous admissions. Only drinks diet coke per daughter. Pmhx: CAD, HLD, HTN, hypothyroidism, OA. Labs (7/26): BUN slightly elevated. Meds include Synthroid. NS @ 75ml/hr. no edema. Loose BM 7/25. Pt states she dislikes regular ensure and that the ensure clear is ok. Currently clear liquid diet (x3 days) for bowel prep. Colonoscopy planned 7/27. Wound Type: Pressure Injury (sacrum and R hip)       Current Nutrition Intake & Therapies:    Average Meal Intake: 26-50% (clear liquids)  Average Supplements Intake: None Ordered  ADULT DIET;  Clear Liquid        Anthropometric Measures:  Height: 5' 2\" (157.5 cm)  Ideal Body Weight (IBW): 110 lbs (50 kg)    Admission Body Weight: 110 lb (49.9 kg) (stated 7/23)  Current Body Weight: 96 lb (43.5 kg) (7/26),   IBW. Weight Source: Bed Scale  Current BMI (kg/m2): 17.6  Usual Body Weight: 95 lb 14 oz (43.5 kg) (bed 7/11/22;125 lb stated 9/10/21; 105 lb bed 2019)  % Weight Change (Calculated): 0.1                    BMI Categories: Underweight (BMI less than 22) age over 72    Estimated Daily Nutrient Needs:  Energy Requirements Based On: Kcal/kg  Weight Used for Energy Requirements: Current  Energy (kcal/day): 1300-1430kcal (30-33kcal/kg)  Weight Used for Protein Requirements: Current  Protein (g/day): 65-73g (1.5-1.7g/kg) increased for wounds  Method Used for Fluid Requirements: ml/Kg  Fluid (ml/day): ~1522ml (35ml/kg)    Nutrition Diagnosis:   Severe malnutrition, In context of chronic illness related to inadequate protein-energy intake as evidenced by severe muscle loss, severe loss of subcutaneous fat, weight loss, poor intake prior to admission  Increased nutrient needs related to increase demand for energy/nutrients as evidenced by wounds    Nutrition Interventions:   Food and/or Nutrient Delivery: Continue Current Diet, Start Oral Nutrition Supplement  Nutrition Education/Counseling: No recommendation at this time  Coordination of Nutrition Care: Continue to monitor while inpatient       Goals:     Goals: PO intake 75% or greater, other (specify)  Specify Other Goals: weight gain, improved wound status    Nutrition Monitoring and Evaluation:   Behavioral-Environmental Outcomes: None Identified  Food/Nutrient Intake Outcomes: Food and Nutrient Intake, Supplement Intake  Physical Signs/Symptoms Outcomes: Biochemical Data, Weight, GI Status, Meal Time Behavior, Nutrition Focused Physical Findings, Skin    Discharge Planning:     Too soon to determine     Brittany Miller, MS, RD, LD

## 2022-07-26 NOTE — PROGRESS NOTES
Progress Note  Date:2022       [facility]  Patient Name:Edna Doyle     YOB: 1945     Age:77 y.o. Subjective    Subjective:  Symptoms:  No shortness of breath, malaise, cough, chest pain, weakness, headache, chest pressure, anorexia, diarrhea or anxiety. Review of Systems   Respiratory:  Negative for cough and shortness of breath. Cardiovascular:  Negative for chest pain. Gastrointestinal:  Negative for anorexia and diarrhea. Neurological:  Negative for weakness. Objective         Vitals Last 24 Hours:  TEMPERATURE:  Temp  Av.7 °F (36.5 °C)  Min: 97.3 °F (36.3 °C)  Max: 98.1 °F (36.7 °C)  RESPIRATIONS RANGE: Resp  Av  Min: 16  Max: 18  PULSE OXIMETRY RANGE: SpO2  Av.9 %  Min: 99 %  Max: 100 %  PULSE RANGE: Pulse  Av.8  Min: 95  Max: 140  BLOOD PRESSURE RANGE: Systolic (77TXD), PFT:92 , Min:76 , EVD:529   ; Diastolic (91KNW), GOL:26, Min:48, Max:66    I/O (24Hr): Intake/Output Summary (Last 24 hours) at 2022 1156  Last data filed at 2022 1139  Gross per 24 hour   Intake 4988.42 ml   Output --   Net 4988.42 ml     Objective:  General Appearance:  Comfortable, well-appearing and in no acute distress. Vital signs: (most recent): Blood pressure 117/66, pulse (!) 124, temperature 97.7 °F (36.5 °C), temperature source Oral, resp. rate 18, height 5' 2\" (1.575 m), weight 96 lb (43.5 kg), SpO2 100 %, not currently breastfeeding. HEENT: Normal HEENT exam.    Lungs:  Normal effort. Heart: Normal rate. S1 normal and S2 normal.    Abdomen: Abdomen is soft. Bowel sounds are normal.   There is no epigastric area or suprapubic area tenderness. Pulses: Distal pulses are intact. Neurological: Patient is alert. Pupils:  Pupils are equal, round, and reactive to light. Skin:  Warm and dry.     Labs/Imaging/Diagnostics    Labs:  CBC:  Recent Labs     22  0752 22  0456 22  0825   WBC 14.6* 9.4 14.6*   RBC 2.69* thickening involving the distal colon and rectum measuring up to 2.5 cm thick. This is contiguous with the wall of the vagina. There is stool in the vagina suggesting rectovaginal fistula. No obstruction. PELVIS:   Appendix:  No findings to suggest acute appendicitis. Bladder:  Unremarkable. No stones. Reproductive:  Unremarkable as visualized. ABDOMEN and PELVIS:   Intraperitoneal space:  Unremarkable. No free air. No significant fluid collection. Bones/joints:  Metallic artifact from hardware from prior fusion of the lumbar spine at L4-5. There are moderate degenerative changes in the spine. No acute fracture or subluxation. Soft tissues:  Unremarkable. Vasculature: The abdominal aorta is severely calcified but nondilated. Lymph nodes:  Unremarkable. No enlarged lymph nodes. Electronically signed by Maria R Groves MD on 07-23-22 at 2356    1.  2 x 8 cm area of infiltrate in the right lung base suspicious for pneumonia. There is underlying emphysema. 2.  Severe circumferential wall thickening involving the distal colon and rectum measuring up to 2.5 cm thick. This is contiguous with the wall of the vagina. There is stool in the vagina suggesting rectovaginal fistula. XR KNEE RIGHT (3 VIEWS)    Result Date: 7/25/2022  INDICATION: Pain. COMPARISON: None. TECHNIQUE: 4 views of the right knee were obtained. FINDINGS: Increased density visualized in the distal femur with subtle lucency is visualized in the femoral condyles bilaterally, increased density visualized along the articular surface  with subchondral cysts seen but no evidence of cortical irregularity or lucency to suggest a fracture. There is no evidence of acute fracture or dislocation. Mild to moderate narrowing of the medial, lateral and patellofemoral joint space is seen. There is no evidence of suprapatellar effusion. Vascular calcifications are seen. Overlying soft tissues are grossly unremarkable.      Degenerative bone changes seen. No evidence of acute osseous abnormality     XR CHEST PORTABLE    Result Date: 7/24/2022  EXAMINATION: XR CHEST PORTABLE CLINICAL HISTORY: ALTERED MENTAL STATUS COMPARISONS: JULY 2, 2022 FINDINGS: Osseous structures are intact. Cardiopericardial silhouette is normal. Pulmonary vasculature is normal. Lungs are clear. NO ACUTE CARDIOPULMONARY DISEASE. Assessment//Plan           Hospital Problems             Last Modified POA    * (Principal) Rectovaginal fistula 7/24/2022 Yes    Hypothyroidism 7/24/2022 Yes    Hypertension 7/24/2022 Yes    RICARDO (acute kidney injury) ([address] 7/24/2022 Yes   rectovaginal or colovaginal fistula  Afib with rVR  HTN      Assessment & Plan  7/25: Notedd A. fib with RVR we will transfer the patient to cardiac floor, cardiology evaluation,transfuse to keep Hgb> 7  keep K > 4 Spoke with nursing about the care, as per psych pt has not capacity. C/w current care, started Cardizem drip. 7/26: Possible colonoscopy tomorrow. Patient getting prep now. On Cardizem drip. Rate is better controlled. No overnight events no new complaints. Continue current care.   Electronically signed by Jasmyn Perla MD on 7/25/22 at 1:20 PM EDT

## 2022-07-26 NOTE — PROGRESS NOTES
[facility], [place] Daily Progress Note  Name: Shreyas Leyva  Age: 78 y.o. Gender: female  CodeStatus: Full Code    Primary Cardiology : Dr. Wendy Garcia MD  [address] [facility] Cardiology : Dr. Murray Mccray APRN-CNP  Primary Care Provider: Iliana Obrien MD  Admission Date: 7/23/2022    Chief complaint/Associated symptoms:   Ailyn Spaulding was seen and examined at bedside    She is currently resting comfortably in bed,    She denies chest pain, palpitations, lightheadedness or dizziness. Admits to chronic shortness of breath     Slight improvement in atrial fibrillation HR with increased beta blocker. Pending colonoscopy tomorrow 7/27/2022 with Dr. Kendy Adams  Atrial Fib with RVR: New onset of atrial fib as of 0823 his AM with current 's. She has received Lopressor 5 mg IV twice in addition to her PO beta blocker. Ideally she should be place on anticoagulation but unfortunately she is anemic with noted Hgb/hct of 6.7/21.5 7/25/2022. She is currently refusing cardioversion. New onset of afib with RVR may be response to significant anemia. She had been placed on a Cardizem drip which was stopped due to hypotension with noted blood pressures 70's - 80's. Will continue beta blocker for rate control. 2. V-Tach: Noted 14 beat V-Tach run  7/24/2022 2100. 3. RICARDO:  Resolved. Bun/creat 57/1.69 on admission,  24/0.89 today      4. Anemia:  Hgb/hct 10.3/32.8 on admission 6.7/21.5 yesterday 7/25/2022, improved 9.0/28.7 today after 1 unit of RBC transfusion. Pending colonoscopy tomorrow with Dr. Jose A New. Plan:  Monitor on telemetry  Rate control strategy,   Pending colonoscopy tomorrow  Further recommendations per Dr. Cele Fraser       Physical Exam  Constitutional:  Frail,  awake/alert/oriented x3, no distress, alert and cooperative. Respiratory/Thorax: Patent airways, CTAB,  normal breath sounds with good chest expansion, thorax symmetric.   Cardiovascular: Irregular rate and rhythm, normal S1 and S2, PMI non displaced. Gastrointestinal:  Non distended, soft, non-tender, no rebound tenderness or guarding, Genitourinary:  deferred  Musculoskeletal:  No apparent injury. Extremities:  No cyanosis, edema, contusions or wounds, no clubbing. Neurological:  Alert and oriented x3. Moves extremities spontaneous with purpose  Psychological:  Appropriate mood and behavior  Skin:  Warm and dry,  no lesions or rashes. Allergies: Bee Venom  Pcn [Penicillins]    Medications:  Reviewed  Home Medications    Infusion Medications:    sodium chloride      dilTIAZem (CARDIZEM) 125 mg in dextrose 5% 125 mL infusion Stopped (07/25/22 1637)    sodium chloride      sodium chloride 75 mL/hr at 07/25/22 2019     Scheduled Medications:    metoprolol tartrate  25 mg Oral Q4H    sodium chloride flush  5-40 mL IntraVENous 2 times per day    ciprofloxacin  400 mg IntraVENous Q24H    metroNIDAZOLE  500 mg IntraVENous Q8H    heparin (porcine)  5,000 Units SubCUTAneous BID    isosorbide mononitrate  30 mg Oral Daily    levothyroxine  100 mcg Oral Daily     PRN Meds: sodium chloride, traMADol, sodium chloride flush, sodium chloride, ondansetron **OR** ondansetron, polyethylene glycol, acetaminophen **OR** acetaminophen, camphor-menthol-methyl salicylate    Vitals  Vitals:    07/26/22 0714   BP: 117/66   Pulse: (!) 124   Resp: 18   Temp: 97.7 °F (36.5 °C)   SpO2: 100%       I&O  No documented urine output      Telemetry:  Atrial fibrillation with RVR  115 -120's. ECHO  7/4/2022  Left Ventricle  Left ventricular ejection fraction is visually estimated at 55%. Right Ventricle  Normal right ventricle structure and function. Normal right ventricle systolic pressure. Pericardial Effusion  No evidence of pericardial effusion.         Labs:   Recent Labs     07/24/22  0752 07/25/22  0456 07/26/22  0825   WBC 14.6* 9.4 14.6*   HGB 7.7* 6.7* 9.0*   HCT 24.9* 21.5* 28.7*    202 249     Recent Labs 07/24/22  0752 07/25/22 0456 07/26/22  0458    142 141   K 4.6 3.9 3.9   * 116* 114*   CO2 19* 14* 15*   BUN 46* 33* 24*   CREATININE 1.29* 1.02* 0.89   CALCIUM 8.6 7.8* 7.8*     Recent Labs     07/24/22  0752 07/25/22 0456 07/26/22 0458   AST 12 11 9   ALT 6 <5 <5   BILITOT <0.2 <0.2 0.3   ALKPHOS 78 65 66     No results for input(s): INR in the last 72 hours. No results for input(s): Charyl Forget in the last 72 hours. Urinalysis:   Lab Results   Component Value Date/Time    NITRU Negative 07/23/2022 10:52 PM    [address] 07/23/2022 10:52 PM    BACTERIA MANY 07/23/2022 10:52 PM    RBCUA 20-50 07/23/2022 10:52 PM    BLOODU LARGE 07/23/2022 10:52 PM    SPECGRAV 1.022 07/23/2022 10:52 PM    GLUCOSEU Negative 07/23/2022 10:52 PM       Radiology:   Most recent    Chest CT      WITH CONTRAST:No results found for this or any previous visit. WITHOUT CONTRAST: No results found for this or any previous visit. CXR      2-view: No results found for this or any previous visit. Portable: Results for orders placed during the hospital encounter of 07/23/22    XR CHEST PORTABLE    Narrative  EXAMINATION: XR CHEST PORTABLE    CLINICAL HISTORY: ALTERED MENTAL STATUS    COMPARISONS: JULY 2, 2022    FINDINGS: Osseous structures are intact. Cardiopericardial silhouette is normal. Pulmonary vasculature is normal. Lungs are clear. Impression  NO ACUTE CARDIOPULMONARY DISEASE. 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 I am managing a portion of pt care. Some medical issues are handled by other specialists. Additional work up and treatment should be done in out pt setting by pt PCP and other out pt providers. In addition to examining and evaluating pt, I spent additional time explaining care, normaland abnormal findings, and treatment plan. All of pt questions were answered. Counseling, diet and education were provided. Case will be discussed with nursing staff when appropriate. Family will be updated if and whenappropriate.       Electronically signed by VALARIE Patel CNP on 7/26/2022 at 8:58 AM

## 2022-07-26 NOTE — CARE COORDINATION
Patient is from [facility] and will require a pre cert to return. PT/OT evals are still needed.

## 2022-07-26 NOTE — PLAN OF CARE
Care plan remains ongoing. Plan for colonoscopy and then possible surgery.  Electronically signed by Agnieszka Vance RN on 7/26/2022 at 11:42 AM

## 2022-07-26 NOTE — PLAN OF CARE
Nutrition Problem #1: Severe malnutrition, In context of chronic illness  Intervention: Food and/or Nutrient Delivery: Continue Current Diet, Start Oral Nutrition Supplement  Nutritional  Goals: PO intake >75%, weight gain.

## 2022-07-27 ENCOUNTER — ANESTHESIA EVENT (OUTPATIENT)
Dept: OPERATING ROOM | Age: 77
DRG: 329 | End: 2022-07-27
Payer: MEDICARE

## 2022-07-27 ENCOUNTER — ANESTHESIA (OUTPATIENT)
Dept: OPERATING ROOM | Age: 77
DRG: 329 | End: 2022-07-27
Payer: MEDICARE

## 2022-07-27 LAB
GLUCOSE BLD-MCNC: 102 MG/DL (ref 70–99)
GLUCOSE BLD-MCNC: 65 MG/DL (ref 70–99)
PERFORMED ON: ABNORMAL
PERFORMED ON: ABNORMAL

## 2022-07-27 PROCEDURE — 6370000000 HC RX 637 (ALT 250 FOR IP): Performed by: NURSE PRACTITIONER

## 2022-07-27 PROCEDURE — 6370000000 HC RX 637 (ALT 250 FOR IP): Performed by: COLON & RECTAL SURGERY

## 2022-07-27 PROCEDURE — 45380 COLONOSCOPY AND BIOPSY: CPT | Performed by: COLON & RECTAL SURGERY

## 2022-07-27 PROCEDURE — 6360000002 HC RX W HCPCS: Performed by: NURSE PRACTITIONER

## 2022-07-27 PROCEDURE — 6360000002 HC RX W HCPCS: Performed by: REGISTERED NURSE

## 2022-07-27 PROCEDURE — 2500000003 HC RX 250 WO HCPCS: Performed by: NURSE PRACTITIONER

## 2022-07-27 PROCEDURE — 7100000001 HC PACU RECOVERY - ADDTL 15 MIN: Performed by: COLON & RECTAL SURGERY

## 2022-07-27 PROCEDURE — 2500000003 HC RX 250 WO HCPCS

## 2022-07-27 PROCEDURE — 3609027000 HC COLONOSCOPY: Performed by: COLON & RECTAL SURGERY

## 2022-07-27 PROCEDURE — 2709999900 HC NON-CHARGEABLE SUPPLY: Performed by: COLON & RECTAL SURGERY

## 2022-07-27 PROCEDURE — 6370000000 HC RX 637 (ALT 250 FOR IP)

## 2022-07-27 PROCEDURE — 7100000000 HC PACU RECOVERY - FIRST 15 MIN: Performed by: COLON & RECTAL SURGERY

## 2022-07-27 PROCEDURE — 2580000003 HC RX 258: Performed by: COLON & RECTAL SURGERY

## 2022-07-27 PROCEDURE — 2500000003 HC RX 250 WO HCPCS: Performed by: COLON & RECTAL SURGERY

## 2022-07-27 PROCEDURE — 2580000003 HC RX 258

## 2022-07-27 PROCEDURE — 6360000002 HC RX W HCPCS: Performed by: COLON & RECTAL SURGERY

## 2022-07-27 PROCEDURE — 6360000002 HC RX W HCPCS

## 2022-07-27 PROCEDURE — 2580000003 HC RX 258: Performed by: INTERNAL MEDICINE

## 2022-07-27 PROCEDURE — 88305 TISSUE EXAM BY PATHOLOGIST: CPT

## 2022-07-27 PROCEDURE — 3700000000 HC ANESTHESIA ATTENDED CARE: Performed by: COLON & RECTAL SURGERY

## 2022-07-27 PROCEDURE — 3700000001 HC ADD 15 MINUTES (ANESTHESIA): Performed by: COLON & RECTAL SURGERY

## 2022-07-27 PROCEDURE — 88342 IMHCHEM/IMCYTCHM 1ST ANTB: CPT

## 2022-07-27 PROCEDURE — 2060000000 HC ICU INTERMEDIATE R&B

## 2022-07-27 RX ORDER — PROPOFOL 10 MG/ML
INJECTION, EMULSION INTRAVENOUS PRN
Status: DISCONTINUED | OUTPATIENT
Start: 2022-07-27 | End: 2022-07-27 | Stop reason: SDUPTHER

## 2022-07-27 RX ORDER — FOLIC ACID 1 MG/1
1 TABLET ORAL DAILY
Status: DISCONTINUED | OUTPATIENT
Start: 2022-07-27 | End: 2022-08-01 | Stop reason: HOSPADM

## 2022-07-27 RX ORDER — SODIUM CHLORIDE 9 MG/ML
25 INJECTION, SOLUTION INTRAVENOUS PRN
Status: DISCONTINUED | OUTPATIENT
Start: 2022-07-27 | End: 2022-07-27 | Stop reason: HOSPADM

## 2022-07-27 RX ORDER — DIGOXIN 250 MCG
250 TABLET ORAL DAILY
Status: DISCONTINUED | OUTPATIENT
Start: 2022-07-28 | End: 2022-08-01 | Stop reason: HOSPADM

## 2022-07-27 RX ORDER — METOCLOPRAMIDE HYDROCHLORIDE 5 MG/ML
10 INJECTION INTRAMUSCULAR; INTRAVENOUS
Status: DISCONTINUED | OUTPATIENT
Start: 2022-07-27 | End: 2022-07-27 | Stop reason: HOSPADM

## 2022-07-27 RX ORDER — METOPROLOL TARTRATE 5 MG/5ML
2.5 INJECTION INTRAVENOUS EVERY 6 HOURS PRN
Status: DISCONTINUED | OUTPATIENT
Start: 2022-07-27 | End: 2022-08-01 | Stop reason: HOSPADM

## 2022-07-27 RX ORDER — DIPHENHYDRAMINE HYDROCHLORIDE 50 MG/ML
12.5 INJECTION INTRAMUSCULAR; INTRAVENOUS
Status: DISCONTINUED | OUTPATIENT
Start: 2022-07-27 | End: 2022-07-27 | Stop reason: HOSPADM

## 2022-07-27 RX ORDER — ONDANSETRON 2 MG/ML
4 INJECTION INTRAMUSCULAR; INTRAVENOUS
Status: DISCONTINUED | OUTPATIENT
Start: 2022-07-27 | End: 2022-07-27 | Stop reason: HOSPADM

## 2022-07-27 RX ORDER — SODIUM CHLORIDE 0.9 % (FLUSH) 0.9 %
5-40 SYRINGE (ML) INJECTION PRN
Status: DISCONTINUED | OUTPATIENT
Start: 2022-07-27 | End: 2022-07-27 | Stop reason: HOSPADM

## 2022-07-27 RX ORDER — DEXTROSE, SODIUM CHLORIDE, SODIUM LACTATE, POTASSIUM CHLORIDE, AND CALCIUM CHLORIDE 5; .6; .31; .03; .02 G/100ML; G/100ML; G/100ML; G/100ML; G/100ML
INJECTION, SOLUTION INTRAVENOUS CONTINUOUS
Status: DISCONTINUED | OUTPATIENT
Start: 2022-07-27 | End: 2022-08-01 | Stop reason: HOSPADM

## 2022-07-27 RX ORDER — MAGNESIUM HYDROXIDE 1200 MG/15ML
LIQUID ORAL PRN
Status: DISCONTINUED | OUTPATIENT
Start: 2022-07-27 | End: 2022-07-27 | Stop reason: ALTCHOICE

## 2022-07-27 RX ORDER — DEXTROSE MONOHYDRATE 100 MG/ML
INJECTION, SOLUTION INTRAVENOUS CONTINUOUS PRN
Status: DISCONTINUED | OUTPATIENT
Start: 2022-07-27 | End: 2022-08-01 | Stop reason: HOSPADM

## 2022-07-27 RX ORDER — SODIUM CHLORIDE 0.9 % (FLUSH) 0.9 %
5-40 SYRINGE (ML) INJECTION EVERY 12 HOURS SCHEDULED
Status: DISCONTINUED | OUTPATIENT
Start: 2022-07-27 | End: 2022-07-27 | Stop reason: HOSPADM

## 2022-07-27 RX ORDER — DOXYCYCLINE HYCLATE 50 MG/1
324 CAPSULE, GELATIN COATED ORAL
Status: DISCONTINUED | OUTPATIENT
Start: 2022-07-28 | End: 2022-08-01 | Stop reason: HOSPADM

## 2022-07-27 RX ADMIN — LEVOTHYROXINE SODIUM 100 MCG: 100 TABLET ORAL at 06:14

## 2022-07-27 RX ADMIN — PROPOFOL 20 MG: 10 INJECTION, EMULSION INTRAVENOUS at 13:25

## 2022-07-27 RX ADMIN — Medication 10 ML: at 19:42

## 2022-07-27 RX ADMIN — PROPOFOL 20 MG: 10 INJECTION, EMULSION INTRAVENOUS at 13:19

## 2022-07-27 RX ADMIN — METOPROLOL TARTRATE 25 MG: 25 TABLET, FILM COATED ORAL at 19:41

## 2022-07-27 RX ADMIN — METOPROLOL TARTRATE 5 MG: 5 INJECTION INTRAVENOUS at 12:50

## 2022-07-27 RX ADMIN — ACETAMINOPHEN 650 MG: 325 TABLET, FILM COATED ORAL at 18:38

## 2022-07-27 RX ADMIN — CIPROFLOXACIN 400 MG: 2 INJECTION, SOLUTION INTRAVENOUS at 04:26

## 2022-07-27 RX ADMIN — PROPOFOL 20 MG: 10 INJECTION, EMULSION INTRAVENOUS at 13:31

## 2022-07-27 RX ADMIN — METOPROLOL TARTRATE 5 MG: 5 INJECTION INTRAVENOUS at 07:04

## 2022-07-27 RX ADMIN — DIGOXIN 250 MCG: 250 INJECTION, SOLUTION INTRAMUSCULAR; INTRAVENOUS at 02:30

## 2022-07-27 RX ADMIN — METRONIDAZOLE 500 MG: 500 INJECTION, SOLUTION INTRAVENOUS at 06:15

## 2022-07-27 RX ADMIN — SODIUM CHLORIDE: 9 INJECTION, SOLUTION INTRAVENOUS at 15:40

## 2022-07-27 RX ADMIN — FOLIC ACID 1 MG: 1 TABLET ORAL at 15:43

## 2022-07-27 RX ADMIN — PROPOFOL 50 MG: 10 INJECTION, EMULSION INTRAVENOUS at 13:16

## 2022-07-27 RX ADMIN — DIGOXIN 250 MCG: 250 INJECTION, SOLUTION INTRAMUSCULAR; INTRAVENOUS at 15:43

## 2022-07-27 RX ADMIN — ENOXAPARIN SODIUM 30 MG: 100 INJECTION SUBCUTANEOUS at 09:16

## 2022-07-27 RX ADMIN — Medication 10 ML: at 09:14

## 2022-07-27 RX ADMIN — METRONIDAZOLE 500 MG: 500 INJECTION, SOLUTION INTRAVENOUS at 17:41

## 2022-07-27 RX ADMIN — ISOSORBIDE MONONITRATE 30 MG: 30 TABLET, EXTENDED RELEASE ORAL at 09:14

## 2022-07-27 RX ADMIN — DIGOXIN 250 MCG: 250 INJECTION, SOLUTION INTRAMUSCULAR; INTRAVENOUS at 09:14

## 2022-07-27 RX ADMIN — CIPROFLOXACIN 400 MG: 2 INJECTION, SOLUTION INTRAVENOUS at 15:42

## 2022-07-27 RX ADMIN — DEXTROSE MONOHYDRATE 125 ML: 100 INJECTION, SOLUTION INTRAVENOUS at 18:36

## 2022-07-27 RX ADMIN — SODIUM CHLORIDE: 9 INJECTION, SOLUTION INTRAVENOUS at 09:16

## 2022-07-27 RX ADMIN — SODIUM CHLORIDE, SODIUM LACTATE, POTASSIUM CHLORIDE, CALCIUM CHLORIDE AND DEXTROSE MONOHYDRATE: 5; 600; 310; 30; 20 INJECTION, SOLUTION INTRAVENOUS at 18:47

## 2022-07-27 ASSESSMENT — ENCOUNTER SYMPTOMS
DIARRHEA: 0
SHORTNESS OF BREATH: 0
COUGH: 0

## 2022-07-27 ASSESSMENT — PAIN SCALES - GENERAL
PAINLEVEL_OUTOF10: 8
PAINLEVEL_OUTOF10: 0

## 2022-07-27 ASSESSMENT — PAIN DESCRIPTION - LOCATION: LOCATION: HEAD

## 2022-07-27 ASSESSMENT — PAIN SCALES - WONG BAKER: WONGBAKER_NUMERICALRESPONSE: 0

## 2022-07-27 NOTE — FLOWSHEET NOTE
Written consent obtained from daughter, Tyrell Altamirano. Plan is for surgery tomorrow with Dr Griselda Mccune.  Electronically signed by Bertha Muse RN on 7/27/2022 at 4:57 PM

## 2022-07-27 NOTE — FLOWSHEET NOTE
Report provided via phone to Chilo Metcalf, in short stay surgery.  Electronically signed by Tom Beltran RN on 7/27/2022 at 10:59 AM

## 2022-07-27 NOTE — PROGRESS NOTES
[facility] Daily Progress Note  Name: Lamine Vann  Age: 78 y.o. Gender: female  CodeStatus: Full Code    Primary Cardiology : Dr. Michael Bahena MD  [address]  Cardiology : Dr. Dionisio Calixto APRN-CNP  Primary Care Provider: Robinson Chawla MD  Admission Date: 7/23/2022    Chief complaint/Associated symptoms:   José Garcia was seen and examined at bedside    She is currently resting comfortably in bed,    She denies chest pain, palpitations, lightheadedness or dizziness. Admits to chronic shortness of breath     Slight improvement in atrial fibrillation HR with increased beta blocker. Pending colonoscopy today 7/27/2022 with Dr. Radha Heredia  Atrial Fib with RVR: New onset of atrial fib as of 0823 his AM with current 's. She has received Lopressor 5 mg IV twice in addition to her PO beta blocker. Ideally she should be place on anticoagulation but unfortunately she is anemic with noted Hgb/hct of 6.7/21.5 7/25/2022. She is currently refusing cardioversion. New onset of afib with RVR may be response to significant anemia. She had been placed on a Cardizem drip which was stopped due to hypotension with noted blood pressures 70's - 80's. Digoxin load administered yesterday with sight noted improvement in HR. Continue to received Lopressor IV PRN. 2. V-Tach: Noted 14 beat V-Tach run  7/24/2022 2100. 3. RICARDO:  Resolved. Bun/creat 57/1.69 on admission,  24/0.89 yesterday      4. Anemia:  Hgb/hct 10.3/32.8 on admission 6.7/21.5 yesterday 7/25/2022, improved 9.0/28.7 yesterday  after 1 unit of RBC transfusion. Pending colonoscopy tomorrow with Dr. Krysta Small. Plan:  Monitor on telemetry  Rate control strategy,   Pending colonoscopy today   Further recommendations per Dr. Lizzette Deshpande       Physical Exam  Constitutional:  Frail,  awake/alert/oriented x3, no distress, alert and cooperative.   Respiratory/Thorax: Patent airways, CTAB,  normal breath sounds with Pericardial Effusion  No evidence of pericardial effusion. Labs:   Recent Labs     07/25/22  0456 07/26/22  0825   WBC 9.4 14.6*   HGB 6.7* 9.0*   HCT 21.5* 28.7*    249       Recent Labs     07/25/22  0456 07/26/22  0458    141   K 3.9 3.9   * 114*   CO2 14* 15*   BUN 33* 24*   CREATININE 1.02* 0.89   CALCIUM 7.8* 7.8*       Recent Labs     07/25/22  0456 07/26/22  0458   AST 11 9   ALT <5 <5   BILITOT <0.2 0.3   ALKPHOS 65 66       No results for input(s): INR in the last 72 hours. No results for input(s): Noel Parker in the last 72 hours. Urinalysis:   Lab Results   Component Value Date/Time    NITRU Negative 07/23/2022 10:52 PM    [address] 07/23/2022 10:52 PM    BACTERIA MANY 07/23/2022 10:52 PM    RBCUA 20-50 07/23/2022 10:52 PM    BLOODU LARGE 07/23/2022 10:52 PM    SPECGRAV 1.022 07/23/2022 10:52 PM    GLUCOSEU Negative 07/23/2022 10:52 PM       Radiology:   Most recent    Chest CT      WITH CONTRAST:No results found for this or any previous visit. WITHOUT CONTRAST: No results found for this or any previous visit. CXR      2-view: No results found for this or any previous visit. Portable: Results for orders placed during the hospital encounter of 07/23/22    XR CHEST PORTABLE    Narrative  EXAMINATION: XR CHEST PORTABLE    CLINICAL HISTORY: ALTERED MENTAL STATUS    COMPARISONS: JULY 2, 2022    FINDINGS: Osseous structures are intact. Cardiopericardial silhouette is normal. Pulmonary vasculature is normal. Lungs are clear. Impression  NO ACUTE CARDIOPULMONARY DISEASE. 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 I am managing a portion of pt care.  Some medical issues are handled by other specialists. Additional work up and treatment should be done in out pt setting by pt PCP and other out pt providers. In addition to examining and evaluating pt, I spent additional time explaining care, normaland abnormal findings, and treatment plan. All of pt questions were answered. Counseling, diet and education were provided. Case will be discussed with nursing staff when appropriate. Family will be updated if and whenappropriate.       Electronically signed by VALARIE Joseph CNP on 7/27/2022 at 11:11 AM

## 2022-07-27 NOTE — CARE COORDINATION
Rounds done with Dr. Kleber Narayanan and nrsg today and pt may have surg and will need evals done post op for his p/c to SNF.

## 2022-07-27 NOTE — CONSULTS
Hematology/Oncology Consult  Encounter Date: 2022 4:45 PM    Ms. Harvy Duane is a 78 y.o. female  : 1945  MRN: [id number]  Acct Number: [de-identified]  Requesting Provider: Dr. 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 Fabiola Mai is a 78years old  female who is being seen in consultation at the request of Dr. 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 Mrs. Fabiola Mai is a poor historian . She has a past history of Coronary artery disease, hyperlipidemia, Hypertension , hypothyroidism and Dementia. She has been in a nursing home . She was noted to have stools coming out of Vagina . She has lost significant weight over last few months . She has anorexia and diarrhea . She denies rectal bleeding . Proctoscopy showed a large rectal tumor which is fixed. Big recto-vaginal fistula was noted . Dr. Jose Bernabe feels that the patient's prognosis is poor . Patient's comorbidities preclude aggressive management of her malignancy. Patient denies abdominal pain, nausea , vomiting etc. She was noted to be in Atrial fibrillation and Acute Renal failure on admission . Renal failure is improving . She has recovered from Covid-19 pneumonia .     Patient Active Problem List   Diagnosis    Degenerative spondylolisthesis    Lumbar stenosis with neurogenic claudication    Hypothyroidism    Hypertension    Hyperlipidemia    Bilateral carotid artery stenosis    TIA (transient ischemic attack)    Pancytopenia (HCC)    Peripheral polyneuropathy    Weight loss    Acute bronchitis    Pneumonia due to COVID-19 virus    RICARDO (acute kidney injury) ([address]    Complicated UTI (urinary tract infection)    Severe malnutrition ([address]    Rectovaginal fistula     Past Medical History:   Diagnosis Date    Bilateral carotid artery disease ([address] CAD (coronary artery disease)     Hyperlipidemia     Hypertension     Hypothyroidism     Osteoarthritis      Past Surgical History:  Cardia Catheterization in   Spine surgery   Partial Thyroidectomy. History reviewed. No pertinent family history. Social History     Socioeconomic History    Marital status:      Spouse name: Not on file    Number of children: Not on file    Years of education: Not on file    Highest education level: Not on file   Occupational History    Not on file   Tobacco Use    Smoking status: Former     Packs/day: 1.00     Years: 35.00     Pack years: 35.00     Types: Cigarettes     Start date:      Quit date: 1996     Years since quittin.6    Smokeless tobacco: Never   Substance and Sexual Activity    Alcohol use:  Yes     Alcohol/week: 0.0 standard drinks     Comment: RARE    Drug use: Never    Sexual activity: Not on file   Other Topics Concern    Not on file   Social History Narrative    Not on file     Social Determinants of Health     Financial Resource Strain: Not on file   Food Insecurity: Not on file   Transportation Needs: Not on file   Physical Activity: Not on file   Stress: Not on file   Social Connections: Not on file   Intimate Partner Violence: Not on file   Housing Stability: Not on file        Current Facility-Administered Medications   Medication Dose Route Frequency Provider Last Rate Last Admin    folic acid (FOLVITE) tablet 1 mg  1 mg Oral Daily Xiomara Ochoa MD   1 mg at 22 1543    [START ON 2022] ferrous gluconate (FERGON) tablet 324 mg  324 mg Oral Daily with breakfast Xiomara Ochoa MD        [facility] ON 2022] digoxin (LANOXIN) tablet 250 mcg  250 mcg Oral Daily VALARIE Fonseca - CNP        midodrine (PROAMATINE) tablet 5 mg  5 mg Oral TID  Xiomara Ochoa MD   5 mg at 22 1823    metoprolol (LOPRESSOR) injection 5 mg  5 mg IntraVENous Q4H PRN Xiomara Ochoa MD   5 mg at 22 1250 levothyroxine (SYNTHROID) tablet 100 mcg  100 mcg Oral Daily Lazarus Moats, MD   100 mcg at 07/27/22 2294    camphor-menthol-methyl salicylate (BENGAY ULTRA STRENGTH) 4-10-30 % cream   Apply externally TID PRN Lazarus Moats, MD   Given at 07/24/22 1169     Allergies   Allergen Reactions    Bee Venom     Pcn [Penicillins] Hives and Itching        Review of Systems is negative except for symptoms mentioned in HPI    PHYSICAL EXAMINATION:   VITAL SIGNS: BP (!) 151/53   Pulse (!) 3   Temp 97.9 °F (36.6 °C) (Oral)   Resp 18   Ht 5' 2\" (1.575 m)   Wt 96 lb (43.5 kg)   LMP  (LMP Unknown)   SpO2 100%   BMI 17.56 kg/m²     (4) Completely disabled, unable to carry out self-care and confined to bed or chair    GENERAL: In no acute distress, Under nourished, well- developed,alert and oriented to person place and time. SKIN: Warm and dry, withoutjaundice, ecchymoses, or petechiae. HEENT: Normocephalic, Conjunctivae pale , sclera anicteric, oral mucosa moist without lesion or exudate in the visible oral cavity or oropharynx, tongue mid-line with good mobility and no deviation with extension. NODES: No palpable adenopathy in the neck Levels I-V, bilateral   Supraclavicular fossae, axillary chains, or inguinal regions. LUNGS: Good inspiratory effort, no accessory muscle use, clear bilaterally, no focal wheeze, rales or rhonchi. CARDIAC: Irregular rate and rhythm, without murmurs, rubs or gallops. ABDOMINAL: Normal bowel soundspresent, soft, non-tender, no mass or organomegaly. MUSKL: Muscle wasting noted   GENITALS: Deferred. RECTAL: Deferred  EXTREMITIES; 1+ edema . No calf tenderness or Liseth's sign   NEUROLOGIC: Alert and oriented x2. Memory is poor , Can move all her extremities . Grade3/5 power noted in lower extremities . LAB RESULTS:  No results found for this or any previous visit (from the past 24 hour(s)).   Recent Labs     07/26/22  0458   GLUCOSE 76    0 Result Notes    Component Ref Range & Units 7/25/22  4:56 AM 7/24/22  7:52 AM 7/23/22  8:15 PM 7/11/22  4:41 AM 7/10/22  8:15 AM 7/9/22  5:53 AM 7/8/22  6:47 AM   WBC 4.8 - 10.8 K/uL 9.4  14.6 High   18.1 High   11.3 High   11.9 High   8.8  10.1    RBC 4.20 - 5.40 M/uL 2.28 Low   2.69 Low   3.62 Low   3.01 Low   3.02 Low   2.65 Low   2.75 Low     Hemoglobin 12.0 - 16.0 g/dL 6.7 Low Panic   7.7 Low   10.3 Low   8.8 Low   8.8 Low   7.7 Low   8.1 Low     Hematocrit 37.0 - 47.0 % 21.5 Low   24.9 Low   32.8 Low   27.4 Low   27.5 Low   24.0 Low   25.0 Low     MCV 82.0 - 100.0 fL 94.3  92.4  90.6  90.8  91.0  90.8  90.7    MCH 27.0 - 31.3 pg 29.4  28.6  28.5  29.1  29.1  29.1  29.5    MCHC 33.0 - 37.0 % 31.1 Low   30.9 Low   31.5 Low   32.0 Low   31.9 Low   32.1 Low   32.5 Low     RDW 11.5 - 14.5 % 19.1 High   18.9 High   19.3 High   21.1 High   21.5 High   21.1 High   21.0 High     Platelets 572 - 132 K/uL 202  264  297  205  230  208  213    Neutrophils % % 89.9  91.4  78.0        Lymphocytes % % 3.7  2.5  4.0        Monocytes % % 3.5  4.0  1.9        Eosinophils % % 2.4  1.9  1.2        Basophils % % 0.5  0.2  0.2        Neutrophils Absolute 1.4 - 6.5 K/uL 8.4 High   13.4 High   17.0 High         Lymphocytes Absolute 1.0 - 4.8 K/uL 0.3 Low   0.4 Low   0.7 Low         Monocytes Absolute 0.2 - 0.8 K/uL 0.3  0.6  0.4        Eosinophils Absolute 0.0 - 0.7 K/uL 0.2  0.3  0.0        Basophils Absolute 0.0 - 0.2 K/uL 0.0  0.0  0.0        PLATELET SLIDE REVIEW    Normal        Bands Relative    16 High  R        Vacuolated Neutrophils    Present        Anisocytosis    1+        Hypochromia    1+             Contains abnormal data Comprehensive Metabolic Panel w/ Reflex to MG  Order: [id number]  Status: Final result    Visible to patient: Yes (seen)    Next appt: 08/01/2022 at 02:00 PM in Obstetrics and Gynecology Navin Gutierrez MD)    0 Result Notes    Component Ref Range & Units 7/25/22  4:56 AM 7/24/22  7:52 AM 7/23/22  9:27 PM 7/23/22  8:15 PM 7/18/22 7/11/22  4:41 AM 7/10/22  8:15 AM   Sodium 135 - 144 mEq/L 142  143   137  140 R  140  139    Potassium reflex Magnesium 3.4 - 4.9 mEq/L 3.9  4.6   4.9  4.8 R  4.2  4.5    Chloride 95 - 107 mEq/L 116 High   110 High    102  102 R  105  104    CO2 20 - 31 mEq/L 14 Low   19 Low    22  19 R  22  22    Anion Gap 9 - 15 mEq/L 12  14   13   R  13  13    Glucose 70 - 99 mg/dL 64 Low   81   99  51 R  84  94    BUN 8 - 23 mg/dL 33 High   46 High    57 High   38 R  15  12    Creatinine 0.50 - 0.90 mg/dL 1.02 High   1.29 High    1.69 High   1.8 R  0.90  0.83    GFR Non- >60 52.8 Low   40.2 Low  CM  27 Abnormal  CM  29.5 Low  CM   >60.0 CM  >60.0 CM    Comment: >60 mL/min/1.73m2 EGFR, calc. for ages 25 and older using the   MDRD formula (not corrected for weight), is valid for stable   renal function. GFR  >60 >60.0  48.7 Low  CM  33 Abnormal  CM  35.7 Low  CM   >60.0 CM  >60.0 CM    Comment: >60 mL/min/1.73m2 EGFR, calc. for ages 25 and older using the   MDRD formula (not corrected for weight), is valid for stable   renal function.     Calcium 8.5 - 9.9 mg/dL 7.8 Low   8.6   10.1 High   8.7 R  8.6  8.3 Low     Total Protein 6.3 - 8.0 g/dL 5.1 Low   6.0 Low    7.2  6.3 R      Albumin 3.5 - 4.6 g/dL 1.8 Low   2.3 Low    2.7 Low   2.9 R  2.6 Low   2.8 Low     Total Bilirubin 0.2 - 0.7 mg/dL <0.2  <0.2   0.3  0.5 R      Alkaline Phosphatase 40 - 130 U/L 65  78   101  84 R      ALT 0 - 33 U/L <5  6   8  7 R      AST 0 - 35 U/L 11  12   15  16 R      Globulin 2.3 - 3.5 g/dL 3.3  3.7 High    4.5 High           Contains abnormal data Iron and TIBC  Order: [id number]  Status: Final result    Visible to patient: Yes (seen)    Next appt: 08/01/2022 at 02:00 PM in Obstetrics and Gynecology Candie Somers MD)    0 Result Notes    Component Ref Range & Units 7/25/22 11:35 AM 5/27/19  7:43 AM   Iron 37 - 145 ug/dL 22 Low   133    TIBC 250 - 450 ug/dL 134 Low   176 Low  R    Iron Saturation 20 - 55 % 16 Low 76 High  R        Contains abnormal data Ferritin  Order: [id number]  Status: Final result    Visible to patient: Yes (seen)    Next appt: 08/01/2022 at 02:00 PM in Obstetrics and Gynecology Kadeem Franco MD)    0 Result Notes    Component Ref Range & Units 7/25/22 11:35 AM 5/27/19  7:43 AM   Ferritin 13 - 150 ng/mL 929 High   678.0 High  R        Contains abnormal data Vitamin B12 & Folate  Order: [id number]  Status: Final result    Visible to patient: Yes (seen)    Next appt: 08/01/2022 at 02:00 PM in Obstetrics and Gynecology Kadeem Franco MD)    0 Result Notes    Component Ref Range & Units 7/25/22 11:35 AM 5/27/19  5:45 AM   Vitamin B-12 232 - 1245 pg/mL 709  <150 Low     Folate >4.8 ng/mL 3.0 Low   19.6 R, CM           RADIOLOGY RESULTS:  Echocardiogram complete 2D with doppler with color    Result Date: 7/4/2022  Transthoracic Echocardiography Report (TTE)  Demographics   Patient Name   Karla Rajput Gender              Female                 P   Patient Number [id number]          Race                                                    Ethnicity   Visit Number   [id number]         Room Number         N614   Corporate ID                     Date of Study       07/04/2022   Accession      [id number]        Referring Physician Ronald Sanchez DO  Number   Date of Birth  1945        Sonographer         Mayelin Lou   Age            78 year(s)        Interpreting        [facility])                                   Physician           Cardiology                                                       Tiago Man,   Procedure Type of Study   TTE procedure:ECHO COMPLETE 2D W/DOP W/COLOR. Procedure Date Date: 07/04/2022 Start: 12:38 PM Study Location: Portable Technical Quality: Adequate visualization Indications:LVF.  Patient Status: Routine Height: 62 inches Weight: 95 pounds BSA: 1.39 m^2 BMI: 17.38 kg/m^2 BP: 107/44 mmHg  Conclusions   Summary  Technically limited study  Left ventricular ejection fraction is visually estimated at 55%. Normal right ventricle structure and function. Normal LV size and  function. No evidence of pericardial effusion. Signature   ----------------------------------------------------------------  Electronically signed by Ben Martinez DO(Interpreting  physician) on 07/04/2022 09:08 PM  ----------------------------------------------------------------   Findings  Left Ventricle Left ventricular ejection fraction is visually estimated at 55%. Right Ventricle Normal right ventricle structure and function. Normal right ventricle systolic pressure. Pericardial Effusion No evidence of pericardial effusion. CT ABDOMEN PELVIS WO CONTRAST Additional Contrast? None    Result Date: 7/23/2022  Patient: Quita Duran  Time Out: 23:56 Exam(s): CT ABDOMEN + PELVIS Without Contrast  EXAM:   CT Abdomen and Pelvis Without Intravenous Contrast  CLINICAL HISTORY:    Reason for exam: concern for rectovaginal fistula. Chief complaint concern for rectovaginal fistula  TECHNIQUE:   Axial computed tomography images of the abdomen and pelvis without intravenous contrast.  All CT scan at this facility use dose modulation, iterative reconstruction, and/or weight based dosing when appropriate to reduce radiation dose to as low as reasonably achievable. COMPARISON:   September 10, 2021  FINDINGS:   Lung bases:  2 x 8 cm area of infiltrate in the right lung base suspicious for pneumonia. There is underlying emphysema. ABDOMEN:   Liver:  Unremarkable. Gallbladder and bile ducts:  Unremarkable. No calcified stones. No ductal dilation. Pancreas:  Unremarkable. No ductal dilation. Spleen:  Unremarkable. No splenomegaly. Adrenals:  Unremarkable. No mass. Kidneys and ureters:  Atrophic right kidney. There are calculi in the right renal pelvis measuring up to 4 mm. No hydronephrosis. Left kidney is unremarkable.    Stomach and bowel:  Severe circumferential wall thickening involving the distal colon and rectum measuring up to 2.5 cm thick. This is contiguous with the wall of the vagina. There is stool in the vagina suggesting rectovaginal fistula. No obstruction. PELVIS:   Appendix:  No findings to suggest acute appendicitis. Bladder:  Unremarkable. No stones. Reproductive:  Unremarkable as visualized. ABDOMEN and PELVIS:   Intraperitoneal space:  Unremarkable. No free air. No significant fluid collection. Bones/joints:  Metallic artifact from hardware from prior fusion of the lumbar spine at L4-5. There are moderate degenerative changes in the spine. No acute fracture or subluxation. Soft tissues:  Unremarkable. Vasculature: The abdominal aorta is severely calcified but nondilated. Lymph nodes:  Unremarkable. No enlarged lymph nodes. Electronically signed by Lynette Short MD on 07-23-22 at 2356    1.  2 x 8 cm area of infiltrate in the right lung base suspicious for pneumonia. There is underlying emphysema. 2.  Severe circumferential wall thickening involving the distal colon and rectum measuring up to 2.5 cm thick. This is contiguous with the wall of the vagina. There is stool in the vagina suggesting rectovaginal fistula. XR KNEE RIGHT (3 VIEWS)    Result Date: 7/25/2022  INDICATION: Pain. COMPARISON: None. TECHNIQUE: 4 views of the right knee were obtained. FINDINGS: Increased density visualized in the distal femur with subtle lucency is visualized in the femoral condyles bilaterally, increased density visualized along the articular surface  with subchondral cysts seen but no evidence of cortical irregularity or lucency to suggest a fracture. There is no evidence of acute fracture or dislocation. Mild to moderate narrowing of the medial, lateral and patellofemoral joint space is seen. There is no evidence of suprapatellar effusion. Vascular calcifications are seen. Overlying soft tissues are grossly unremarkable.      Degenerative bone changes seen. No evidence of acute osseous abnormality     CT HEAD WO CONTRAST    Result Date: 7/9/2022  CT HEAD WO CONTRAST : 7/9/2022 CLINICAL HISTORY:  confusion . COMPARISON: Head MRI 5/27/2019 and head CT 5/26/2019. TECHNIQUE: Spiral unenhanced images were obtained of the head, with routine multiplanar reconstructions performed. All CT scans at this facility use dose modulation, iterative reconstruction, and/or weight based dosing when appropriate to reduce radiation dose to as low as reasonably achievable. FINDINGS: There is no intracranial hemorrhage, mass effect, midline shift, extra-axial collection, evidence of hydrocephalus, recent ischemic infarct, or skull fracture identified. Mild generalized cerebral volume loss and mild to moderate patchy white matter changes are again noted. The mastoid air cells and visualized paranasal sinuses are essentially clear. NO ACUTE INTRACRANIAL PROCESSOR SIGNIFICANT CHANGE FROM PRIOR STUDIES IDENTIFIED. XR CHEST PORTABLE    Result Date: 7/24/2022  EXAMINATION: XR CHEST PORTABLE CLINICAL HISTORY: ALTERED MENTAL STATUS COMPARISONS: JULY 2, 2022 FINDINGS: Osseous structures are intact. Cardiopericardial silhouette is normal. Pulmonary vasculature is normal. Lungs are clear. NO ACUTE CARDIOPULMONARY DISEASE. XR CHEST PORTABLE    Result Date: 7/2/2022  XR CHEST PORTABLE Clinical History:  Failure to thrive. Comparison:  9/10/2021. RESULT: No consolidation. Hyperinflated lungs. Mildly coarsened lung markings, unchanged. No large pleural effusion. No pneumothorax. Stable cardiomediastinal silhouette. Aortic vascular calcifications. No distinct acute osseous findings. No acute radiographic abnormality. US RETROPERITONEAL COMPLETE    Addendum Date: 7/3/2022    Addendum: Reason for addendum is typographical error in the impression of the report. Here is a new report: EXAMINATION:   RENAL ULTRASOUND HISTORY:  Acute renal failure.  TECHNIQUE:  Sonography of the kidneys was performed. Images were obtained and stored in a permanent archive. COMPARISON: CT 9/10/2021. RESULT: Right Kidney:      -Renal length: 6.0 cm      -Parenchyma: Parenchyma echogenicity is increased. Diffuse parenchymal thinning present measuring around 5 mm.      -Collecting system: No hydronephrosis. -Calculus: Echogenic lower pole calculus, similar to the prior CT.      -Lesion:  1.2 cm cyst, unchanged. Left Kidney:          -Renal length: 10.0 cm      -Parenchyma: Normal parenchymal echogenicity. Normal parenchymal thickness measuring around 12 mm.      -Collecting system: No hydronephrosis. -Calculus: No echogenic, shadowing calculus.      -Lesion:  None. Bladder: Decompressed. Result Date: 7/3/2022  EXAMINATION:   RENAL ULTRASOUND HISTORY:  Acute renal failure. TECHNIQUE:  Sonography of the kidneys was performed. Images were obtained and stored in a permanent archive. COMPARISON: CT 9/10/2021. RESULT: Right Kidney:      -Renal length: 6.0 cm      -Parenchyma: Parenchyma echogenicity is increased. Diffuse parenchymal thinning present measuring around 5 mm.      -Collecting system: No hydronephrosis. -Calculus: Echogenic lower pole calculus, similar to the prior CT.      -Lesion:  1.2 cm cyst, unchanged. Left Kidney:          -Renal length: 10.0 cm      -Parenchyma: Normal parenchymal echogenicity. Normal parenchymal thickness measuring around 12 mm.      -Collecting system: No hydronephrosis. -Calculus: No echogenic, shadowing calculus.      -Lesion:  None. Bladder: Decompressed. Right nephrolithiasis. No hydronephrosis. Severe right RENAL ATROPHY, unchanged. .     ASSESSMENT AND PLAN  Locally Advanced Carcinoma of Rectum with Rectovaginal Fistula . Patient is not a candidate for extensive surgery due to multiple comorbidities. Palliative care should be our goal . Agree with diverting colostomy .  I shall wait for her to recover from her surgery and then discuss further management of her malignancy to keep her comfortable . Normocytic Normochromic anemia secondary to poor nutrition and chronic disease . She will benefit from packed RBC transfusion as her H & H is low . I shall order Folic Acid as she has Folic acid deficiency. Dementia  New Onset Atrial Fibrilation with rapid VR    Thanks for this consultation . I shall follow her as an outpatient .        Electronically signed by Brittany Dodd MD on 7/27/2022 at 4:45 PM

## 2022-07-27 NOTE — FLOWSHEET NOTE
Dr Griselda Mccune at bedside to speak with the patient and her daughter, Tyrell Altamirano, about colonoscopy findings and plan for surgery tomorrow to create a colostomy. Also discussed the plan to bring heme-onc on board for her care. All questions answered.  Electronically signed by Bertha Muse RN on 7/27/2022 at 4:18 PM

## 2022-07-27 NOTE — ANESTHESIA PRE PROCEDURE
Ro Wisdom APRN - CNP   250 mcg at 07/27/22 0914    midodrine (PROAMATINE) tablet 5 mg  5 mg Oral TID WC Ro Wisdom APRN - CNP   5 mg at 07/26/22 1823    metoprolol (LOPRESSOR) injection 5 mg  5 mg IntraVENous Q4H PRN Ro Wisdom APRN - CNP   5 mg at 07/27/22 0704    enoxaparin Sodium (LOVENOX) injection 30 mg  30 mg SubCUTAneous Daily Cecily Whitaker, APRN - CNP   30 mg at 07/27/22 0916    ciprofloxacin (CIPRO) IVPB 400 mg  400 mg IntraVENous Q12H Cecily Whitaker, APRN - CNP   Stopped at 07/27/22 0447    0.9 % sodium chloride infusion   IntraVENous PRN Stephane Ou, DO        dilTIAZem 125 mg in dextrose 5 % 125 mL infusion  2.5-15 mg/hr IntraVENous Continuous Mita Lyon MD   Stopped at 07/25/22 1637    [Held by provider] metoprolol tartrate (LOPRESSOR) tablet 25 mg  25 mg Oral Q4H Ro Wisdom APRN - CNP   25 mg at 07/26/22 0913    traMADol (ULTRAM) tablet 50 mg  50 mg Oral Q6H PRN Mita Lyon MD   50 mg at 07/25/22 1401    sodium chloride flush 0.9 % injection 5-40 mL  5-40 mL IntraVENous 2 times per day Cecily Whitaker, APRN - CNP   10 mL at 07/27/22 0914    sodium chloride flush 0.9 % injection 5-40 mL  5-40 mL IntraVENous PRN Zarina Brown APRN - CNP        0.9 % sodium chloride infusion   IntraVENous PRN Tomlisbet Andersono, APRN - CNP        ondansetron (ZOFRAN-ODT) disintegrating tablet 4 mg  4 mg Oral Q8H PRN Tomma Dago, APRN - CNP        Or    ondansetron (ZOFRAN) injection 4 mg  4 mg IntraVENous Q6H PRN Tomma Dago, APRN - CNP        polyethylene glycol (GLYCOLAX) packet 17 g  17 g Oral Daily PRN Tomma Dago, APRN - CNP        acetaminophen (TYLENOL) tablet 650 mg  650 mg Oral Q6H PRN Tomma Dago, APRN - CNP   650 mg at 07/25/22 0500    Or    acetaminophen (TYLENOL) suppository 650 mg  650 mg Rectal Q6H PRN VALARIE Gan CNP        0.9 % sodium chloride infusion   IntraVENous Continuous VALARIE Gan CNP 75 mL/hr at 07/27/22 1055 Rate Verify at 07/27/22 1055    Substance Use Topics    Alcohol use: Yes     Alcohol/week: 0.0 standard drinks     Comment: RARE                                Counseling given: Not Answered      Vital Signs (Current):   Vitals:    07/27/22 0230 07/27/22 0707 07/27/22 0732 07/27/22 0850   BP:  100/71 133/82    Pulse: (!) 103 (!) 146 95 95   Resp:   18    Temp:   97.7 °F (36.5 °C)    TempSrc:   Oral    SpO2:   100%    Weight:       Height:                                                  BP Readings from Last 3 Encounters:   07/27/22 133/82   07/13/22 118/61   09/16/21 (!) 158/79       NPO Status:                                                                                 BMI:   Wt Readings from Last 3 Encounters:   07/26/22 96 lb (43.5 kg)   07/11/22 95 lb 14.4 oz (43.5 kg)   09/10/21 125 lb (56.7 kg)     Body mass index is 17.56 kg/m². CBC:   Lab Results   Component Value Date/Time    WBC 14.6 07/26/2022 08:25 AM    RBC 3.18 07/26/2022 08:25 AM    HGB 9.0 07/26/2022 08:25 AM    HCT 28.7 07/26/2022 08:25 AM    MCV 90.0 07/26/2022 08:25 AM    RDW 19.2 07/26/2022 08:25 AM     07/26/2022 08:25 AM       CMP:   Lab Results   Component Value Date/Time     07/26/2022 04:58 AM    K 3.9 07/26/2022 04:58 AM     07/26/2022 04:58 AM    CO2 15 07/26/2022 04:58 AM    BUN 24 07/26/2022 04:58 AM    CREATININE 0.89 07/26/2022 04:58 AM    GFRAA >60.0 07/26/2022 04:58 AM    LABGLOM >60.0 07/26/2022 04:58 AM    GLUCOSE 76 07/26/2022 04:58 AM    PROT 5.2 07/26/2022 04:58 AM    CALCIUM 7.8 07/26/2022 04:58 AM    BILITOT 0.3 07/26/2022 04:58 AM    ALKPHOS 66 07/26/2022 04:58 AM    AST 9 07/26/2022 04:58 AM    ALT <5 07/26/2022 04:58 AM       POC Tests: No results for input(s): POCGLU, POCNA, POCK, POCCL, POCBUN, POCHEMO, POCHCT in the last 72 hours.     Coags:   Lab Results   Component Value Date/Time    PROTIME 10.6 08/04/2014 12:12 PM    INR 1.0 08/04/2014 12:12 PM    APTT 26.1 08/04/2014 12:12 PM       HCG (If Applicable): No results found for: Carola Sifuentes, HCG, HCGQUANT     ABGs:   Lab Results   Component Value Date/Time    PHART 7.383 07/04/2022 08:34 AM    PO2ART 113 07/04/2022 08:34 AM    LPV8YNS 20 07/04/2022 08:34 AM    OVV8WKJ 11.7 07/04/2022 08:34 AM    BEART -13 07/04/2022 08:34 AM    Q5XBRKYH 99 07/04/2022 08:34 AM        Type & Screen (If Applicable):  No results found for: LABABO, LABRH    Drug/Infectious Status (If Applicable):  No results found for: HIV, HEPCAB    COVID-19 Screening (If Applicable):   Lab Results   Component Value Date/Time    COVID19 Not Detected 07/13/2022 02:43 PM           Anesthesia Evaluation    Airway: Mallampati: II  TM distance: >3 FB   Neck ROM: full  Mouth opening: > = 3 FB   Dental:    (+) upper dentures and lower dentures      Pulmonary: breath sounds clear to auscultation      (-) pneumonia                           Cardiovascular:    (+) hypertension:, CAD:, dysrhythmias: atrial fibrillation,       ECG reviewed  Rhythm: irregular  Rate: abnormal  Echocardiogram reviewed         Beta Blocker:  Dose within 24 Hrs         Neuro/Psych:   (+) neuromuscular disease:, TIA,             GI/Hepatic/Renal: Neg GI/Hepatic/Renal ROS            Endo/Other:    (+) hypothyroidism, blood dyscrasia: anemia:., .                 Abdominal:             Vascular: negative vascular ROS. Other Findings:           Anesthesia Plan      MAC     ASA 3       Induction: intravenous. MIPS: Prophylactic antiemetics administered. Anesthetic plan and risks discussed with patient. Use of blood products discussed with patient whom consented to blood products.    Plan discussed with CRNA and surgical team.    Attending anesthesiologist reviewed and agrees with Preprocedure content                Salvatore Ospina MD   7/27/2022

## 2022-07-27 NOTE — ANESTHESIA POSTPROCEDURE EVALUATION
Department of Anesthesiology  Postprocedure Note    Patient: Marky Portillo  MRN: [id number]  YOB: 1945  Date of evaluation: 7/27/2022      Procedure Summary     Date: 07/27/22 Room / Location: [address]    Anesthesia Start: 9044 Anesthesia Stop: 9543    Procedure: COLONOSCOPY Diagnosis:       Rectovaginal fistula      (RECTOVAGINAL FISTULA)    Surgeons: Breana Seaman MD Responsible Provider: Tequila Fabian MD    Anesthesia Type: MAC ASA Status: 3          Anesthesia Type: No value filed.     Roque Phase I:      Roque Phase II:        Anesthesia Post Evaluation    Patient location during evaluation: bedside  Patient participation: complete - patient participated  Level of consciousness: awake and awake and alert  Airway patency: patent  Nausea & Vomiting: no nausea and no vomiting  Complications: no  Cardiovascular status: blood pressure returned to baseline and hemodynamically stable  Respiratory status: acceptable  Hydration status: euvolemic

## 2022-07-27 NOTE — FLOWSHEET NOTE
Taken to short stay via bed for colonoscopy.  Electronically signed by Agnieszka Vance RN on 7/27/2022 at 12:08 PM

## 2022-07-27 NOTE — FLOWSHEET NOTE
AM assessment completed. Patient resting in bed at this time. Denies any chest pain. Remains atrial fibrillation on the monitor. Trace edema noted to bilateral upper extremities. Pedal pulses palpable. No shortness of breath noted at this time. Lungs are diminished bilaterally. SATS 100% on RA. She is A/Ox2-to person and place. Denies any pain with elimination, but is incontinent of bowel and bladder. Skin remains warm and pink. Slight redness noted to buttocks ant to helga-area. Cleaned up from a medium sized soft stool. New depends placed and zinc cream applied liberally. Patient does complain of burning when cleaning her up. Scabbed area noted to right knee. Purplish/reddish areas noted to right hip and upper sacral area. Patient removed the Mepilex dressings to bilateral heels. Removed SL from right lower forearm secondary to pain with flushing. Catheter intact and dressing applied. New #20g S placed to left lower forearm with attempt and fluids switched to this site, NS infusing at 75ml/hr. Patient tolerated well with no complaints of any discomfort. Vital signs stable and AM medications provided. Perfect serve message sent to Dr Khadra Yanes to let him know that the patients stools are still not clear, he is aware and no new orders received. Call light remains in reach.  Electronically signed by Silvia Moctezuma RN on 7/27/2022 at 10:49 AM

## 2022-07-27 NOTE — PLAN OF CARE
Care plan remains ongoing. Plan for colonoscopy today.  Electronically signed by Hao Lira RN on 7/27/2022 at 10:55 AM

## 2022-07-27 NOTE — PROGRESS NOTES
Pt currently in Afib with RVR with hr 140's-150's.   Notified Dr. Wyvonna Buerger.  Orders given to give 5mg Lopressor IVP

## 2022-07-27 NOTE — FLOWSHEET NOTE
Called and spoke with the patient's daughter, Clark Garcia via phone. Updated on plan of care and phone consent obtained for colonoscopy planned for this afternoon.  Electronically signed by Lolis Elizabeth RN on 7/27/2022 at 10:50 AM

## 2022-07-28 ENCOUNTER — ANESTHESIA (OUTPATIENT)
Dept: OPERATING ROOM | Age: 77
DRG: 329 | End: 2022-07-28
Payer: MEDICARE

## 2022-07-28 ENCOUNTER — ANESTHESIA EVENT (OUTPATIENT)
Dept: OPERATING ROOM | Age: 77
DRG: 329 | End: 2022-07-28
Payer: MEDICARE

## 2022-07-28 LAB
ALBUMIN SERPL-MCNC: 1.9 G/DL (ref 3.5–4.6)
ALP BLD-CCNC: 60 U/L (ref 40–130)
ALT SERPL-CCNC: <5 U/L (ref 0–33)
ANION GAP SERPL CALCULATED.3IONS-SCNC: 11 MEQ/L (ref 9–15)
AST SERPL-CCNC: 10 U/L (ref 0–35)
BASOPHILS ABSOLUTE: 0.1 K/UL (ref 0–0.2)
BASOPHILS RELATIVE PERCENT: 0.4 %
BILIRUB SERPL-MCNC: <0.2 MG/DL (ref 0.2–0.7)
BUN BLDV-MCNC: 10 MG/DL (ref 8–23)
CALCIUM SERPL-MCNC: 7.8 MG/DL (ref 8.5–9.9)
CARCINOEMBRYONIC ANTIGEN: 1.9 NG/ML
CHLORIDE BLD-SCNC: 113 MEQ/L (ref 95–107)
CO2: 14 MEQ/L (ref 20–31)
CREAT SERPL-MCNC: 0.81 MG/DL (ref 0.5–0.9)
EOSINOPHILS ABSOLUTE: 0.2 K/UL (ref 0–0.7)
EOSINOPHILS RELATIVE PERCENT: 1.3 %
GFR AFRICAN AMERICAN: >60
GFR NON-AFRICAN AMERICAN: >60
GLOBULIN: 3.2 G/DL (ref 2.3–3.5)
GLUCOSE BLD-MCNC: 107 MG/DL (ref 70–99)
GLUCOSE BLD-MCNC: 78 MG/DL (ref 70–99)
GLUCOSE BLD-MCNC: 92 MG/DL (ref 70–99)
HCT VFR BLD CALC: 28 % (ref 37–47)
HEMOGLOBIN: 9.2 G/DL (ref 12–16)
LYMPHOCYTES ABSOLUTE: 0.4 K/UL (ref 1–4.8)
LYMPHOCYTES RELATIVE PERCENT: 3 %
MCH RBC QN AUTO: 29 PG (ref 27–31.3)
MCHC RBC AUTO-ENTMCNC: 32.8 % (ref 33–37)
MCV RBC AUTO: 88.4 FL (ref 82–100)
MONOCYTES ABSOLUTE: 0.5 K/UL (ref 0.2–0.8)
MONOCYTES RELATIVE PERCENT: 3.4 %
NEUTROPHILS ABSOLUTE: 12.4 K/UL (ref 1.4–6.5)
NEUTROPHILS RELATIVE PERCENT: 91.9 %
PDW BLD-RTO: 18.9 % (ref 11.5–14.5)
PERFORMED ON: ABNORMAL
PERFORMED ON: NORMAL
PLATELET # BLD: 257 K/UL (ref 130–400)
POTASSIUM SERPL-SCNC: 3.8 MEQ/L (ref 3.4–4.9)
RBC # BLD: 3.17 M/UL (ref 4.2–5.4)
SODIUM BLD-SCNC: 138 MEQ/L (ref 135–144)
TOTAL PROTEIN: 5.1 G/DL (ref 6.3–8)
WBC # BLD: 13.5 K/UL (ref 4.8–10.8)

## 2022-07-28 PROCEDURE — 6360000002 HC RX W HCPCS: Performed by: COLON & RECTAL SURGERY

## 2022-07-28 PROCEDURE — 6370000000 HC RX 637 (ALT 250 FOR IP): Performed by: COLON & RECTAL SURGERY

## 2022-07-28 PROCEDURE — 6370000000 HC RX 637 (ALT 250 FOR IP)

## 2022-07-28 PROCEDURE — 2580000003 HC RX 258: Performed by: INTERNAL MEDICINE

## 2022-07-28 PROCEDURE — 7100000011 HC PHASE II RECOVERY - ADDTL 15 MIN: Performed by: COLON & RECTAL SURGERY

## 2022-07-28 PROCEDURE — 2500000003 HC RX 250 WO HCPCS: Performed by: NURSE ANESTHETIST, CERTIFIED REGISTERED

## 2022-07-28 PROCEDURE — 2720000010 HC SURG SUPPLY STERILE: Performed by: COLON & RECTAL SURGERY

## 2022-07-28 PROCEDURE — 3600000014 HC SURGERY LEVEL 4 ADDTL 15MIN: Performed by: COLON & RECTAL SURGERY

## 2022-07-28 PROCEDURE — 7100000001 HC PACU RECOVERY - ADDTL 15 MIN: Performed by: COLON & RECTAL SURGERY

## 2022-07-28 PROCEDURE — 7100000010 HC PHASE II RECOVERY - FIRST 15 MIN: Performed by: COLON & RECTAL SURGERY

## 2022-07-28 PROCEDURE — 3700000000 HC ANESTHESIA ATTENDED CARE: Performed by: COLON & RECTAL SURGERY

## 2022-07-28 PROCEDURE — 2580000003 HC RX 258: Performed by: STUDENT IN AN ORGANIZED HEALTH CARE EDUCATION/TRAINING PROGRAM

## 2022-07-28 PROCEDURE — 2500000003 HC RX 250 WO HCPCS: Performed by: COLON & RECTAL SURGERY

## 2022-07-28 PROCEDURE — 6360000002 HC RX W HCPCS: Performed by: NURSE ANESTHETIST, CERTIFIED REGISTERED

## 2022-07-28 PROCEDURE — 85025 COMPLETE CBC W/AUTO DIFF WBC: CPT

## 2022-07-28 PROCEDURE — 7100000000 HC PACU RECOVERY - FIRST 15 MIN: Performed by: COLON & RECTAL SURGERY

## 2022-07-28 PROCEDURE — 2709999900 HC NON-CHARGEABLE SUPPLY: Performed by: COLON & RECTAL SURGERY

## 2022-07-28 PROCEDURE — 0D1N0Z4 BYPASS SIGMOID COLON TO CUTANEOUS, OPEN APPROACH: ICD-10-PCS | Performed by: COLON & RECTAL SURGERY

## 2022-07-28 PROCEDURE — 2580000003 HC RX 258: Performed by: COLON & RECTAL SURGERY

## 2022-07-28 PROCEDURE — 88307 TISSUE EXAM BY PATHOLOGIST: CPT

## 2022-07-28 PROCEDURE — 6370000000 HC RX 637 (ALT 250 FOR IP): Performed by: NURSE PRACTITIONER

## 2022-07-28 PROCEDURE — 6360000002 HC RX W HCPCS: Performed by: STUDENT IN AN ORGANIZED HEALTH CARE EDUCATION/TRAINING PROGRAM

## 2022-07-28 PROCEDURE — 1210000000 HC MED SURG R&B

## 2022-07-28 PROCEDURE — 44143 PARTIAL REMOVAL OF COLON: CPT | Performed by: COLON & RECTAL SURGERY

## 2022-07-28 PROCEDURE — 80053 COMPREHEN METABOLIC PANEL: CPT

## 2022-07-28 PROCEDURE — 82378 CARCINOEMBRYONIC ANTIGEN: CPT

## 2022-07-28 PROCEDURE — 0D1L0Z4 BYPASS TRANSVERSE COLON TO CUTANEOUS, OPEN APPROACH: ICD-10-PCS | Performed by: COLON & RECTAL SURGERY

## 2022-07-28 PROCEDURE — 6370000000 HC RX 637 (ALT 250 FOR IP): Performed by: INTERNAL MEDICINE

## 2022-07-28 PROCEDURE — 36415 COLL VENOUS BLD VENIPUNCTURE: CPT

## 2022-07-28 PROCEDURE — 99213 OFFICE O/P EST LOW 20 MIN: CPT

## 2022-07-28 PROCEDURE — 0DTG0ZZ RESECTION OF LEFT LARGE INTESTINE, OPEN APPROACH: ICD-10-PCS | Performed by: COLON & RECTAL SURGERY

## 2022-07-28 PROCEDURE — 3700000001 HC ADD 15 MINUTES (ANESTHESIA): Performed by: COLON & RECTAL SURGERY

## 2022-07-28 PROCEDURE — 6370000000 HC RX 637 (ALT 250 FOR IP): Performed by: STUDENT IN AN ORGANIZED HEALTH CARE EDUCATION/TRAINING PROGRAM

## 2022-07-28 PROCEDURE — 3600000004 HC SURGERY LEVEL 4 BASE: Performed by: COLON & RECTAL SURGERY

## 2022-07-28 RX ORDER — OXYCODONE HYDROCHLORIDE AND ACETAMINOPHEN 5; 325 MG/1; MG/1
1 TABLET ORAL EVERY 4 HOURS PRN
Status: DISCONTINUED | OUTPATIENT
Start: 2022-07-28 | End: 2022-08-01 | Stop reason: HOSPADM

## 2022-07-28 RX ORDER — SODIUM CHLORIDE, SODIUM LACTATE, POTASSIUM CHLORIDE, CALCIUM CHLORIDE 600; 310; 30; 20 MG/100ML; MG/100ML; MG/100ML; MG/100ML
INJECTION, SOLUTION INTRAVENOUS CONTINUOUS
Status: DISCONTINUED | OUTPATIENT
Start: 2022-07-28 | End: 2022-07-28 | Stop reason: HOSPADM

## 2022-07-28 RX ORDER — SODIUM CHLORIDE 0.9 % (FLUSH) 0.9 %
5-40 SYRINGE (ML) INJECTION EVERY 12 HOURS SCHEDULED
Status: DISCONTINUED | OUTPATIENT
Start: 2022-07-28 | End: 2022-08-01 | Stop reason: HOSPADM

## 2022-07-28 RX ORDER — OXYCODONE HYDROCHLORIDE 5 MG/1
5 TABLET ORAL PRN
Status: COMPLETED | OUTPATIENT
Start: 2022-07-28 | End: 2022-07-28

## 2022-07-28 RX ORDER — DIPHENHYDRAMINE HYDROCHLORIDE 50 MG/ML
12.5 INJECTION INTRAMUSCULAR; INTRAVENOUS
Status: ACTIVE | OUTPATIENT
Start: 2022-07-28 | End: 2022-07-28

## 2022-07-28 RX ORDER — FENTANYL CITRATE 50 UG/ML
50 INJECTION, SOLUTION INTRAMUSCULAR; INTRAVENOUS EVERY 5 MIN PRN
Status: DISCONTINUED | OUTPATIENT
Start: 2022-07-28 | End: 2022-08-01 | Stop reason: HOSPADM

## 2022-07-28 RX ORDER — SODIUM CHLORIDE 0.9 % (FLUSH) 0.9 %
5-40 SYRINGE (ML) INJECTION PRN
Status: DISCONTINUED | OUTPATIENT
Start: 2022-07-28 | End: 2022-08-01 | Stop reason: HOSPADM

## 2022-07-28 RX ORDER — FENTANYL CITRATE 50 UG/ML
INJECTION, SOLUTION INTRAMUSCULAR; INTRAVENOUS PRN
Status: DISCONTINUED | OUTPATIENT
Start: 2022-07-28 | End: 2022-07-28 | Stop reason: SDUPTHER

## 2022-07-28 RX ORDER — LABETALOL HYDROCHLORIDE 5 MG/ML
10 INJECTION, SOLUTION INTRAVENOUS
Status: DISCONTINUED | OUTPATIENT
Start: 2022-07-28 | End: 2022-08-01 | Stop reason: HOSPADM

## 2022-07-28 RX ORDER — MAGNESIUM HYDROXIDE 1200 MG/15ML
LIQUID ORAL CONTINUOUS PRN
Status: DISCONTINUED | OUTPATIENT
Start: 2022-07-28 | End: 2022-07-28 | Stop reason: HOSPADM

## 2022-07-28 RX ORDER — OXYCODONE HYDROCHLORIDE 5 MG/1
10 TABLET ORAL PRN
Status: COMPLETED | OUTPATIENT
Start: 2022-07-28 | End: 2022-07-28

## 2022-07-28 RX ORDER — FENTANYL CITRATE 50 UG/ML
25 INJECTION, SOLUTION INTRAMUSCULAR; INTRAVENOUS EVERY 5 MIN PRN
Status: DISCONTINUED | OUTPATIENT
Start: 2022-07-28 | End: 2022-08-01 | Stop reason: HOSPADM

## 2022-07-28 RX ORDER — SODIUM CHLORIDE 9 MG/ML
25 INJECTION, SOLUTION INTRAVENOUS PRN
Status: DISCONTINUED | OUTPATIENT
Start: 2022-07-28 | End: 2022-08-01 | Stop reason: HOSPADM

## 2022-07-28 RX ORDER — ONDANSETRON 2 MG/ML
INJECTION INTRAMUSCULAR; INTRAVENOUS PRN
Status: DISCONTINUED | OUTPATIENT
Start: 2022-07-28 | End: 2022-07-28 | Stop reason: SDUPTHER

## 2022-07-28 RX ORDER — ONDANSETRON 2 MG/ML
4 INJECTION INTRAMUSCULAR; INTRAVENOUS
Status: ACTIVE | OUTPATIENT
Start: 2022-07-28 | End: 2022-07-28

## 2022-07-28 RX ORDER — PROPOFOL 10 MG/ML
INJECTION, EMULSION INTRAVENOUS PRN
Status: DISCONTINUED | OUTPATIENT
Start: 2022-07-28 | End: 2022-07-28 | Stop reason: SDUPTHER

## 2022-07-28 RX ORDER — HYDRALAZINE HYDROCHLORIDE 20 MG/ML
10 INJECTION INTRAMUSCULAR; INTRAVENOUS
Status: DISCONTINUED | OUTPATIENT
Start: 2022-07-28 | End: 2022-08-01 | Stop reason: HOSPADM

## 2022-07-28 RX ORDER — PROCHLORPERAZINE EDISYLATE 5 MG/ML
5 INJECTION INTRAMUSCULAR; INTRAVENOUS
Status: ACTIVE | OUTPATIENT
Start: 2022-07-28 | End: 2022-07-28

## 2022-07-28 RX ORDER — ROCURONIUM BROMIDE 10 MG/ML
INJECTION, SOLUTION INTRAVENOUS PRN
Status: DISCONTINUED | OUTPATIENT
Start: 2022-07-28 | End: 2022-07-28 | Stop reason: SDUPTHER

## 2022-07-28 RX ORDER — OXYCODONE HYDROCHLORIDE AND ACETAMINOPHEN 5; 325 MG/1; MG/1
2 TABLET ORAL EVERY 4 HOURS PRN
Status: DISCONTINUED | OUTPATIENT
Start: 2022-07-28 | End: 2022-08-01 | Stop reason: HOSPADM

## 2022-07-28 RX ORDER — SODIUM CHLORIDE, SODIUM LACTATE, POTASSIUM CHLORIDE, CALCIUM CHLORIDE 600; 310; 30; 20 MG/100ML; MG/100ML; MG/100ML; MG/100ML
INJECTION, SOLUTION INTRAVENOUS CONTINUOUS
Status: DISCONTINUED | OUTPATIENT
Start: 2022-07-28 | End: 2022-07-28

## 2022-07-28 RX ORDER — DEXAMETHASONE SODIUM PHOSPHATE 4 MG/ML
INJECTION, SOLUTION INTRA-ARTICULAR; INTRALESIONAL; INTRAMUSCULAR; INTRAVENOUS; SOFT TISSUE PRN
Status: DISCONTINUED | OUTPATIENT
Start: 2022-07-28 | End: 2022-07-28 | Stop reason: SDUPTHER

## 2022-07-28 RX ORDER — LIDOCAINE HYDROCHLORIDE 20 MG/ML
INJECTION, SOLUTION INTRAVENOUS PRN
Status: DISCONTINUED | OUTPATIENT
Start: 2022-07-28 | End: 2022-07-28 | Stop reason: SDUPTHER

## 2022-07-28 RX ORDER — FENTANYL CITRATE 50 UG/ML
INJECTION, SOLUTION INTRAMUSCULAR; INTRAVENOUS
Status: DISPENSED
Start: 2022-07-28 | End: 2022-07-29

## 2022-07-28 RX ADMIN — ONDANSETRON 4 MG: 2 INJECTION INTRAMUSCULAR; INTRAVENOUS at 13:52

## 2022-07-28 RX ADMIN — DEXAMETHASONE SODIUM PHOSPHATE 4 MG: 4 INJECTION, SOLUTION INTRAMUSCULAR; INTRAVENOUS at 13:34

## 2022-07-28 RX ADMIN — DIGOXIN 250 MCG: 250 TABLET ORAL at 09:36

## 2022-07-28 RX ADMIN — SODIUM CHLORIDE, SODIUM LACTATE, POTASSIUM CHLORIDE, CALCIUM CHLORIDE AND DEXTROSE MONOHYDRATE 75 ML/HR: 5; 600; 310; 30; 20 INJECTION, SOLUTION INTRAVENOUS at 17:35

## 2022-07-28 RX ADMIN — LIDOCAINE HYDROCHLORIDE 40 MG: 20 INJECTION, SOLUTION INTRAVENOUS at 13:02

## 2022-07-28 RX ADMIN — METRONIDAZOLE 500 MG: 500 INJECTION, SOLUTION INTRAVENOUS at 23:05

## 2022-07-28 RX ADMIN — SODIUM CHLORIDE, SODIUM LACTATE, POTASSIUM CHLORIDE, CALCIUM CHLORIDE AND DEXTROSE MONOHYDRATE: 5; 600; 310; 30; 20 INJECTION, SOLUTION INTRAVENOUS at 10:23

## 2022-07-28 RX ADMIN — ACETAMINOPHEN 650 MG: 325 TABLET, FILM COATED ORAL at 01:55

## 2022-07-28 RX ADMIN — FENTANYL CITRATE 25 MCG: 50 INJECTION, SOLUTION INTRAMUSCULAR; INTRAVENOUS at 15:06

## 2022-07-28 RX ADMIN — FENTANYL CITRATE 25 MCG: 50 INJECTION, SOLUTION INTRAMUSCULAR; INTRAVENOUS at 14:45

## 2022-07-28 RX ADMIN — FENTANYL CITRATE 25 MCG: 50 INJECTION, SOLUTION INTRAMUSCULAR; INTRAVENOUS at 13:02

## 2022-07-28 RX ADMIN — CIPROFLOXACIN 400 MG: 2 INJECTION, SOLUTION INTRAVENOUS at 15:52

## 2022-07-28 RX ADMIN — PHENYLEPHRINE HYDROCHLORIDE 100 MCG: 10 INJECTION INTRAVENOUS at 13:33

## 2022-07-28 RX ADMIN — FOLIC ACID 1 MG: 1 TABLET ORAL at 09:35

## 2022-07-28 RX ADMIN — SODIUM CHLORIDE, POTASSIUM CHLORIDE, SODIUM LACTATE AND CALCIUM CHLORIDE: 600; 310; 30; 20 INJECTION, SOLUTION INTRAVENOUS at 13:56

## 2022-07-28 RX ADMIN — ROCURONIUM BROMIDE 20 MG: 10 INJECTION INTRAVENOUS at 13:31

## 2022-07-28 RX ADMIN — METOPROLOL TARTRATE 25 MG: 25 TABLET, FILM COATED ORAL at 22:46

## 2022-07-28 RX ADMIN — SUGAMMADEX 200 MG: 100 INJECTION, SOLUTION INTRAVENOUS at 13:56

## 2022-07-28 RX ADMIN — OXYCODONE 10 MG: 5 TABLET ORAL at 23:08

## 2022-07-28 RX ADMIN — ISOSORBIDE MONONITRATE 30 MG: 30 TABLET, EXTENDED RELEASE ORAL at 09:35

## 2022-07-28 RX ADMIN — METOPROLOL TARTRATE 25 MG: 25 TABLET, FILM COATED ORAL at 09:35

## 2022-07-28 RX ADMIN — ROCURONIUM BROMIDE 30 MG: 10 INJECTION INTRAVENOUS at 13:02

## 2022-07-28 RX ADMIN — CIPROFLOXACIN 400 MG: 2 INJECTION, SOLUTION INTRAVENOUS at 04:32

## 2022-07-28 RX ADMIN — MIDODRINE HYDROCHLORIDE 5 MG: 2.5 TABLET ORAL at 09:35

## 2022-07-28 RX ADMIN — METRONIDAZOLE 500 MG: 500 INJECTION, SOLUTION INTRAVENOUS at 02:05

## 2022-07-28 RX ADMIN — LEVOTHYROXINE SODIUM 100 MCG: 100 TABLET ORAL at 06:38

## 2022-07-28 RX ADMIN — PROPOFOL 90 MG: 10 INJECTION, EMULSION INTRAVENOUS at 13:02

## 2022-07-28 RX ADMIN — METRONIDAZOLE 500 MG: 500 INJECTION, SOLUTION INTRAVENOUS at 13:12

## 2022-07-28 RX ADMIN — SODIUM CHLORIDE, POTASSIUM CHLORIDE, SODIUM LACTATE AND CALCIUM CHLORIDE: 600; 310; 30; 20 INJECTION, SOLUTION INTRAVENOUS at 12:30

## 2022-07-28 RX ADMIN — FENTANYL CITRATE 25 MCG: 50 INJECTION, SOLUTION INTRAMUSCULAR; INTRAVENOUS at 14:31

## 2022-07-28 RX ADMIN — METOPROLOL TARTRATE 25 MG: 25 TABLET, FILM COATED ORAL at 04:32

## 2022-07-28 RX ADMIN — METOPROLOL TARTRATE 25 MG: 25 TABLET, FILM COATED ORAL at 01:55

## 2022-07-28 ASSESSMENT — PAIN DESCRIPTION - DESCRIPTORS
DESCRIPTORS: DISCOMFORT
DESCRIPTORS: ACHING;TIGHTNESS

## 2022-07-28 ASSESSMENT — PAIN SCALES - GENERAL
PAINLEVEL_OUTOF10: 8
PAINLEVEL_OUTOF10: 0
PAINLEVEL_OUTOF10: 0
PAINLEVEL_OUTOF10: 6
PAINLEVEL_OUTOF10: 8
PAINLEVEL_OUTOF10: 7

## 2022-07-28 ASSESSMENT — PAIN SCALES - WONG BAKER
WONGBAKER_NUMERICALRESPONSE: 0

## 2022-07-28 ASSESSMENT — ENCOUNTER SYMPTOMS
SHORTNESS OF BREATH: 0
DIARRHEA: 0
COUGH: 0

## 2022-07-28 ASSESSMENT — PAIN - FUNCTIONAL ASSESSMENT
PAIN_FUNCTIONAL_ASSESSMENT: ACTIVITIES ARE NOT PREVENTED
PAIN_FUNCTIONAL_ASSESSMENT: 0-10
PAIN_FUNCTIONAL_ASSESSMENT: PREVENTS OR INTERFERES SOME ACTIVE ACTIVITIES AND ADLS

## 2022-07-28 ASSESSMENT — PAIN DESCRIPTION - ORIENTATION: ORIENTATION: RIGHT;LEFT;MID

## 2022-07-28 ASSESSMENT — PAIN DESCRIPTION - LOCATION
LOCATION: ABDOMEN
LOCATION: ABDOMEN

## 2022-07-28 NOTE — CARE COORDINATION
PT FOR SURGERY TODAY. WILL NEED PT/OT ONCE ABLE AFTER SURGERY AND PRECERT FOR [facility].  LSW/CM TO FOLLOW.

## 2022-07-28 NOTE — FLOWSHEET NOTE
78 yr old female received post op diverting sigmoid colostomy. Pt is alert and oriented, responds appropriately, follows commands. VSS. Tele SR. Respirations even and nonlabored on room air. Abdomen dressing is c/d/I. LUQ stoma present with small amount of pink tinged drainage in bag. Abdomen is rounded with tenderness to touch. Pt repositioned for comfort. Call light in reach. Bed alarm engaged. Family in to visit.

## 2022-07-28 NOTE — BRIEF OP NOTE
Brief Postoperative Note      Patient: Donna Hicks  YOB: 1945  MRN: [id number]    Date of Procedure: 7/28/2022    Pre-Op Diagnosis: RECTOVAGINAL FISTULA    Post-Op Diagnosis: Same       Procedure(s): DIVERTING SIGMOID COLOSTOMY.  ROOM 179    Surgeon(s):  Lazarus Moats, MD    Assistant:  First Assistant: Scott Banegas    Anesthesia: General    Estimated Blood Loss (mL): 50    Complications: None    Specimens:   ID Type Source Tests Collected by Time Destination   A : Left Colon  Tissue Colon SURGICAL PATHOLOGY Lazarus Moats, MD 7/28/2022 1334        Implants:  * No implants in log *      Drains:   Colostomy LUQ (Active)       Findings: Left colectomy performed with and transverse colon colostomy as a Salgado's procedure    Electronically signed by Rubens Haq MD on 7/28/2022 at 1:54 PM

## 2022-07-28 NOTE — FLOWSHEET NOTE
Patient resting in bed. Pt is alert and oriented x 4. Pt is NPO for surgery today. nsr ON TELEMETRY.

## 2022-07-28 NOTE — ANESTHESIA POSTPROCEDURE EVALUATION
Department of Anesthesiology  Postprocedure Note    Patient: Leilani Gray  MRN: [id number]  YOB: 1945  Date of evaluation: 7/28/2022      Procedure Summary     Date: 07/28/22 Room / Location: [address]    Anesthesia Start: 1250 Anesthesia Stop: 1816    Procedure: DIVERTING SIGMOID COLOSTOMY. ROOM 179 (Abdomen) Diagnosis:       Rectovaginal fistula      (RECTOVAGINAL FISTULA)    Surgeons: Babita Murrell MD Responsible Provider: Dhruv Tavera MD    Anesthesia Type: general ASA Status: 3          Anesthesia Type: No value filed.     Roque Phase I: Roque Score: 10    Roque Phase II:        Anesthesia Post Evaluation    Patient location during evaluation: PACU  Patient participation: complete - patient participated  Level of consciousness: awake  Pain score: 0  Airway patency: patent  Nausea & Vomiting: no nausea and no vomiting  Complications: no  Cardiovascular status: hemodynamically stable  Respiratory status: acceptable  Hydration status: euvolemic

## 2022-07-28 NOTE — PROGRESS NOTES
Patient received from Surgery. 8/10 pain. Patient medicated several times with some relief. Patient resting with eyes shut. Colostomy intact, stoma pink . Patient transferring to [facility] from [facility]. Left message with family and also gave Galilea Spence report on floor.

## 2022-07-28 NOTE — ANESTHESIA PRE PROCEDURE
Department of Anesthesiology  Preprocedure Note       Name:  Rloand Borrero   Age:  78 y.o.  :  1945                                          MRN:  [id number]         Date:  2022      Surgeon: Erica Swan):  Florida Spears MD    Procedure: Procedure(s): DIVERTING SIGMOID COLOSTOMY. ROOM 179    Medications prior to admission:   Prior to Admission medications    Medication Sig Start Date End Date Taking? Authorizing Provider   dronabinol (MARINOL) 2.5 MG capsule Take 1 capsule by mouth in the morning and 1 capsule in the evening. Take before meals. Do all this for 30 days.  22  MARY Cavanaugh   metoprolol succinate (TOPROL XL) 25 MG extended release tablet Take 3 tablets by mouth daily 22   VALARIE Melgar CNP   magnesium oxide (MAG-OX) 400 (240 Mg) MG tablet Take 1 tablet by mouth daily 22   VALARIE Melgar CNP   albuterol sulfate  (90 Base) MCG/ACT inhaler Inhale 2 puffs into the lungs every 4 hours as needed for Wheezing or Shortness of Breath (Space out to every 6 hours as symptoms improve) 1/4/20 2/3/20  VALARIE Da Silva CNP   cyanocobalamin ([employer] VITAMIN B12) 1000 MCG tablet Take 1 tablet by mouth daily 19   Criselda Caicedo MD   levothyroxine (SYNTHROID) 112 MCG tablet Take 1 tablet by mouth Daily 19   Criselda Caicedo MD   albuterol sulfate  (90 Base) MCG/ACT inhaler INHALE 2 PUFFS 4 TIMES DAILY AS NEEDED 3/8/19   Kristel Galvan MD   isosorbide mononitrate (IMDUR) 30 MG extended release tablet Take 1 tablet by mouth daily 3/16/18   Kristel Galvan MD       Current medications:    Current Facility-Administered Medications   Medication Dose Route Frequency Provider Last Rate Last Admin    lactated ringers infusion   IntraVENous Continuous Brisa Foy  mL/hr at 22 1230 New Bag at 22 1230    sodium chloride 0.9 % irrigation    Continuous PRN Florida Spears MD   1,000 mL at 69/61/73 9411    folic acid (FOLVITE) tablet 1 mg  1 mg Oral Daily Adalid House MD   1 mg at 07/28/22 0935    ferrous gluconate (FERGON) tablet 324 mg  324 mg Oral Daily with breakfast Adalid House MD        digoxin (LANOXIN) tablet 250 mcg  250 mcg Oral Daily VALARIE Iglesias - CNP   250 mcg at 07/28/22 0138    metoprolol (LOPRESSOR) injection 2.5 mg  2.5 mg IntraVENous Q6H PRN Gregg Enciso MD        glucose chewable tablet 16 g  4 tablet Oral PRN Ashutosh Solorio MD        dextrose bolus 10% 125 mL  125 mL IntraVENous PRN Ashutosh Solorio MD   Stopped at 07/27/22 1844    Or    dextrose bolus 10% 250 mL  250 mL IntraVENous PRN Ashutosh Solorio MD        glucagon (rDNA) injection 1 mg  1 mg SubCUTAneous PRN Ashutosh Solorio MD        dextrose 10 % infusion   IntraVENous Continuous PRN Ashutosh Solorio MD        dextrose 5 % in lactated ringers infusion   IntraVENous Continuous Ashutosh Solorio MD 75 mL/hr at 07/28/22 1023 New Bag at 07/28/22 1023    midodrine (PROAMATINE) tablet 5 mg  5 mg Oral TID WC Gregg Enciso MD   5 mg at 07/28/22 0935    metoprolol (LOPRESSOR) injection 5 mg  5 mg IntraVENous Q4H PRN Adalid House MD   5 mg at 07/27/22 1250    enoxaparin Sodium (LOVENOX) injection 30 mg  30 mg SubCUTAneous Daily Adalid House MD   30 mg at 07/27/22 0916    ciprofloxacin (CIPRO) IVPB 400 mg  400 mg IntraVENous Q12H Adalid House MD   Stopped at 07/28/22 0536    0.9 % sodium chloride infusion   IntraVENous PRN Adalid House MD        dilTIAZem 125 mg in dextrose 5 % 125 mL infusion  2.5-15 mg/hr IntraVENous Continuous Adalid House MD   Stopped at 07/25/22 1637    metoprolol tartrate (LOPRESSOR) tablet 25 mg  25 mg Oral Q4H Adalid House MD   25 mg at 07/28/22 0935    traMADol (ULTRAM) tablet 50 mg  50 mg Oral Q6H PRN Adalid House MD   50 mg at 07/25/22 1401    sodium chloride flush 0.9 % injection 5-40 mL  5-40 mL IntraVENous 2 times per day Adalid House, UTI (urinary tract infection) N39.0    Severe malnutrition (HCC) E43    Rectovaginal fistula N82.3       Past Medical History:        Diagnosis Date    Bilateral carotid artery disease ([address]     CAD (coronary artery disease)     Hyperlipidemia     Hypertension     Hypothyroidism     Osteoarthritis        Past Surgical History:        Procedure Laterality Date    CARDIAC CATHETERIZATION  1990s    SPINE SURGERY  2014    LUMBAR    THYROIDECTOMY, PARTIAL         Social History:    Social History     Tobacco Use    Smoking status: Former     Packs/day: 1.00     Years: 35.00     Pack years: 35.00     Types: Cigarettes     Start date:      Quit date: 1996     Years since quittin.6    Smokeless tobacco: Never   Substance Use Topics    Alcohol use: Yes     Alcohol/week: 0.0 standard drinks     Comment: RARE                                Counseling given: Not Answered      Vital Signs (Current):   Vitals:    22 0427 22 0744 22 0800 22 1204   BP: (!) 144/80   (!) 170/77   Pulse: (!) 106  81 98   Resp:  17  18   Temp:  97.2 °F (36.2 °C)  98 °F (36.7 °C)   TempSrc:  Oral  Temporal   SpO2: 100%   99%   Weight:       Height:                                                  BP Readings from Last 3 Encounters:   22 (!) 170/77   22 118/61   21 (!) 158/79       NPO Status: Time of last liquid consumption:                         Time of last solid consumption:                         Date of last liquid consumption: 22                        Date of last solid food consumption: 22    BMI:   Wt Readings from Last 3 Encounters:   22 96 lb (43.5 kg)   22 95 lb 14.4 oz (43.5 kg)   09/10/21 125 lb (56.7 kg)     Body mass index is 17.56 kg/m².     CBC:   Lab Results   Component Value Date/Time    WBC 13.5 2022 06:51 AM    RBC 3.17 2022 06:51 AM    HGB 9.2 2022 06:51 AM    HCT 28.0 2022 06:51 AM    MCV 88.4 07/28/2022 06:51 AM    RDW 18.9 07/28/2022 06:51 AM     07/28/2022 06:51 AM       CMP:   Lab Results   Component Value Date/Time     07/28/2022 06:51 AM    K 3.8 07/28/2022 06:51 AM    K 3.9 07/26/2022 04:58 AM     07/28/2022 06:51 AM    CO2 14 07/28/2022 06:51 AM    BUN 10 07/28/2022 06:51 AM    CREATININE 0.81 07/28/2022 06:51 AM    GFRAA >60.0 07/28/2022 06:51 AM    LABGLOM >60.0 07/28/2022 06:51 AM    GLUCOSE 92 07/28/2022 06:51 AM    PROT 5.1 07/28/2022 06:51 AM    CALCIUM 7.8 07/28/2022 06:51 AM    BILITOT <0.2 07/28/2022 06:51 AM    ALKPHOS 60 07/28/2022 06:51 AM    AST 10 07/28/2022 06:51 AM    ALT <5 07/28/2022 06:51 AM       POC Tests:   Recent Labs     07/28/22  0622   POCGLU 78       Coags:   Lab Results   Component Value Date/Time    PROTIME 10.6 08/04/2014 12:12 PM    INR 1.0 08/04/2014 12:12 PM    APTT 26.1 08/04/2014 12:12 PM       HCG (If Applicable): No results found for: PREGTESTUR, PREGSERUM, HCG, HCGQUANT     ABGs:   Lab Results   Component Value Date/Time    PHART 7.383 07/04/2022 08:34 AM    PO2ART 113 07/04/2022 08:34 AM    EYK8KZV 20 07/04/2022 08:34 AM    FZM5NTZ 11.7 07/04/2022 08:34 AM    BEART -13 07/04/2022 08:34 AM    K9VRKRLS 99 07/04/2022 08:34 AM        Type & Screen (If Applicable):  No results found for: LABABO, LABRH    Drug/Infectious Status (If Applicable):  No results found for: HIV, HEPCAB    COVID-19 Screening (If Applicable):   Lab Results   Component Value Date/Time    COVID19 Not Detected 07/13/2022 02:43 PM           Anesthesia Evaluation  Patient summary reviewed and Nursing notes reviewed no history of anesthetic complications:   Airway: Mallampati: II  TM distance: >3 FB   Neck ROM: full  Mouth opening: > = 3 FB   Dental: normal exam         Pulmonary:Negative Pulmonary ROS and normal exam                               Cardiovascular:Negative CV ROS  Exercise tolerance: good (>4 METS),   (+) hypertension:, CAD:,       ECG reviewed               Beta Blocker:  Dose within 24 Hrs         Neuro/Psych:   Negative Neuro/Psych ROS  (+) TIA,             GI/Hepatic/Renal: Neg GI/Hepatic/Renal ROS            Endo/Other: Negative Endo/Other ROS   (+) hypothyroidism, blood dyscrasia: anemia:., .          Pt had PAT visit. Abdominal:             Vascular: negative vascular ROS. Other Findings:           Anesthesia Plan      general     ASA 3     (ETT)  Induction: intravenous. MIPS: Postoperative opioids intended and Prophylactic antiemetics administered. Anesthetic plan and risks discussed with patient. Plan discussed with CRNA.     Attending anesthesiologist reviewed and agrees with Pre Eval content                Ashish Guaman MD   7/28/2022

## 2022-07-28 NOTE — FLOWSHEET NOTE
I have made 2 earlier attempts to call report on this patient , who transferred to [facility] post surgery, and started out on [address] this morning.

## 2022-07-28 NOTE — PROGRESS NOTES
Progress Note  Date:2022       GLOZ:[id number]  Patient Name:Edna Doyle     YOB: 1945     Age:77 y.o. Subjective    Subjective:  Symptoms:  No shortness of breath, malaise, cough, chest pain, weakness, headache, chest pressure, anorexia, diarrhea or anxiety. Review of Systems   Respiratory:  Negative for cough and shortness of breath. Cardiovascular:  Negative for chest pain. Gastrointestinal:  Negative for anorexia and diarrhea. Neurological:  Negative for weakness. Objective         Vitals Last 24 Hours:  TEMPERATURE:  Temp  Av.9 °F (36.6 °C)  Min: 97.2 °F (36.2 °C)  Max: 98.7 °F (37.1 °C)  RESPIRATIONS RANGE: Resp  Av.6  Min: 14  Max: 27  PULSE OXIMETRY RANGE: SpO2  Av.5 %  Min: 98 %  Max: 100 %  PULSE RANGE: Pulse  Av.1  Min: 3  Max: 150  BLOOD PRESSURE RANGE: Systolic (24LNR), FSF:718 , Min:86 , ESB:187   ; Diastolic (61CNG), NGV:40, Min:47, Max:82    I/O (24Hr): Intake/Output Summary (Last 24 hours) at 2022 1206  Last data filed at 2022 1849  Gross per 24 hour   Intake 922.88 ml   Output --   Net 922.88 ml       Objective:  General Appearance:  Comfortable, well-appearing and in no acute distress. Vital signs: (most recent): Blood pressure (!) 170/77, pulse 98, temperature 98 °F (36.7 °C), temperature source Temporal, resp. rate 18, height 5' 2\" (1.575 m), weight 96 lb (43.5 kg), SpO2 99 %, not currently breastfeeding. HEENT: Normal HEENT exam.    Lungs:  Normal effort. Heart: Normal rate. S1 normal and S2 normal.    Abdomen: Abdomen is soft. Bowel sounds are normal.   There is no epigastric area or suprapubic area tenderness. Pulses: Distal pulses are intact. Neurological: Patient is alert. Pupils:  Pupils are equal, round, and reactive to light. Skin:  Warm and dry.     Labs/Imaging/Diagnostics    Labs:  CBC:  Recent Labs     22  0825 22  0651   WBC 14.6* 13.5*   RBC 3.18* 3.17*   HGB 9.0* 9.2*   HCT 28.7* 28.0*   MCV 90.0 88.4   RDW 19.2* 18.9*    257       CHEMISTRIES:  Recent Labs     07/26/22  0458 07/28/22  0651    138   K 3.9 3.8   * 113*   CO2 15* 14*   BUN 24* 10   CREATININE 0.89 0.81   GLUCOSE 76 92       PT/INR:No results for input(s): PROTIME, INR in the last 72 hours. APTT:No results for input(s): APTT in the last 72 hours. LIVER PROFILE:  Recent Labs     07/26/22  0458 07/28/22  0651   AST 9 10   ALT <5 <5   BILITOT 0.3 <0.2   ALKPHOS 66 60         Imaging Last 24 Hours:  CT ABDOMEN PELVIS WO CONTRAST Additional Contrast? None    Result Date: 7/23/2022  Patient: Foreign Melton  Time Out: 23:56 Exam(s): CT ABDOMEN + PELVIS Without Contrast  EXAM:   CT Abdomen and Pelvis Without Intravenous Contrast  CLINICAL HISTORY:    Reason for exam: concern for rectovaginal fistula. Chief complaint concern for rectovaginal fistula  TECHNIQUE:   Axial computed tomography images of the abdomen and pelvis without intravenous contrast.  All CT scan at this facility use dose modulation, iterative reconstruction, and/or weight based dosing when appropriate to reduce radiation dose to as low as reasonably achievable. COMPARISON:   September 10, 2021  FINDINGS:   Lung bases:  2 x 8 cm area of infiltrate in the right lung base suspicious for pneumonia. There is underlying emphysema. ABDOMEN:   Liver:  Unremarkable. Gallbladder and bile ducts:  Unremarkable. No calcified stones. No ductal dilation. Pancreas:  Unremarkable. No ductal dilation. Spleen:  Unremarkable. No splenomegaly. Adrenals:  Unremarkable. No mass. Kidneys and ureters:  Atrophic right kidney. There are calculi in the right renal pelvis measuring up to 4 mm. No hydronephrosis. Left kidney is unremarkable. Stomach and bowel:  Severe circumferential wall thickening involving the distal colon and rectum measuring up to 2.5 cm thick. This is contiguous with the wall of the vagina.   There is stool in the vagina suggesting rectovaginal fistula. No obstruction. PELVIS:   Appendix:  No findings to suggest acute appendicitis. Bladder:  Unremarkable. No stones. Reproductive:  Unremarkable as visualized. ABDOMEN and PELVIS:   Intraperitoneal space:  Unremarkable. No free air. No significant fluid collection. Bones/joints:  Metallic artifact from hardware from prior fusion of the lumbar spine at L4-5. There are moderate degenerative changes in the spine. No acute fracture or subluxation. Soft tissues:  Unremarkable. Vasculature: The abdominal aorta is severely calcified but nondilated. Lymph nodes:  Unremarkable. No enlarged lymph nodes. Electronically signed by Eliana Chamorro MD on 07-23-22 at 2356    1.  2 x 8 cm area of infiltrate in the right lung base suspicious for pneumonia. There is underlying emphysema. 2.  Severe circumferential wall thickening involving the distal colon and rectum measuring up to 2.5 cm thick. This is contiguous with the wall of the vagina. There is stool in the vagina suggesting rectovaginal fistula. XR KNEE RIGHT (3 VIEWS)    Result Date: 7/25/2022  INDICATION: Pain. COMPARISON: None. TECHNIQUE: 4 views of the right knee were obtained. FINDINGS: Increased density visualized in the distal femur with subtle lucency is visualized in the femoral condyles bilaterally, increased density visualized along the articular surface  with subchondral cysts seen but no evidence of cortical irregularity or lucency to suggest a fracture. There is no evidence of acute fracture or dislocation. Mild to moderate narrowing of the medial, lateral and patellofemoral joint space is seen. There is no evidence of suprapatellar effusion. Vascular calcifications are seen. Overlying soft tissues are grossly unremarkable. Degenerative bone changes seen.  No evidence of acute osseous abnormality     XR CHEST PORTABLE    Result Date: 7/24/2022  EXAMINATION: XR CHEST PORTABLE CLINICAL HISTORY: ALTERED MENTAL STATUS COMPARISONS: JULY 2, 2022 FINDINGS: Osseous structures are intact. Cardiopericardial silhouette is normal. Pulmonary vasculature is normal. Lungs are clear. NO ACUTE CARDIOPULMONARY DISEASE. 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 fib with RVR we will transfer the patient to cardiac floor, cardiology evaluation,transfuse to keep Hgb> 7  keep K > 4 Spoke with nursing about the care, as per psych pt has not capacity. C/w current care, started Cardizem drip. 7/26: Possible colonoscopy tomorrow. Patient getting prep now. On Cardizem drip. Rate is better controlled. No overnight events no new complaints. Continue current care. 7/27: Patient going for colonoscopy. No overnight events send routine lab work-up tomorrow. Spoke with nursing about the care. 7/28: Patient is going for surgery today. No overnight events no new complaints. Hemoglobin stable. Hypoglycemia resolved. c/w current care  Electronically signed by Dutch Fierro MD on 7/25/22 at 1:20 PM EDT

## 2022-07-28 NOTE — FLOWSHEET NOTE
2000 VSS pt is alert and in a better mood today. Pt did state that she received bad news today and she isn't too thrilled about it. Pt is lying in bed with call light in reach. This RN will CTM.    0000 Pt is resting comfortably. 0430 VSS pt is now receiving IV abx, pt appears to be really tired and does not want to be bothered.

## 2022-07-28 NOTE — PROGRESS NOTES
[facility] Daily Progress Note      Date: 7/28/2022    Name: Lamine Vann    Age: 78 y.o. Gender: female  CodeStatus: Full Code      Rounding Cardiology : Susie De Leon MD    Primary Cardiology : Dr. Michael Bahena MD    Primary Care Provider: Robinson Chawla MD    Subjective:    Chart Reviewed and patient examined. She denies chest pain, palpitations, lightheadedness or dizziness. Still with chronic shortness of breath     Rate controlled AFIB. Post colonoscopy. 14 system General and Cardiac ROS otherwise negative or unchanged. Assessment:    Anemia, Hgb 6.7 > 10.3, post transfusion  Rectal Mass c/w Infiltrative rectal CA with rectovaginal fistula, new diagnosis. Atrial Fib with RVR: new onset, currently refusing cardioversion. Rate control for now. V-Tach: Noted 14 beat V-Tach run  7/24/2022  PSVT  Normal Echo and LV Function, LVEF 55%. 7/22  RICARDO:  Resolved. UTI, Recent  HTN  HLP  Hypothyroidism  Dementia   as prior otherwise     Plan:    Cardiac Supportive Conservative Care  OK for Surgery from CV point with moderate increased risk. Monitor on telemetry  Rate control strategy  Continue current medications  Palliative care planned at this point  Eventual diverting colostomy per surgery. Oncology consultation noted. Further recommendations  See orders.      ==================================================    Physical Exam  Constitutional:  Frail,  awake/alert/oriented x3, no distress, alert and cooperative. Respiratory/Thorax: Patent airways, CTAB,  normal breath sounds with good chest expansion, thorax symmetric. Cardiovascular: Irregular  rate and rhythm, normal S1 and S2, PMI non displaced. Gastrointestinal:  Non distended, soft, non-tender, no rebound tenderness or guarding, Genitourinary:  deferred  Musculoskeletal:  No apparent injury. Extremities:  No cyanosis, edema, contusions or wounds, no clubbing. Neurological:  Alert and oriented x3.   Moves extremities spontaneous with purpose  Psychological:  Appropriate mood and behavior  Skin:  Warm and dry,  no lesions or rashes. Allergies: Bee Venom  Pcn [Penicillins]    Medications:  Reviewed  Home Medications    Infusion Medications:    dextrose      dextrose 5% in lactated ringers 75 mL/hr at 07/27/22 1849    sodium chloride      dilTIAZem (CARDIZEM) 125 mg in dextrose 5% 125 mL infusion Stopped (07/25/22 1637)    sodium chloride       Scheduled Medications:    folic acid  1 mg Oral Daily    ferrous gluconate  324 mg Oral Daily with breakfast    digoxin  250 mcg Oral Daily    midodrine  5 mg Oral TID WC    enoxaparin  30 mg SubCUTAneous Daily    ciprofloxacin  400 mg IntraVENous Q12H    metoprolol tartrate  25 mg Oral Q4H    sodium chloride flush  5-40 mL IntraVENous 2 times per day    metroNIDAZOLE  500 mg IntraVENous Q8H    isosorbide mononitrate  30 mg Oral Daily    levothyroxine  100 mcg Oral Daily     PRN Meds: metoprolol, glucose, dextrose bolus **OR** dextrose bolus, glucagon (rDNA), dextrose, metoprolol, sodium chloride, traMADol, sodium chloride flush, sodium chloride, ondansetron **OR** ondansetron, polyethylene glycol, acetaminophen **OR** acetaminophen, camphor-menthol-methyl salicylate    Vitals  Vitals:    07/28/22 0744   BP:    Pulse:    Resp: 17   Temp: 97.2 °F (36.2 °C)   SpO2:        I&O  No documented urine output      Telemetry:  Atrial fibrillation with RVR  115 -120's. ECHO  7/4/2022  Left Ventricle  Left ventricular ejection fraction is visually estimated at 55%. Right Ventricle  Normal right ventricle structure and function. Normal right ventricle systolic pressure. Pericardial Effusion  No evidence of pericardial effusion.         Labs:   Recent Labs     07/26/22  0825 07/28/22  0651   WBC 14.6* 13.5*   HGB 9.0* 9.2*   HCT 28.7* 28.0*    257       Recent Labs     07/26/22  0458 07/28/22  0651    138   K 3.9 3.8   * 113*   CO2 15* 14*   BUN 24* 10   CREATININE 0.89 0. 81   CALCIUM 7.8* 7.8*       Recent Labs     07/26/22  0458 07/28/22  0651   AST 9 10   ALT <5 <5   BILITOT 0.3 <0.2   ALKPHOS 66 60       No results for input(s): INR in the last 72 hours. No results for input(s): Natalie Raymond in the last 72 hours. Urinalysis:   Lab Results   Component Value Date/Time    NITRU Negative 07/23/2022 10:52 PM    [address] 07/23/2022 10:52 PM    BACTERIA MANY 07/23/2022 10:52 PM    RBCUA 20-50 07/23/2022 10:52 PM    BLOODU LARGE 07/23/2022 10:52 PM    SPECGRAV 1.022 07/23/2022 10:52 PM    GLUCOSEU Negative 07/23/2022 10:52 PM       Radiology:   Most recent    Chest CT      WITH CONTRAST:No results found for this or any previous visit. WITHOUT CONTRAST: No results found for this or any previous visit. CXR      2-view: No results found for this or any previous visit. Portable: Results for orders placed during the hospital encounter of 07/23/22    XR CHEST PORTABLE    Narrative  EXAMINATION: XR CHEST PORTABLE    CLINICAL HISTORY: ALTERED MENTAL STATUS    COMPARISONS: JULY 2, 2022    FINDINGS: Osseous structures are intact. Cardiopericardial silhouette is normal. Pulmonary vasculature is normal. Lungs are clear. Impression  NO ACUTE CARDIOPULMONARY DISEASE. 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 I am managing a portion of pt care. Some medical issues are handled by other specialists. Additional work up and treatment should be done in out pt setting by pt PCP and other out pt providers. In addition to examining and evaluating pt, I spent additional time explaining care, normaland abnormal findings, and treatment plan. All of pt questions were answered.  Counseling, diet and education were provided. Case will be discussed with nursing staff when appropriate. Family will be updated if and whenappropriate.       Electronically signed by Jackelin Noguera MD on 7/28/2022 at 9:21 AM

## 2022-07-28 NOTE — PROGRESS NOTES
Wound Ostomy Continence Nursing    This [id number] [address] [place] [address] Nurse received referral from Dr. Reid Ervin for stoma marking, sigmoid colostomy. Patient educated on the role of the [address] [place] [address] Nurse and the purpose of stoma marking at this time. Patient was not receptive, kept saying \"I don't really know what's going on. \"     In a laying flat position, rectus muscle located. Abdomen remains flat with loose skin in a laying position. In a seated position, abdomen is still flat, though patient's loose skin falls to the lower quadrants of the abdomen. Unable to evaluate abdomen in a bending forward and standing position. Patient states \"he back hurts too much to bend forward and that she is too weak to stand. Due to patient's limitations with mobility and the loose skin of the abdomen, best location for stoma placement would be the LUQ of the abdomen. Site marked, assured the site was within the rectus muscle. Patient denies any questions at this time.     Electronically signed by ANGELY Abbott, RN, Martir Sanchez on 7/28/2022 at 9:30 AM

## 2022-07-28 NOTE — OP NOTE
[address]                      [address], [address] [facility]                                OPERATIVE REPORT    PATIENT NAME: Ba Lowe                :        1945  MED REC NO:   [id number]                            ROOM:       W179  ACCOUNT NO:   [de-identified]                           ADMIT DATE: 2022  PROVIDER:     Trini Oliver MD    DATE OF PROCEDURE:  2022    PREOPERATIVE DIAGNOSIS:  Malignant rectovaginal fistula. POSTOPERATIVE DIAGNOSIS:  Malignant rectovaginal fistula. PROCEDURE PERFORMED:  Left colectomy with end colostomy and Meir  stump. SURGEON:  Trini Oliver MD    ASSISTANT:  Ms. Franklin Severino. ANESTHESIA:  General endotracheal anesthesia. ESTIMATED BLOOD LOSS:  50 mL. SPECIMENS:  Left colon. COMPLICATIONS:  None. INDICATIONS:  A 79-year-old female with a malignant rectovaginal  fistula. Diversion for quality of life and fecal management discussed  with the son and daughter. They have elected to proceed as power  . The patient agreeable as well. The patient marked  preoperatively. Risks of surgery including infection, bleeding, damage  to the surrounding intestine, reoperation, postoperative pain were all  outlined. Consent obtained. OPERATIVE PROCEDURE:  She was taken to the operating room, placed in the  supine position. General endotracheal anesthesia was administered. A  colostomy marking was placed preoperatively. She received Cipro and  Flagyl preoperatively. A time-out was taken for appropriate  verification. A periumbilical incision was made. Subcutaneous tissues were incised to  the fascia, which was opened in the midline. The sigmoid colon was  identified and pulled into the surgical field. A cautery was used to  take the line of Toldt around the splenic flexure under direct  visualization without problems or issues.     I wish to have a short rectal stump and proceed with a manageable left  upper quadrant distal transverse colostomy. I transected the proximal descending colon with a contour stapler as  well as the distal sigmoid colon. An Enseal device was used to transect the mesentery. The specimen was  removed from the field and sent to Pathology in formalin as permanent  section. At this time, an opening on the anterior abdominal wall, where the  colostomy site marking was made down to the fascia, which was opened in  a cruciate fashion. The distal transverse colon was brought out and  secured. The lap, needle, instrument, and towel counts were all correct. The  bowel was placed in normal anatomic position. The fascia was closed  with a combination of 0 Vicryl and 0 Prolene suture. The skin was  closed with staples. The colostomy was matured in a Allyssa fashion using interrupted 3-0  chromic suture. Ostomy appliance was placed. Dressings were applied on  the periumbilical incision. Following completion of the lap, needle, instrument, and towel counts  were again correct. The patient was extubated and taken to Recovery for postop monitoring.         Keke Hall MD    D: 07/28/2022 14:05:25       T: 07/28/2022 14:07:43     [id number]  Job#: [id number]     Doc#: [id number]    CC:

## 2022-07-29 LAB
ALBUMIN SERPL-MCNC: 2 G/DL (ref 3.5–4.6)
ALP BLD-CCNC: 62 U/L (ref 40–130)
ALT SERPL-CCNC: <5 U/L (ref 0–33)
ANION GAP SERPL CALCULATED.3IONS-SCNC: 14 MEQ/L (ref 9–15)
AST SERPL-CCNC: 11 U/L (ref 0–35)
BASOPHILS ABSOLUTE: 0 K/UL (ref 0–0.2)
BASOPHILS RELATIVE PERCENT: 0.1 %
BILIRUB SERPL-MCNC: <0.2 MG/DL (ref 0.2–0.7)
BUN BLDV-MCNC: 8 MG/DL (ref 8–23)
CALCIUM SERPL-MCNC: 7.9 MG/DL (ref 8.5–9.9)
CHLORIDE BLD-SCNC: 111 MEQ/L (ref 95–107)
CO2: 14 MEQ/L (ref 20–31)
CREAT SERPL-MCNC: 0.77 MG/DL (ref 0.5–0.9)
CULTURE, BLOOD 2: NORMAL
EKG ATRIAL RATE: 91 BPM
EKG P AXIS: 104 DEGREES
EKG P-R INTERVAL: 158 MS
EKG Q-T INTERVAL: 288 MS
EKG QRS DURATION: 70 MS
EKG QTC CALCULATION (BAZETT): 354 MS
EKG R AXIS: 151 DEGREES
EKG T AXIS: 155 DEGREES
EKG VENTRICULAR RATE: 91 BPM
EOSINOPHILS ABSOLUTE: 0 K/UL (ref 0–0.7)
EOSINOPHILS RELATIVE PERCENT: 0 %
GFR AFRICAN AMERICAN: >60
GFR NON-AFRICAN AMERICAN: >60
GLOBULIN: 3.3 G/DL (ref 2.3–3.5)
GLUCOSE BLD-MCNC: 101 MG/DL (ref 70–99)
GLUCOSE BLD-MCNC: 110 MG/DL (ref 70–99)
GLUCOSE BLD-MCNC: 111 MG/DL (ref 70–99)
GLUCOSE BLD-MCNC: 130 MG/DL (ref 70–99)
GLUCOSE BLD-MCNC: 88 MG/DL (ref 70–99)
HCT VFR BLD CALC: 26.5 % (ref 37–47)
HEMOGLOBIN: 8.6 G/DL (ref 12–16)
LYMPHOCYTES ABSOLUTE: 0.4 K/UL (ref 1–4.8)
LYMPHOCYTES RELATIVE PERCENT: 2.3 %
MAGNESIUM: 1.7 MG/DL (ref 1.7–2.4)
MCH RBC QN AUTO: 28.9 PG (ref 27–31.3)
MCHC RBC AUTO-ENTMCNC: 32.3 % (ref 33–37)
MCV RBC AUTO: 89.4 FL (ref 82–100)
MONOCYTES ABSOLUTE: 0.4 K/UL (ref 0.2–0.8)
MONOCYTES RELATIVE PERCENT: 2.6 %
NEUTROPHILS ABSOLUTE: 15.9 K/UL (ref 1.4–6.5)
NEUTROPHILS RELATIVE PERCENT: 95 %
PDW BLD-RTO: 19.5 % (ref 11.5–14.5)
PERFORMED ON: ABNORMAL
PERFORMED ON: NORMAL
PLATELET # BLD: 228 K/UL (ref 130–400)
POTASSIUM SERPL-SCNC: 4.2 MEQ/L (ref 3.4–4.9)
RBC # BLD: 2.97 M/UL (ref 4.2–5.4)
SODIUM BLD-SCNC: 139 MEQ/L (ref 135–144)
TOTAL PROTEIN: 5.3 G/DL (ref 6.3–8)
WBC # BLD: 16.8 K/UL (ref 4.8–10.8)

## 2022-07-29 PROCEDURE — 2580000003 HC RX 258: Performed by: STUDENT IN AN ORGANIZED HEALTH CARE EDUCATION/TRAINING PROGRAM

## 2022-07-29 PROCEDURE — 6370000000 HC RX 637 (ALT 250 FOR IP): Performed by: COLON & RECTAL SURGERY

## 2022-07-29 PROCEDURE — 36415 COLL VENOUS BLD VENIPUNCTURE: CPT

## 2022-07-29 PROCEDURE — 80053 COMPREHEN METABOLIC PANEL: CPT

## 2022-07-29 PROCEDURE — 2580000003 HC RX 258: Performed by: COLON & RECTAL SURGERY

## 2022-07-29 PROCEDURE — 6360000002 HC RX W HCPCS: Performed by: COLON & RECTAL SURGERY

## 2022-07-29 PROCEDURE — 83735 ASSAY OF MAGNESIUM: CPT

## 2022-07-29 PROCEDURE — 1210000000 HC MED SURG R&B

## 2022-07-29 PROCEDURE — 2500000003 HC RX 250 WO HCPCS: Performed by: COLON & RECTAL SURGERY

## 2022-07-29 PROCEDURE — 99212 OFFICE O/P EST SF 10 MIN: CPT

## 2022-07-29 PROCEDURE — 99024 POSTOP FOLLOW-UP VISIT: CPT | Performed by: COLON & RECTAL SURGERY

## 2022-07-29 PROCEDURE — 93005 ELECTROCARDIOGRAM TRACING: CPT | Performed by: COLON & RECTAL SURGERY

## 2022-07-29 PROCEDURE — 85025 COMPLETE CBC W/AUTO DIFF WBC: CPT

## 2022-07-29 RX ADMIN — ACETAMINOPHEN 650 MG: 325 TABLET, FILM COATED ORAL at 18:08

## 2022-07-29 RX ADMIN — METOPROLOL TARTRATE 25 MG: 25 TABLET, FILM COATED ORAL at 14:04

## 2022-07-29 RX ADMIN — METOPROLOL TARTRATE 25 MG: 25 TABLET, FILM COATED ORAL at 05:44

## 2022-07-29 RX ADMIN — SODIUM CHLORIDE, SODIUM LACTATE, POTASSIUM CHLORIDE, CALCIUM CHLORIDE AND DEXTROSE MONOHYDRATE: 5; 600; 310; 30; 20 INJECTION, SOLUTION INTRAVENOUS at 06:55

## 2022-07-29 RX ADMIN — SODIUM CHLORIDE, PRESERVATIVE FREE 10 ML: 5 INJECTION INTRAVENOUS at 21:57

## 2022-07-29 RX ADMIN — LEVOTHYROXINE SODIUM 100 MCG: 100 TABLET ORAL at 05:44

## 2022-07-29 RX ADMIN — CIPROFLOXACIN 400 MG: 2 INJECTION, SOLUTION INTRAVENOUS at 04:03

## 2022-07-29 RX ADMIN — SODIUM CHLORIDE, SODIUM LACTATE, POTASSIUM CHLORIDE, CALCIUM CHLORIDE AND DEXTROSE MONOHYDRATE: 5; 600; 310; 30; 20 INJECTION, SOLUTION INTRAVENOUS at 23:28

## 2022-07-29 RX ADMIN — METOPROLOL TARTRATE 25 MG: 25 TABLET, FILM COATED ORAL at 18:03

## 2022-07-29 RX ADMIN — FERROUS GLUCONATE 324 MG: 324 TABLET ORAL at 10:07

## 2022-07-29 RX ADMIN — METRONIDAZOLE 500 MG: 500 INJECTION, SOLUTION INTRAVENOUS at 05:45

## 2022-07-29 RX ADMIN — METOPROLOL TARTRATE 25 MG: 25 TABLET, FILM COATED ORAL at 10:07

## 2022-07-29 RX ADMIN — ENOXAPARIN SODIUM 30 MG: 100 INJECTION SUBCUTANEOUS at 10:07

## 2022-07-29 RX ADMIN — FOLIC ACID 1 MG: 1 TABLET ORAL at 10:07

## 2022-07-29 RX ADMIN — Medication 10 ML: at 21:58

## 2022-07-29 RX ADMIN — METRONIDAZOLE 500 MG: 500 INJECTION, SOLUTION INTRAVENOUS at 21:51

## 2022-07-29 RX ADMIN — METRONIDAZOLE 500 MG: 500 INJECTION, SOLUTION INTRAVENOUS at 14:06

## 2022-07-29 RX ADMIN — METOPROLOL TARTRATE 25 MG: 25 TABLET, FILM COATED ORAL at 21:57

## 2022-07-29 RX ADMIN — ISOSORBIDE MONONITRATE 30 MG: 30 TABLET, EXTENDED RELEASE ORAL at 10:07

## 2022-07-29 RX ADMIN — CIPROFLOXACIN 400 MG: 2 INJECTION, SOLUTION INTRAVENOUS at 16:02

## 2022-07-29 RX ADMIN — DIGOXIN 250 MCG: 250 TABLET ORAL at 10:07

## 2022-07-29 RX ADMIN — METOPROLOL TARTRATE 25 MG: 25 TABLET, FILM COATED ORAL at 01:05

## 2022-07-29 ASSESSMENT — ENCOUNTER SYMPTOMS
DIARRHEA: 0
COUGH: 0
SHORTNESS OF BREATH: 0

## 2022-07-29 ASSESSMENT — PAIN SCALES - GENERAL: PAINLEVEL_OUTOF10: 3

## 2022-07-29 NOTE — PROGRESS NOTES
Progress Note  Date:2022       Room:[place] 777087-01  Patient Name:Edna Doyle     YOB: 1945     Age:77 y.o. Subjective    Subjective:  Symptoms:  No shortness of breath, malaise, cough, chest pain, weakness, headache, chest pressure, anorexia, diarrhea or anxiety. Review of Systems   Respiratory:  Negative for cough and shortness of breath. Cardiovascular:  Negative for chest pain. Gastrointestinal:  Negative for anorexia and diarrhea. Neurological:  Negative for weakness. Objective         Vitals Last 24 Hours:  TEMPERATURE:  Temp  Av.2 °F (36.2 °C)  Min: 97.1 °F (36.2 °C)  Max: 97.3 °F (36.3 °C)  RESPIRATIONS RANGE: Resp  Av.9  Min: 12  Max: 21  PULSE OXIMETRY RANGE: SpO2  Av.5 %  Min: 96 %  Max: 100 %  PULSE RANGE: Pulse  Av  Min: 67  Max: 108  BLOOD PRESSURE RANGE: Systolic (51ZLY), UAE:500 , Min:121 , XWO:776   ; Diastolic (87DYE), SJM:29, Min:51, Max:83    I/O (24Hr): Intake/Output Summary (Last 24 hours) at 2022 1256  Last data filed at 2022 1104  Gross per 24 hour   Intake 1410 ml   Output 200 ml   Net 1210 ml       Objective:  General Appearance:  Comfortable, well-appearing and in no acute distress. Vital signs: (most recent): Blood pressure (!) 123/51, pulse 88, temperature 97.3 °F (36.3 °C), temperature source Oral, resp. rate 17, height 5' 2\" (1.575 m), weight 96 lb (43.5 kg), SpO2 100 %, not currently breastfeeding. HEENT: Normal HEENT exam.    Lungs:  Normal effort. Heart: Normal rate. S1 normal and S2 normal.    Abdomen: Abdomen is soft. Bowel sounds are normal.   There is no epigastric area or suprapubic area tenderness. Pulses: Distal pulses are intact. Neurological: Patient is alert. Pupils:  Pupils are equal, round, and reactive to light. Skin:  Warm and dry.     Labs/Imaging/Diagnostics    Labs:  CBC:  Recent Labs     22  0651 22  0607   WBC 13.5* 16.8*   RBC 3.17* 2.97*   HGB 9.2* 8.6*   HCT 28.0* 26.5*   MCV 88.4 89.4   RDW 18.9* 19.5*    228       CHEMISTRIES:  Recent Labs     07/28/22  0651 07/29/22  0608    139   K 3.8 4.2   * 111*   CO2 14* 14*   BUN 10 8   CREATININE 0.81 0.77   GLUCOSE 92 130*   MG  --  1.7       PT/INR:No results for input(s): PROTIME, INR in the last 72 hours. APTT:No results for input(s): APTT in the last 72 hours. LIVER PROFILE:  Recent Labs     07/28/22  0651 07/29/22  0608   AST 10 11   ALT <5 <5   BILITOT <0.2 <0.2   ALKPHOS 60 62         Imaging Last 24 Hours:  CT ABDOMEN PELVIS WO CONTRAST Additional Contrast? None    Result Date: 7/23/2022  Patient: Foye Prader  Time Out: 23:56 Exam(s): CT ABDOMEN + PELVIS Without Contrast  EXAM:   CT Abdomen and Pelvis Without Intravenous Contrast  CLINICAL HISTORY:    Reason for exam: concern for rectovaginal fistula. Chief complaint concern for rectovaginal fistula  TECHNIQUE:   Axial computed tomography images of the abdomen and pelvis without intravenous contrast.  All CT scan at this facility use dose modulation, iterative reconstruction, and/or weight based dosing when appropriate to reduce radiation dose to as low as reasonably achievable. COMPARISON:   September 10, 2021  FINDINGS:   Lung bases:  2 x 8 cm area of infiltrate in the right lung base suspicious for pneumonia. There is underlying emphysema. ABDOMEN:   Liver:  Unremarkable. Gallbladder and bile ducts:  Unremarkable. No calcified stones. No ductal dilation. Pancreas:  Unremarkable. No ductal dilation. Spleen:  Unremarkable. No splenomegaly. Adrenals:  Unremarkable. No mass. Kidneys and ureters:  Atrophic right kidney. There are calculi in the right renal pelvis measuring up to 4 mm. No hydronephrosis. Left kidney is unremarkable. Stomach and bowel:  Severe circumferential wall thickening involving the distal colon and rectum measuring up to 2.5 cm thick. This is contiguous with the wall of the vagina. There is stool in the vagina suggesting rectovaginal fistula. No obstruction. PELVIS:   Appendix:  No findings to suggest acute appendicitis. Bladder:  Unremarkable. No stones. Reproductive:  Unremarkable as visualized. ABDOMEN and PELVIS:   Intraperitoneal space:  Unremarkable. No free air. No significant fluid collection. Bones/joints:  Metallic artifact from hardware from prior fusion of the lumbar spine at L4-5. There are moderate degenerative changes in the spine. No acute fracture or subluxation. Soft tissues:  Unremarkable. Vasculature: The abdominal aorta is severely calcified but nondilated. Lymph nodes:  Unremarkable. No enlarged lymph nodes. Electronically signed by Donny Martin MD on 07-23-22 at 2356    1.  2 x 8 cm area of infiltrate in the right lung base suspicious for pneumonia. There is underlying emphysema. 2.  Severe circumferential wall thickening involving the distal colon and rectum measuring up to 2.5 cm thick. This is contiguous with the wall of the vagina. There is stool in the vagina suggesting rectovaginal fistula. XR KNEE RIGHT (3 VIEWS)    Result Date: 7/25/2022  INDICATION: Pain. COMPARISON: None. TECHNIQUE: 4 views of the right knee were obtained. FINDINGS: Increased density visualized in the distal femur with subtle lucency is visualized in the femoral condyles bilaterally, increased density visualized along the articular surface  with subchondral cysts seen but no evidence of cortical irregularity or lucency to suggest a fracture. There is no evidence of acute fracture or dislocation. Mild to moderate narrowing of the medial, lateral and patellofemoral joint space is seen. There is no evidence of suprapatellar effusion. Vascular calcifications are seen. Overlying soft tissues are grossly unremarkable. Degenerative bone changes seen.  No evidence of acute osseous abnormality     XR CHEST PORTABLE    Result Date: 7/24/2022  EXAMINATION: XR CHEST PORTABLE CLINICAL HISTORY: ALTERED MENTAL STATUS COMPARISONS: JULY 2, 2022 FINDINGS: Osseous structures are intact. Cardiopericardial silhouette is normal. Pulmonary vasculature is normal. Lungs are clear. NO ACUTE CARDIOPULMONARY DISEASE. 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 fib with RVR we will transfer the patient to cardiac floor, cardiology evaluation,transfuse to keep Hgb> 7  keep K > 4 Spoke with nursing about the care, as per psych pt has not capacity. C/w current care, started Cardizem drip. 7/26: Possible colonoscopy tomorrow. Patient getting prep now. On Cardizem drip. Rate is better controlled. No overnight events no new complaints. Continue current care. 7/27: Patient going for colonoscopy. No overnight events send routine lab work-up tomorrow. Spoke with nursing about the care. 7/28: Patient is going for surgery today. No overnight events no new complaints. Hemoglobin stable. Hypoglycemia resolved. c/w current care  7/29: Status post left colectomy with end colostomy and Meir stump tolerated surgery well. Family decided on hospice. Hospice consulted. Continue current care. Denies any needs. Pain is controlled.   Electronically signed by Abel Ramos MD on 7/25/22 at 1:20 PM EDT

## 2022-07-29 NOTE — PROGRESS NOTES
Comprehensive Nutrition Assessment    Type and Reason for Visit:  Reassess    Nutrition Recommendations/Plan:   Continue Clear liquid diet and Clear oral supplements at all meals  When okay with surgeon, advance to General , low fiber diet as tolerated  Counseled pt & family on importance of adequate energy & protein intake, for recovery and disease management, with emphasis on nutrient rich food choices and appropriate supplement use       Malnutrition Assessment:  Malnutrition Status:  Severe malnutrition (07/26/22 1054)    Context:  Chronic Illness     Findings of the 6 clinical characteristics of malnutrition:  Energy Intake:  75% or less estimated energy requirements for 1 month or longer  Weight Loss:  Unable to assess (hx of stated weights)     Body Fat Loss:  Severe body fat loss Buccal region   Muscle Mass Loss:  Severe muscle mass loss Temples (temporalis)  Fluid Accumulation:  Unable to assess     Strength:  Not Performed    Nutrition Assessment:    Nutritional status remains compromised, no progress towards nutrition related goals, > 5 days NPO/Clears, malnutrition being address with oral nutrition supplements, awaiting diet advance per surgeon, once colostomy output improves    Nutrition Related Findings:    day 5 NPO/CLEAR, s/p colectomy wtih diverting colostomy 7/28, pt denies any GI or nutrition related complaints, currently weight increased with 1+ BLE, labs noted, meds reviewd Wound Type: Deep Tissue Injury (sacrum /hip)       Current Nutrition Intake & Therapies:    Average Meal Intake: 26-50% (clear liquids)  Average Supplements Intake: 26-50%  ADULT DIET; Clear Liquid  ADULT ORAL NUTRITION SUPPLEMENT; Breakfast, Lunch, Dinner; Clear Liquid Oral Supplement    Anthropometric Measures:  Height: 5' 2\" (157.5 cm)  Ideal Body Weight (IBW): 110 lbs (50 kg)    Admission Body Weight: 110 lb (49.9 kg) (stated 7/23)  Current Body Weight: 96 lb (43.5 kg) (7/26),    UTD due to edema  IBW.  Weight Source: Bed Scale  Current BMI (kg/m2): 17.6  Usual Body Weight: 95 lb 14 oz (43.5 kg) (bed 7/11/22;125 lb stated 9/10/21; 105 lb bed 2019)  % Weight Change (Calculated): 0.1                    BMI Categories: Underweight (BMI less than 22) age over 72    Estimated Daily Nutrient Needs:  Energy Requirements Based On: Kcal/kg  Weight Used for Energy Requirements: Current  Energy (kcal/day): 1300-1430kcal (30-33kcal/kg)  Weight Used for Protein Requirements: Current  Protein (g/day): 65-73g (1.5-1.7g/kg) increased for wounds  Method Used for Fluid Requirements: ml/Kg  Fluid (ml/day): ~1522ml (35ml/kg)    Nutrition Diagnosis:   Severe malnutrition, In context of chronic illness related to inadequate protein-energy intake as evidenced by severe muscle loss, severe loss of subcutaneous fat, weight loss, poor intake prior to admission  Inadequate protein-energy intake related to altered GI function as evidenced by NPO or clear liquid status due to medical condition    Nutrition Interventions:   Food and/or Nutrient Delivery: Continue Current Diet, Continue Oral Nutrition Supplement  Nutrition Education/Counseling: No recommendation at this time  Coordination of Nutrition Care: Continue to monitor while inpatient       Goals:  Previous Goal Met: No Progress toward Goal(s)  Goals: PO intake 75% or greater, other (specify)  Specify Other Goals: weight gain, improved wound status    Nutrition Monitoring and Evaluation:   Behavioral-Environmental Outcomes: None Identified  Food/Nutrient Intake Outcomes: Food and Nutrient Intake, Supplement Intake  Physical Signs/Symptoms Outcomes: Biochemical Data, Weight, GI Status, Meal Time Behavior, Nutrition Focused Physical Findings, Skin    Discharge Planning:     Too soon to determine     Aba Tobias RD, LD

## 2022-07-29 NOTE — PLAN OF CARE
Nutrition Problem #1: Severe malnutrition, In context of chronic illness  Intervention: Food and/or Nutrient Delivery: Continue Current Diet, Continue Oral Nutrition Supplement  Nutritional      Problem: Nutrition Deficit:  Goal: Optimize nutritional status  7/29/2022 1338 by Cha Liriano, RD, LD  Outcome: Not Progressing  Flowsheets (Taken 7/26/2022 1057 by Arturo Rodriguez, MS, RD, LD)  Nutrient intake appropriate for improving, restoring, or maintaining nutritional needs:   Assess nutritional status and recommend course of action   Monitor oral intake, labs, and treatment plans   Recommend appropriate diets, oral nutritional supplements, and vitamin/mineral supplements   Order, calculate, and assess calorie counts as needed   Recommend, monitor, and adjust tube feedings and TPN/PPN based on assessed needs   Provide specific nutrition education to patient or family as appropriate  7/29/2022 1027 by Federico Bautista RN  Outcome: Progressing

## 2022-07-29 NOTE — PROGRESS NOTES
[facility] Daily Progress Note  Name: Beata Fisher  Age: 78 y.o. Gender: female  CodeStatus: Full Code    Primary Cardiology : Dr. Roscoe Arambula MD  [id number]  Cardiology : Dr. Sterling Leach MD  [place]errat Parents APRN-CNP  Primary Care Provider: Sherlyn Hammans, MD  Admission Date: 7/23/2022    Chief complaint/Associated symptoms:   Vikas Ward was seen and examined at bedside    She is currently resting in bed, she is sleeping but arousal able. I have spoken with PRISCILLA man that states that she is taking her PO medication. 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 Assessment  Atrial Fib with RVR: New onset of atrial fib with RVR. Rate control strategy with noted improvement in HR. Has converted to SR.       2. V-Tach: Noted 14 beat V-Tach run  7/24/2022 2100. No reoccurrence. 3. RICARDO:  Resolved. Bun/creat 57/1.69 on admission,  8/0.77 today     4. Anemia:  Hgb/hct 10.3/32.8 on admission 8.6/26.5  today. Suggest maintaining  Hgb greater than 8, as this has been a factor in uncontrolled HR during this admission. Plan:  Monitor on telemetry  Rate control strategy,   If unable to take/maintain PO medications will need to change PO lopressor to IV  Continue current cardiac medications. Further recommendations per Dr. Francesca Galloway       Physical Exam  Constitutional:  Frail,  no distress, alert and cooperative. Respiratory/Thorax: Patent airways, CTAB,  normal breath sounds with good chest expansion, thorax symmetric. Cardiovascular: Regular  rate and rhythm, normal S1 and S2, PMI non displaced. Gastrointestinal:  Non distended, soft, tender  Genitourinary:  deferred  Musculoskeletal:  No apparent injury. Extremities:  No cyanosis, edema, contusions or wounds, no clubbing.    Neurological:  Alert   Moves extremities spontaneous with purpose  Psychological:  Appropriate mood and behavior  Skin:  Warm and dry,  no lesions or rashes. Allergies: Bee Venom  Pcn [Penicillins]    Medications:  Reviewed  Home Medications    Infusion Medications:    sodium chloride      dextrose      dextrose 5% in lactated ringers 75 mL/hr at 07/29/22 0655    sodium chloride      dilTIAZem (CARDIZEM) 125 mg in dextrose 5% 125 mL infusion Stopped (07/25/22 1637)    sodium chloride       Scheduled Medications:    sodium chloride flush  5-40 mL IntraVENous 2 times per day    folic acid  1 mg Oral Daily    ferrous gluconate  324 mg Oral Daily with breakfast    digoxin  250 mcg Oral Daily    midodrine  5 mg Oral TID WC    enoxaparin  30 mg SubCUTAneous Daily    ciprofloxacin  400 mg IntraVENous Q12H    metoprolol tartrate  25 mg Oral Q4H    sodium chloride flush  5-40 mL IntraVENous 2 times per day    metroNIDAZOLE  500 mg IntraVENous Q8H    isosorbide mononitrate  30 mg Oral Daily    levothyroxine  100 mcg Oral Daily     PRN Meds: sodium chloride flush, sodium chloride, fentanNYL, fentanNYL, labetalol **OR** hydrALAZINE, oxyCODONE-acetaminophen **OR** oxyCODONE-acetaminophen, HYDROmorphone, metoprolol, glucose, dextrose bolus **OR** dextrose bolus, glucagon (rDNA), dextrose, metoprolol, sodium chloride, traMADol, sodium chloride flush, sodium chloride, ondansetron **OR** ondansetron, polyethylene glycol, acetaminophen **OR** acetaminophen, camphor-menthol-methyl salicylate    Vitals  Vitals:    07/29/22 0544   BP: 137/68   Pulse: 88   Resp:    Temp:    SpO2:        I&O  No documented urine output      Telemetry:  SR 80's     EKG  7/29/2022  Normal sinus rhythm  HR 91 bpm.   Low voltage QRS  Lateral infarct , age undetermined       ECHO  7/4/2022  Left Ventricle  Left ventricular ejection fraction is visually estimated at 55%. Right Ventricle  Normal right ventricle structure and function. Normal right ventricle systolic pressure. Pericardial Effusion  No evidence of pericardial effusion.         Labs:   Recent Labs     07/26/22  0825 07/28/22  0151 07/29/22  0607   WBC 14.6* 13.5* 16.8*   HGB 9.0* 9.2* 8.6*   HCT 28.7* 28.0* 26.5*    257 228       Recent Labs     07/28/22  0651 07/29/22  0608    139   K 3.8 4.2   * 111*   CO2 14* 14*   BUN 10 8   CREATININE 0.81 0.77   CALCIUM 7.8* 7.9*       Recent Labs     07/28/22  0651 07/29/22  0608   AST 10 11   ALT <5 <5   BILITOT <0.2 <0.2   ALKPHOS 60 62       No results for input(s): INR in the last 72 hours. No results for input(s): Denia Martin in the last 72 hours. Urinalysis:   Lab Results   Component Value Date/Time    NITRU Negative 07/23/2022 10:52 PM    [address] 07/23/2022 10:52 PM    BACTERIA MANY 07/23/2022 10:52 PM    RBCUA 20-50 07/23/2022 10:52 PM    BLOODU LARGE 07/23/2022 10:52 PM    SPECGRAV 1.022 07/23/2022 10:52 PM    GLUCOSEU Negative 07/23/2022 10:52 PM       Radiology:   Most recent    Chest CT      WITH CONTRAST:No results found for this or any previous visit. WITHOUT CONTRAST: No results found for this or any previous visit. CXR      2-view: No results found for this or any previous visit. Portable: Results for orders placed during the hospital encounter of 07/23/22    XR CHEST PORTABLE    Narrative  EXAMINATION: XR CHEST PORTABLE    CLINICAL HISTORY: ALTERED MENTAL STATUS    COMPARISONS: JULY 2, 2022    FINDINGS: Osseous structures are intact. Cardiopericardial silhouette is normal. Pulmonary vasculature is normal. Lungs are clear. Impression  NO ACUTE CARDIOPULMONARY DISEASE. Active Hospital Problems    Diagnosis Date Noted    Rectovaginal fistula [N82.3] 07/24/2022     Priority: Medium    RICARDO (acute kidney injury) ([facility] [address] [N17.9]     Hypothyroidism [E03.9]     Hypertension [I10]        Additional work up or/and treatment plan may be added today or then after based on clinical progression. I am managing a portion of pt care. Some medical issues are handled by other specialists.  Additional work up and treatment should be done in out pt setting by pt PCP and other out pt providers. In addition to examining and evaluating pt, I spent additional time explaining care, normaland abnormal findings, and treatment plan. All of pt questions were answered. Counseling, diet and education were provided. Case will be discussed with nursing staff when appropriate. Family will be updated if and whenappropriate.       Electronically signed by VALARIE Cote CNP on 7/29/2022 at 8:19 AM

## 2022-07-29 NOTE — PROGRESS NOTES
Wound Ostomy Continence Nurse                                                                                Ostomy Referral Progress Note      NAME:  Hilton Mendoza  MEDICAL RECORD NUMBER:  [id number]  AGE: 78 y.o. GENDER:  female  :  1945  TODAY'S DATE:  2022    Subjective     Hilton Mendoza is a 78 y.o. female referred by:   [x] Physician  [] Nursing  [] Other:      New Colostomy    PAST MEDICAL HISTORY:        Diagnosis Date    Bilateral carotid artery disease (HCC)     CAD (coronary artery disease)     Hyperlipidemia     Hypertension     Hypothyroidism     Osteoarthritis        MEDICATIONS:    No current facility-administered medications on file prior to encounter. Current Outpatient Medications on File Prior to Encounter   Medication Sig Dispense Refill    dronabinol (MARINOL) 2.5 MG capsule Take 1 capsule by mouth in the morning and 1 capsule in the evening. Take before meals. Do all this for 30 days.  60 capsule 0    metoprolol succinate (TOPROL XL) 25 MG extended release tablet Take 3 tablets by mouth daily 30 tablet 3    magnesium oxide (MAG-OX) 400 (240 Mg) MG tablet Take 1 tablet by mouth daily 30 tablet 3    albuterol sulfate  (90 Base) MCG/ACT inhaler Inhale 2 puffs into the lungs every 4 hours as needed for Wheezing or Shortness of Breath (Space out to every 6 hours as symptoms improve) 1 Inhaler 0    cyanocobalamin ([employer] VITAMIN B12) 1000 MCG tablet Take 1 tablet by mouth daily 30 tablet 3    levothyroxine (SYNTHROID) 112 MCG tablet Take 1 tablet by mouth Daily 30 tablet 1    albuterol sulfate  (90 Base) MCG/ACT inhaler INHALE 2 PUFFS 4 TIMES DAILY AS NEEDED 8.5 Inhaler 3    isosorbide mononitrate (IMDUR) 30 MG extended release tablet Take 1 tablet by mouth daily 90 tablet 3       ALLERGIES:    Allergies   Allergen Reactions    Bee Venom     Pcn [Penicillins] Hives and Itching       PAST SURGICAL HISTORY:    Past Surgical History:   Procedure Laterality Date    CARDIAC CATHETERIZATION  1990s    COLECTOMY N/A 2022    DIVERTING SIGMOID COLOSTOMY. ROOM 179 performed by Mary Barboza MD at [address] [facility] N/A 2022    [facility]    LUMBAR    THYROIDECTOMY, PARTIAL         FAMILY HISTORY:    family history is not on file. SOCIAL HISTORY:    Social History     Tobacco Use    Smoking status: Former     Packs/day: 1.00     Years: 35.00     Pack years: 35.00     Types: Cigarettes     Start date:      Quit date: 1996     Years since quittin.6    Smokeless tobacco: Never   Substance Use Topics    Alcohol use:  Yes     Alcohol/week: 0.0 standard drinks     Comment: RARE    Drug use: Never       LABS:  WBC:    Lab Results   Component Value Date/Time    WBC 16.8 2022 06:07 AM     H/H:    Lab Results   Component Value Date/Time    HGB 8.6 2022 06:07 AM    HCT 26.5 2022 06:07 AM     BMP:    Lab Results   Component Value Date/Time     2022 06:08 AM    K 4.2 2022 06:08 AM    K 3.9 2022 04:58 AM     2022 06:08 AM    CO2 14 2022 06:08 AM    BUN 8 2022 06:08 AM    LABALBU 2.0 2022 06:08 AM    CREATININE 0.77 2022 06:08 AM    CALCIUM 7.9 2022 06:08 AM    GFRAA >60.0 2022 06:08 AM    LABGLOM >60.0 2022 06:08 AM    GLUCOSE 130 2022 06:08 AM     PTT:    Lab Results   Component Value Date/Time    APTT 26.1 2014 12:12 PM   [APTT}  PT/INR:    Lab Results   Component Value Date/Time    PROTIME 10.6 2014 12:12 PM    INR 1.0 2014 12:12 PM       Objective    BP (!) 123/51   Pulse 88   Temp 97.3 °F (36.3 °C) (Oral)   Resp 17   Ht 5' 2\" (1.575 m)   Wt 96 lb (43.5 kg)   LMP  (LMP Unknown)   SpO2 100%   BMI 17.56 kg/m²     Gilberto Risk Score Gilberto Scale Score: 14    Assessment   Surgeon Dr Samuel Single Colostomy LUQ (Active)   Stoma  Assessment [place] 07/29/22 1018   Peristomal Assessment Clean, dry & intact 07/29/22 1018   Stool Appearance Watery 07/29/22 1018   Stool Color Red Streak 07/28/22 2045   Stool Amount Small 07/28/22 2045   Number of days: 0                  Intake/Output Summary (Last 24 hours) at 7/29/2022 1226  Last data filed at 7/29/2022 1104  Gross per 24 hour   Intake 1410 ml   Output 200 ml   Net 1210 ml       DATE OF LAST BM=      Plan   Plan for Ostomy Care: Patient alert for this visit. Reports she is very tired. Stoma is beefy red, moist, protruding with intact skin surrounding. Current ostomy appliance os intact with minimal pink watery drainage noted. Patient is from [facility] and plan is for her to return when she is ready. Supplies left at bedside as well as colostomy teaching folder. Supply list placed on chart. Reviewed with patients primary RN. Ostomy Plan of Care  [x] Supplies/Instructions left in room  [] Patient using home supplies  [] Brand/supplies at bedside [employer]    Current Diet: ADULT DIET; Clear Liquid  ADULT ORAL NUTRITION SUPPLEMENT; Breakfast, Lunch, Dinner; Clear Liquid Oral Supplement  Dietician consult:  No    Discharge Plan:  Placement for patient upon discharge: skilled nursing    Outpatient visit plan No  Supplies given N/A   Samples requested N/A    Referrals:     [x] [facility]  [] Supplies  [] Other      Patient/Caregiver Teaching: Folder left at bedside. Will follow up for further teaching on Monday.   Written Instructions given to patient/family  Teaching provided:  [] Reviewed GI and A&P        [] Supplies  [] Pouch emptying      [] Manipulate closure  [] Routine Care         [] Comment  [] Pouch maintenance           Level of patient/caregiver understanding able to:  [] Indicates understanding       [x] Needs reinforcement  [] Unsuccessful      [] Verbal Understanding  [] Demonstrated understanding       [] No evidence of learning  [] Refused teaching         [] N/A    Electronically signed by  Mahesh Jaeger RN (covering Wound/Ostomy Nurse)

## 2022-07-29 NOTE — PROGRESS NOTES
[facility] is schedule to meet with patients daughter today at 0 for referral meeting. Patients son's will also be present via phone.

## 2022-07-29 NOTE — CARE COORDINATION
THIS LSW SPOKE WITH PATIENT, DTR. IGOR AND SON CAREN THIS AM.  D/C PLANS DISCUSSED: PATIENT WILL RETURN TO [facility] AND WILL BE ABLE TO STAY IN ROOM 828, PER Letty EMERY. PATIENT AND FAMILY ARE REQUESTING [facility] HOSPICE REFERRAL. I CALLED AND SPOKE WITH DANE AT [facility] AND MADE HER AWARE.  ZULEIKAW / CM TO FOLLOW.   Electronically signed by MAYTE Gan LSW on 7/29/22 at 10:34 AM EDT

## 2022-07-29 NOTE — CONSULTS
[address] referral completed in room with pt and her daughter Clark Garcia present and her 2 sons Deborah López and Alyssa Murphy on the phone. Educated family on hospice philosophy, levels of care, Medicare insurance and services available through hospice. Family is receptive to hospice services but wished to have some time to think and talk things over as to what the next step is they want to take. Spoke with Dr. Marsha Montemayor, pt found appropriate for services. Elvira Peters notified for pt being found appropriate. [facility] ready for services.

## 2022-07-30 LAB
ALBUMIN SERPL-MCNC: 1.8 G/DL (ref 3.5–4.6)
ALP BLD-CCNC: 52 U/L (ref 40–130)
ALT SERPL-CCNC: <5 U/L (ref 0–33)
ANION GAP SERPL CALCULATED.3IONS-SCNC: 13 MEQ/L (ref 9–15)
AST SERPL-CCNC: 9 U/L (ref 0–35)
BASOPHILS ABSOLUTE: 0.1 K/UL (ref 0–0.2)
BASOPHILS RELATIVE PERCENT: 0.5 %
BILIRUB SERPL-MCNC: <0.2 MG/DL (ref 0.2–0.7)
BUN BLDV-MCNC: 8 MG/DL (ref 8–23)
CALCIUM SERPL-MCNC: 7.8 MG/DL (ref 8.5–9.9)
CHLORIDE BLD-SCNC: 112 MEQ/L (ref 95–107)
CO2: 16 MEQ/L (ref 20–31)
CREAT SERPL-MCNC: 0.78 MG/DL (ref 0.5–0.9)
EOSINOPHILS ABSOLUTE: 0.1 K/UL (ref 0–0.7)
EOSINOPHILS RELATIVE PERCENT: 0.6 %
GFR AFRICAN AMERICAN: >60
GFR NON-AFRICAN AMERICAN: >60
GLOBULIN: 2.9 G/DL (ref 2.3–3.5)
GLUCOSE BLD-MCNC: 78 MG/DL (ref 70–99)
GLUCOSE BLD-MCNC: 84 MG/DL (ref 70–99)
GLUCOSE BLD-MCNC: 85 MG/DL (ref 70–99)
GLUCOSE BLD-MCNC: 92 MG/DL (ref 70–99)
GLUCOSE BLD-MCNC: 95 MG/DL (ref 70–99)
HCT VFR BLD CALC: 24.3 % (ref 37–47)
HEMOGLOBIN: 7.9 G/DL (ref 12–16)
LYMPHOCYTES ABSOLUTE: 0.6 K/UL (ref 1–4.8)
LYMPHOCYTES RELATIVE PERCENT: 3.6 %
MCH RBC QN AUTO: 28.3 PG (ref 27–31.3)
MCHC RBC AUTO-ENTMCNC: 32.5 % (ref 33–37)
MCV RBC AUTO: 87.3 FL (ref 82–100)
MONOCYTES ABSOLUTE: 0.4 K/UL (ref 0.2–0.8)
MONOCYTES RELATIVE PERCENT: 2.8 %
NEUTROPHILS ABSOLUTE: 14.3 K/UL (ref 1.4–6.5)
NEUTROPHILS RELATIVE PERCENT: 92.5 %
PDW BLD-RTO: 18.9 % (ref 11.5–14.5)
PERFORMED ON: NORMAL
PLATELET # BLD: 192 K/UL (ref 130–400)
POTASSIUM SERPL-SCNC: 3.8 MEQ/L (ref 3.4–4.9)
RBC # BLD: 2.78 M/UL (ref 4.2–5.4)
SODIUM BLD-SCNC: 141 MEQ/L (ref 135–144)
TOTAL PROTEIN: 4.7 G/DL (ref 6.3–8)
WBC # BLD: 15.5 K/UL (ref 4.8–10.8)

## 2022-07-30 PROCEDURE — 97163 PT EVAL HIGH COMPLEX 45 MIN: CPT

## 2022-07-30 PROCEDURE — 6360000002 HC RX W HCPCS: Performed by: SURGERY

## 2022-07-30 PROCEDURE — 1210000000 HC MED SURG R&B

## 2022-07-30 PROCEDURE — 6370000000 HC RX 637 (ALT 250 FOR IP): Performed by: COLON & RECTAL SURGERY

## 2022-07-30 PROCEDURE — 6360000002 HC RX W HCPCS: Performed by: COLON & RECTAL SURGERY

## 2022-07-30 PROCEDURE — 85025 COMPLETE CBC W/AUTO DIFF WBC: CPT

## 2022-07-30 PROCEDURE — 2580000003 HC RX 258: Performed by: STUDENT IN AN ORGANIZED HEALTH CARE EDUCATION/TRAINING PROGRAM

## 2022-07-30 PROCEDURE — 36415 COLL VENOUS BLD VENIPUNCTURE: CPT

## 2022-07-30 PROCEDURE — 80053 COMPREHEN METABOLIC PANEL: CPT

## 2022-07-30 PROCEDURE — 2500000003 HC RX 250 WO HCPCS: Performed by: COLON & RECTAL SURGERY

## 2022-07-30 PROCEDURE — 99024 POSTOP FOLLOW-UP VISIT: CPT | Performed by: SURGERY

## 2022-07-30 RX ORDER — METOCLOPRAMIDE HYDROCHLORIDE 5 MG/ML
5 INJECTION INTRAMUSCULAR; INTRAVENOUS EVERY 8 HOURS
Status: DISCONTINUED | OUTPATIENT
Start: 2022-07-30 | End: 2022-08-01 | Stop reason: HOSPADM

## 2022-07-30 RX ADMIN — DIGOXIN 250 MCG: 250 TABLET ORAL at 09:23

## 2022-07-30 RX ADMIN — HYDROMORPHONE HYDROCHLORIDE 0.5 MG: 1 INJECTION, SOLUTION INTRAMUSCULAR; INTRAVENOUS; SUBCUTANEOUS at 12:53

## 2022-07-30 RX ADMIN — METRONIDAZOLE 500 MG: 500 INJECTION, SOLUTION INTRAVENOUS at 05:42

## 2022-07-30 RX ADMIN — LEVOTHYROXINE SODIUM 100 MCG: 100 TABLET ORAL at 05:43

## 2022-07-30 RX ADMIN — METOPROLOL TARTRATE 25 MG: 25 TABLET, FILM COATED ORAL at 09:23

## 2022-07-30 RX ADMIN — METOPROLOL TARTRATE 25 MG: 25 TABLET, FILM COATED ORAL at 22:08

## 2022-07-30 RX ADMIN — TRAMADOL HYDROCHLORIDE 50 MG: 50 TABLET, COATED ORAL at 22:09

## 2022-07-30 RX ADMIN — ACETAMINOPHEN 650 MG: 325 TABLET, FILM COATED ORAL at 01:54

## 2022-07-30 RX ADMIN — SODIUM CHLORIDE, PRESERVATIVE FREE 10 ML: 5 INJECTION INTRAVENOUS at 09:23

## 2022-07-30 RX ADMIN — METOPROLOL TARTRATE 25 MG: 25 TABLET, FILM COATED ORAL at 01:51

## 2022-07-30 RX ADMIN — FOLIC ACID 1 MG: 1 TABLET ORAL at 09:23

## 2022-07-30 RX ADMIN — METOCLOPRAMIDE HYDROCHLORIDE 5 MG: 5 INJECTION INTRAMUSCULAR; INTRAVENOUS at 12:52

## 2022-07-30 RX ADMIN — METOPROLOL TARTRATE 25 MG: 25 TABLET, FILM COATED ORAL at 05:43

## 2022-07-30 RX ADMIN — ENOXAPARIN SODIUM 30 MG: 100 INJECTION SUBCUTANEOUS at 09:23

## 2022-07-30 RX ADMIN — METRONIDAZOLE 500 MG: 500 INJECTION, SOLUTION INTRAVENOUS at 12:56

## 2022-07-30 RX ADMIN — CIPROFLOXACIN 400 MG: 2 INJECTION, SOLUTION INTRAVENOUS at 04:09

## 2022-07-30 ASSESSMENT — PAIN DESCRIPTION - LOCATION: LOCATION: BACK;GENERALIZED

## 2022-07-30 ASSESSMENT — PAIN SCALES - GENERAL
PAINLEVEL_OUTOF10: 9
PAINLEVEL_OUTOF10: 7
PAINLEVEL_OUTOF10: 8

## 2022-07-30 ASSESSMENT — ENCOUNTER SYMPTOMS
DIARRHEA: 0
COUGH: 0
SHORTNESS OF BREATH: 0

## 2022-07-30 ASSESSMENT — PAIN - FUNCTIONAL ASSESSMENT: PAIN_FUNCTIONAL_ASSESSMENT: PREVENTS OR INTERFERES WITH MANY ACTIVE NOT PASSIVE ACTIVITIES

## 2022-07-30 ASSESSMENT — PAIN DESCRIPTION - ORIENTATION: ORIENTATION: LOWER

## 2022-07-30 ASSESSMENT — PAIN DESCRIPTION - DESCRIPTORS: DESCRIPTORS: ACHING;BURNING

## 2022-07-30 NOTE — PROGRESS NOTES
Pt has been A&O x4 throughout the night, although forgetful at times, pt did decline to be repositioned throughout the night, advised pt of the importance of being repositioned. She also requested to have her SCDs removed from her legs this am because they were bothering her. Pt was medicated with tylenol twice for abd pain rating it 7/10. Pt did not want anything stronger  than tylenol for pain. Lower ext elevated on a pillow. Bed alarm on.

## 2022-07-30 NOTE — PROGRESS NOTES
RBC 3.17* 2.97* 2.78*   HGB 9.2* 8.6* 7.9*   HCT 28.0* 26.5* 24.3*   MCV 88.4 89.4 87.3   RDW 18.9* 19.5* 18.9*    228 192       CHEMISTRIES:  Recent Labs     07/28/22  0651 07/29/22  0608 07/30/22  0601    139 141   K 3.8 4.2 3.8   * 111* 112*   CO2 14* 14* 16*   BUN 10 8 8   CREATININE 0.81 0.77 0.78   GLUCOSE 92 130* 95   MG  --  1.7  --        PT/INR:No results for input(s): PROTIME, INR in the last 72 hours. APTT:No results for input(s): APTT in the last 72 hours. LIVER PROFILE:  Recent Labs     07/28/22  0651 07/29/22  0608 07/30/22  0601   AST 10 11 9   ALT <5 <5 <5   BILITOT <0.2 <0.2 <0.2   ALKPHOS 60 62 52         Imaging Last 24 Hours:  CT ABDOMEN PELVIS WO CONTRAST Additional Contrast? None    Result Date: 7/23/2022  Patient: Hawk Uriarte  Time Out: 23:56 Exam(s): CT ABDOMEN + PELVIS Without Contrast  EXAM:   CT Abdomen and Pelvis Without Intravenous Contrast  CLINICAL HISTORY:    Reason for exam: concern for rectovaginal fistula. Chief complaint concern for rectovaginal fistula  TECHNIQUE:   Axial computed tomography images of the abdomen and pelvis without intravenous contrast.  All CT scan at this facility use dose modulation, iterative reconstruction, and/or weight based dosing when appropriate to reduce radiation dose to as low as reasonably achievable. COMPARISON:   September 10, 2021  FINDINGS:   Lung bases:  2 x 8 cm area of infiltrate in the right lung base suspicious for pneumonia. There is underlying emphysema. ABDOMEN:   Liver:  Unremarkable. Gallbladder and bile ducts:  Unremarkable. No calcified stones. No ductal dilation. Pancreas:  Unremarkable. No ductal dilation. Spleen:  Unremarkable. No splenomegaly. Adrenals:  Unremarkable. No mass. Kidneys and ureters:  Atrophic right kidney. There are calculi in the right renal pelvis measuring up to 4 mm. No hydronephrosis. Left kidney is unremarkable.    Stomach and bowel:  Severe circumferential wall thickening involving the distal colon and rectum measuring up to 2.5 cm thick. This is contiguous with the wall of the vagina. There is stool in the vagina suggesting rectovaginal fistula. No obstruction. PELVIS:   Appendix:  No findings to suggest acute appendicitis. Bladder:  Unremarkable. No stones. Reproductive:  Unremarkable as visualized. ABDOMEN and PELVIS:   Intraperitoneal space:  Unremarkable. No free air. No significant fluid collection. Bones/joints:  Metallic artifact from hardware from prior fusion of the lumbar spine at L4-5. There are moderate degenerative changes in the spine. No acute fracture or subluxation. Soft tissues:  Unremarkable. Vasculature: The abdominal aorta is severely calcified but nondilated. Lymph nodes:  Unremarkable. No enlarged lymph nodes. Electronically signed by Kimmy Hawkins MD on 07-23-22 at 2356    1.  2 x 8 cm area of infiltrate in the right lung base suspicious for pneumonia. There is underlying emphysema. 2.  Severe circumferential wall thickening involving the distal colon and rectum measuring up to 2.5 cm thick. This is contiguous with the wall of the vagina. There is stool in the vagina suggesting rectovaginal fistula. XR KNEE RIGHT (3 VIEWS)    Result Date: 7/25/2022  INDICATION: Pain. COMPARISON: None. TECHNIQUE: 4 views of the right knee were obtained. FINDINGS: Increased density visualized in the distal femur with subtle lucency is visualized in the femoral condyles bilaterally, increased density visualized along the articular surface  with subchondral cysts seen but no evidence of cortical irregularity or lucency to suggest a fracture. There is no evidence of acute fracture or dislocation. Mild to moderate narrowing of the medial, lateral and patellofemoral joint space is seen. There is no evidence of suprapatellar effusion. Vascular calcifications are seen. Overlying soft tissues are grossly unremarkable. Degenerative bone changes seen. No evidence of acute osseous abnormality     XR CHEST PORTABLE    Result Date: 7/24/2022  EXAMINATION: XR CHEST PORTABLE CLINICAL HISTORY: ALTERED MENTAL STATUS COMPARISONS: JULY 2, 2022 FINDINGS: Osseous structures are intact. Cardiopericardial silhouette is normal. Pulmonary vasculature is normal. Lungs are clear. NO ACUTE CARDIOPULMONARY DISEASE. 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 fib with RVR we will transfer the patient to cardiac floor, cardiology evaluation,transfuse to keep Hgb> 7  keep K > 4 Spoke with nursing about the care, as per psych pt has not capacity. C/w current care, started Cardizem drip. 7/26: Possible colonoscopy tomorrow. Patient getting prep now. On Cardizem drip. Rate is better controlled. No overnight events no new complaints. Continue current care. 7/27: Patient going for colonoscopy. No overnight events send routine lab work-up tomorrow. Spoke with nursing about the care. 7/28: Patient is going for surgery today. No overnight events no new complaints. Hemoglobin stable. Hypoglycemia resolved. c/w current care  7/29: Status post left colectomy with end colostomy and Meir stump tolerated surgery well. Family decided on hospice. Hospice consulted. Continue current care. Denies any needs. Pain is controlled. 7/30: Family deciding about hospice. If patient does not sign up with hospice needs pre-CERT for SNF placement. Spoke with . Advance diet as tolerated as per surgery. Antibiotics as per surgery.   Electronically signed by Arline Mcintosh MD on 7/25/22 at 1:20 PM EDT

## 2022-07-30 NOTE — CARE COORDINATION
This LSW met with patient at bedside this am. Patient resting comfortably. D/C PLAN: Patient will return to [facility] in room 828. [facility] met with patient and family on 7/29/2022- family to contact [facility] when / if they decided that they would like Hospice services. Patient to be found Hospice appropriate. LSW / CM to follow.   Electronically signed by MAYTE Nur, IVORY on 7/30/22 at 8:16 AM EDT

## 2022-07-30 NOTE — PROGRESS NOTES
Shift assessment complete. Patient A&Ox4. VSS. She takes pills whole one at a time, she took 2 pills and refused to take the rest. I offered to assist her with liquids for breakfast which she also refused. I attempted to turn her and she refused stating that she is comfortable the way she is. She was educated on medication, nutrition, and turning to prevent skin break down. She states that might try later, but she doesn't feel well right now. Bed alarm on for safety. Call light in reach. 1630: Loss IV access. Dr Adalid House aware.

## 2022-07-30 NOTE — PROGRESS NOTES
Physical Therapy Med Surg Initial Assessment  Facility/Department: Replaced by [facility] MED SURG UNIT  Room: [facility]       NAME: Shreyas Leyva  : 1945 (78 y.o.)  MRN: [id number]  CODE STATUS: Full Code    Date of Service: 2022    Patient Diagnosis(es): Rectovaginal fistula [N82.3]  Septicemia ([facility] [address] [address] [A41.9]  Acute kidney injury ([address] [N17.9]  Acute cystitis without hematuria [N30.00]  Pneumonia of right lower lobe due to infectious organism [J18.9]   Chief Complaint   Patient presents with    Other     Sent in from [facility], nurses aides reported stool in vaginal vault, no history of rectal vaginal fistula to date, but this did occur a couple times prior to being sent to ED for evaluation, patient is afebrile, alert and oriented x2 which is baseline     Patient Active Problem List    Diagnosis Date Noted    Rectovaginal fistula 2022    Severe malnutrition ([address]     Complicated UTI (urinary tract infection) 2022    RICARDO (acute kidney injury) ([address]     Pneumonia due to COVID-19 virus 09/10/2021    Acute bronchitis 2020    Pancytopenia ([address]     Peripheral polyneuropathy     Weight loss     TIA (transient ischemic attack) 2019    Bilateral carotid artery stenosis 09/15/2017    Hypothyroidism     Hypertension     Hyperlipidemia     Degenerative spondylolisthesis 2015    Lumbar stenosis with neurogenic claudication 2015        Past Medical History:   Diagnosis Date    Bilateral carotid artery disease (HCC)     CAD (coronary artery disease)     Hyperlipidemia     Hypertension     Hypothyroidism     Osteoarthritis      Past Surgical History:   Procedure Laterality Date    CARDIAC CATHETERIZATION  1990s    COLECTOMY N/A 2022    DIVERTING SIGMOID COLOSTOMY.  ROOM 179 performed by Chasity Crowe MD at [address] [place] [address] N/A 2022    COLONOSCOPY performed by Chasity Crowe MD at [place] [address]  2014    LUMBAR    THYROIDECTOMY, PARTIAL   Chart Reviewed: Yes  Patient assessed for rehabilitation services?: Yes  Family / Caregiver Present: No    Restrictions:  Restrictions/Precautions: Fall Risk (high fall risk per Esta Blazing)  Position Activity Restriction  Other position/activity restrictions: abdominal precautions s/p diverting sigmoid colostomy 22     SUBJECTIVE:   Subjective: pt report \"Not a good day\" Pt reports abdominal pain    Pain  Pre treatment screenin/10 OR Faces 1-10  []  Pt declined intervention  [x]  Pt able to proceed PT session  [x] physician at pt's bedside at PT arrival  []  RN called to bedside to administer meds.   Post treatment screening 8/10, described as same as above     Prior Level of Function:  Social/Functional History  Lives With: Daughter  Type of Home: House  Home Layout: Able to Live on Main level with bedroom/bathroom, Two level (typically was using bedroom on 2nd level, 14 steps with1 handrail prior to illness)  Home Access: Stairs to enter with rails  Entrance Stairs - Number of Steps: 2  Entrance Stairs - Rails: Right  Bathroom Shower/Tub: Tub/Shower unit  Bathroom Equipment: Shower chair, Grab bars in [address] [facility]: [facility], [facility] [address], [place], [address] Help From: Family (dtr)  ADL Assistance: Independent  Homemaking Assistance: Needs assistance  Homemaking Responsibilities: Yes  Ambulation Assistance: Independent (cane)  Transfer Assistance: Independent  Active : No    OBJECTIVE:   Vision  Vision: Impaired  Vision Exceptions: Wears glasses for reading  Hearing: Within functional limits    Cognition:  Overall Orientation Status: Within Functional Limits  Orientation Level: Oriented to person, Oriented to place (oriented 2022)  Follows Commands: Within Functional Limits    Observation/Palpation  Posture: Fair  Observation: pleasant, cooperative, no acute distress noted, on RA    ROM:  AROM: Grossly decreased, non-functional (significantly limited by strength deficits)  PROM: Generally decreased, functional (able to obtain hip/knee 90/90, ankle DF to neutral only)    Strength:  Strength: Generally decreased, functional (R LE 2-/5, L LE 2/5)    Neuro:  Balance  Sitting - Static: Poor (anticipated based on observe trunk control during bed mobility)     Tone: Normal  Coordination: Grossly decreased, non-functional    Sensation: Intact (pt denies parasthesias)    Bed mobility  Rolling to Right: Dependent/Total  Supine to Sit: Dependent/Total  Bed Mobility Comments: able to lift head of bed and perform head turns, limited pt participation d/t pain and overall debility    Transfers  Sit to Stand: Unable to assess  Stand to sit: Unable to assess  Comment: anticipate dependent assistance, recommend mechnical lift at this time into recliner for OOB activity      Activity Tolerance  Activity Tolerance: Patient limited by pain      Patient Education  Education Given To: Patient  Education Provided: Plan of Care;Role of Therapy  Education Method: Verbal  Education Outcome: Verbalized understanding       ASSESSMENT:   Body Structures, Functions, Activity Limitations Requiring Skilled Therapeutic Intervention: Decreased functional mobility ; Decreased strength;Decreased ROM; Decreased cognition;Decreased balance;Decreased posture; Increased pain  Decision Making: High Complexity  History: high  Exam: high  Clinical Presentation: high    Specific Instructions for Next Treatment: bed therex, build rapport and encourage sitting EOB  Therapy Prognosis: Fair (Hx of poor participation)  Barriers to Learning: cognitive deficits         DISCHARGE RECOMMENDATIONS:  Discharge Recommendations: Continue to assess pending progress, Patient would benefit from continued therapy after discharge  Assessment: Pt demonstrates the above deficits and decline in functional mobility status placing them at increased risk for falls.  Pt would benefit from physical therapy to address above deficits and allow for safe return home at highest level of function, decrease risk for falls, and improve QOL. Requires PT Follow-Up: Yes       PLAN OF CARE:  Plan  Plan: 1 time a day 3-6 times a week  Specific Instructions for Next Treatment: bed therex, build rapport and encourage sitting EOB  Current Treatment Recommendations: Strengthening, ROM, Balance training, Functional mobility training, Neuromuscular re-education, Return to work related activity, Home exercise program, Safety education & training, Patient/Caregiver education & training, Equipment evaluation, education, & procurement, Cognitive reorientation, Gait training, Transfer training    Safety Devices  Type of Devices: Left in bed, Bed alarm in place, Call light within reach    Goals:  Patient goals : to feel better  Long Term Goals  Long term goal 1: Rolling with Min A to decrease caregiver burden and promote ability to reposition self for comfort and prevent pressure injury  Long term goal 2: Supine<>sit with Max A using log roll technique  Long term goal 3: Unsupported sitting x 5 min with Mod A  Long term goal 4: Min A with HEP to improve LE strength, ROM, and activity tolerance    AMPA (6 CLICK) BASIC MOBILITY  AM-PAC Inpatient Mobility Raw Score : 6     Therapy Time:   Individual   Time In 1321   Time Out 1340   Minutes [address], 07/30/22 at 1:44 PM       Definitions for assistance levels  Independent = pt does not require any physical supervision or assistance from another person for activity completion. Device may be needed.   Stand by assistance = pt requires verbal cues or instructions from another person, close to but not touching, to perform the activity  Minimal assistance= pt performs 75% or more of the activity; assistance is required to complete the activity  Moderate assistance= pt performs 50% of the activity; assistance is required to complete the activity  Maximal assistance = pt performs 25% of the activity; assistance is required to complete the activity  Dependent = pt requires total physical assistance to accomplish the task

## 2022-07-30 NOTE — PROGRESS NOTES
Pt Name: Ada Hickey  Medical Record Number: [id number]  Date of Birth 1945   Admit date 7/23/2022  7:35 PM  Today's Date: 7/30/2022     ASSESSMENT  1. Post op day # 2  2. Ada Hickey had Procedure(s): DIVERTING SIGMOID COLOSTOMY. ROOM 179  3. Stable  4. Minimal ostomy output    PLAN  Reglan 5 mg twice daily  PT OT consult    SUBJECTIVE  Chief complaint: Not hungry  Afebrile, vital signs are stable. She denies any nausea or vomiting, has not passed flatus or had a bowel movement. She is tolerating a ADULT DIET; Clear Liquid  ADULT ORAL NUTRITION SUPPLEMENT; Breakfast, Lunch, Dinner; Clear Liquid Oral Supplement. Her pain is well controlled on current medications. She has not been ambulating in the halls. has a past medical history of Bilateral carotid artery disease ([address] CAD (coronary artery disease), Hyperlipidemia, Hypertension, Hypothyroidism, and Osteoarthritis.     CURRENT MEDS  Scheduled Meds:   metoclopramide  5 mg IntraVENous Q8H    sodium chloride flush  5-40 mL IntraVENous 2 times per day    folic acid  1 mg Oral Daily    ferrous gluconate  324 mg Oral Daily with breakfast    digoxin  250 mcg Oral Daily    midodrine  5 mg Oral TID WC    enoxaparin  30 mg SubCUTAneous Daily    ciprofloxacin  400 mg IntraVENous Q12H    metoprolol tartrate  25 mg Oral Q4H    sodium chloride flush  5-40 mL IntraVENous 2 times per day    metroNIDAZOLE  500 mg IntraVENous Q8H    isosorbide mononitrate  30 mg Oral Daily    levothyroxine  100 mcg Oral Daily     Continuous Infusions:   sodium chloride      dextrose      dextrose 5% in lactated ringers 75 mL/hr at 07/30/22 0816    sodium chloride      dilTIAZem (CARDIZEM) 125 mg in dextrose 5% 125 mL infusion Stopped (07/25/22 1637)    sodium chloride       PRN Meds:.sodium chloride flush, sodium chloride, fentanNYL, fentanNYL, labetalol **OR** hydrALAZINE, oxyCODONE-acetaminophen **OR** oxyCODONE-acetaminophen, HYDROmorphone, metoprolol, glucose, dextrose bolus **OR** dextrose bolus, glucagon (rDNA), dextrose, metoprolol, sodium chloride, traMADol, sodium chloride flush, sodium chloride, ondansetron **OR** ondansetron, polyethylene glycol, acetaminophen **OR** acetaminophen, camphor-menthol-methyl salicylate    OBJECTIVE  CURRENT VITALS:  height is 5' 2\" (1.575 m) and weight is 96 lb (43.5 kg). Her oral temperature is 97.3 °F (36.3 °C). Her blood pressure is 147/74 (abnormal) and her pulse is 80. Her respiration is 16 and oxygen saturation is 100%. Temperature Range (24h):Temp: 97.3 °F (36.3 °C) Temp  Av.3 °F (36.3 °C)  Min: 97.3 °F (36.3 °C)  Max: 97.3 °F (36.3 °C)  BP Range (59A): Systolic (07MPN), I , Min:134 , ARJ:872     Diastolic (93BRL), NAX:13, Min:41, Max:74    Pulse Range (24h): Pulse  Av.3  Min: 76  Max: 83  Respiration Range (24h): Resp  Av.3  Min: 16  Max: 17    GENERAL: alert, no distress  LUNGS: clear to ausculation, without wheezes, rales or rhonci  HEART: normal rate and regular    ABDOMEN: Soft and nontender. Good bowel sounds, left lower quadrant ostomy that is viable with very little output  EXTERMITY: no cyanosis, clubbing or edema    In: 2310.7 [P.O.:300; I.V.:1219.9]  Out: 1070 [Urine:1050]  Date 22 0000 - 22 2359   Shift 5058-0160 1068-3338 6446-2949 24 Hour Total   INTAKE   P.O.(mL/kg/hr) 240(0.7)   240   I. V.(mL/kg)  469. 9(10.8)  469. 9(10.8)   IV Piggyback(mL/kg)  390.9(9)  390.9(9)   Shift Total(mL/kg) 240(5.5) 860.7(19.8)  1100. 7(25.3)   OUTPUT   Urine(mL/kg/hr) 350(1)   350   Stool(mL/kg) 20(0.5)   20(0.5)   Shift Total(mL/kg) 370(8.5)   370(8.5)   Weight (kg) 43.5 43.5 43.5 43.5       LABS  Recent Labs     22  0651 22  0607 22  0608 22  0601   WBC 13.5* 16.8*  --  15.5*   HGB 9.2* 8.6*  --  7.9*   HCT 28.0* 26.5*  --  24.3*    228  --  192     --  139 141   K 3.8  --  4.2 3.8   *  --  111* 112*   CO2 14*  --  14* 16*   BUN 10  --  8 8   CREATININE 0.81  --  0.77 0.78   MG  --   --  1.7  --    CALCIUM 7.8*  --  7.9* 7.8*      No results for input(s): PTT, INR in the last 72 hours. Invalid input(s): PT  Recent Labs     07/28/22  0651 07/29/22  0608 07/30/22  0601   AST 10 11 9   ALT <5 <5 <5   BILITOT <0.2 <0.2 <0.2       RADIOLOGY  Echocardiogram complete 2D with doppler with color    Result Date: 7/4/2022  Transthoracic Echocardiography Report (TTE)  Demographics   Patient Name   Syed Anaya Gender              Female                 P   Patient Number [id number]          Race                                                    Ethnicity   Visit Number   [id number]         Room Number         R999   Corporate ID                     Date of Study       07/04/2022   Accession      [id number]        Referring Physician Deyvi Tabares DO  Number   Date of Birth  1945        Sonographer         Lexie Dennis   Age            78 year(s)        Interpreting        [facility])                                   Physician           Cardiology                                                       Karan Kapoor DO  Procedure Type of Study   TTE procedure:ECHO COMPLETE 2D W/DOP W/COLOR. Procedure Date Date: 07/04/2022 Start: 12:38 PM Study Location: Portable Technical Quality: Adequate visualization Indications:LVF. Patient Status: Routine Height: 62 inches Weight: 95 pounds BSA: 1.39 m^2 BMI: 17.38 kg/m^2 BP: 107/44 mmHg  Conclusions   Summary  Technically limited study  Left ventricular ejection fraction is visually estimated at 55%. Normal right ventricle structure and function. Normal LV size and  function. No evidence of pericardial effusion.    Signature   ----------------------------------------------------------------  Electronically signed by Karan Kapoor DO(Interpreting  physician) on 07/04/2022 09:08 PM  ----------------------------------------------------------------   Findings  Left Ventricle Left ventricular ejection fraction is visually estimated at 55%. Right Ventricle Normal right ventricle structure and function. Normal right ventricle systolic pressure. Pericardial Effusion No evidence of pericardial effusion. CT ABDOMEN PELVIS WO CONTRAST Additional Contrast? None    Result Date: 7/23/2022  Patient: Moody Mckinley  Time Out: 23:56 Exam(s): CT ABDOMEN + PELVIS Without Contrast  EXAM:   CT Abdomen and Pelvis Without Intravenous Contrast  CLINICAL HISTORY:    Reason for exam: concern for rectovaginal fistula. Chief complaint concern for rectovaginal fistula  TECHNIQUE:   Axial computed tomography images of the abdomen and pelvis without intravenous contrast.  All CT scan at this facility use dose modulation, iterative reconstruction, and/or weight based dosing when appropriate to reduce radiation dose to as low as reasonably achievable. COMPARISON:   September 10, 2021  FINDINGS:   Lung bases:  2 x 8 cm area of infiltrate in the right lung base suspicious for pneumonia. There is underlying emphysema. ABDOMEN:   Liver:  Unremarkable. Gallbladder and bile ducts:  Unremarkable. No calcified stones. No ductal dilation. Pancreas:  Unremarkable. No ductal dilation. Spleen:  Unremarkable. No splenomegaly. Adrenals:  Unremarkable. No mass. Kidneys and ureters:  Atrophic right kidney. There are calculi in the right renal pelvis measuring up to 4 mm. No hydronephrosis. Left kidney is unremarkable. Stomach and bowel:  Severe circumferential wall thickening involving the distal colon and rectum measuring up to 2.5 cm thick. This is contiguous with the wall of the vagina. There is stool in the vagina suggesting rectovaginal fistula. No obstruction. PELVIS:   Appendix:  No findings to suggest acute appendicitis. Bladder:  Unremarkable. No stones. Reproductive:  Unremarkable as visualized. ABDOMEN and PELVIS:   Intraperitoneal space:  Unremarkable. No free air. No significant fluid collection. Bones/joints:  Metallic artifact from hardware from prior fusion of the lumbar spine at L4-5. There are moderate degenerative changes in the spine. No acute fracture or subluxation. Soft tissues:  Unremarkable. Vasculature: The abdominal aorta is severely calcified but nondilated. Lymph nodes:  Unremarkable. No enlarged lymph nodes. Electronically signed by Agnieszka Hein MD on 07-23-22 at 2356    1.  2 x 8 cm area of infiltrate in the right lung base suspicious for pneumonia. There is underlying emphysema. 2.  Severe circumferential wall thickening involving the distal colon and rectum measuring up to 2.5 cm thick. This is contiguous with the wall of the vagina. There is stool in the vagina suggesting rectovaginal fistula. XR KNEE RIGHT (3 VIEWS)    Result Date: 7/25/2022  INDICATION: Pain. COMPARISON: None. TECHNIQUE: 4 views of the right knee were obtained. FINDINGS: Increased density visualized in the distal femur with subtle lucency is visualized in the femoral condyles bilaterally, increased density visualized along the articular surface  with subchondral cysts seen but no evidence of cortical irregularity or lucency to suggest a fracture. There is no evidence of acute fracture or dislocation. Mild to moderate narrowing of the medial, lateral and patellofemoral joint space is seen. There is no evidence of suprapatellar effusion. Vascular calcifications are seen. Overlying soft tissues are grossly unremarkable. Degenerative bone changes seen. No evidence of acute osseous abnormality     CT HEAD WO CONTRAST    Result Date: 7/9/2022  CT HEAD WO CONTRAST : 7/9/2022 CLINICAL HISTORY:  confusion . COMPARISON: Head MRI 5/27/2019 and head CT 5/26/2019. TECHNIQUE: Spiral unenhanced images were obtained of the head, with routine multiplanar reconstructions performed.  All CT scans at this facility use dose modulation, iterative reconstruction, and/or weight based dosing when appropriate to reduce radiation dose to as low as reasonably achievable. FINDINGS: There is no intracranial hemorrhage, mass effect, midline shift, extra-axial collection, evidence of hydrocephalus, recent ischemic infarct, or skull fracture identified. Mild generalized cerebral volume loss and mild to moderate patchy white matter changes are again noted. The mastoid air cells and visualized paranasal sinuses are essentially clear. NO ACUTE INTRACRANIAL PROCESSOR SIGNIFICANT CHANGE FROM PRIOR STUDIES IDENTIFIED. XR CHEST PORTABLE    Result Date: 7/24/2022  EXAMINATION: XR CHEST PORTABLE CLINICAL HISTORY: ALTERED MENTAL STATUS COMPARISONS: JULY 2, 2022 FINDINGS: Osseous structures are intact. Cardiopericardial silhouette is normal. Pulmonary vasculature is normal. Lungs are clear. NO ACUTE CARDIOPULMONARY DISEASE. XR CHEST PORTABLE    Result Date: 7/2/2022  XR CHEST PORTABLE Clinical History:  Failure to thrive. Comparison:  9/10/2021. RESULT: No consolidation. Hyperinflated lungs. Mildly coarsened lung markings, unchanged. No large pleural effusion. No pneumothorax. Stable cardiomediastinal silhouette. Aortic vascular calcifications. No distinct acute osseous findings. No acute radiographic abnormality. US RETROPERITONEAL COMPLETE    Addendum Date: 7/3/2022    Addendum: Reason for addendum is typographical error in the impression of the report. Here is a new report: EXAMINATION:   RENAL ULTRASOUND HISTORY:  Acute renal failure. TECHNIQUE:  Sonography of the kidneys was performed. Images were obtained and stored in a permanent archive. COMPARISON: CT 9/10/2021. RESULT: Right Kidney:      -Renal length: 6.0 cm      -Parenchyma: Parenchyma echogenicity is increased. Diffuse parenchymal thinning present measuring around 5 mm.      -Collecting system: No hydronephrosis. -Calculus: Echogenic lower pole calculus, similar to the prior CT.      -Lesion:  1.2 cm cyst, unchanged.  Left Kidney:          -Renal length: 10.0 cm      -Parenchyma: Normal parenchymal echogenicity. Normal parenchymal thickness measuring around 12 mm.      -Collecting system: No hydronephrosis. -Calculus: No echogenic, shadowing calculus.      -Lesion:  None. Bladder: Decompressed. Result Date: 7/3/2022  EXAMINATION:   RENAL ULTRASOUND HISTORY:  Acute renal failure. TECHNIQUE:  Sonography of the kidneys was performed. Images were obtained and stored in a permanent archive. COMPARISON: CT 9/10/2021. RESULT: Right Kidney:      -Renal length: 6.0 cm      -Parenchyma: Parenchyma echogenicity is increased. Diffuse parenchymal thinning present measuring around 5 mm.      -Collecting system: No hydronephrosis. -Calculus: Echogenic lower pole calculus, similar to the prior CT.      -Lesion:  1.2 cm cyst, unchanged. Left Kidney:          -Renal length: 10.0 cm      -Parenchyma: Normal parenchymal echogenicity. Normal parenchymal thickness measuring around 12 mm.      -Collecting system: No hydronephrosis. -Calculus: No echogenic, shadowing calculus.      -Lesion:  None. Bladder: Decompressed. Right nephrolithiasis. No hydronephrosis. Severe right RENAL ATROPHY, unchanged.      Electronically signed by Pineda Deleon MD on 7/30/2022 at 2:34 PM

## 2022-07-30 NOTE — PROGRESS NOTES
Spoke with Timmy Roberts the code status is changed to [facility] as per her request.

## 2022-07-30 NOTE — PROGRESS NOTES
[facility] Daily Progress Note      Date: 7/30/2022    Name: Ada Hickey    Age: 78 y.o. Gender: female  CodeStatus: Full Code      Rounding Cardiology : Nj Arthur MD    Primary Cardiology : Dr. Kate Prescott MD    Primary Care Provider: Alpesh Botello MD    Subjective:    Chart Reviewed and patient examined. She denies chest pain, palpitations, lightheadedness or dizziness. Still with chronic shortness of breath     Remains in SR. 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    14 system General and Cardiac ROS otherwise negative or unchanged. 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 Atrial Fib with RVR: new onset, converted to SR. V-Tach run  7/24/2022  PSVT  Normal Echo and LV Function, LVEF 55%. 7/22  RICARDO:  Resolved. UTI, Recent  HTN  HLP  Hypothyroidism  Dementia   as prior otherwise     Plan:    Cardiac Supportive Conservative Care  Post operative care. Monitor on telemetry  Continue current medications. Palliative care planned at this point  Oncology consultation noted. Further recommendations  See orders.      ==================================================    Physical Exam  Constitutional:  Frail,  awake/alert/oriented x3, no distress, alert and cooperative. Respiratory/Thorax: Patent airways, CTAB,  normal breath sounds with good chest expansion, thorax symmetric. Cardiovascular: Irregular  rate and rhythm, normal S1 and S2, PMI non displaced. Gastrointestinal:  Non distended, soft, non-tender, no rebound tenderness or guarding, Genitourinary:  deferred  Musculoskeletal:  No apparent injury. Extremities:  No cyanosis, edema, contusions or wounds, no clubbing.    Neurological: Alert and oriented x3. Moves extremities spontaneous with purpose  Psychological:  Appropriate mood and behavior  Skin:  Warm and dry,  no lesions or rashes. Allergies: Bee Venom  Pcn [Penicillins]    Medications:  Reviewed  Home Medications    Infusion Medications:    sodium chloride      dextrose      dextrose 5% in lactated ringers 75 mL/hr at 07/30/22 0816    sodium chloride      dilTIAZem (CARDIZEM) 125 mg in dextrose 5% 125 mL infusion Stopped (07/25/22 1637)    sodium chloride       Scheduled Medications:    metoclopramide  5 mg IntraVENous Q8H    sodium chloride flush  5-40 mL IntraVENous 2 times per day    folic acid  1 mg Oral Daily    ferrous gluconate  324 mg Oral Daily with breakfast    digoxin  250 mcg Oral Daily    midodrine  5 mg Oral TID WC    enoxaparin  30 mg SubCUTAneous Daily    ciprofloxacin  400 mg IntraVENous Q12H    metoprolol tartrate  25 mg Oral Q4H    sodium chloride flush  5-40 mL IntraVENous 2 times per day    metroNIDAZOLE  500 mg IntraVENous Q8H    isosorbide mononitrate  30 mg Oral Daily    levothyroxine  100 mcg Oral Daily     PRN Meds: sodium chloride flush, sodium chloride, fentanNYL, fentanNYL, labetalol **OR** hydrALAZINE, oxyCODONE-acetaminophen **OR** oxyCODONE-acetaminophen, HYDROmorphone, metoprolol, glucose, dextrose bolus **OR** dextrose bolus, glucagon (rDNA), dextrose, metoprolol, sodium chloride, traMADol, sodium chloride flush, sodium chloride, ondansetron **OR** ondansetron, polyethylene glycol, acetaminophen **OR** acetaminophen, camphor-menthol-methyl salicylate    Vitals  Vitals:    07/30/22 0722   BP:    Pulse:    Resp: 17   Temp:    SpO2:        I&O  No documented urine output      Telemetry:  Atrial fibrillation with RVR  115 -120's. ECHO  7/4/2022  Left Ventricle  Left ventricular ejection fraction is visually estimated at 55%. Right Ventricle  Normal right ventricle structure and function. Normal right ventricle systolic pressure.      Pericardial Effusion  No evidence of pericardial effusion. Labs:   Recent Labs     07/28/22  0651 07/29/22  0607 07/30/22  0601   WBC 13.5* 16.8* 15.5*   HGB 9.2* 8.6* 7.9*   HCT 28.0* 26.5* 24.3*    228 192       Recent Labs     07/28/22  0651 07/29/22  0608 07/30/22  0601    139 141   K 3.8 4.2 3.8   * 111* 112*   CO2 14* 14* 16*   BUN 10 8 8   CREATININE 0.81 0.77 0.78   CALCIUM 7.8* 7.9* 7.8*       Recent Labs     07/28/22  0651 07/29/22  0608 07/30/22  0601   AST 10 11 9   ALT <5 <5 <5   BILITOT <0.2 <0.2 <0.2   ALKPHOS 60 62 52       No results for input(s): INR in the last 72 hours. No results for input(s): Eileen Mould in the last 72 hours. Urinalysis:   Lab Results   Component Value Date/Time    NITRU Negative 07/23/2022 10:52 PM    [address] 07/23/2022 10:52 PM    BACTERIA MANY 07/23/2022 10:52 PM    RBCUA 20-50 07/23/2022 10:52 PM    BLOODU LARGE 07/23/2022 10:52 PM    SPECGRAV 1.022 07/23/2022 10:52 PM    GLUCOSEU Negative 07/23/2022 10:52 PM       Radiology:   Most recent    Chest CT      WITH CONTRAST:No results found for this or any previous visit. WITHOUT CONTRAST: No results found for this or any previous visit. CXR      2-view: No results found for this or any previous visit. Portable: Results for orders placed during the hospital encounter of 07/23/22    XR CHEST PORTABLE    Narrative  EXAMINATION: XR CHEST PORTABLE    CLINICAL HISTORY: ALTERED MENTAL STATUS    COMPARISONS: JULY 2, 2022    FINDINGS: Osseous structures are intact. Cardiopericardial silhouette is normal. Pulmonary vasculature is normal. Lungs are clear. Impression  NO ACUTE CARDIOPULMONARY DISEASE.         Active Hospital Problems    Diagnosis Date Noted    Rectovaginal fistula [N82.3] 07/24/2022     Priority: Medium    RICARDO (acute kidney injury) ([address] [N17.9]     Hypothyroidism [E03.9]     Hypertension [I10]        Additional work up or/and treatment plan may be added today or then after based on clinical progression. I am managing a portion of pt care. Some medical issues are handled by other specialists. Additional work up and treatment should be done in out pt setting by pt PCP and other out pt providers. In addition to examining and evaluating pt, I spent additional time explaining care, normaland abnormal findings, and treatment plan. All of pt questions were answered. Counseling, diet and education were provided. Case will be discussed with nursing staff when appropriate. Family will be updated if and whenappropriate.       Electronically signed by Noreen Powell MD on 7/30/2022 at 12:31 PM

## 2022-07-31 LAB
GLUCOSE BLD-MCNC: 92 MG/DL (ref 70–99)
GLUCOSE BLD-MCNC: 94 MG/DL (ref 70–99)
GLUCOSE BLD-MCNC: 95 MG/DL (ref 70–99)
PERFORMED ON: NORMAL

## 2022-07-31 PROCEDURE — 99024 POSTOP FOLLOW-UP VISIT: CPT | Performed by: SURGERY

## 2022-07-31 PROCEDURE — 6360000002 HC RX W HCPCS: Performed by: COLON & RECTAL SURGERY

## 2022-07-31 PROCEDURE — 6370000000 HC RX 637 (ALT 250 FOR IP): Performed by: COLON & RECTAL SURGERY

## 2022-07-31 PROCEDURE — 2500000003 HC RX 250 WO HCPCS: Performed by: COLON & RECTAL SURGERY

## 2022-07-31 PROCEDURE — 6360000002 HC RX W HCPCS: Performed by: SURGERY

## 2022-07-31 PROCEDURE — 1210000000 HC MED SURG R&B

## 2022-07-31 PROCEDURE — 2580000003 HC RX 258: Performed by: COLON & RECTAL SURGERY

## 2022-07-31 RX ORDER — SODIUM CHLORIDE, SODIUM LACTATE, POTASSIUM CHLORIDE, CALCIUM CHLORIDE 600; 310; 30; 20 MG/100ML; MG/100ML; MG/100ML; MG/100ML
INJECTION, SOLUTION INTRAVENOUS
Status: DISCONTINUED
Start: 2022-07-31 | End: 2022-07-31 | Stop reason: WASHOUT

## 2022-07-31 RX ADMIN — METRONIDAZOLE 500 MG: 500 INJECTION, SOLUTION INTRAVENOUS at 06:33

## 2022-07-31 RX ADMIN — SODIUM CHLORIDE, SODIUM LACTATE, POTASSIUM CHLORIDE, CALCIUM CHLORIDE AND DEXTROSE MONOHYDRATE: 5; 600; 310; 30; 20 INJECTION, SOLUTION INTRAVENOUS at 07:44

## 2022-07-31 RX ADMIN — METOPROLOL TARTRATE 25 MG: 25 TABLET, FILM COATED ORAL at 22:44

## 2022-07-31 RX ADMIN — METRONIDAZOLE 500 MG: 500 INJECTION, SOLUTION INTRAVENOUS at 22:45

## 2022-07-31 RX ADMIN — ISOSORBIDE MONONITRATE 30 MG: 30 TABLET, EXTENDED RELEASE ORAL at 08:23

## 2022-07-31 RX ADMIN — METOPROLOL TARTRATE 25 MG: 25 TABLET, FILM COATED ORAL at 02:23

## 2022-07-31 RX ADMIN — METRONIDAZOLE 500 MG: 500 INJECTION, SOLUTION INTRAVENOUS at 14:13

## 2022-07-31 RX ADMIN — ACETAMINOPHEN 650 MG: 325 TABLET, FILM COATED ORAL at 22:44

## 2022-07-31 RX ADMIN — ACETAMINOPHEN 650 MG: 325 TABLET, FILM COATED ORAL at 14:16

## 2022-07-31 RX ADMIN — LEVOTHYROXINE SODIUM 100 MCG: 100 TABLET ORAL at 06:29

## 2022-07-31 RX ADMIN — FOLIC ACID 1 MG: 1 TABLET ORAL at 08:23

## 2022-07-31 RX ADMIN — METOCLOPRAMIDE HYDROCHLORIDE 5 MG: 5 INJECTION INTRAMUSCULAR; INTRAVENOUS at 03:32

## 2022-07-31 RX ADMIN — OXYCODONE AND ACETAMINOPHEN 1 TABLET: 5; 325 TABLET ORAL at 03:30

## 2022-07-31 RX ADMIN — FERROUS GLUCONATE 324 MG: 324 TABLET ORAL at 07:43

## 2022-07-31 RX ADMIN — SODIUM CHLORIDE, SODIUM LACTATE, POTASSIUM CHLORIDE, CALCIUM CHLORIDE AND DEXTROSE MONOHYDRATE: 5; 600; 310; 30; 20 INJECTION, SOLUTION INTRAVENOUS at 05:27

## 2022-07-31 RX ADMIN — METOPROLOL TARTRATE 25 MG: 25 TABLET, FILM COATED ORAL at 06:29

## 2022-07-31 RX ADMIN — METOPROLOL TARTRATE 25 MG: 25 TABLET, FILM COATED ORAL at 13:12

## 2022-07-31 RX ADMIN — CIPROFLOXACIN 400 MG: 2 INJECTION, SOLUTION INTRAVENOUS at 15:59

## 2022-07-31 RX ADMIN — CIPROFLOXACIN 400 MG: 2 INJECTION, SOLUTION INTRAVENOUS at 04:16

## 2022-07-31 RX ADMIN — METOPROLOL TARTRATE 25 MG: 25 TABLET, FILM COATED ORAL at 08:23

## 2022-07-31 RX ADMIN — METOPROLOL TARTRATE 25 MG: 25 TABLET, FILM COATED ORAL at 17:01

## 2022-07-31 RX ADMIN — ENOXAPARIN SODIUM 30 MG: 100 INJECTION SUBCUTANEOUS at 08:25

## 2022-07-31 RX ADMIN — METOCLOPRAMIDE HYDROCHLORIDE 5 MG: 5 INJECTION INTRAMUSCULAR; INTRAVENOUS at 08:23

## 2022-07-31 RX ADMIN — DIGOXIN 250 MCG: 250 TABLET ORAL at 08:23

## 2022-07-31 RX ADMIN — METOCLOPRAMIDE HYDROCHLORIDE 5 MG: 5 INJECTION INTRAMUSCULAR; INTRAVENOUS at 17:01

## 2022-07-31 ASSESSMENT — PAIN DESCRIPTION - DESCRIPTORS: DESCRIPTORS: ACHING

## 2022-07-31 ASSESSMENT — PAIN DESCRIPTION - LOCATION
LOCATION: ABDOMEN
LOCATION: HEAD
LOCATION: HEAD

## 2022-07-31 ASSESSMENT — PAIN SCALES - GENERAL
PAINLEVEL_OUTOF10: 1
PAINLEVEL_OUTOF10: 7
PAINLEVEL_OUTOF10: 7
PAINLEVEL_OUTOF10: 3
PAINLEVEL_OUTOF10: 0

## 2022-07-31 ASSESSMENT — ENCOUNTER SYMPTOMS
SHORTNESS OF BREATH: 0
DIARRHEA: 0
COUGH: 0

## 2022-07-31 ASSESSMENT — PAIN - FUNCTIONAL ASSESSMENT: PAIN_FUNCTIONAL_ASSESSMENT: PREVENTS OR INTERFERES SOME ACTIVE ACTIVITIES AND ADLS

## 2022-07-31 NOTE — PROGRESS NOTES
Progress Note  Date:2022       Room:[place] 063617-01  Patient Name:Edna Doyle     YOB: 1945     Age:77 y.o. Subjective    Subjective:  Symptoms:  No shortness of breath, malaise, cough, chest pain, weakness, headache, chest pressure, anorexia, diarrhea or anxiety. Review of Systems   Respiratory:  Negative for cough and shortness of breath. Cardiovascular:  Negative for chest pain. Gastrointestinal:  Negative for anorexia and diarrhea. Neurological:  Negative for weakness. Objective         Vitals Last 24 Hours:  TEMPERATURE:  Temp  Av.9 °F (36.6 °C)  Min: 97.7 °F (36.5 °C)  Max: 98.2 °F (36.8 °C)  RESPIRATIONS RANGE: Resp  Av.8  Min: 16  Max: 18  PULSE OXIMETRY RANGE: SpO2  Av.2 %  Min: 99 %  Max: 100 %  PULSE RANGE: Pulse  Av.1  Min: 78  Max: 134  BLOOD PRESSURE RANGE: Systolic (04ZPP), TWW:523 , Min:129 , JZZ:490   ; Diastolic (63RPL), GLK:10, Min:52, Max:77    I/O (24Hr): Intake/Output Summary (Last 24 hours) at 2022 1152  Last data filed at 2022 2300  Gross per 24 hour   Intake --   Output 25 ml   Net -25 ml       Objective:  General Appearance:  Comfortable, well-appearing and in no acute distress. Vital signs: (most recent): Blood pressure (!) 144/62, pulse 78, temperature 97.9 °F (36.6 °C), resp. rate 16, height 5' 2\" (1.575 m), weight 96 lb (43.5 kg), SpO2 99 %, not currently breastfeeding. HEENT: Normal HEENT exam.    Lungs:  Normal effort. Heart: Normal rate. S1 normal and S2 normal.    Abdomen: Abdomen is soft. Bowel sounds are normal.   There is no epigastric area or suprapubic area tenderness. Pulses: Distal pulses are intact. Neurological: Patient is alert. Pupils:  Pupils are equal, round, and reactive to light. Skin:  Warm and dry.     Labs/Imaging/Diagnostics    Labs:  CBC:  Recent Labs     22  0607 22  0601   WBC 16.8* 15.5*   RBC 2.97* 2.78*   HGB 8.6* 7.9*   HCT 26.5* 24.3*   MCV 89.4 87.3   RDW 19.5* 18.9*    192       CHEMISTRIES:  Recent Labs     07/29/22  0608 07/30/22  0601    141   K 4.2 3.8   * 112*   CO2 14* 16*   BUN 8 8   CREATININE 0.77 0.78   GLUCOSE 130* 95   MG 1.7  --        PT/INR:No results for input(s): PROTIME, INR in the last 72 hours. APTT:No results for input(s): APTT in the last 72 hours. LIVER PROFILE:  Recent Labs     07/29/22  0608 07/30/22  0601   AST 11 9   ALT <5 <5   BILITOT <0.2 <0.2   ALKPHOS 62 52         Imaging Last 24 Hours:  CT ABDOMEN PELVIS WO CONTRAST Additional Contrast? None    Result Date: 7/23/2022  Patient: Fidelina Linn  Time Out: 23:56 Exam(s): CT ABDOMEN + PELVIS Without Contrast  EXAM:   CT Abdomen and Pelvis Without Intravenous Contrast  CLINICAL HISTORY:    Reason for exam: concern for rectovaginal fistula. Chief complaint concern for rectovaginal fistula  TECHNIQUE:   Axial computed tomography images of the abdomen and pelvis without intravenous contrast.  All CT scan at this facility use dose modulation, iterative reconstruction, and/or weight based dosing when appropriate to reduce radiation dose to as low as reasonably achievable. COMPARISON:   September 10, 2021  FINDINGS:   Lung bases:  2 x 8 cm area of infiltrate in the right lung base suspicious for pneumonia. There is underlying emphysema. ABDOMEN:   Liver:  Unremarkable. Gallbladder and bile ducts:  Unremarkable. No calcified stones. No ductal dilation. Pancreas:  Unremarkable. No ductal dilation. Spleen:  Unremarkable. No splenomegaly. Adrenals:  Unremarkable. No mass. Kidneys and ureters:  Atrophic right kidney. There are calculi in the right renal pelvis measuring up to 4 mm. No hydronephrosis. Left kidney is unremarkable. Stomach and bowel:  Severe circumferential wall thickening involving the distal colon and rectum measuring up to 2.5 cm thick. This is contiguous with the wall of the vagina.   There is stool in the vagina suggesting rectovaginal fistula. No obstruction. PELVIS:   Appendix:  No findings to suggest acute appendicitis. Bladder:  Unremarkable. No stones. Reproductive:  Unremarkable as visualized. ABDOMEN and PELVIS:   Intraperitoneal space:  Unremarkable. No free air. No significant fluid collection. Bones/joints:  Metallic artifact from hardware from prior fusion of the lumbar spine at L4-5. There are moderate degenerative changes in the spine. No acute fracture or subluxation. Soft tissues:  Unremarkable. Vasculature: The abdominal aorta is severely calcified but nondilated. Lymph nodes:  Unremarkable. No enlarged lymph nodes. Electronically signed by Emily Arboleda MD on 07-23-22 at 2356    1.  2 x 8 cm area of infiltrate in the right lung base suspicious for pneumonia. There is underlying emphysema. 2.  Severe circumferential wall thickening involving the distal colon and rectum measuring up to 2.5 cm thick. This is contiguous with the wall of the vagina. There is stool in the vagina suggesting rectovaginal fistula. XR KNEE RIGHT (3 VIEWS)    Result Date: 7/25/2022  INDICATION: Pain. COMPARISON: None. TECHNIQUE: 4 views of the right knee were obtained. FINDINGS: Increased density visualized in the distal femur with subtle lucency is visualized in the femoral condyles bilaterally, increased density visualized along the articular surface  with subchondral cysts seen but no evidence of cortical irregularity or lucency to suggest a fracture. There is no evidence of acute fracture or dislocation. Mild to moderate narrowing of the medial, lateral and patellofemoral joint space is seen. There is no evidence of suprapatellar effusion. Vascular calcifications are seen. Overlying soft tissues are grossly unremarkable. Degenerative bone changes seen.  No evidence of acute osseous abnormality     XR CHEST PORTABLE    Result Date: 7/24/2022  EXAMINATION: XR CHEST PORTABLE CLINICAL HISTORY: ALTERED MENTAL STATUS COMPARISONS: JULY 2, 2022 FINDINGS: Osseous structures are intact. Cardiopericardial silhouette is normal. Pulmonary vasculature is normal. Lungs are clear. NO ACUTE CARDIOPULMONARY DISEASE. 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 fib with RVR we will transfer the patient to cardiac floor, cardiology evaluation,transfuse to keep Hgb> 7  keep K > 4 Spoke with nursing about the care, as per psych pt has not capacity. C/w current care, started Cardizem drip. 7/26: Possible colonoscopy tomorrow. Patient getting prep now. On Cardizem drip. Rate is better controlled. No overnight events no new complaints. Continue current care. 7/27: Patient going for colonoscopy. No overnight events send routine lab work-up tomorrow. Spoke with nursing about the care. 7/28: Patient is going for surgery today. No overnight events no new complaints. Hemoglobin stable. Hypoglycemia resolved. c/w current care  7/29: Status post left colectomy with end colostomy and Meir stump tolerated surgery well. Family decided on hospice. Hospice consulted. Continue current care. Denies any needs. Pain is controlled. 7/30: Family deciding about hospice. If patient does not sign up with hospice needs pre-CERT for SNF placement. Spoke with . Advance diet as tolerated as per surgery. Antibiotics as per surgery. 7/31: Advance diet as per surgery, antibiotics per surgery family deciding about hospice. Kimmy Choe next of kin decided on DNR comfort care. If patient does not sign up with hospice then she needs to go to skilled and needs pre-CERT. Spoke with .   Electronically signed by Herve Murphy

## 2022-07-31 NOTE — FLOWSHEET NOTE
0710: Bedside handoff report received. Patient is awake, alert, in bed and appears comfortable. Call light within reach. 0825: Shift assessment completed. See flow sheet. Medications administered. Patient denies pain at this time. Call light within reach, bed alarm on. Electronically signed by Shashank Voss RN on 7/31/2022 at 11:35 AM     1300: Secure message to Dr. Chau Arellano: Patient's BP is 134/51 and heart rate is 75. Would you like me to hold the Lopressor? Response: Ok to give. Electronically signed by Shashank Voss RN on 7/31/2022 at 1:10 PM     1300: Patient refusing turns and allowing RN to check and change her depends, despite education. She states \"I am comfortable and I don't want you to move me\". Patient's daughter is at bedside.  Electronically signed by Shashank Voss RN on 7/31/2022 at 2:31 PM

## 2022-07-31 NOTE — PROGRESS NOTES
[facility] Daily Progress Note      Date: 7/31/2022    Name: Ha Thompson    Age: 78 y.o. Gender: female  CodeStatus: DNR-CC      Rounding Cardiology : Jacobo Gallego MD    Primary Cardiology : Dr. Mela Stanton MD    Primary Care Provider: Bari Souza MD    Subjective:    Chart Reviewed and patient examined. She denies chest pain, palpitations, lightheadedness or dizziness. Still with chronic shortness of breath     Remains in SR. 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    14 system General and Cardiac ROS otherwise negative or unchanged. 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 Atrial Fib with RVR: new onset, converted to SR. V-Tach run  7/24/2022  PSVT  Normal Echo and LV Function, LVEF 55%. 7/22  RICARDO:  Resolved. UTI, Recent  HTN  HLP  Hypothyroidism  Dementia   as prior otherwise     Plan:    Cardiac Supportive Conservative Care  Post operative care. Monitor on telemetry  Continue current medications. Palliative care planned at this point  Oncology consultation noted. Further recommendations  See orders.      ==================================================    Physical Exam  Constitutional:  Frail,  awake/alert/oriented x3, no distress, alert and cooperative. Respiratory/Thorax: Patent airways, CTAB,  normal breath sounds with good chest expansion, thorax symmetric. Cardiovascular: Irregular  rate and rhythm, normal S1 and S2, PMI non displaced. Gastrointestinal:  Non distended, soft, non-tender, no rebound tenderness or guarding, Genitourinary:  deferred  Musculoskeletal:  No apparent injury. Extremities:  No cyanosis, edema, contusions or wounds, no clubbing.    Neurological:  Alert and oriented x3. Moves extremities spontaneous with purpose  Psychological:  Appropriate mood and behavior  Skin:  Warm and dry,  no lesions or rashes. Allergies: Bee Venom  Pcn [Penicillins]    Medications:  Reviewed  Home Medications    Infusion Medications:    sodium chloride      dextrose      dextrose 5% in lactated ringers 75 mL/hr at 07/31/22 0744    sodium chloride      dilTIAZem (CARDIZEM) 125 mg in dextrose 5% 125 mL infusion Stopped (07/25/22 1637)    sodium chloride       Scheduled Medications:    metoclopramide  5 mg IntraVENous Q8H    sodium chloride flush  5-40 mL IntraVENous 2 times per day    folic acid  1 mg Oral Daily    ferrous gluconate  324 mg Oral Daily with breakfast    digoxin  250 mcg Oral Daily    midodrine  5 mg Oral TID WC    enoxaparin  30 mg SubCUTAneous Daily    ciprofloxacin  400 mg IntraVENous Q12H    metoprolol tartrate  25 mg Oral Q4H    sodium chloride flush  5-40 mL IntraVENous 2 times per day    metroNIDAZOLE  500 mg IntraVENous Q8H    isosorbide mononitrate  30 mg Oral Daily    levothyroxine  100 mcg Oral Daily     PRN Meds: sodium chloride flush, sodium chloride, fentanNYL, fentanNYL, labetalol **OR** hydrALAZINE, oxyCODONE-acetaminophen **OR** oxyCODONE-acetaminophen, HYDROmorphone, metoprolol, glucose, dextrose bolus **OR** dextrose bolus, glucagon (rDNA), dextrose, metoprolol, sodium chloride, traMADol, sodium chloride flush, sodium chloride, ondansetron **OR** ondansetron, polyethylene glycol, acetaminophen **OR** acetaminophen, camphor-menthol-methyl salicylate    Vitals  Vitals:    07/31/22 1213   BP: (!) 134/51   Pulse: 75   Resp: 16   Temp:    SpO2: 100%       I&O  No documented urine output      Telemetry:  Atrial fibrillation with RVR  115 -120's. ECHO  7/4/2022  Left Ventricle  Left ventricular ejection fraction is visually estimated at 55%. Right Ventricle  Normal right ventricle structure and function. Normal right ventricle systolic pressure. Pericardial Effusion  No evidence of pericardial effusion. Labs:   Recent Labs     07/29/22  0607 07/30/22  0601   WBC 16.8* 15.5*   HGB 8.6* 7.9*   HCT 26.5* 24.3*    192       Recent Labs     07/29/22  0608 07/30/22  0601    141   K 4.2 3.8   * 112*   CO2 14* 16*   BUN 8 8   CREATININE 0.77 0.78   CALCIUM 7.9* 7.8*       Recent Labs     07/29/22  0608 07/30/22  0601   AST 11 9   ALT <5 <5   BILITOT <0.2 <0.2   ALKPHOS 62 52       No results for input(s): INR in the last 72 hours. No results for input(s): Prentis Revels in the last 72 hours. Urinalysis:   Lab Results   Component Value Date/Time    NITRU Negative 07/23/2022 10:52 PM    [address] 07/23/2022 10:52 PM    BACTERIA MANY 07/23/2022 10:52 PM    RBCUA 20-50 07/23/2022 10:52 PM    BLOODU LARGE 07/23/2022 10:52 PM    SPECGRAV 1.022 07/23/2022 10:52 PM    GLUCOSEU Negative 07/23/2022 10:52 PM       Radiology:   Most recent    Chest CT      WITH CONTRAST:No results found for this or any previous visit. WITHOUT CONTRAST: No results found for this or any previous visit. CXR      2-view: No results found for this or any previous visit. Portable: Results for orders placed during the hospital encounter of 07/23/22    XR CHEST PORTABLE    Narrative  EXAMINATION: XR CHEST PORTABLE    CLINICAL HISTORY: ALTERED MENTAL STATUS    COMPARISONS: JULY 2, 2022    FINDINGS: Osseous structures are intact. Cardiopericardial silhouette is normal. Pulmonary vasculature is normal. Lungs are clear. Impression  NO ACUTE CARDIOPULMONARY DISEASE. Active Hospital Problems    Diagnosis Date Noted    Rectovaginal fistula [N82.3] 07/24/2022     Priority: Medium    RICARDO (acute kidney injury) ([facility] [address] [N17.9]     Hypothyroidism [E03.9]     Hypertension [I10]        Additional work up or/and treatment plan may be added today or then after based on clinical progression. I am managing a portion of pt care.  Some medical issues are handled by other specialists. Additional work up and treatment should be done in out pt setting by pt PCP and other out pt providers. In addition to examining and evaluating pt, I spent additional time explaining care, normaland abnormal findings, and treatment plan. All of pt questions were answered. Counseling, diet and education were provided. Case will be discussed with nursing staff when appropriate. Family will be updated if and whenappropriate.       Electronically signed by Deejay Heath MD on 7/31/2022 at 2:42 PM

## 2022-07-31 NOTE — CARE COORDINATION
LSW meet with pt at bedside. Pt is alert and oriented pleasant during conversation. Pt agree with DC plan return to [facility]. Per previous  note pt will return to [facility] with [facility] at the time of DC. LSW will follow for any questions or concerns.      Electronically signed by Verner Plum, LSW on 7/31/2022 at 9:43 AM

## 2022-07-31 NOTE — PROGRESS NOTES
Pt Name: Vitaliy Ayers  Medical Record Number: [id number]  Date of Birth 1945   Admit date 7/23/2022  7:35 PM  Today's Date: 7/31/2022     ASSESSMENT  1. Post op day # 3  2. Vitaliy Ayers had Procedure(s): DIVERTING SIGMOID COLOSTOMY. ROOM 179  3. Doing well  4. Stoma output noted    PLAN  1. Regular diet    SUBJECTIVE  Chief complaint: None  Afebrile, vital signs are stable. She denies any nausea or vomiting, has passed flatus and had a bowel movement. She is tolerating a ADULT ORAL NUTRITION SUPPLEMENT; Breakfast, Lunch, Dinner; Clear Liquid Oral Supplement  ADULT DIET; Regular. Her pain is well controlled on current medications. .      has a past medical history of Bilateral carotid artery disease ([address] CAD (coronary artery disease), Hyperlipidemia, Hypertension, Hypothyroidism, and Osteoarthritis.     CURRENT MEDS  Scheduled Meds:   metoclopramide  5 mg IntraVENous Q8H    sodium chloride flush  5-40 mL IntraVENous 2 times per day    folic acid  1 mg Oral Daily    ferrous gluconate  324 mg Oral Daily with breakfast    digoxin  250 mcg Oral Daily    midodrine  5 mg Oral TID WC    enoxaparin  30 mg SubCUTAneous Daily    ciprofloxacin  400 mg IntraVENous Q12H    metoprolol tartrate  25 mg Oral Q4H    sodium chloride flush  5-40 mL IntraVENous 2 times per day    metroNIDAZOLE  500 mg IntraVENous Q8H    isosorbide mononitrate  30 mg Oral Daily    levothyroxine  100 mcg Oral Daily     Continuous Infusions:   sodium chloride      dextrose      dextrose 5% in lactated ringers 75 mL/hr at 07/31/22 0744    sodium chloride      dilTIAZem (CARDIZEM) 125 mg in dextrose 5% 125 mL infusion Stopped (07/25/22 1637)    sodium chloride       PRN Meds:.sodium chloride flush, sodium chloride, fentanNYL, fentanNYL, labetalol **OR** hydrALAZINE, oxyCODONE-acetaminophen **OR** oxyCODONE-acetaminophen, HYDROmorphone, metoprolol, glucose, dextrose bolus **OR** dextrose bolus, glucagon (rDNA), dextrose, metoprolol, sodium chloride, traMADol, sodium chloride flush, sodium chloride, ondansetron **OR** ondansetron, polyethylene glycol, acetaminophen **OR** acetaminophen, camphor-menthol-methyl salicylate    OBJECTIVE  CURRENT VITALS:  height is 5' 2\" (1.575 m) and weight is 96 lb (43.5 kg). Her temperature is 97.9 °F (36.6 °C). Her blood pressure is 134/51 (abnormal) and her pulse is 75. Her respiration is 16 and oxygen saturation is 100%. Temperature Range (24h):Temp: 97.9 °F (36.6 °C) Temp  Av.9 °F (36.6 °C)  Min: 97.7 °F (36.5 °C)  Max: 98.2 °F (36.8 °C)  BP Range (56U): Systolic (43CEI), KYX:952 , Min:129 , HMA:025     Diastolic (50XHP), CHN:50, Min:51, Max:77    Pulse Range (24h): Pulse  Av.2  Min: 75  Max: 134  Respiration Range (24h): Resp  Av.8  Min: 16  Max: 18    GENERAL: alert, no distress  LUNGS: clear to ausculation, without wheezes, rales or rhonci  HEART: normal rate and regular    ABDOMEN: Soft and nontender. Good bowel sounds, left lower quadrant ostomy that is viable with stool in the bag  EXTERMITY: no cyanosis, clubbing or edema    In: 860.7 [I.V.:469.9]  Out: 25       LABS  Recent Labs     22  0607 22  0608 22  0601   WBC 16.8*  --  15.5*   HGB 8.6*  --  7.9*   HCT 26.5*  --  24.3*     --  192   NA  --  139 141   K  --  4.2 3.8   CL  --  111* 112*   CO2  --  14* 16*   BUN  --  8 8   CREATININE  --  0.77 0.78   MG  --  1.7  --    CALCIUM  --  7.9* 7.8*      No results for input(s): PTT, INR in the last 72 hours.     Invalid input(s): PT  Recent Labs     22  0608 22  0601   AST 11 9   ALT <5 <5   BILITOT <0.2 <0.2       RADIOLOGY  Echocardiogram complete 2D with doppler with color    Result Date: 2022  Transthoracic Echocardiography Report (TTE)  Demographics   Patient Name   Syed Anaya Gender              Female                 P   Patient Number [id number]          Race                                                    Ethnicity   Visit Number   [id number]         Room Number         G488   Corporate ID                     Date of Study       07/04/2022   Accession      [id number]        Referring Physician Justin Montenegro DO  Number   Date of Birth  1945        Sonographer         Alissa Fuentes   Age            78 year(s)        Interpreting        [employer]                                   Physician           Cardiology                                                       Alek TAYLOR DO  Procedure Type of Study   TTE procedure:ECHO COMPLETE 2D W/DOP W/COLOR. Procedure Date Date: 07/04/2022 Start: 12:38 PM Study Location: Portable Technical Quality: Adequate visualization Indications:LVF. Patient Status: Routine Height: 62 inches Weight: 95 pounds BSA: 1.39 m^2 BMI: 17.38 kg/m^2 BP: 107/44 mmHg  Conclusions   Summary  Technically limited study  Left ventricular ejection fraction is visually estimated at 55%. Normal right ventricle structure and function. Normal LV size and  function. No evidence of pericardial effusion. Signature   ----------------------------------------------------------------  Electronically signed by Nisreen Santillan DO(Interpreting  physician) on 07/04/2022 09:08 PM  ----------------------------------------------------------------   Findings  Left Ventricle Left ventricular ejection fraction is visually estimated at 55%. Right Ventricle Normal right ventricle structure and function. Normal right ventricle systolic pressure. Pericardial Effusion No evidence of pericardial effusion. CT ABDOMEN PELVIS WO CONTRAST Additional Contrast? None    Result Date: 7/23/2022  Patient: Nando Quinteros  Time Out: 23:56 Exam(s): CT ABDOMEN + PELVIS Without Contrast  EXAM:   CT Abdomen and Pelvis Without Intravenous Contrast  CLINICAL HISTORY:    Reason for exam: concern for rectovaginal fistula.  Chief complaint concern for rectovaginal fistula  TECHNIQUE:   Axial computed tomography images of the abdomen and pelvis without intravenous contrast.  All CT scan at this facility use dose modulation, iterative reconstruction, and/or weight based dosing when appropriate to reduce radiation dose to as low as reasonably achievable. COMPARISON:   September 10, 2021  FINDINGS:   Lung bases:  2 x 8 cm area of infiltrate in the right lung base suspicious for pneumonia. There is underlying emphysema. ABDOMEN:   Liver:  Unremarkable. Gallbladder and bile ducts:  Unremarkable. No calcified stones. No ductal dilation. Pancreas:  Unremarkable. No ductal dilation. Spleen:  Unremarkable. No splenomegaly. Adrenals:  Unremarkable. No mass. Kidneys and ureters:  Atrophic right kidney. There are calculi in the right renal pelvis measuring up to 4 mm. No hydronephrosis. Left kidney is unremarkable. Stomach and bowel:  Severe circumferential wall thickening involving the distal colon and rectum measuring up to 2.5 cm thick. This is contiguous with the wall of the vagina. There is stool in the vagina suggesting rectovaginal fistula. No obstruction. PELVIS:   Appendix:  No findings to suggest acute appendicitis. Bladder:  Unremarkable. No stones. Reproductive:  Unremarkable as visualized. ABDOMEN and PELVIS:   Intraperitoneal space:  Unremarkable. No free air. No significant fluid collection. Bones/joints:  Metallic artifact from hardware from prior fusion of the lumbar spine at L4-5. There are moderate degenerative changes in the spine. No acute fracture or subluxation. Soft tissues:  Unremarkable. Vasculature: The abdominal aorta is severely calcified but nondilated. Lymph nodes:  Unremarkable. No enlarged lymph nodes. Electronically signed by Omkar Arambula MD on 07-23-22 at 2356    1.  2 x 8 cm area of infiltrate in the right lung base suspicious for pneumonia. There is underlying emphysema.  2.  Severe circumferential wall thickening involving the distal colon and rectum measuring up to 2.5 cm thick.  This is contiguous with the wall of the vagina. There is stool in the vagina suggesting rectovaginal fistula. XR KNEE RIGHT (3 VIEWS)    Result Date: 7/25/2022  INDICATION: Pain. COMPARISON: None. TECHNIQUE: 4 views of the right knee were obtained. FINDINGS: Increased density visualized in the distal femur with subtle lucency is visualized in the femoral condyles bilaterally, increased density visualized along the articular surface  with subchondral cysts seen but no evidence of cortical irregularity or lucency to suggest a fracture. There is no evidence of acute fracture or dislocation. Mild to moderate narrowing of the medial, lateral and patellofemoral joint space is seen. There is no evidence of suprapatellar effusion. Vascular calcifications are seen. Overlying soft tissues are grossly unremarkable. Degenerative bone changes seen. No evidence of acute osseous abnormality     CT HEAD WO CONTRAST    Result Date: 7/9/2022  CT HEAD WO CONTRAST : 7/9/2022 CLINICAL HISTORY:  confusion . COMPARISON: Head MRI 5/27/2019 and head CT 5/26/2019. TECHNIQUE: Spiral unenhanced images were obtained of the head, with routine multiplanar reconstructions performed. All CT scans at this facility use dose modulation, iterative reconstruction, and/or weight based dosing when appropriate to reduce radiation dose to as low as reasonably achievable. FINDINGS: There is no intracranial hemorrhage, mass effect, midline shift, extra-axial collection, evidence of hydrocephalus, recent ischemic infarct, or skull fracture identified. Mild generalized cerebral volume loss and mild to moderate patchy white matter changes are again noted. The mastoid air cells and visualized paranasal sinuses are essentially clear. NO ACUTE INTRACRANIAL PROCESSOR SIGNIFICANT CHANGE FROM PRIOR STUDIES IDENTIFIED.      XR CHEST PORTABLE    Result Date: 7/24/2022  EXAMINATION: XR CHEST PORTABLE CLINICAL HISTORY: ALTERED MENTAL STATUS COMPARISONS: JULY 2, 2022 FINDINGS: Osseous structures are intact. Cardiopericardial silhouette is normal. Pulmonary vasculature is normal. Lungs are clear. NO ACUTE CARDIOPULMONARY DISEASE. XR CHEST PORTABLE    Result Date: 7/2/2022  XR CHEST PORTABLE Clinical History:  Failure to thrive. Comparison:  9/10/2021. RESULT: No consolidation. Hyperinflated lungs. Mildly coarsened lung markings, unchanged. No large pleural effusion. No pneumothorax. Stable cardiomediastinal silhouette. Aortic vascular calcifications. No distinct acute osseous findings. No acute radiographic abnormality. US RETROPERITONEAL COMPLETE    Addendum Date: 7/3/2022    Addendum: Reason for addendum is typographical error in the impression of the report. Here is a new report: EXAMINATION:   RENAL ULTRASOUND HISTORY:  Acute renal failure. TECHNIQUE:  Sonography of the kidneys was performed. Images were obtained and stored in a permanent archive. COMPARISON: CT 9/10/2021. RESULT: Right Kidney:      -Renal length: 6.0 cm      -Parenchyma: Parenchyma echogenicity is increased. Diffuse parenchymal thinning present measuring around 5 mm.      -Collecting system: No hydronephrosis. -Calculus: Echogenic lower pole calculus, similar to the prior CT.      -Lesion:  1.2 cm cyst, unchanged. Left Kidney:          -Renal length: 10.0 cm      -Parenchyma: Normal parenchymal echogenicity. Normal parenchymal thickness measuring around 12 mm.      -Collecting system: No hydronephrosis. -Calculus: No echogenic, shadowing calculus.      -Lesion:  None. Bladder: Decompressed. Result Date: 7/3/2022  EXAMINATION:   RENAL ULTRASOUND HISTORY:  Acute renal failure. TECHNIQUE:  Sonography of the kidneys was performed. Images were obtained and stored in a permanent archive. COMPARISON: CT 9/10/2021. RESULT: Right Kidney:      -Renal length: 6.0 cm      -Parenchyma: Parenchyma echogenicity is increased.   Diffuse parenchymal thinning present measuring around 5 mm.      -Collecting system: No hydronephrosis. -Calculus: Echogenic lower pole calculus, similar to the prior CT.      -Lesion:  1.2 cm cyst, unchanged. Left Kidney:          -Renal length: 10.0 cm      -Parenchyma: Normal parenchymal echogenicity. Normal parenchymal thickness measuring around 12 mm.      -Collecting system: No hydronephrosis. -Calculus: No echogenic, shadowing calculus.      -Lesion:  None. Bladder: Decompressed. Right nephrolithiasis. No hydronephrosis. Severe right RENAL ATROPHY, unchanged.      Electronically signed by Korina Kebede MD on 7/31/2022 at 1:31 PM

## 2022-08-01 VITALS
HEIGHT: 62 IN | HEART RATE: 88 BPM | DIASTOLIC BLOOD PRESSURE: 54 MMHG | OXYGEN SATURATION: 99 % | SYSTOLIC BLOOD PRESSURE: 151 MMHG | TEMPERATURE: 97.9 F | BODY MASS INDEX: 17.66 KG/M2 | RESPIRATION RATE: 16 BRPM | WEIGHT: 96 LBS

## 2022-08-01 LAB
GLUCOSE BLD-MCNC: 100 MG/DL (ref 70–99)
PERFORMED ON: ABNORMAL
SARS-COV-2, NAAT: NOT DETECTED

## 2022-08-01 PROCEDURE — 6370000000 HC RX 637 (ALT 250 FOR IP): Performed by: COLON & RECTAL SURGERY

## 2022-08-01 PROCEDURE — 6360000002 HC RX W HCPCS: Performed by: COLON & RECTAL SURGERY

## 2022-08-01 PROCEDURE — 99211 OFF/OP EST MAY X REQ PHY/QHP: CPT

## 2022-08-01 PROCEDURE — 87635 SARS-COV-2 COVID-19 AMP PRB: CPT

## 2022-08-01 PROCEDURE — 99024 POSTOP FOLLOW-UP VISIT: CPT | Performed by: COLON & RECTAL SURGERY

## 2022-08-01 PROCEDURE — 2580000003 HC RX 258: Performed by: COLON & RECTAL SURGERY

## 2022-08-01 PROCEDURE — 2580000003 HC RX 258: Performed by: STUDENT IN AN ORGANIZED HEALTH CARE EDUCATION/TRAINING PROGRAM

## 2022-08-01 PROCEDURE — 6370000000 HC RX 637 (ALT 250 FOR IP): Performed by: INTERNAL MEDICINE

## 2022-08-01 PROCEDURE — 6360000002 HC RX W HCPCS: Performed by: SURGERY

## 2022-08-01 PROCEDURE — 2500000003 HC RX 250 WO HCPCS: Performed by: COLON & RECTAL SURGERY

## 2022-08-01 PROCEDURE — 97166 OT EVAL MOD COMPLEX 45 MIN: CPT

## 2022-08-01 RX ORDER — DOXYCYCLINE HYCLATE 50 MG/1
324 CAPSULE, GELATIN COATED ORAL
Qty: 30 TABLET | Refills: 3
Start: 2022-08-01

## 2022-08-01 RX ORDER — MIDODRINE HYDROCHLORIDE 5 MG/1
5 TABLET ORAL
Qty: 90 TABLET | Refills: 3
Start: 2022-08-01

## 2022-08-01 RX ORDER — METOPROLOL SUCCINATE 25 MG/1
25 TABLET, EXTENDED RELEASE ORAL DAILY
Status: DISCONTINUED | OUTPATIENT
Start: 2022-08-01 | End: 2022-08-01

## 2022-08-01 RX ORDER — METRONIDAZOLE 250 MG/1
250 TABLET ORAL 3 TIMES DAILY
Qty: 21 TABLET | Refills: 0
Start: 2022-08-01 | End: 2022-08-08

## 2022-08-01 RX ORDER — DIGOXIN 250 MCG
250 TABLET ORAL DAILY
Qty: 30 TABLET | Refills: 3
Start: 2022-08-01

## 2022-08-01 RX ORDER — METOPROLOL SUCCINATE 25 MG/1
75 TABLET, EXTENDED RELEASE ORAL DAILY
Qty: 30 TABLET | Refills: 3 | Status: SHIPPED | OUTPATIENT
Start: 2022-08-01

## 2022-08-01 RX ORDER — CIPROFLOXACIN 500 MG/1
500 TABLET, FILM COATED ORAL 2 TIMES DAILY
Qty: 14 TABLET | Refills: 0
Start: 2022-08-01 | End: 2022-08-08

## 2022-08-01 RX ORDER — ACETAMINOPHEN 325 MG/1
650 TABLET ORAL ONCE
Status: COMPLETED | OUTPATIENT
Start: 2022-08-01 | End: 2022-08-01

## 2022-08-01 RX ORDER — FOLIC ACID 1 MG/1
1 TABLET ORAL DAILY
Qty: 30 TABLET | Refills: 3
Start: 2022-08-01

## 2022-08-01 RX ORDER — OXYCODONE HYDROCHLORIDE AND ACETAMINOPHEN 5; 325 MG/1; MG/1
1 TABLET ORAL EVERY 4 HOURS PRN
Qty: 24 TABLET | Refills: 0 | Status: SHIPPED | OUTPATIENT
Start: 2022-08-01 | End: 2022-08-08

## 2022-08-01 RX ADMIN — METOCLOPRAMIDE HYDROCHLORIDE 5 MG: 5 INJECTION INTRAMUSCULAR; INTRAVENOUS at 02:57

## 2022-08-01 RX ADMIN — CIPROFLOXACIN 400 MG: 2 INJECTION, SOLUTION INTRAVENOUS at 04:52

## 2022-08-01 RX ADMIN — LEVOTHYROXINE SODIUM 100 MCG: 100 TABLET ORAL at 05:57

## 2022-08-01 RX ADMIN — DIGOXIN 250 MCG: 250 TABLET ORAL at 10:37

## 2022-08-01 RX ADMIN — SODIUM CHLORIDE, PRESERVATIVE FREE 10 ML: 5 INJECTION INTRAVENOUS at 10:46

## 2022-08-01 RX ADMIN — FERROUS GLUCONATE 324 MG: 324 TABLET ORAL at 10:38

## 2022-08-01 RX ADMIN — Medication 10 ML: at 10:47

## 2022-08-01 RX ADMIN — METOPROLOL TARTRATE 25 MG: 25 TABLET, FILM COATED ORAL at 02:58

## 2022-08-01 RX ADMIN — OXYCODONE AND ACETAMINOPHEN 1 TABLET: 5; 325 TABLET ORAL at 10:37

## 2022-08-01 RX ADMIN — METRONIDAZOLE 500 MG: 500 INJECTION, SOLUTION INTRAVENOUS at 05:56

## 2022-08-01 RX ADMIN — METRONIDAZOLE 500 MG: 500 INJECTION, SOLUTION INTRAVENOUS at 13:26

## 2022-08-01 RX ADMIN — ACETAMINOPHEN 650 MG: 325 TABLET ORAL at 12:11

## 2022-08-01 RX ADMIN — METOCLOPRAMIDE HYDROCHLORIDE 5 MG: 5 INJECTION INTRAMUSCULAR; INTRAVENOUS at 10:36

## 2022-08-01 RX ADMIN — METOPROLOL TARTRATE 25 MG: 25 TABLET, FILM COATED ORAL at 10:37

## 2022-08-01 RX ADMIN — FOLIC ACID 1 MG: 1 TABLET ORAL at 10:38

## 2022-08-01 RX ADMIN — ISOSORBIDE MONONITRATE 30 MG: 30 TABLET, EXTENDED RELEASE ORAL at 10:38

## 2022-08-01 RX ADMIN — METOPROLOL TARTRATE 25 MG: 25 TABLET, FILM COATED ORAL at 05:56

## 2022-08-01 RX ADMIN — SODIUM CHLORIDE, SODIUM LACTATE, POTASSIUM CHLORIDE, CALCIUM CHLORIDE AND DEXTROSE MONOHYDRATE: 5; 600; 310; 30; 20 INJECTION, SOLUTION INTRAVENOUS at 00:39

## 2022-08-01 ASSESSMENT — PAIN SCALES - GENERAL
PAINLEVEL_OUTOF10: 7

## 2022-08-01 ASSESSMENT — PAIN DESCRIPTION - ORIENTATION
ORIENTATION: MID
ORIENTATION: LOWER
ORIENTATION: MID

## 2022-08-01 ASSESSMENT — PAIN DESCRIPTION - DESCRIPTORS
DESCRIPTORS: ACHING;SORE
DESCRIPTORS: ACHING;SHARP
DESCRIPTORS: ACHING;SHARP

## 2022-08-01 ASSESSMENT — PAIN DESCRIPTION - LOCATION
LOCATION: BACK
LOCATION: ABDOMEN;BACK
LOCATION: BACK

## 2022-08-01 NOTE — DISCHARGE SUMMARY
Hospital Medicine Discharge Summary    Hernandez Wilburn  :  1945  MRN:  [id number]    Admit date:  2022  Discharge date:  2022    Admitting Physician: Vanna Ramos MD  Primary Care Physician:  Nevin Coy MD        Discharge Diagnoses:      Colovaginal fistula  A. fib with RVR  Hypertension    Chief Complaint   Patient presents with    Other     Sent in from [facility], nurses aides reported stool in vaginal vault, no history of rectal vaginal fistula to date, but this did occur a couple times prior to being sent to ED for evaluation, patient is afebrile, alert and oriented x2 which is baseline     Hospital Course:       Patient is 28-year-old female with a past medical history of hypertension hypothyroidism who present to the hospital with stool in the vaginal vault. Patient was at extended-care facility and was noted by an aide to have stool in the vagina. Patient was evaluated by surgical team during this admission. She had left colectomy and end colostomy. Patient was also treated for A. fib with RVR. A. fib was new onset. Cardiology was consulted. Hospice was consulted by family but patient was not enrolled during this admission however CODE STATUS is DNR CC at this time. Patient will be discharged to skilled nursing facility with plan to follow-up with PCP as outpatient. List of medication changes below. Exam on discharge:   /60   Pulse 80   Temp 98.2 °F (36.8 °C) (Oral)   Resp 16   Ht 5' 2\" (1.575 m)   Wt 96 lb (43.5 kg)   LMP  (LMP Unknown)   SpO2 100%   BMI 17.56 kg/m²   General appearance: No apparent distress, appears stated age and cooperative. HEENT: Pupils equal, round, and reactive to light. Conjunctivae/corneas clear. Neck: Supple, with full range of motion. No jugular venous distention. Trachea midline. Respiratory:  Normal respiratory effort. Clear to auscultation, bilaterally without Rales/Wheezes/Rhonchi.   Cardiovascular: Regular rate and rhythm with normal S1/S2 without murmurs, rubs or gallops. Abdomen: Soft, non-tender, non-distended with normal bowel sounds. Musculoskeletal: No clubbing, cyanosis or edema bilaterally. Full range of motion without deformity. Skin: Skin color, texture, turgor normal.  No rashes or lesions. Neuro: Non Focal.   Psychiatric: Alert   Capillary Refill: Brisk,< 3 seconds   Peripheral Pulses: +2 palpable, equal bilaterally     Patient was seen by the following consultants   Consults:  IP CONSULT TO PHARMACY  IP CONSULT TO GENERAL SURGERY  IP CONSULT TO PSYCHIATRY  IP CONSULT TO CARDIOLOGY  IP CONSULT TO ONCOLOGY  IP CONSULT TO HOSPICE    Significant Diagnostic Studies:    Refer to chart     Please refer to chart if no studies are shown here    CT ABDOMEN PELVIS WO CONTRAST Additional Contrast? None    Result Date: 7/23/2022  Patient: Wes Truong  Time Out: 23:56 Exam(s): CT ABDOMEN + PELVIS Without Contrast  EXAM:   CT Abdomen and Pelvis Without Intravenous Contrast  CLINICAL HISTORY:    Reason for exam: concern for rectovaginal fistula. Chief complaint concern for rectovaginal fistula  TECHNIQUE:   Axial computed tomography images of the abdomen and pelvis without intravenous contrast.  All CT scan at this facility use dose modulation, iterative reconstruction, and/or weight based dosing when appropriate to reduce radiation dose to as low as reasonably achievable. COMPARISON:   September 10, 2021  FINDINGS:   Lung bases:  2 x 8 cm area of infiltrate in the right lung base suspicious for pneumonia. There is underlying emphysema. ABDOMEN:   Liver:  Unremarkable. Gallbladder and bile ducts:  Unremarkable. No calcified stones. No ductal dilation. Pancreas:  Unremarkable. No ductal dilation. Spleen:  Unremarkable. No splenomegaly. Adrenals:  Unremarkable. No mass. Kidneys and ureters:  Atrophic right kidney. There are calculi in the right renal pelvis measuring up to 4 mm. No hydronephrosis.   Left kidney is unremarkable. Stomach and bowel:  Severe circumferential wall thickening involving the distal colon and rectum measuring up to 2.5 cm thick. This is contiguous with the wall of the vagina. There is stool in the vagina suggesting rectovaginal fistula. No obstruction. PELVIS:   Appendix:  No findings to suggest acute appendicitis. Bladder:  Unremarkable. No stones. Reproductive:  Unremarkable as visualized. ABDOMEN and PELVIS:   Intraperitoneal space:  Unremarkable. No free air. No significant fluid collection. Bones/joints:  Metallic artifact from hardware from prior fusion of the lumbar spine at L4-5. There are moderate degenerative changes in the spine. No acute fracture or subluxation. Soft tissues:  Unremarkable. Vasculature: The abdominal aorta is severely calcified but nondilated. Lymph nodes:  Unremarkable. No enlarged lymph nodes. Electronically signed by Herb Gann MD on 07-23-22 at 2356    1.  2 x 8 cm area of infiltrate in the right lung base suspicious for pneumonia. There is underlying emphysema. 2.  Severe circumferential wall thickening involving the distal colon and rectum measuring up to 2.5 cm thick. This is contiguous with the wall of the vagina. There is stool in the vagina suggesting rectovaginal fistula. XR CHEST PORTABLE    Result Date: 7/24/2022  EXAMINATION: XR CHEST PORTABLE CLINICAL HISTORY: ALTERED MENTAL STATUS COMPARISONS: JULY 2, 2022 FINDINGS: Osseous structures are intact. Cardiopericardial silhouette is normal. Pulmonary vasculature is normal. Lungs are clear. NO ACUTE CARDIOPULMONARY DISEASE. Discharge Medications:         Medication List        START taking these medications      ciprofloxacin 500 MG tablet  Commonly known as: CIPRO  Take 1 tablet by mouth in the morning and 1 tablet before bedtime. Do all this for 7 days. digoxin 250 MCG tablet  Commonly known as: LANOXIN  Take 1 tablet by mouth in the morning. ferrous gluconate 324 (38 Fe) MG tablet  Commonly known as: FERGON  Take 1 tablet by mouth daily (with breakfast)     folic acid 1 MG tablet  Commonly known as: FOLVITE  Take 1 tablet by mouth in the morning. metoprolol tartrate 25 MG tablet  Commonly known as: LOPRESSOR  Take 1 tablet by mouth every 4 hours     metroNIDAZOLE 250 MG tablet  Commonly known as: FLAGYL  Take 1 tablet by mouth in the morning and 1 tablet at noon and 1 tablet before bedtime. Do all this for 7 days. midodrine 5 MG tablet  Commonly known as: PROAMATINE  Take 1 tablet by mouth in the morning and 1 tablet at noon and 1 tablet in the evening. Take with meals. oxyCODONE-acetaminophen 5-325 MG per tablet  Commonly known as: PERCOCET  Take 1 tablet by mouth every 4 hours as needed for Pain for up to 7 days. CHANGE how you take these medications      albuterol sulfate  (90 Base) MCG/ACT inhaler  Commonly known as: PROVENTIL;VENTOLIN;PROAIR  INHALE 2 PUFFS 4 TIMES DAILY AS NEEDED  What changed: Another medication with the same name was removed. Continue taking this medication, and follow the directions you see here. CONTINUE taking these medications      cyanocobalamin 1000 MCG tablet  Commonly known as: [employer] VITAMIN B12  Take 1 tablet by mouth daily     dronabinol 2.5 MG capsule  Commonly known as: Marinol  Take 1 capsule by mouth in the morning and 1 capsule in the evening. Take before meals. Do all this for 30 days.      isosorbide mononitrate 30 MG extended release tablet  Commonly known as: IMDUR  Take 1 tablet by mouth daily     levothyroxine 112 MCG tablet  Commonly known as: Synthroid  Take 1 tablet by mouth Daily     magnesium oxide 400 (240 Mg) MG tablet  Commonly known as: MAG-OX  Take 1 tablet by mouth daily            STOP taking these medications      metoprolol succinate 25 MG extended release tablet  Commonly known as: TOPROL XL               Where to Get Your Medications        You can get these medications from any pharmacy    Bring a paper prescription for each of these medications  oxyCODONE-acetaminophen 5-325 MG per tablet       Information about where to get these medications is not yet available    Ask your nurse or doctor about these medications  ciprofloxacin 500 MG tablet  digoxin 250 MCG tablet  ferrous gluconate 324 (38 Fe) MG tablet  folic acid 1 MG tablet  metoprolol tartrate 25 MG tablet  metroNIDAZOLE 250 MG tablet  midodrine 5 MG tablet         Disposition:   If discharged to Home, Any [facility] needs that were indicated and/or required as been addressed and set up by Social Work. Condition at discharge: good     Activity: activity as tolerated    Total time taken for discharging this patient: 40 minutes. Greater than 70% of time was spent focused exclusively on this patient. Time was taken to review chart, discuss plans with consultants, reconciling medications, discussing plan answering questions with patient.      Michelle Burns DO  8/1/2022, 10:28 AM  ----------------------------------------------------------------------------------------------------------------------    Janann Boast,

## 2022-08-01 NOTE — PROGRESS NOTES
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.3]  Septicemia ([address] [A41.9]  Acute kidney injury ([address] [N17.9]  Acute cystitis without hematuria [N30.00]  Pneumonia of right lower lobe due to infectious organism [J18.9]   Patient Active Problem List    Diagnosis Date Noted    Rectovaginal fistula 2022    Severe malnutrition ([address]     Complicated UTI (urinary tract infection) 2022    RICARDO (acute kidney injury) ([facility]     Pneumonia due to COVID-19 virus 09/10/2021    Acute bronchitis 2020    Pancytopenia ([address]     Peripheral polyneuropathy     Weight loss     TIA (transient ischemic attack) 2019    Bilateral carotid artery stenosis 09/15/2017    Hypothyroidism     Hypertension     Hyperlipidemia     Degenerative spondylolisthesis 2015    Lumbar stenosis with neurogenic claudication 2015        Past Medical History:   Diagnosis Date    Bilateral carotid artery disease (HCC)     CAD (coronary artery disease)     Hyperlipidemia     Hypertension     Hypothyroidism     Osteoarthritis      Past Surgical History:   Procedure Laterality Date    CARDIAC CATHETERIZATION  1990s    COLECTOMY N/A 2022    DIVERTING SIGMOID COLOSTOMY.  ROOM 179 performed by Bianca Feliciano MD at [address] [place] [address] N/A 2022    COLONOSCOPY performed by Bianca Feliciano MD at [place] [address]  2014    LUMBAR    THYROIDECTOMY, PARTIAL          Restrictions  Restrictions/Precautions: Fall Risk (high fall risk per Steve Quesada)         Other position/activity restrictions: abdominal precautions s/p diverting sigmoid colostomy 22    Safety Devices: Safety Devices  Type of Devices: Left in bed;Bed alarm in place;Call light within reach     Patient's date of birth confirmed: Yes    General:       Subjective          Pain at start of treatment: Yes: 8/10    Pain at end of treatment: Yes: 8/10    Location: low  Nursing notified: Yes  RN:   Intervention: Repositioned    Prior Level of Function:  Social/Functional History  Lives With: Daughter  Type of Home: House  Home Layout: Able to Live on Main level with bedroom/bathroom, Two level (typically was using bedroom on 2nd level, 14 steps with1 handrail prior to illness)  Home Access: Stairs to enter with rails  Entrance Stairs - Number of Steps: 2  Entrance Stairs - Rails: Right  Bathroom Shower/Tub: Tub/Shower unit  Bathroom Equipment: Shower chair, Grab bars in [address] [place]: [place], [place], Walker, rolling  Has the patient had two or more falls in the past year or any fall with injury in the past year?: No  Receives Help From: Family (dtr)  ADL Assistance: Independent  Homemaking Assistance: Needs assistance (Pt reports shared homemaking tasks with daughter)  Homemaking Responsibilities: Yes  Ambulation Assistance: Independent (cane)  Transfer Assistance: Independent  Active : Yes  Additional Comments: Pt reports thjat she is at home alone at times. Pt reports that daughter is a teacher    OBJECTIVE:     Orientation Status:  Orientation  Orientation Level: Oriented to place;Oriented to person;Oriented to time    Observation:  Observation/Palpation  Observation: IV in place    Cognition Status:  Cognition  Cognition Comment: follows one step commands consistently    Perception Status:  Perception  Overall Perceptual Status: WFL    Vision and Hearing Status:  Vision  Vision Exceptions: Wears glasses for reading  Hearing  Hearing: Within functional limits   Vision - Basic Assessment  Prior Vision: Wears glasses only for reading  Visual History: No significant visual history  Patient Visual Report: No visual complaint reported. Visual Field Cut: No  Oculo Motor Control:  WNL    GROSS ASSESSMENT AROM/PROM:

## 2022-08-01 NOTE — DISCHARGE INSTR - COC
Continuity of Care Form    Patient Name: Ninfa White   :  1945  MRN:  [id number]    Admit date:  2022  Discharge date:  22    Code Status Order: [facility]   Advance Directives:   [facility] Documentation       Date/Time Healthcare Directive Type of Healthcare Directive Copy in [address] Agent's Name Healthcare Agent's Phone Number    22 1918 Yes, patient has an advance directive for healthcare treatment Living will;Durable power of  for health care -- -- -- --            Admitting Physician: Sivan Reed MD  PCP: Carlos Nolen MD    Discharging Nurse: Jeff Malik RN  [address] Unit/Room#: W601/A942-29  Discharging Unit Phone Number: [phone]    Emergency Contact:   Extended Emergency Contact Information  Primary Emergency Contact: Lela Lowe of [facility] Phone: [phone]  Relation: Child  Secondary Emergency Contact: Formerly named [facility] Nikki Moyer of [facility] Phone: [phone]  Relation: Child    Past Surgical History:  Past Surgical History:   Procedure Laterality Date    CARDIAC CATHETERIZATION  1990s    COLECTOMY N/A 2022    DIVERTING SIGMOID COLOSTOMY. ROOM 179 performed by Tracie Burgos MD at [address] [place] [address] N/A 2022    COLONOSCOPY performed by Tracie Burgos MD at . [place] [address]  2014    LUMBAR    THYROIDECTOMY, PARTIAL         Immunization History: There is no immunization history on file for this patient.     Active Problems:  Patient Active Problem List   Diagnosis Code    Degenerative spondylolisthesis M43.10    Lumbar stenosis with neurogenic claudication M48.062    Hypothyroidism E03.9    Hypertension I10    Hyperlipidemia E78.5    Bilateral carotid artery stenosis I65.23    TIA (transient ischemic attack) G45.9    Pancytopenia (HCC) D61.818    Peripheral polyneuropathy G62.9    Weight loss R63.4    Acute bronchitis J20.9    Pneumonia due to COVID-19 virus U07.1, J12.82    RICARDO (acute kidney injury) ([facility] H99.2    Complicated UTI (urinary tract infection) N39.0    Severe malnutrition ([facility] E43    Rectovaginal fistula N82.3       Isolation/Infection:   Isolation            No Isolation          Patient Infection Status       Infection Onset Added Last Indicated Last Indicated By Review Planned Expiration Resolved Resolved By    None active    Resolved    COVID-19 09/10/21 09/10/21 09/10/21 COVID-19, Rapid   21     COVID-19 (Rule Out) 09/10/21 09/10/21 09/10/21 COVID-19, Rapid (Ordered)   09/10/21 Rule-Out Test Resulted            Nurse Assessment:  Last Vital Signs: /60   Pulse 80   Temp 98.2 °F (36.8 °C) (Oral)   Resp 16   Ht 5' 2\" (1.575 m)   Wt 96 lb (43.5 kg)   LMP  (LMP Unknown)   SpO2 100%   BMI 17.56 kg/m²     Last documented pain score (0-10 scale): Pain Level: 7  Last Weight:   Wt Readings from Last 1 Encounters:   22 96 lb (43.5 kg)     Mental Status:  alert and coherent    IV Access:  - None    Nursing Mobility/ADLs:  Walking   Assisted  Transfer  Assisted  Bathing  Assisted  Dressing  Assisted  Toileting  Assisted  Feeding  Assisted  Med Admin  Assisted  Med Delivery   whole    Wound Care Documentation and Therapy:  Wound 22 Sacrum (Active)   Wound Etiology Deep tissue/Injury 22 0825   Dressing Status Other (Comment) 22 2300   Wound Cleansed Soap and water 22 2215   Dressing/Treatment Zinc paste 22 0825   Dressing Change Due 22 1220   Wound Length (cm) 2 cm 22 1220   Wound Width (cm) 2 cm 22 1220   Wound Surface Area (cm^2) 4 cm^2 22 1220   Wound Assessment Purple/maroon 22 0825   Drainage Amount None 22 0825   Odor None 22 0825   Janelle-wound Assessment Blanchable erythema 22 0825   Margins Defined edges 22 0825   Number of days: 9       Wound 22 Hip Right (Active)   Wound Etiology Deep tissue/Injury 07/31/22 0825   Dressing/Treatment Zinc paste 07/31/22 0825   Dressing Change Due 07/26/22 07/25/22 2030   Wound Length (cm) 3 cm 07/25/22 1220   Wound Width (cm) 3 cm 07/25/22 1220   Wound Surface Area (cm^2) 9 cm^2 07/25/22 1220   Wound Assessment Purple/maroon 07/31/22 0825   Drainage Amount None 07/31/22 0825   Odor None 07/31/22 0825   Janelle-wound Assessment Blanchable erythema 07/31/22 0825   Margins Defined edges 07/31/22 0825   Number of days: 9       Incision 07/28/22 Abdomen Medial;Right;Upper (Active)   Dressing Status Clean;Dry; Intact 07/31/22 0825   Dressing/Treatment Dry dressing 07/31/22 0825   Closure Staples 07/31/22 0825   Drainage Amount None 07/31/22 0825   Odor None 07/31/22 0825   Number of days: 4        Elimination:  Continence: Bowel: No  Bladder: No  Urinary Catheter: None   Colostomy/Ileostomy/Ileal Conduit: Yes  Colostomy LUQ-Stomal Appliance: 1 piece  Colostomy LUQ-Flange Size (inches):  (40mm)  Colostomy LUQ-Stoma  Assessment: Moist, Pink, Protrudes  Colostomy LUQ-Peristomal Assessment: Clean, dry & intact  Colostomy LUQ-Treatment: Bag change  Colostomy LUQ-Stool Appearance: Loose  Colostomy LUQ-Stool Color: Brown  Colostomy LUQ-Stool Amount: Small  Colostomy LUQ-Output (mL): 50 ml    Date of Last BM: 8/1/22    Intake/Output Summary (Last 24 hours) at 8/1/2022 1027  Last data filed at 8/1/2022 0559  Gross per 24 hour   Intake 2804.63 ml   Output 50 ml   Net 2754.63 ml     I/O last 3 completed shifts: In: 2804.6 [P.O.:120; I.V.:2032; IV Piggyback:652.7]  Out: 75 [Stool:75]    Safety Concerns:      At Risk for Falls    Impairments/Disabilities:      Vision and Bed bound / Falls risk    Nutrition Therapy:  Current Nutrition Therapy:   - Oral Diet:  General    Routes of Feeding: Oral  Liquids: No Restrictions  Daily Fluid Restriction: no  Last Modified Barium Swallow with Video (Video Swallowing Test): not done    Treatments at the Time of Hospital Discharge:   Respiratory Treatments: None  Oxygen Therapy:  is not on home oxygen therapy. Ventilator:    - No ventilator support    Rehab Therapies: Physical Therapy, Occupational Therapy, and Speech/Language Therapy  Weight Bearing Status/Restrictions: No weight bearing restrictions  Other Medical Equipment (for information only, NOT a DME order):  wheelchair and walker  Other Treatments: None     Patient's personal belongings (please select all that are sent with patient):  Glasses, Dentures upper and lower, [place]  / clothes    RN SIGNATURE:  Electronically signed by Kevin Leong RN on 8/1/22 at 12:54 PM EDT    CASE MANAGEMENT/SOCIAL WORK SECTION    Inpatient Status Date: ***    Readmission Risk Assessment Score:  Readmission Risk              Risk of Unplanned Readmission:  [id number]           Discharging to Facility/ Agency   Name: Rah Roth  Address:  Phone: [phone]  Fax:    Dialysis Facility (if applicable)   Name:  Address:  Dialysis Schedule:  Phone:  Fax:    / signature: Electronically signed by MAYTE Tucker, ZULEIKAW on 8/1/22 at 12:18 PM EDT     PHYSICIAN SECTION    Prognosis: Fair    Condition at Discharge: Stable    Rehab Potential (if transferring to Rehab): Good    Recommended Labs or Other Treatments After Discharge: none     Physician Certification: I certify the above information and transfer of Maury Ham  is necessary for the continuing treatment of the diagnosis listed and that she requires [facility] for less 30 days.      Update Admission H&P: No change in H&P    PHYSICIAN SIGNATURE:  Electronically signed by Edgard Velazquez DO on 8/1/22 at 10:28 AM EDT

## 2022-08-01 NOTE — PROGRESS NOTES
Wound Ostomy Continence Nurse  Ostomy Referral Follow-up Progress Note      NAME:  Dionte Davis  MEDICAL RECORD NUMBER:  [id number]  AGE: 78 y.o. GENDER:  female  :  1945  TODAY'S DATE:  2022    Subjective:    Dionte Davis is a 78 y.o. female referred by:    Physician  [] Nursing  [] Other:     New and Established    Objective    /60   Pulse 80   Temp 98.2 °F (36.8 °C) (Oral)   Resp 16   Ht 5' 2\" (1.575 m)   Wt 96 lb (43.5 kg)   LMP  (LMP Unknown)   SpO2 100%   BMI 17.56 kg/m²     Gilberto Risk Score Gilberto Scale Score: 10    Assessment   Surgeon Dr Héctor Marlow        Colostomy      Colostomy LUQ (Active)   Stomal Appliance 1 piece 22 1010   Stoma  Assessment Moist;Pink;Protrudes 22 1010   Peristomal Assessment Clean, dry & intact 22 1010   Treatment Bag change 22 1010   Stool Appearance Loose 22 1010   Stool Color Brown 22 1010   Stool Amount Small 22 1010   Output (mL) 50 ml 22 0559   Number of days: 3              Intake/Output Summary (Last 24 hours) at 2022 1011  Last data filed at 2022 0559  Gross per 24 hour   Intake 2804.63 ml   Output 50 ml   Net 2754.63 ml       Plan:   Plan for Ostomy Care: Patient alert and oriented for this visit. Discussed ostomy bag change and patient agreeable. Appliance changed. Extra barrier ring folded and applied to 9 o'clock position to account for contour on incision. Dry sterile dressing replaced over incision which is stapled, intact with no redness, or drainage noted. Patient receptive to instruction on bag opening and closing. Ostomy Plan of Care  [x] Supplies/Instructions left in room  [] Patient using home supplies  [x] Brand/supplies at bedside coloplast    Current Diet: ADULT ORAL NUTRITION SUPPLEMENT; Breakfast, Lunch, Dinner; Clear Liquid Oral Supplement  ADULT DIET;  Regular  Dietician consult:  No    Discharge Plan:  Placement for patient upon discharge: skilled nursing    Outpatient visit plan No  Supplies given Yes   Samples requested N/A    Referrals:  []   [] [facility]  [] Supplies  [] Other      Patient/Caregiver Teaching:  Written Instructions given to patient/family   Teaching provided:  [] Reviewed GI and A&P         Supplies  [x] Pouch emptying      [] Manipulate closure  [] Routine Care         [] Comment  [] Pouch maintenance           Level of patient/caregiver understanding able to:  [] Indicates understanding       [x] Needs reinforcement  [] Unsuccessful      [] Verbal Understanding  [] Demonstrated understanding       [] No evidence of learning  [] Refused teaching         [] N/A    Electronically signed by  Bishop Acharya RN (covering Wound/Ostomy Nurse)

## 2022-08-01 NOTE — PROGRESS NOTES
[facility] Daily Progress Note  Name: Jamilah Fatima  Age: 78 y.o. Gender: female  CodeStatus: DNR-CC    Primary Cardiology : Dr. Venus Angela MD  [facility] Cardiology : Dr. Yi Sweeney APRN-CNP  Primary Care Provider: Carissa Brown MD  Admission Date: 7/23/2022    Chief complaint/Associated symptoms:   Zeina Mcdonough was seen and examined at bedside    She is resting comfortably in bed without any specific complaints    She denies chest pain or shortness of breath     Plan for discharge today. 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 Assessment  Atrial Fib with RVR: New onset of atrial fib with RVR. Rate control strategy with noted improvement in HR. Has maintained  SR.       2. V-Tach: Noted 14 beat V-Tach run  7/24/2022 2100. No reoccurrence. 3. RICARDO:  Resolved. 4. Anemia:  Hgb/hct 10.3/32.8 on admission 7.9/24.3  7/30/2022. Suggest maintaining  Hgb greater than 8, as this has been a factor in uncontrolled HR during this admission. Per medicine. Plan:  Discontinue Metoprolol tartrate  Resume home dose of metoprolol succinate  Continue digoxin PO  Obtain Dig level in 1 week   Cardiology to sign off, will arrange follow up with Dr. Keily Decker   Further recommendations per Dr. Kajal Vu       Physical Exam  Constitutional:  Frail,  no distress, alert and cooperative. Respiratory/Thorax: Patent airways, CTAB,  normal breath sounds with good chest expansion, thorax symmetric. Cardiovascular: Regular  rate and rhythm, normal S1 and S2, PMI non displaced. Gastrointestinal:  Non distended, soft, tender  Genitourinary:  deferred  Musculoskeletal:  No apparent injury. Extremities:  No cyanosis, edema, contusions or wounds, no clubbing.    Neurological:  Alert   Moves extremities spontaneous with purpose  Psychological:  Appropriate mood and behavior  Skin:  Warm and dry,  no lesions or rashes. Allergies: Bee Venom  Pcn [Penicillins]    Medications:  Reviewed  Home Medications    Infusion Medications:    sodium chloride      dextrose      dextrose 5% in lactated ringers 75 mL/hr at 08/01/22 0039    sodium chloride      dilTIAZem (CARDIZEM) 125 mg in dextrose 5% 125 mL infusion Stopped (07/25/22 1637)    sodium chloride       Scheduled Medications:    acetaminophen  650 mg Oral Once    metoclopramide  5 mg IntraVENous Q8H    sodium chloride flush  5-40 mL IntraVENous 2 times per day    folic acid  1 mg Oral Daily    ferrous gluconate  324 mg Oral Daily with breakfast    digoxin  250 mcg Oral Daily    midodrine  5 mg Oral TID WC    enoxaparin  30 mg SubCUTAneous Daily    ciprofloxacin  400 mg IntraVENous Q12H    metoprolol tartrate  25 mg Oral Q4H    sodium chloride flush  5-40 mL IntraVENous 2 times per day    metroNIDAZOLE  500 mg IntraVENous Q8H    isosorbide mononitrate  30 mg Oral Daily    levothyroxine  100 mcg Oral Daily     PRN Meds: sodium chloride flush, sodium chloride, fentanNYL, fentanNYL, labetalol **OR** hydrALAZINE, oxyCODONE-acetaminophen **OR** oxyCODONE-acetaminophen, HYDROmorphone, metoprolol, glucose, dextrose bolus **OR** dextrose bolus, glucagon (rDNA), dextrose, metoprolol, sodium chloride, traMADol, sodium chloride flush, sodium chloride, ondansetron **OR** ondansetron, polyethylene glycol, acetaminophen **OR** acetaminophen, camphor-menthol-methyl salicylate    Vitals  Vitals:    08/01/22 1209   BP: (!) 151/54   Pulse: 88   Resp:    Temp:    SpO2:        I&O  No documented urine output      Telemetry:  No telemetry      EKG  7/29/2022  Normal sinus rhythm  HR 91 bpm.   Low voltage QRS  Lateral infarct , age undetermined       ECHO  7/4/2022  Left Ventricle  Left ventricular ejection fraction is visually estimated at 55%. Right Ventricle  Normal right ventricle structure and function. Normal right ventricle systolic pressure.      Pericardial Effusion  No evidence of pericardial effusion. Labs:   Recent Labs     07/30/22  0601   WBC 15.5*   HGB 7.9*   HCT 24.3*          Recent Labs     07/30/22  0601      K 3.8   *   CO2 16*   BUN 8   CREATININE 0.78   CALCIUM 7.8*       Recent Labs     07/30/22  0601   AST 9   ALT <5   BILITOT <0.2   ALKPHOS 52       No results for input(s): INR in the last 72 hours. No results for input(s): Rita Ill in the last 72 hours. Urinalysis:   Lab Results   Component Value Date/Time    NITRU Negative 07/23/2022 10:52 PM    [address] 07/23/2022 10:52 PM    BACTERIA MANY 07/23/2022 10:52 PM    RBCUA 20-50 07/23/2022 10:52 PM    BLOODU LARGE 07/23/2022 10:52 PM    SPECGRAV 1.022 07/23/2022 10:52 PM    GLUCOSEU Negative 07/23/2022 10:52 PM       Radiology:   Most recent    Chest CT      WITH CONTRAST:No results found for this or any previous visit. WITHOUT CONTRAST: No results found for this or any previous visit. CXR      2-view: No results found for this or any previous visit. Portable: Results for orders placed during the hospital encounter of 07/23/22    XR CHEST PORTABLE    Narrative  EXAMINATION: XR CHEST PORTABLE    CLINICAL HISTORY: ALTERED MENTAL STATUS    COMPARISONS: JULY 2, 2022    FINDINGS: Osseous structures are intact. Cardiopericardial silhouette is normal. Pulmonary vasculature is normal. Lungs are clear. Impression  NO ACUTE CARDIOPULMONARY DISEASE. 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 I am managing a portion of pt care. Some medical issues are handled by other specialists. Additional work up and treatment should be done in out pt setting by pt PCP and other out pt providers.      In addition to examining and evaluating pt, I spent additional time explaining care, normaland abnormal findings, and treatment plan. All of pt questions were answered. Counseling, diet and education were provided. Case will be discussed with nursing staff when appropriate. Family will be updated if and whenappropriate.       Electronically signed by VALARIE Puente CNP on 8/1/2022 at 12:09 PM

## 2022-08-01 NOTE — CARE COORDINATION
DC PLAN IS KIRSTIN GEE TODAY, TRANSPORT ARRANGED WITH [facility] PER LSW, WILL NOTIFY DTR WITH TIME. CARDIOLOGIST AWARE OF PLAN FOR DC AND WILL SIGN OFF. TRANSPORT ARRANGED , IMM COMPLETED VIA PHONE PER LSW.

## 2022-08-01 NOTE — PROGRESS NOTES
Shift assessment completed. A&Ox4. VSS. Lung sounds are diminished. Dyspnea on exertion. Abdomen is flat w/ ostomy in place. Surgical site drsg CDI. Pt c/o pain and tenderness in abdomen. Small output present in pt ostomy: brown and soft. Bowel sounds active. Incontinent of urine. Buttocks is pink. purple with deep tissue injury present. Mepilex and zinc placed. Left arm presents w/ fluid / possible 3rd spacing. BLE are edematous. Heels intact w/ mepilex present bilaterally. Meds given per MAR. Pt resting in bed with call light in reach. 12 - This RN informed pts daughter Lety Mcdowell that pt was to be transported to [facility] around 1500.     1450 - Report given to RN over at [facility]. All questions answered. Script in patients folder and was signed.      All pts belongings w/ patient: Book bag, glasses, dentures, clothes etc.     Electronically signed by Florentino Livingston RN on 8/1/2022 at 3:10 PM

## 2022-08-04 LAB
EKG ATRIAL RATE: 89 BPM
EKG P AXIS: 88 DEGREES
EKG P-R INTERVAL: 152 MS
EKG Q-T INTERVAL: 304 MS
EKG QRS DURATION: 66 MS
EKG QTC CALCULATION (BAZETT): 369 MS
EKG R AXIS: 33 DEGREES
EKG T AXIS: 61 DEGREES
EKG VENTRICULAR RATE: 89 BPM

## 2022-08-09 ENCOUNTER — OFFICE VISIT (OUTPATIENT)
Dept: SURGERY | Age: 77
End: 2022-08-09

## 2022-08-09 VITALS
TEMPERATURE: 97.8 F | OXYGEN SATURATION: 95 % | HEIGHT: 62 IN | BODY MASS INDEX: 17.66 KG/M2 | WEIGHT: 96 LBS | HEART RATE: 97 BPM

## 2022-08-09 DIAGNOSIS — C21.0 SQUAMOUS CELL CARCINOMA OF ANUS (HCC): ICD-10-CM

## 2022-08-09 DIAGNOSIS — N82.3 RECTAL VAGINAL FISTULA: Primary | ICD-10-CM

## 2022-08-09 PROCEDURE — 99024 POSTOP FOLLOW-UP VISIT: CPT | Performed by: COLON & RECTAL SURGERY

## 2022-08-09 NOTE — PROGRESS NOTES
Subjective:      Patient ID: Harvy Duane is a 78 y.o. 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 She was in the hospital last week and had a diverting sigmoid colostomy for bowel management. She is currently at [facility]. She has a history of underlying Alzheimer's. She is here for follow-up after diverting colostomy. Past Medical History:   Diagnosis Date    Bilateral carotid artery disease (HCC)     CAD (coronary artery disease)     Hyperlipidemia     Hypertension     Hypothyroidism     Osteoarthritis      Past Surgical History:   Procedure Laterality Date    CARDIAC CATHETERIZATION  1990s    COLECTOMY N/A 2022    DIVERTING SIGMOID COLOSTOMY. ROOM 179 performed by Dread White MD at [address] [place] [address] N/A 2022    COLONOSCOPY performed by Dread White MD at [place] [address]  2014    LUMBAR    THYROIDECTOMY, PARTIAL       Social History     Socioeconomic History    Marital status:      Spouse name: Not on file    Number of children: Not on file    Years of education: Not on file    Highest education level: Not on file   Occupational History    Not on file   Tobacco Use    Smoking status: Former     Packs/day: 1.00     Years: 35.00     Pack years: 35.00     Types: Cigarettes     Start date:      Quit date: 1996     Years since quittin.7    Smokeless tobacco: Never   Substance and Sexual Activity    Alcohol use:  Yes     Alcohol/week: 0.0 standard drinks     Comment: RARE    Drug use: Never    Sexual activity: Not on file   Other Topics Concern    Not on file   Social History Narrative    Not on file     Social Determinants of Health     Financial Resource Strain: Not on file   Food Insecurity: Not on file   Transportation Needs: Not on file   Physical Activity: Not on file   Stress: Not on file   Social Connections: Not on file   Intimate Partner Violence: Not on file   Housing Stability: Not on file     No family history on file. Allergies:  Bee venom and Pcn [penicillins]    Review of Systems    Objective:    Pulse 97   Temp 97.8 °F (36.6 °C) (Temporal)   Ht 5' 2\" (1.575 m)   Wt 96 lb (43.5 kg)   LMP  (LMP Unknown)   SpO2 95%   BMI 17.56 kg/m²     Physical Exam  On exam her incision has healed well. Staples were removed. Her colostomy is functioning well. Her abdomen is soft and nondistended. Assessment/Plan:          Diagnosis Orders   1. Rectal vaginal fistula  CHRISS Sprague MD, Oncology, [place]      2. Squamous cell carcinoma of anus (HCC)  CHRISS Sprague MD, Oncology, [place]        I have made referral to the oncologist who saw her in the hospital regarding any palliative treatment. The family during the hospital stay was not eager to proceed with treatment but I have not seen any notes from the record from the oncology standpoint. No surgical intervention anticipated further. If there are any questions or problems that arise, or any way I can be of further assistance, I asked her to call. Please note this report has beenpartially produced using speech recognition software and may cause contain errors related to that system including grammar, punctuation and spelling as well as words and phrases that may seem inappropriate.  If there arequestions or concerns please feel free to contact me to clarify

## 2022-08-11 RX ORDER — OXYCODONE HYDROCHLORIDE AND ACETAMINOPHEN 5; 325 MG/1; MG/1
1 TABLET ORAL EVERY 4 HOURS PRN
COMMUNITY

## 2022-08-11 RX ORDER — METRONIDAZOLE 250 MG/1
250 TABLET ORAL 2 TIMES DAILY
COMMUNITY

## 2022-08-11 RX ORDER — CIPROFLOXACIN 500 MG/1
500 TABLET, FILM COATED ORAL 2 TIMES DAILY
COMMUNITY

## 2022-08-15 PROBLEM — Z51.5 HOSPICE CARE: Status: ACTIVE | Noted: 2022-08-15

## 2022-08-16 ENCOUNTER — OFFICE VISIT (OUTPATIENT)
Dept: GERIATRIC MEDICINE | Age: 77
End: 2022-08-16
Payer: MEDICARE

## 2022-08-16 DIAGNOSIS — R63.4 WEIGHT LOSS: ICD-10-CM

## 2022-08-16 DIAGNOSIS — G62.9 PERIPHERAL POLYNEUROPATHY: ICD-10-CM

## 2022-08-16 DIAGNOSIS — E43 SEVERE MALNUTRITION (HCC): Primary | ICD-10-CM

## 2022-08-16 DIAGNOSIS — N82.3 RECTOVAGINAL FISTULA: ICD-10-CM

## 2022-08-16 DIAGNOSIS — N39.0 COMPLICATED UTI (URINARY TRACT INFECTION): ICD-10-CM

## 2022-08-16 DIAGNOSIS — R62.7 ADULT FAILURE TO THRIVE: ICD-10-CM

## 2022-08-16 PROCEDURE — 99304 1ST NF CARE SF/LOW MDM 25: CPT | Performed by: INTERNAL MEDICINE

## 2022-08-16 PROCEDURE — G9692 HOSP RECD BY PT DUR MSMT PER: HCPCS | Performed by: INTERNAL MEDICINE

## 2022-08-17 PROBLEM — D50.9 IRON DEFICIENCY ANEMIA: Status: ACTIVE | Noted: 2022-08-17

## 2022-08-17 ASSESSMENT — ENCOUNTER SYMPTOMS
COUGH: 0
CONSTIPATION: 1
ANAL BLEEDING: 0
SHORTNESS OF BREATH: 0
BLOOD IN STOOL: 0
ABDOMINAL PAIN: 1
ABDOMINAL DISTENTION: 0
CHEST TIGHTNESS: 0
DIARRHEA: 0
NAUSEA: 0

## 2022-08-17 NOTE — PROGRESS NOTES
Subjective:      Patient ID: Ha Thompson is a pleasant 78 y.o. female who presents today for:  No chief complaint on file. [facility]    Patient is seen for new onset? Vaginal bleeding as well as complaint of right rib pain. Patient is aggressive speech today, very agitated. She does want to answer questions or be bothered throughout visit, however with further questioning patient is having some new clotting found in her brief today per nurse. Did reexamine briefly today and there is some scab darkened blood. No obvious bleeding per vaginal orifice, however did see brief with scattered clots depicting obvious vaginal bleeding. Did not appear to be stool in brief. Did ask patient if she would be willing to OB/GYN consultation, she said she would \"think about\". She also complains of right lower rib pain. She states that she had an old childhood injury assessment for further pain medications during today's visit. Did state that we will attempt to utilize Tylenol versus prior to adding increased narcotic medication which was helping the patient at the time. However she ultimately decided to allow for increased Tylenol. We will utilize as needed order. On no further complaints today. Vital signs 124/76 mmHg, 80 bpm, 17 RR, 98 °F, 98% SPO2 on room air, most recent weight 90 LBS.       Patient Active Problem List   Diagnosis    Degenerative spondylolisthesis    Lumbar stenosis with neurogenic claudication    Hypothyroidism    Hypertension    Hyperlipidemia    Bilateral carotid artery stenosis    TIA (transient ischemic attack)    Pancytopenia (HCC)    Peripheral polyneuropathy    Weight loss    Acute bronchitis    Pneumonia due to COVID-19 virus    RICARDO (acute kidney injury) ([facility] [address]    Complicated UTI (urinary tract infection)    Severe malnutrition (HCC)    Rectovaginal fistula    Hospice care    Iron deficiency anemia     Past Medical History:   Diagnosis Date    Bilateral carotid artery pain, mild states \"old childhood injury\") and constipation. Negative for abdominal distention, anal bleeding, blood in stool (no obvious indication of GI bleed (?)), diarrhea and nausea. Genitourinary:  Positive for vaginal bleeding. Negative for difficulty urinating, dysuria, flank pain, pelvic pain and vaginal pain. Hematological:  Does not bruise/bleed easily. Psychiatric/Behavioral:  Positive for agitation. Negative for behavioral problems (resistant to  exam), confusion and dysphoric mood. All other systems reviewed and are negative. Objective:   LMP  (LMP Unknown)     Physical Exam  Constitutional:       General: She is in acute distress (AMS/agitation). Appearance: She is ill-appearing. HENT:      Head: Normocephalic and atraumatic. Mouth/Throat:      Mouth: Mucous membranes are moist.      Pharynx: Oropharynx is clear. Eyes:      General: No scleral icterus. Conjunctiva/sclera: Conjunctivae normal.      Pupils: Pupils are equal, round, and reactive to light. Cardiovascular:      Rate and Rhythm: Normal rate and regular rhythm. Pulses: Normal pulses. Pulmonary:      Effort: Pulmonary effort is normal. No respiratory distress. Breath sounds: Normal breath sounds. No stridor. No wheezing or rhonchi. Abdominal:      General: Bowel sounds are normal. There is no distension. Palpations: Abdomen is soft. Tenderness: There is guarding. There is no right CVA tenderness, left CVA tenderness or rebound. Genitourinary:     General: Normal vulva. Vagina: Vaginal discharge (?bloody/clotting DC on very limited exam d/t patient agitation) present. Comments: Pt did have clotting or bleeding seen. No obvious darkened or tarry stool. Brief during exam shows mild light blood, no clots. Skin:     General: Skin is warm and dry. Coloration: Skin is pale. Skin is not jaundiced. Findings: No bruising or erythema.    Neurological:      General: No focal deficit present. Mental Status: She is alert and oriented to person, place, and time. Mental status is at baseline. Motor: Weakness present. Gait: Gait abnormal.   Psychiatric:         Thought Content: Thought content normal.      Comments: Patient increasingly agitated throughout exam, cursory language. Patient showing questionable judgment regarding vulvovaginal bleeding. Did discuss OB/GYN visit, she begrudgingly stated \"I will think about it \". Assessment:       Diagnosis Orders   1. Abnormal vaginal bleeding        2. Iron deficiency anemia, unspecified iron deficiency anemia type              Plan: We will perform FOBT's x3. Monitor for blood routinely upon changing. Will assess CBC and CMP on Monday. We will schedule for OB/GYN visit for possible pelvic ultrasound and exam.  Currently on folic acid and iron gluconate supplementation. Consider iron studies if further decrease in H&H. Monitor BP. No follow-ups on file. Side effects, adverse effects of the medication prescribed today, as well as treatment plan and result expectations have been discussed withthe patient who expresses understanding and desires to proceed.     Stephanie Culverma

## 2022-08-19 NOTE — PROGRESS NOTES
Comment: RARE    Drug use: Never         Past Medical History:   Diagnosis Date    Bilateral carotid artery disease (HCC)     CAD (coronary artery disease)     Hyperlipidemia     Hypertension     Hypothyroidism     Osteoarthritis            Past Surgical History:   Procedure Laterality Date    CARDIAC CATHETERIZATION  1990s    COLECTOMY N/A 7/28/2022    DIVERTING SIGMOID COLOSTOMY. ROOM 179 performed by Heena Laguerre MD at [address] [place] [address] N/A 7/27/2022    COLONOSCOPY performed by Heena Laguerre MD at . [address]  8/11/2014    LUMBAR    THYROIDECTOMY, PARTIAL  1962         Current Outpatient Medications on File Prior to Visit   Medication Sig Dispense Refill    magnesium oxide (MAG-OX) 400 (240 Mg) MG tablet Take 1 tablet by mouth daily (Patient taking differently: Take 400 mg by mouth in the morning. Indications: Nutritional Support.) 30 tablet 3    cyanocobalamin ([employer] VITAMIN B12) 1000 MCG tablet Take 1 tablet by mouth daily (Patient taking differently: Take 1,000 mcg by mouth in the morning. Indications: Nutritional Support.) 30 tablet 3    levothyroxine (SYNTHROID) 112 MCG tablet Take 1 tablet by mouth Daily (Patient taking differently: Take 100 mcg by mouth in the morning. Indications: Underactive Thyroid.) 30 tablet 1    albuterol sulfate  (90 Base) MCG/ACT inhaler INHALE 2 PUFFS 4 TIMES DAILY AS NEEDED 8.5 Inhaler 3    isosorbide mononitrate (IMDUR) 30 MG extended release tablet Take 1 tablet by mouth daily (Patient taking differently: Take 30 mg by mouth in the morning. Indications: High Blood Pressure Disorder.) 90 tablet 3     No current facility-administered medications on file prior to visit. Allergies   Allergen Reactions    Bee Venom     Pcn [Penicillins] Hives and Itching         No family history on file. Physical Exam:      Physical Exam  Vitals and nursing note reviewed. Constitutional:       Appearance: Normal appearance.       Comments: Bilateral temporal wasting   HENT:      Head: Normocephalic and atraumatic. Nose: Nose normal.      Mouth/Throat:      Mouth: Mucous membranes are dry. Cardiovascular:      Rate and Rhythm: Regular rhythm. Pulses: Normal pulses. Heart sounds: Murmur heard. Pulmonary:      Effort: Pulmonary effort is normal.      Breath sounds: No wheezing. Abdominal:      General: Bowel sounds are normal.      Palpations: Abdomen is soft. Musculoskeletal:         General: Normal range of motion. Cervical back: Neck supple. No tenderness. Skin:     General: Skin is warm. Findings: Bruising present. Neurological:      Mental Status: She is disoriented. Motor: Weakness present. Psychiatric:         Mood and Affect: Mood normal.       not currently breastfeeding.       .   Lab Results   Component Value Date    WBC 15.5 (H) 07/30/2022    HGB 7.9 (L) 07/30/2022    HCT 24.3 (L) 07/30/2022    MCV 87.3 07/30/2022     07/30/2022     Lab Results   Component Value Date/Time     07/30/2022 06:01 AM    K 3.8 07/30/2022 06:01 AM    K 3.9 07/26/2022 04:58 AM     07/30/2022 06:01 AM    CO2 16 07/30/2022 06:01 AM    BUN 8 07/30/2022 06:01 AM    CREATININE 0.78 07/30/2022 06:01 AM    GLUCOSE 95 07/30/2022 06:01 AM    CALCIUM 7.8 07/30/2022 06:01 AM                ASSESSMENT:  Patient Active Problem List   Diagnosis    Degenerative spondylolisthesis    Lumbar stenosis with neurogenic claudication    Hypothyroidism    Hypertension    Hyperlipidemia    Bilateral carotid artery stenosis    TIA (transient ischemic attack)    Pancytopenia (HCC)    Peripheral polyneuropathy    Weight loss    Acute bronchitis    Pneumonia due to COVID-19 virus    RICARDO (acute kidney injury) ([address]    Complicated UTI (urinary tract infection)    Severe malnutrition ([address]    Rectovaginal fistula    Hospice care    Iron deficiency anemia    Occlusion and stenosis of bilateral carotid arteries    Atrophy of right kidney    Body mass index (BMI) of 20 to 24    Cardiomyopathy (HCC)    Cholelithiasis    Chronic back pain    Cobalamin deficiency    Dyslipidemia    Fatigue    Left ventricular hypertrophy    Nephrosclerosis    Osteoarthritis of knee    Postoperative hypothyroidism    Stage 3 chronic kidney disease (HCC)    Underweight    Vitamin D deficiency         PLAN:   Diagnosis Orders   1. Complicated UTI (urinary tract infection)        2. Dyslipidemia        3. Severe malnutrition ([address]        4. RICARDO (acute kidney injury) ([facility] [address]        5. Peripheral polyneuropathy        6. Lumbar stenosis with neurogenic claudication          Continue supportive care complete course antibiotic therapy nutritional support. Course of physical therapy outpatient therapy reorientation as able patient remains at increased risk for delirium/dementia. Patient is functionally weak continue nutritional support given her poor oral intake. Repeat CBC BMP liver function test pending. Patient to be followed by dietitian at this time. Globally weak. Please note orders entered on site at facility after discussion with appropriate facility nursing/therapy/ / nutritional staff. Current longstanding medical problems and acute medical issues addressed with staff. Available data and data elements in on site paper chart reviewed and analyzed. Current external consultant notes reviewed in on site chart. Ordered laboratory testing and imaging will be reviewed when available. This patient's need for psychiatric medication has been reviewed. Will consider further adjustment and possible further evaluations by mental health services.

## 2022-08-23 PROBLEM — E55.9 VITAMIN D DEFICIENCY: Status: ACTIVE | Noted: 2022-08-23

## 2022-08-23 PROBLEM — I42.9 CARDIOMYOPATHY (HCC): Status: ACTIVE | Noted: 2022-08-23

## 2022-08-23 PROBLEM — I65.23 OCCLUSION AND STENOSIS OF BILATERAL CAROTID ARTERIES: Status: ACTIVE | Noted: 2019-03-12

## 2022-08-23 PROBLEM — G89.29 CHRONIC BACK PAIN: Status: ACTIVE | Noted: 2022-08-23

## 2022-08-23 PROBLEM — E78.5 DYSLIPIDEMIA: Status: ACTIVE | Noted: 2022-08-23

## 2022-08-23 PROBLEM — K80.20 CHOLELITHIASIS: Status: ACTIVE | Noted: 2022-08-23

## 2022-08-23 PROBLEM — I12.9 NEPHROSCLEROSIS: Status: ACTIVE | Noted: 2022-08-23

## 2022-08-23 PROBLEM — N26.1 ATROPHY OF RIGHT KIDNEY: Status: ACTIVE | Noted: 2022-08-23

## 2022-08-23 PROBLEM — M54.9 CHRONIC BACK PAIN: Status: ACTIVE | Noted: 2022-08-23

## 2022-08-23 PROBLEM — E53.8 COBALAMIN DEFICIENCY: Status: ACTIVE | Noted: 2022-08-23

## 2022-08-23 PROBLEM — R53.83 FATIGUE: Status: ACTIVE | Noted: 2022-08-23

## 2022-08-23 PROBLEM — E89.0 POSTOPERATIVE HYPOTHYROIDISM: Status: ACTIVE | Noted: 2022-08-23

## 2022-08-23 PROBLEM — R63.6 UNDERWEIGHT: Status: ACTIVE | Noted: 2022-08-23

## 2022-08-23 PROBLEM — I51.7 LEFT VENTRICULAR HYPERTROPHY: Status: ACTIVE | Noted: 2022-08-23

## 2022-08-23 PROBLEM — M17.9 OSTEOARTHRITIS OF KNEE: Status: ACTIVE | Noted: 2022-08-23

## 2022-08-23 PROBLEM — N18.30 STAGE 3 CHRONIC KIDNEY DISEASE (HCC): Status: ACTIVE | Noted: 2022-08-23

## 2022-08-23 ASSESSMENT — ENCOUNTER SYMPTOMS
COUGH: 0
CONSTIPATION: 1
ABDOMINAL PAIN: 0
CHEST TIGHTNESS: 0
ABDOMINAL DISTENTION: 0
SHORTNESS OF BREATH: 0
NAUSEA: 0
BLOOD IN STOOL: 0
ANAL BLEEDING: 0
DIARRHEA: 0

## 2022-08-23 NOTE — PROGRESS NOTES
Subjective:      Patient ID: Cam Murray is a pleasant 78 y.o. female who presents today for:  No chief complaint on file. [facility]    Patient seen today for ongoing complaint of arthritic pain for which family expressed need for possible increase in pain coverage. Attempted to get a hold of Woody Cornea (daughter). Patient currently on combination of APAP as needed and Marinol 2.5 mg p.o. twice daily due to ongoing weight loss that is advanced, and poor nutritional intake. Today's vital signs 120/66, 18 RR, 82 bpm, 94% SPO2, 97 °F, weight = 88 LBS. Patient showing some mild signs of AMS. Does have history of recurrent UTI and CKD. We will perform labs including UA C&S, as well as potential addition of short duration tramadol vs oxycodone for bilateral hip and knee pain. Reportedly still some mild to moderate vaginal bleeding ; postmenopausal.  Patient expresses no desire for OB/GYN visit this time we will continue to emphasize and schedule if patient agreeable. Monitoring labs as needed to monitor for any blood loss anemia.   Patient Active Problem List   Diagnosis    Degenerative spondylolisthesis    Lumbar stenosis with neurogenic claudication    Hypothyroidism    Hypertension    Hyperlipidemia    Bilateral carotid artery stenosis    TIA (transient ischemic attack)    Pancytopenia (HCC)    Peripheral polyneuropathy    Weight loss    Acute bronchitis    Pneumonia due to COVID-19 virus    RICARDO (acute kidney injury) ([facility] [address]    Complicated UTI (urinary tract infection)    Severe malnutrition (HCC)    Rectovaginal fistula    Hospice care    Iron deficiency anemia    Occlusion and stenosis of bilateral carotid arteries    Atrophy of right kidney    Body mass index (BMI) of 20 to 24    Cardiomyopathy (HCC)    Cholelithiasis    Chronic back pain    Cobalamin deficiency    Dyslipidemia    Fatigue    Left ventricular hypertrophy    Nephrosclerosis    Osteoarthritis of knee    Postoperative hypothyroidism Stage 3 chronic kidney disease (HCC)    Underweight    Vitamin D deficiency     Past Medical History:   Diagnosis Date    Bilateral carotid artery disease (HCC)     CAD (coronary artery disease)     Hyperlipidemia     Hypertension     Hypothyroidism     Osteoarthritis      Past Surgical History:   Procedure Laterality Date    CARDIAC CATHETERIZATION  1990s    COLECTOMY N/A 2022    DIVERTING SIGMOID COLOSTOMY. ROOM 179 performed by Chencho Ewing MD at [address] [place] [address] N/A 2022    COLONOSCOPY performed by Chencho Ewing MD at . [place] [address]  2014    LUMBAR    THYROIDECTOMY, PARTIAL       Social History     Socioeconomic History    Marital status:      Spouse name: Not on file    Number of children: Not on file    Years of education: Not on file    Highest education level: Not on file   Occupational History    Not on file   Tobacco Use    Smoking status: Former     Packs/day: 1.00     Years: 35.00     Pack years: 35.00     Types: Cigarettes     Start date:      Quit date: 1996     Years since quittin.7    Smokeless tobacco: Never   Substance and Sexual Activity    Alcohol use: Yes     Alcohol/week: 0.0 standard drinks     Comment: RARE    Drug use: Never    Sexual activity: Not on file   Other Topics Concern    Not on file   Social History Narrative    Not on file     Social Determinants of Health     Financial Resource Strain: Not on file   Food Insecurity: Not on file   Transportation Needs: Not on file   Physical Activity: Not on file   Stress: Not on file   Social Connections: Not on file   Intimate Partner Violence: Not on file   Housing Stability: Not on file     No family history on file. Allergies   Allergen Reactions    Bee Venom     Pcn [Penicillins] Hives and Itching         Review of Systems   Constitutional:  Positive for fatigue. Negative for activity change, appetite change, chills and fever.    Respiratory:  Negative for cough, Coloration: Skin is pale. Skin is not jaundiced. Findings: No bruising or erythema. Neurological:      General: No focal deficit present. Mental Status: She is alert and oriented to person, place, and time. Mental status is at baseline. Motor: Weakness present. Gait: Gait abnormal.   Psychiatric:      Comments: Agitated; redirectable after several minutes . Assessment:       Diagnosis Orders   1. Severe malnutrition (HCC)        2. Stage 3 chronic kidney disease, unspecified whether stage 3a or 3b CKD ([facility] [address]        3. Weight loss              Plan: We will plan on contacting urinalysis with culture reflex. Continue current pain medications. Will consult for hospice for pain medications. Use protein supplements and high calorie pleasure foods ad libitum. Mnitor labs for changes in H&H. Revisit ob/gyn referral again at next visit. Monitor for vaginal bleeding in brief. No follow-ups on file. Side effects, adverse effects of the medication prescribed today, as well as treatment plan and result expectations have been discussed withthe patient who expresses understanding and desires to proceed.     Sammy Lizarraga

## 2022-08-27 ASSESSMENT — ENCOUNTER SYMPTOMS
CONSTIPATION: 0
COUGH: 1

## 2022-09-15 ASSESSMENT — ENCOUNTER SYMPTOMS
COUGH: 1
ABDOMINAL PAIN: 1
SHORTNESS OF BREATH: 1
BACK PAIN: 1

## 2022-09-15 NOTE — PROGRESS NOTES
Patient Name: Gaye Agosto    YOB: 1945  Medical Record Number: [id number]      History of Present Illness:  72-year-old woman readmitted to this facility for functional decline patient was sent out for evaluation of possibility of stool in her vagina patient has no history of prior colostomy patient did have evidence of afibrillation with RVR patient was seen for possibility of rectovaginal fistula patient ultimately family opted for conservative management she was stabilized and got frustrated therapy simply return to the facility. Patient has been been seen by hospice in the past.  Patient did have a course of pneumonia treated course antibiotic therapy. Recent urine infection as well as recent COVID-19. Was cognitively impaired globally weak. Seen by hematology oncology as well as surgical services had evidence of advanced rectal carcinoma and had for aggressive care however she is poor candidate patient did undergo palliative intervention with diverting colostomy she was stabilized and transferred here for possible conservative management. Review of Systems   Constitutional:  Positive for activity change, appetite change, fatigue and unexpected weight change. HENT:  Negative for congestion. Respiratory:  Positive for cough and shortness of breath. Cardiovascular:  Positive for leg swelling. Negative for chest pain. Gastrointestinal:  Positive for abdominal pain. Musculoskeletal:  Positive for back pain. Negative for arthralgias. Neurological:  Positive for weakness. All other systems reviewed and are negative.     Review of Systems: All 14 review of systems negative other than as stated above    Social History     Tobacco Use    Smoking status: Former     Packs/day: 1.00     Years: 35.00     Pack years: 35.00     Types: Cigarettes     Start date: 67     Quit date: 1996     Years since quittin.8    Smokeless tobacco: Never   Substance Use Topics    Alcohol use: Yes     Alcohol/week: 0.0 standard drinks     Comment: RARE    Drug use: Never         Past Medical History:   Diagnosis Date    Bilateral carotid artery disease (HCC)     CAD (coronary artery disease)     Hyperlipidemia     Hypertension     Hypothyroidism     Osteoarthritis            Past Surgical History:   Procedure Laterality Date    CARDIAC CATHETERIZATION  1990s    COLECTOMY N/A 7/28/2022    DIVERTING SIGMOID COLOSTOMY. ROOM 179 performed by Glo Moritz, MD at [address] [facility] N/A 7/27/2022    COLONOSCOPY performed by Glo Moritz, MD at [address]  8/11/2014    LUMBAR    THYROIDECTOMY, PARTIAL  1962         Current Outpatient Medications on File Prior to Visit   Medication Sig Dispense Refill    ciprofloxacin (CIPRO) 500 MG tablet Take 500 mg by mouth in the morning and 500 mg before bedtime. Indications: Preventative Treatment, post surgical.      metroNIDAZOLE (FLAGYL) 250 MG tablet Take 250 mg by mouth in the morning and 250 mg before bedtime. Indications: Preventative Treatment, post op fistula. oxyCODONE-acetaminophen (PERCOCET) 5-325 MG per tablet Take 1 tablet by mouth every 4 hours as needed for Pain.      digoxin (LANOXIN) 250 MCG tablet Take 1 tablet by mouth in the morning. (Patient taking differently: Take 250 mcg by mouth in the morning. Indications: Atrial Fibrillation.) 30 tablet 3    midodrine (PROAMATINE) 5 MG tablet Take 1 tablet by mouth in the morning and 1 tablet at noon and 1 tablet in the evening. Take with meals. (Patient taking differently: Take 5 mg by mouth in the morning and 5 mg before bedtime.  Indications: Disorder of Low Blood Pressure.) 90 tablet 3    ferrous gluconate (FERGON) 324 (38 Fe) MG tablet Take 1 tablet by mouth daily (with breakfast) (Patient taking differently: Take 324 mg by mouth daily (with breakfast) Indications: Nutritional Support) 30 tablet 3    folic acid (FOLVITE) 1 MG tablet Take 1 tablet by mouth in the morning. (Patient taking differently: Take 1 mg by mouth in the morning. Indications: Nutritional Support.) 30 tablet 3    metoprolol succinate (TOPROL XL) 25 MG extended release tablet Take 3 tablets by mouth in the morning. (Patient taking differently: Take 75 mg by mouth in the morning. Indications: High Blood Pressure Disorder.) 30 tablet 3    [DISCONTINUED] metoprolol tartrate (LOPRESSOR) 25 MG tablet Take 1 tablet by mouth every 4 hours 60 tablet 3    magnesium oxide (MAG-OX) 400 (240 Mg) MG tablet Take 1 tablet by mouth daily (Patient taking differently: Take 400 mg by mouth in the morning. Indications: Nutritional Support.) 30 tablet 3    cyanocobalamin ([employer] VITAMIN B12) 1000 MCG tablet Take 1 tablet by mouth daily (Patient taking differently: Take 1,000 mcg by mouth in the morning. Indications: Nutritional Support.) 30 tablet 3    levothyroxine (SYNTHROID) 112 MCG tablet Take 1 tablet by mouth Daily (Patient taking differently: Take 100 mcg by mouth in the morning. Indications: Underactive Thyroid.) 30 tablet 1    albuterol sulfate  (90 Base) MCG/ACT inhaler INHALE 2 PUFFS 4 TIMES DAILY AS NEEDED 8.5 Inhaler 3    isosorbide mononitrate (IMDUR) 30 MG extended release tablet Take 1 tablet by mouth daily (Patient taking differently: Take 30 mg by mouth in the morning. Indications: High Blood Pressure Disorder.) 90 tablet 3     No current facility-administered medications on file prior to visit. Allergies   Allergen Reactions    Bee Venom     Pcn [Penicillins] Hives and Itching         No family history on file. Physical Exam:      Physical Exam  Vitals and nursing note reviewed. Constitutional:       Appearance: She is ill-appearing. HENT:      Head: Atraumatic. Nose: Nose normal.      Mouth/Throat:      Mouth: Mucous membranes are dry. Eyes:      Extraocular Movements: Extraocular movements intact. Cardiovascular:      Rate and Rhythm: Normal rate. Rhythm irregular.       Pulses: Normal pulses. Pulmonary:      Effort: Pulmonary effort is normal.      Breath sounds: No wheezing. Abdominal:      General: Bowel sounds are normal.      Palpations: Abdomen is soft. Comments: Colostomy     Musculoskeletal:         General: No swelling. Cervical back: Neck supple. No tenderness. Skin:     Findings: Bruising present. Neurological:      Mental Status: She is alert. Motor: Weakness present. not currently breastfeeding.       .   Lab Results   Component Value Date    WBC 15.5 (H) 07/30/2022    HGB 7.9 (L) 07/30/2022    HCT 24.3 (L) 07/30/2022    MCV 87.3 07/30/2022     07/30/2022     Lab Results   Component Value Date/Time     07/30/2022 06:01 AM    K 3.8 07/30/2022 06:01 AM    K 3.9 07/26/2022 04:58 AM     07/30/2022 06:01 AM    CO2 16 07/30/2022 06:01 AM    BUN 8 07/30/2022 06:01 AM    CREATININE 0.78 07/30/2022 06:01 AM    GLUCOSE 95 07/30/2022 06:01 AM    CALCIUM 7.8 07/30/2022 06:01 AM                ASSESSMENT:  Patient Active Problem List   Diagnosis    Degenerative spondylolisthesis    Lumbar stenosis with neurogenic claudication    Hypothyroidism    Hypertension    Hyperlipidemia    Bilateral carotid artery stenosis    TIA (transient ischemic attack)    Pancytopenia (HCC)    Peripheral polyneuropathy    Weight loss    Acute bronchitis    Pneumonia due to COVID-19 virus    RICARDO (acute kidney injury) ([facility] [address]    Complicated UTI (urinary tract infection)    Severe malnutrition ([facility] [address]    Rectovaginal fistula    Hospice care    Iron deficiency anemia    Occlusion and stenosis of bilateral carotid arteries    Atrophy of right kidney    Body mass index (BMI) of 20 to 24    Cardiomyopathy (HCC)    Cholelithiasis    Chronic back pain    Cobalamin deficiency    Dyslipidemia    Fatigue    Left ventricular hypertrophy    Nephrosclerosis    Osteoarthritis of knee    Postoperative hypothyroidism    Stage 3 chronic kidney disease (HCC)    Underweight    Vitamin D deficiency         PLAN:   Diagnosis Orders   1. Severe malnutrition ([address]        2. Rectovaginal fistula        3. Complicated UTI (urinary tract infection)        4. Weight loss        5. Peripheral polyneuropathy        6. Adult failure to thrive            Continue with treatment support monitor for new RVR local colostomy care following up with hospice services. Continue supportive palliation. Please note orders entered on site at facility after discussion with appropriate facility nursing/therapy/ / nutritional staff. Current longstanding medical problems and acute medical issues addressed with staff. Available data and data elements in on site paper chart reviewed and analyzed. Current external consultant notes reviewed in on site chart. Ordered laboratory testing and imaging will be reviewed when available. This patient's need for psychiatric medication has been reviewed. Will consider further adjustment and possible further evaluations by mental health services.

## 2023-06-23 NOTE — PROGRESS NOTES
Epic down time from 01:00 to 02:30. NSAID Refill Protocol - 12 Month Protocol Failed 06/23/2023 09:10 AM   Protocol Details  Patient is less than 71 years old       celebrex  Last visit: 3/17/23    Follow up visit: 11/9/2023    Last refill: 6/9/23    Okay to refill?  MD advise

## (undated) DEVICE — SKIN PREP TRAY 4 COMPARTM TRAY: Brand: MEDLINE INDUSTRIES, INC.

## (undated) DEVICE — ELECTRODE PT RET AD L9FT HI MOIST COND ADH HYDRGEL CORDED

## (undated) DEVICE — GAUZE,SPONGE,4"X4",16PLY,XRAY,STRL,LF: Brand: MEDLINE

## (undated) DEVICE — TTL1LYR 16FR10ML 100%SIL TMPST TR: Brand: MEDLINE

## (undated) DEVICE — PACK,BASIC: Brand: MEDLINE

## (undated) DEVICE — TUBE SET 96 MM 64 MM H2O PERISTALTIC STD AUX CHANNEL

## (undated) DEVICE — SUTURE PROL SZ 0 L30IN NONABSORBABLE BLU L36MM CT-1 1/2 CIR 8424H

## (undated) DEVICE — LABEL MED MINI W/ MARKER

## (undated) DEVICE — BRUSH ENDO CLN L90.5IN SHTH DIA1.7MM BRIST DIA5-7MM 2-6MM

## (undated) DEVICE — COUNTER NDL 40 COUNT HLD 70 FOAM BLK ADH W/ MAG

## (undated) DEVICE — SUTURE PERMA-HAND SZ 3-0 L18IN NONABSORBABLE BLK SH-1 L22MM C003D

## (undated) DEVICE — Device: Brand: ENDO SMARTCAP

## (undated) DEVICE — SYRINGE IRRIG 60ML SFT PLIABLE BLB EZ TO GRP 1 HND USE W/

## (undated) DEVICE — YANKAUER,BULB TIP,W/O VENT,RIGID,STERILE: Brand: MEDLINE

## (undated) DEVICE — YANKAUER,POOLE TIP,STERILE,50/CS: Brand: MEDLINE

## (undated) DEVICE — SUTURE ABSORBABLE BRAIDED 0 CT-1 8X27 IN UD VICRYL JJ41G

## (undated) DEVICE — SPONGE GZ W4XL4IN COT 12 PLY TYP VII WVN C FLD DSGN

## (undated) DEVICE — Z DISCONTINUED USE 2716238 PER MEDLINE STAPLER INT L40MM DIA4.7MM GRN CRV HD B FRM TECHNOLOGY DISP

## (undated) DEVICE — ADAPTER FLSH PMP FLD MGMT GI IRRIG OFP 2 DISPOSABLE

## (undated) DEVICE — TUBING, SUCTION, 1/4" X 10', STRAIGHT: Brand: MEDLINE

## (undated) DEVICE — GAUZE,SPONGE,FLUFF,6"X6.75",STRL,10/TRAY: Brand: MEDLINE

## (undated) DEVICE — SPONGE,LAP,18"X18",DLX,XR,ST,5/PK,40/PK: Brand: MEDLINE

## (undated) DEVICE — DRAPE, LAVH, STERILE: Brand: MEDLINE

## (undated) DEVICE — GLOVE ORANGE PI 7 1/2   MSG9075

## (undated) DEVICE — GLOVE ORANGE PI 8   MSG9080

## (undated) DEVICE — E-Z CLEAN, NON-STICK, PTFE COATED, ELECTROSURGICAL BLADE ELECTRODE, 6.5 INCH (16.5 CM): Brand: MEGADYNE

## (undated) DEVICE — GOWN,SIRUS,POLYRNF,BRTHSLV,XLN/XL,20/CS: Brand: MEDLINE

## (undated) DEVICE — INTENDED FOR TISSUE SEPARATION, AND OTHER PROCEDURES THAT REQUIRE A SHARP SURGICAL BLADE TO PUNCTURE OR CUT.: Brand: BARD-PARKER ® CARBON RIB-BACK BLADES

## (undated) DEVICE — SINGLE PORT MANIFOLD: Brand: NEPTUNE 2

## (undated) DEVICE — TOWEL,OR,DSP,ST,BLUE,STD,4/PK,20PK/CS: Brand: MEDLINE

## (undated) DEVICE — STAPLER INT CUT LN 40MM STPL 51MM GRN CRV HD B FRM

## (undated) DEVICE — APPLICATOR MEDICATED 26 CC SOLUTION HI LT ORNG CHLORAPREP

## (undated) DEVICE — NEPTUNE E-SEP SMOKE EVACUATION PENCIL, COATED, 70MM BLADE, PUSH BUTTON SWITCH: Brand: NEPTUNE E-SEP

## (undated) DEVICE — DRAPE,MEDI-SLUSH,STERILE: Brand: MEDLINE

## (undated) DEVICE — Device

## (undated) DEVICE — GOWN,SIRUS,POLYRNF,BRTHSLV,LG,30/CS: Brand: MEDLINE

## (undated) DEVICE — Device: Brand: SENSURA MIO

## (undated) DEVICE — TOWEL,OR,DSP,ST,GREEN,DLX,XR,4/PK,20PK/C: Brand: MEDLINE

## (undated) DEVICE — JELLY,LUBE,STERILE,FLIP TOP,TUBE,2-OZ: Brand: MEDLINE

## (undated) DEVICE — DRAPE EQUIP TRNSPRT CONTAINMENT FOR BK TAB

## (undated) DEVICE — MARKER SURG SKIN GENTIAN VLT REG TIP W/ 6IN RUL

## (undated) DEVICE — SEALER ENDOSCP NANO COAT OPN DIV CRV L JAW LIGASURE IMPACT